# Patient Record
Sex: FEMALE | Race: WHITE | NOT HISPANIC OR LATINO | Employment: OTHER | URBAN - METROPOLITAN AREA
[De-identification: names, ages, dates, MRNs, and addresses within clinical notes are randomized per-mention and may not be internally consistent; named-entity substitution may affect disease eponyms.]

---

## 2017-01-10 ENCOUNTER — RADIANT APPOINTMENT (OUTPATIENT)
Dept: GENERAL RADIOLOGY | Facility: CLINIC | Age: 72
End: 2017-01-10
Attending: PHYSICIAN ASSISTANT
Payer: COMMERCIAL

## 2017-01-10 ENCOUNTER — OFFICE VISIT (OUTPATIENT)
Dept: FAMILY MEDICINE | Facility: CLINIC | Age: 72
End: 2017-01-10
Payer: COMMERCIAL

## 2017-01-10 ENCOUNTER — TELEPHONE (OUTPATIENT)
Dept: FAMILY MEDICINE | Facility: CLINIC | Age: 72
End: 2017-01-10

## 2017-01-10 VITALS
SYSTOLIC BLOOD PRESSURE: 138 MMHG | DIASTOLIC BLOOD PRESSURE: 72 MMHG | TEMPERATURE: 97.1 F | HEART RATE: 79 BPM | WEIGHT: 209 LBS | HEIGHT: 67 IN | BODY MASS INDEX: 32.8 KG/M2 | OXYGEN SATURATION: 98 %

## 2017-01-10 DIAGNOSIS — M79.672 LEFT FOOT PAIN: ICD-10-CM

## 2017-01-10 DIAGNOSIS — S40.021A HEMATOMA OF ARM, RIGHT, INITIAL ENCOUNTER: ICD-10-CM

## 2017-01-10 DIAGNOSIS — E11.9 TYPE 2 DIABETES MELLITUS WITHOUT COMPLICATION, WITHOUT LONG-TERM CURRENT USE OF INSULIN (H): ICD-10-CM

## 2017-01-10 DIAGNOSIS — M79.672 LEFT FOOT PAIN: Primary | ICD-10-CM

## 2017-01-10 LAB — HBA1C MFR BLD: 8.3 % (ref 4.3–6)

## 2017-01-10 PROCEDURE — 83036 HEMOGLOBIN GLYCOSYLATED A1C: CPT | Performed by: PHYSICIAN ASSISTANT

## 2017-01-10 PROCEDURE — 99214 OFFICE O/P EST MOD 30 MIN: CPT | Performed by: PHYSICIAN ASSISTANT

## 2017-01-10 PROCEDURE — 36416 COLLJ CAPILLARY BLOOD SPEC: CPT | Performed by: PHYSICIAN ASSISTANT

## 2017-01-10 PROCEDURE — 73630 X-RAY EXAM OF FOOT: CPT | Mod: LT

## 2017-01-10 NOTE — MR AVS SNAPSHOT
"              After Visit Summary   1/10/2017    Haritha Cat    MRN: [id number]           Patient Information     Date Of Birth          1945        Visit Information        Provider Department      1/10/2017 11:00 AM Amisha Plaza PA-C [facility]        Today's Diagnoses     Hematoma of arm, right, initial encounter    -  1     Left foot pain         Type 2 diabetes mellitus without complication, without long-term current use of insulin (H)            Follow-ups after your visit        Who to contact     If you have questions or need follow up information about today's clinic visit or your schedule please contact [facility] directly at [phone].  Normal or non-critical lab and imaging results will be communicated to you by VideoMining[employer], letter or phone within 4 business days after the clinic has received the results. If you do not hear from us within 7 days, please contact the clinic through VideoMininghart or phone. If you have a critical or abnormal lab result, we will notify you by phone as soon as possible.  Submit refill requests through [employer] or call your pharmacy and they will forward the refill request to us. Please allow 3 business days for your refill to be completed.          Additional Information About Your Visit        MyChart Information     [employer] gives you secure access to your electronic health record. If you see a primary care provider, you can also send messages to your care team and make appointments. If you have questions, please call your primary care clinic.  If you do not have a primary care provider, please call [phone] and they will assist you.        Care EveryWhere ID     This is your Care EveryWhere ID. This could be used by other organizations to access your [place] medical records  [id number]        Your Vitals Were     Pulse Temperature Height BMI (Body Mass Index) Pulse Oximetry Breastfeeding?    79 97.1  F (36.2  C) (Oral) 5' 6.5\" (1.689 m) " 33.23 kg/m2 98% No       Blood Pressure from Last 3 Encounters:   01/10/17 138/72   12/09/16 140/76   10/18/16 140/72    Weight from Last 3 Encounters:   01/10/17 209 lb (94.802 kg)   12/09/16 209 lb 12.8 oz (95.165 kg)   08/25/16 203 lb (92.08 kg)              We Performed the Following     Hemoglobin A1c          Today's Medication Changes          These changes are accurate as of: 1/10/17 12:00 PM.  If you have any questions, ask your nurse or doctor.               These medicines have changed or have updated prescriptions.        Dose/Directions    * metFORMIN 500 MG 24 hr tablet   Commonly known as:  GLUCOPHAGE-XR   This may have changed:  Another medication with the same name was added. Make sure you understand how and when to take each.   Used for:  Type 2 diabetes mellitus without complication, unspecified long term insulin use status (H)   Changed by:  Amisha Plaza PA-C        TAKE ONE TABLET BY MOUTH TWICE DAILY WITH A MEAL   Quantity:  60 tablet   Refills:  3       * metFORMIN 500 MG tablet   Commonly known as:  GLUCOPHAGE   This may have changed:  You were already taking a medication with the same name, and this prescription was added. Make sure you understand how and when to take each.   Used for:  Type 2 diabetes mellitus without complication, without long-term current use of insulin (H)   Changed by:  Amisha Plaza PA-C        Dose:  1000 mg   Take 2 tablets (1,000 mg) by mouth 2 times daily (with meals)   Quantity:  360 tablet   Refills:  1       * Notice:  This list has 2 medication(s) that are the same as other medications prescribed for you. Read the directions carefully, and ask your doctor or other care provider to review them with you.         Where to get your medicines      These medications were sent to [facility] [address] [facility] - [place] - [address]  [address] [address]     Phone:  [phone]    - metFORMIN 500 MG tablet             Primary Care Provider Office  Phone # Fax #    Amisha Plaza PA-C [phone]       [facility] [address] [facility] [address]  [facility] [address]        Thank you!     Thank you for choosing [facility]  for your care. Our goal is always to provide you with excellent care. Hearing back from our patients is one way we can continue to improve our services. Please take a few minutes to complete the written survey that you may receive in the mail after your visit with us. Thank you!             Your Updated Medication List - Protect others around you: Learn how to safely use, store and throw away your medicines at www.disposemymeds.org.          This list is accurate as of: 1/10/17 12:00 PM.  Always use your most recent med list.                   Brand Name Dispense Instructions for use    aspirin 81 MG tablet      Take  by mouth daily.       losartan-hydrochlorothiazide 100-25 MG per tablet    HYZAAR    90 tablet    Take 1 tablet by mouth daily       METAMUCIL SMOOTH TEXTURE 63 % Powd   Generic drug:  psyllium      Take 1 teaspoonful by mouth daily. Mix in 8 ounces of water       * metFORMIN 500 MG 24 hr tablet    GLUCOPHAGE-XR    60 tablet    TAKE ONE TABLET BY MOUTH TWICE DAILY WITH A MEAL       * metFORMIN 500 MG tablet    GLUCOPHAGE    360 tablet    Take 2 tablets (1,000 mg) by mouth 2 times daily (with meals)       metoprolol 100 MG 24 hr tablet    TOPROL-XL    135 tablet    TAKE 1.5 TABLETS BY MOUTH DAILY       naproxen sodium 220 MG tablet    ANAPROX    180 tablet    Take 1 tablet by mouth daily.       omega-3 fatty acids 1200 MG capsule     90 capsule    Take 2 capsules by mouth daily.       STATIN NOT PRESCRIBED (INTENTIONAL)      Statin not prescribed intentionally due to Other LDL at goal; pt doesn't want to add another medication (This option does not exclude patient from measure)       VIACTIV MULTI-VITAMIN Chew      Take 1 chew tab by mouth daily.       * Notice:  This list has 2 medication(s) that are  the same as other medications prescribed for you. Read the directions carefully, and ask your doctor or other care provider to review them with you.

## 2017-01-10 NOTE — PROGRESS NOTES
HPI: 70 yo female here with L foot pain following a fall she took on 1/8/17  She was carrying a big load down her stairs in her home in slippers and slipped on the last step   Pain is across the L forefoot particularly in the L great toe and she has bleeding noted at the base of her great toenail  She also hit the R forearm which is bruised and swollen  She hit the R knee and shin as well and has bruising but normal function and this not affecting her ability to walk like her toe pain is  She had to get a cane and walks with only pressure on the L heel since so much pain in the L forefoot  She is diabetic and due to A1c today.   She has a trip in a few months to hoping to work on diet to lose weight    Past Medical History   Diagnosis Date     DM type 2 (diabetes mellitus, type 2) (H)      Osteopenia      DXA 2008     Abscess of abdominal cavity (H) 11/02     cecal perf, prob from diverticulitis     GERD (gastroesophageal reflux disease)      Hepatitis A      Diverticulosis      Herpes zoster 11/28/11     Elevated LFTs      fatty liver dz     Allergic rhinitis      HTN (hypertension), benign      Osteopenia 2014     Colon polyp 2015     colonoscopy due 5 years.     Past Surgical History   Procedure Laterality Date     Bauxite teeth  age 27     Varicose vein surgery       Dr. Bowen     Social History   Substance Use Topics     Smoking status: Never Smoker      Smokeless tobacco: Never Used     Alcohol Use: 0.0 oz/week     0 Standard drinks or equivalent per week      Comment: 0-3 drinks per week     Current Outpatient Prescriptions   Medication Sig Dispense Refill     metFORMIN (GLUCOPHAGE) 500 MG tablet Take 2 tablets (1,000 mg) by mouth 2 times daily (with meals) 360 tablet 1     order for DME Equipment being ordered: Post op shoe Large 1 each 0     metFORMIN (GLUCOPHAGE-XR) 500 MG 24 hr tablet TAKE ONE TABLET BY MOUTH TWICE DAILY WITH A MEAL 60 tablet 3     losartan-hydrochlorothiazide (HYZAAR) 100-25 MG per  "tablet Take 1 tablet by mouth daily 90 tablet 1     metoprolol (TOPROL-XL) 100 MG 24 hr tablet TAKE 1.5 TABLETS BY MOUTH DAILY 135 tablet 1     STATIN NOT PRESCRIBED, INTENTIONAL, Statin not prescribed intentionally due to Other LDL at goal; pt doesn't want to add another medication (This option does not exclude patient from measure)  0     omega-3 fatty acids 1200 MG capsule Take 2 capsules by mouth daily. 90 capsule      aspirin 81 MG tablet Take  by mouth daily.       naproxen sodium (ANAPROX) 220 MG tablet Take 1 tablet by mouth daily. 180 tablet 3     psyllium (METAMUCIL SMOOTH TEXTURE) 63 % POWD Take 1 teaspoonful by mouth daily. Mix in 8 ounces of water       Multiple Vitamins-Calcium (VIACTIV MULTI-VITAMIN) CHEW Take 1 chew tab by mouth daily.       Allergies   Allergen Reactions     Amoxicillin Hives     Lisinopril      cough     Lotrel Hives       PHYSICAL EXAM:    /72 mmHg  Pulse 79  Temp(Src) 97.1  F (36.2  C) (Oral)  Ht 5' 6.5\" (1.689 m)  Wt 209 lb (94.802 kg)  BMI 33.23 kg/m2  SpO2 98%  Breastfeeding? No    Patient appears non toxic  R forearm with palpable hematoma and large eccymosis.  Small eccymosis R knee and shin  L foot: there is edema and erythema of the forefoot with sig tenderness across the forefoot and in her great toe  Toenail sensitive and partially broken off.  She will likely lose this toenail  No sign of infection or purulent drainage.    Foot xray read by radiology as not fracture, but I question if there is a fx of the great toe   Pt had L foot placed in hard shoe which did give her more comfort as she is not able to wear a normal shoe with the pain and swelling she has    Results for orders placed or performed in visit on 01/10/17   Hemoglobin A1c   Result Value Ref Range    Hemoglobin A1C 8.3 (H) 4.3 - 6.0 %         Assessment and Plan:     (M79.841) Left foot pain  (primary encounter diagnosis)  Comment: ? fx of the great toe? Pt place in hard shoe. She has a cane she " can use as well. She can take otc medications for pain, ice, rest, elevation.  She is at risk of infection given the injury to the great toenail lindsey as a diabetic so asked to f/u right away if develops any purulent drainage, increased redness or swelling or pain  Plan: XR Foot Left G/E 3 Views, order for DME            (S40.021A) Hematoma of arm, right, initial encounter  Comment:   Plan: reassured this likely to resolve.    (E11.9) Type 2 diabetes mellitus without complication, without long-term current use of insulin (H)  Comment: A1c not at goal so doubled her metformin from 500mg bid to 1000mg bid. F/u A1c in 3 months.  Plan: Hemoglobin A1c, metFORMIN (GLUCOPHAGE) 500 MG         tablet              Amisha Plaza PA-C

## 2017-01-10 NOTE — NURSING NOTE
"Chief Complaint   Patient presents with     Fall     Sunday 1-8-17 slipped on inside steps while wearing socks Left  Foot bruised and partial toe nail torn off , Right arm bruised and lump, right knee bruised and painful.       Initial /72 mmHg  Pulse 79  Temp(Src) 97.1  F (36.2  C) (Oral)  Ht 5' 6.5\" (1.689 m)  Wt 209 lb (94.802 kg)  BMI 33.23 kg/m2  SpO2 98%  Breastfeeding? No Estimated body mass index is 33.23 kg/(m^2) as calculated from the following:    Height as of this encounter: 5' 6.5\" (1.689 m).    Weight as of this encounter: 209 lb (94.802 kg).  BP completed using cuff size: large MANUAL     "

## 2017-01-10 NOTE — TELEPHONE ENCOUNTER
Reason for Call:  Form, our goal is to have forms completed with 72 hours, however, some forms may require a visit or additional information.    Type of letter, form or note:  handicap    Who is the form from?: Patient    Where did the form come from: n/a    What clinic location was the form placed at?: [facility]    Where the form was placed: will     What number is listed as a contact on the form?: [phone]        Additional comments: pt came into to clinic and states needs temp handi cap sticker.. If poss. Can it be mailed to here?     Call taken on 1/10/2017 at 12:22 PM by Tarsha Allen

## 2017-01-11 NOTE — TELEPHONE ENCOUNTER
I don't know that she needs this for broken toe  I suspect she will start to feel better pretty quickly so probably not worth going through the paperwork

## 2017-01-11 NOTE — TELEPHONE ENCOUNTER
Per provider, LVM for patient that handicap parking would not be needed for a broken toe that should heal quickly. BROOKLYN Hernandez CMA January 11, 2017 1:44 PM

## 2017-01-11 NOTE — PROGRESS NOTES
Quick Note:    Pat,     The radiologist read your xrays as not fracture, but I'm still suspicious of a possible fracture in the big toe.  Wearing the hard shoe will help either way.  It is also important to continue to ice your foot for at least 20minutes 4 times per day.  Keep the leg elevated and rest the foot as much as possible to speed the healing process along.  Soak the foot in Epson salts daily to prevent infection.    Let me know if you are not improving over the next week or so.     Amisha Plaza PA-C    ______

## 2017-01-18 ENCOUNTER — TELEPHONE (OUTPATIENT)
Dept: FAMILY MEDICINE | Facility: CLINIC | Age: 72
End: 2017-01-18

## 2017-01-18 NOTE — TELEPHONE ENCOUNTER
Reason for call:  Same day appointment Yellow Sheet  Symptom or request: saw Amisha Plaza on 1/10 Hematoma on right arm is rock hard now, red around the area and warm to touch and very painful, foot is doing good, patient was told to call if any change patient  Wonders if Amisha would want to see her today lives [place]minutes from clinic    Duration (how long have symptoms been present): 2weeks    Have you been treated for this before? Yes    Additional comments:     Phone Number patient can be reached at:  Home number on file [phone] (home)    Best Time:  today    Can we leave a detailed message on this number:  YES    Call taken on 1/18/2017 at 8:12 AM by Tessie Cox

## 2017-03-15 DIAGNOSIS — I10 HTN (HYPERTENSION), BENIGN: ICD-10-CM

## 2017-03-15 RX ORDER — METOPROLOL SUCCINATE 100 MG/1
TABLET, EXTENDED RELEASE ORAL
Qty: 135 TABLET | Refills: 2 | Status: SHIPPED | OUTPATIENT
Start: 2017-03-15 | End: 2017-11-15

## 2017-03-15 NOTE — TELEPHONE ENCOUNTER
Metoprolol        Last Written Prescription Date: 09/13/16  Last Fill Quantity: 135, # refills: 1    Last Office Visit with G, P or [facility] prescribing provider:  01/10/17   Future Office Visit:        BP Readings from Last 3 Encounters:   01/10/17 138/72   12/09/16 140/76   10/18/16 140/72     Paulette Armenta MA

## 2017-05-07 DIAGNOSIS — I10 HYPERTENSION: ICD-10-CM

## 2017-05-08 RX ORDER — LOSARTAN POTASSIUM AND HYDROCHLOROTHIAZIDE 25; 100 MG/1; MG/1
TABLET ORAL
Qty: 90 TABLET | Refills: 1 | Status: SHIPPED | OUTPATIENT
Start: 2017-05-08 | End: 2017-07-13

## 2017-05-08 NOTE — TELEPHONE ENCOUNTER
losartan-hydrochlorothiazide (HYZAAR) 100-25 MG per tablet        Last Written Prescription Date: 11/7/2016  Last Fill Quantity: 90, # refills: 1  Last Office Visit with [facility], [facility] or [facility] prescribing provider: 1/10/2017       Potassium   Date Value Ref Range Status   08/19/2016 3.9 3.4 - 5.3 mmol/L Final     Creatinine   Date Value Ref Range Status   08/19/2016 0.83 0.52 - 1.04 mg/dL Final     BP Readings from Last 3 Encounters:   01/10/17 138/72   12/09/16 140/76   10/18/16 140/72

## 2017-07-11 ENCOUNTER — OFFICE VISIT (OUTPATIENT)
Dept: FAMILY MEDICINE | Facility: CLINIC | Age: 72
End: 2017-07-11
Payer: COMMERCIAL

## 2017-07-11 VITALS
HEART RATE: 79 BPM | OXYGEN SATURATION: 97 % | TEMPERATURE: 98 F | WEIGHT: 198.8 LBS | HEIGHT: 67 IN | BODY MASS INDEX: 31.2 KG/M2 | SYSTOLIC BLOOD PRESSURE: 120 MMHG | DIASTOLIC BLOOD PRESSURE: 72 MMHG

## 2017-07-11 DIAGNOSIS — F40.298 NEEDLE PHOBIA: ICD-10-CM

## 2017-07-11 DIAGNOSIS — R10.31 BILATERAL LOWER ABDOMINAL CRAMPING: ICD-10-CM

## 2017-07-11 DIAGNOSIS — R50.9 FEVER, UNSPECIFIED: ICD-10-CM

## 2017-07-11 DIAGNOSIS — R55 VASOVAGAL SYNCOPE: ICD-10-CM

## 2017-07-11 DIAGNOSIS — F43.9 SITUATIONAL STRESS: ICD-10-CM

## 2017-07-11 DIAGNOSIS — I10 HTN (HYPERTENSION), BENIGN: ICD-10-CM

## 2017-07-11 DIAGNOSIS — R53.83 FATIGUE, UNSPECIFIED TYPE: ICD-10-CM

## 2017-07-11 DIAGNOSIS — E11.9 TYPE 2 DIABETES MELLITUS WITHOUT COMPLICATION, WITHOUT LONG-TERM CURRENT USE OF INSULIN (H): Primary | ICD-10-CM

## 2017-07-11 DIAGNOSIS — R10.32 BILATERAL LOWER ABDOMINAL CRAMPING: ICD-10-CM

## 2017-07-11 LAB
ANION GAP SERPL CALCULATED.3IONS-SCNC: 12 MMOL/L (ref 3–14)
BUN SERPL-MCNC: 13 MG/DL (ref 7–30)
CALCIUM SERPL-MCNC: 9.4 MG/DL (ref 8.5–10.1)
CHLORIDE SERPL-SCNC: 90 MMOL/L (ref 94–109)
CO2 SERPL-SCNC: 23 MMOL/L (ref 20–32)
CREAT SERPL-MCNC: 0.86 MG/DL (ref 0.52–1.04)
ERYTHROCYTE [DISTWIDTH] IN BLOOD BY AUTOMATED COUNT: 11.9 % (ref 10–15)
GFR SERPL CREATININE-BSD FRML MDRD: 65 ML/MIN/1.7M2
GLUCOSE SERPL-MCNC: 163 MG/DL (ref 70–99)
HBA1C MFR BLD: 7.2 % (ref 4.3–6)
HCT VFR BLD AUTO: 43.6 % (ref 35–47)
HGB BLD-MCNC: 15.2 G/DL (ref 11.7–15.7)
MCH RBC QN AUTO: 31 PG (ref 26.5–33)
MCHC RBC AUTO-ENTMCNC: 34.9 G/DL (ref 31.5–36.5)
MCV RBC AUTO: 89 FL (ref 78–100)
PLATELET # BLD AUTO: 151 10E9/L (ref 150–450)
POTASSIUM SERPL-SCNC: 4 MMOL/L (ref 3.4–5.3)
RBC # BLD AUTO: 4.91 10E12/L (ref 3.8–5.2)
SODIUM SERPL-SCNC: 125 MMOL/L (ref 133–144)
TSH SERPL DL<=0.005 MIU/L-ACNC: 3.15 MU/L (ref 0.4–4)
WBC # BLD AUTO: 5.1 10E9/L (ref 4–11)

## 2017-07-11 PROCEDURE — 86618 LYME DISEASE ANTIBODY: CPT | Performed by: PHYSICIAN ASSISTANT

## 2017-07-11 PROCEDURE — 85027 COMPLETE CBC AUTOMATED: CPT | Performed by: PHYSICIAN ASSISTANT

## 2017-07-11 PROCEDURE — 80048 BASIC METABOLIC PNL TOTAL CA: CPT | Performed by: PHYSICIAN ASSISTANT

## 2017-07-11 PROCEDURE — 99214 OFFICE O/P EST MOD 30 MIN: CPT | Performed by: PHYSICIAN ASSISTANT

## 2017-07-11 PROCEDURE — 36415 COLL VENOUS BLD VENIPUNCTURE: CPT | Performed by: PHYSICIAN ASSISTANT

## 2017-07-11 PROCEDURE — 83036 HEMOGLOBIN GLYCOSYLATED A1C: CPT | Performed by: PHYSICIAN ASSISTANT

## 2017-07-11 PROCEDURE — 84443 ASSAY THYROID STIM HORMONE: CPT | Performed by: PHYSICIAN ASSISTANT

## 2017-07-11 NOTE — PROGRESS NOTES
Pat,      Your sodium is very low.  Stop the hyzaar. Avoid drinking plain water, start drinking Gatorade or Pedialyte to correct this.  Return on Thursday to have this rechecked. I will see you at 2pm for a follow up appointment that day.  Your thyroid test was normal.    Amisha Plaza PA-C

## 2017-07-11 NOTE — PROGRESS NOTES
HPI: 73 yo female here with loss of appetite, feels tired, HA behind her eyes  She is needing to take naps in the mornings.  She is diabetic and has not been checking her blood sugars.  Her 43 yo son had a seizure in June and brain bleed and now she is helping to take care of him  His kids come every other weekend  He is able to walk and talk but can't work or drive  She has post nasal drip and runny nose. The mucus is clear.  This started in the past few weeks.  She has cough as well x few weeks prod of thick sputum  Denies sick contacts  Not sneezing, no sore throat  She had a temp yesterday 101.  Denies  sxs  Has some lower abd pain; crampy, BMs normal, not loose and no blood      Past Medical History:   Diagnosis Date     Abscess of abdominal cavity (H) 11/02    cecal perf, prob from diverticulitis     Allergic rhinitis      Colon polyp 2015    colonoscopy due 5 years.     Diverticulosis      DM type 2 (diabetes mellitus, type 2) (H)      Elevated LFTs     fatty liver dz     GERD (gastroesophageal reflux disease)      Hepatitis A      Herpes zoster 11/28/11     HTN (hypertension), benign      Osteopenia     DXA 2008     Osteopenia 2014     Past Surgical History:   Procedure Laterality Date     varicose vein surgery      Dr. Bowen     wisdom teeth  age 27     Social History   Substance Use Topics     Smoking status: Never Smoker     Smokeless tobacco: Never Used     Alcohol use 0.0 oz/week     0 Standard drinks or equivalent per week      Comment: 0-3 drinks per week     Current Outpatient Prescriptions   Medication Sig Dispense Refill     losartan-hydrochlorothiazide (HYZAAR) 100-25 MG per tablet TAKE ONE TABLET BY MOUTH ONE TIME DAILY 90 tablet 1     metoprolol (TOPROL-XL) 100 MG 24 hr tablet TAKE 1.5 TABLETS BY MOUTH DAILY 135 tablet 2     metFORMIN (GLUCOPHAGE) 500 MG tablet Take 2 tablets (1,000 mg) by mouth 2 times daily (with meals) 360 tablet 1     order for DME Equipment being ordered: Post op shoe  "Large 1 each 0     STATIN NOT PRESCRIBED, INTENTIONAL, Statin not prescribed intentionally due to Other LDL at goal; pt doesn't want to add another medication (This option does not exclude patient from measure)  0     omega-3 fatty acids 1200 MG capsule Take 2 capsules by mouth daily. 90 capsule      aspirin 81 MG tablet Take  by mouth daily.       naproxen sodium (ANAPROX) 220 MG tablet Take 1 tablet by mouth daily. 180 tablet 3     psyllium (METAMUCIL SMOOTH TEXTURE) 63 % POWD Take 1 teaspoonful by mouth daily. Mix in 8 ounces of water       Multiple Vitamins-Calcium (VIACTIV MULTI-VITAMIN) CHEW Take 1 chew tab by mouth daily.       [DISCONTINUED] metFORMIN (GLUCOPHAGE-XR) 500 MG 24 hr tablet TAKE ONE TABLET BY MOUTH TWICE DAILY WITH A MEAL 60 tablet 3     [DISCONTINUED] losartan-hydrochlorothiazide (HYZAAR) 100-25 MG per tablet Take 1 tablet by mouth daily 90 tablet 1     Allergies   Allergen Reactions     Amoxicillin Hives     Lisinopril      cough     Lotrel Hives     FAMILY HISTORY NOTED AND REVIEWED  PHYSICAL EXAM:    /72 (BP Location: Right arm, Patient Position: Chair, Cuff Size: Adult Large)  Pulse 79  Temp 98  F (36.7  C) (Oral)  Ht 5' 6.5\" (1.689 m)  Wt 198 lb 12.8 oz (90.2 kg)  SpO2 97%  BMI 31.61 kg/m2    EXAM:  GENERAL APPEARANCE: tired  EYES: Eyes grossly normal to inspection, conjunctivae and sclerae normal  HENT: ear canals and TM's normal and nose and mouth without ulcers or lesions  NECK: no adenopathy, no asymmetry, masses, or scars and thyroid normal to palpation  RESP: lungs clear to auscultation - no rales, rhonchi or wheezes  CV: regular rates and rhythm, normal S1 S2, no S3 or S4 and no murmur, click or rub  SKIN: no suspicious lesions or rashes    Results for orders placed or performed in visit on 07/11/17   HEMOGLOBIN A1C   Result Value Ref Range    Hemoglobin A1C 7.2 (H) 4.3 - 6.0 %   CBC with platelets   Result Value Ref Range    WBC 5.1 4.0 - 11.0 10e9/L    RBC Count 4.91 " 3.8 - 5.2 10e12/L    Hemoglobin 15.2 11.7 - 15.7 g/dL    Hematocrit 43.6 35.0 - 47.0 %    MCV 89 78 - 100 fl    MCH 31.0 26.5 - 33.0 pg    MCHC 34.9 31.5 - 36.5 g/dL    RDW 11.9 10.0 - 15.0 %    Platelet Count 151 150 - 450 10e9/L         Assessment and Plan:     (E11.9) Type 2 diabetes mellitus without complication, without long-term current use of insulin (H)  (primary encounter diagnosis)  Comment:   Plan: HEMOGLOBIN A1C        Improved.    (R53.83) Fatigue, unspecified type  Comment: ? With fever and sinus sxs may have sinus infection. Will await labs, but if normal, consider course of antibiotics.  For now start Allegra 180mg qd.  Plan: TSH with free T4 reflex            (F43.9) Situational stress  Comment:   Plan: son with TBI following head injury after seizure.    (I10) HTN (hypertension), benign  Comment: well controlled  Plan: Basic metabolic panel            (R10.31,  R10.32) Bilateral lower abdominal cramping  Comment:   Plan: *UA reflex to Microscopic, CANCELED: *UA reflex        to Microscopic            (R50.9) Fever, unspecified  Comment:   Plan: CBC with platelets, Lyme Disease Maye with         reflex to WB Serum            (R55) Vasovagal syncope  Comment: following her blood draw today  Plan: Pt allowed to rest in pt room and feels well enough to go home    (F40.298) Needle phobia  Comment:   Plan: next time should stay in pt room and be supine for blood draw.      Amisha Plaza PA-C

## 2017-07-11 NOTE — MR AVS SNAPSHOT
After Visit Summary   7/11/2017    Haritha Cat    MRN: [id number]           Patient Information     Date Of Birth          1945        Visit Information        Provider Department      7/11/2017 12:00 PM Amisha Plaza PA-C [facility]        Today's Diagnoses     Type 2 diabetes mellitus without complication, without long-term current use of insulin (H)    -  1    Fatigue, unspecified type        Situational stress        HTN (hypertension), benign        Bilateral lower abdominal cramping        Fever, unspecified          Care Instructions      Preventive Health Recommendations    Female Ages 65 +    Yearly exam:     See your health care provider every year in order to  o Review health changes.   o Discuss preventive care.    o Review your medicines if your doctor has prescribed any.      You no longer need a yearly Pap test unless you've had an abnormal Pap test in the past 10 years. If you have vaginal symptoms, such as bleeding or discharge, be sure to talk with your provider about a Pap test.      Every 1 to 2 years, have a mammogram.  If you are over 69, talk with your health care provider about whether or not you want to continue having screening mammograms.      Every 10 years, have a colonoscopy. Or, have a yearly FIT test (stool test). These exams will check for colon cancer.       Have a cholesterol test every 5 years, or more often if your doctor advises it.       Have a diabetes test (fasting glucose) every three years. If you are at risk for diabetes, you should have this test more often.       At age 65, have a bone density scan (DEXA) to check for osteoporosis (brittle bone disease).    Shots:    Get a flu shot each year.    Get a tetanus shot every 10 years.    Talk to your doctor about your pneumonia vaccines. There are now two you should receive - Pneumovax (PPSV 23) and Prevnar (PCV 13).    Talk to your doctor about the shingles vaccine.    Talk to your  doctor about the hepatitis B vaccine.    Nutrition:     Eat at least 5 servings of fruits and vegetables each day.      Eat whole-grain bread, whole-wheat pasta and brown rice instead of white grains and rice.      Talk to your provider about Calcium and Vitamin D.     Lifestyle    Exercise at least 150 minutes a week (30 minutes a day, 5 days a week). This will help you control your weight and prevent disease.      Limit alcohol to one drink per day.      No smoking.       Wear sunscreen to prevent skin cancer.       See your dentist twice a year for an exam and cleaning.      See your eye doctor every 1 to 2 years to screen for conditions such as glaucoma, macular degeneration and cataracts.    Start Allegra 180mg daily          Follow-ups after your visit        Your next 10 appointments already scheduled     Sep 07, 2017 11:00 AM CDT   LAB with CS LAB   [facility]    [address] [id number] [phone]           Patient must bring picture ID.  Patient should be prepared to give a urine specimen  Please do not eat 10-12 hours before your appointment if you are coming in fasting for labs on lipids, cholesterol, or glucose (sugar).  Pregnant women should follow their Care Team instructions. Water with medications is okay. Do not drink coffee or other fluids.   If you have concerns about taking  your medications, please ask at office or if scheduling via [facility], send a message by clicking on Secure Messaging, Message Your Care Team.            Sep 07, 2017 12:00 PM CDT   [facility]-Op physical with Amisha Plaza [facility]-C   [facility]    [address] [id number] [phone]            Sep 14, 2017 12:00 PM CDT   PHYSICAL with Amisha Plaza PA-C   [facility]    [address] [id number] [phone]              Who to contact     If you have  "questions or need follow up information about today's clinic visit or your schedule please contact [facility] directly at [phone].  Normal or non-critical lab and imaging results will be communicated to you by MyChart, letter or phone within 4 business days after the clinic has received the results. If you do not hear from us within 7 days, please contact the clinic through [facility] or phone. If you have a critical or abnormal lab result, we will notify you by phone as soon as possible.  Submit refill requests through [email or web address] or call your pharmacy and they will forward the refill request to us. Please allow 3 business days for your refill to be completed.          Additional Information About Your Visit        [facility] Information     [email or web address] gives you secure access to your electronic health record. If you see a primary care provider, you can also send messages to your care team and make appointments. If you have questions, please call your primary care clinic.  If you do not have a primary care provider, please call [phone] and they will assist you.        Care EveryWhere ID     This is your Care EveryWhere ID. This could be used by other organizations to access your [place] medical records  [id number]        Your Vitals Were     Pulse Temperature Height Pulse Oximetry BMI (Body Mass Index)       79 98  F (36.7  C) (Oral) 5' 6.5\" (1.689 m) 97% 31.61 kg/m2        Blood Pressure from Last 3 Encounters:   07/11/17 120/72   01/10/17 138/72   12/09/16 140/76    Weight from Last 3 Encounters:   07/11/17 198 lb 12.8 oz (90.2 kg)   01/10/17 209 lb (94.8 kg)   12/09/16 209 lb 12.8 oz (95.2 kg)              We Performed the Following     Basic metabolic panel     CBC with platelets     HEMOGLOBIN A1C     Lyme Disease Maye with reflex to WB Serum     TSH with free T4 reflex          Today's Medication Changes          These changes are accurate as of: 7/11/17 12:24 PM.  If you have any questions, ask " your nurse or doctor.               These medicines have changed or have updated prescriptions.        Dose/Directions    losartan-hydrochlorothiazide 100-25 MG per tablet   Commonly known as:  HYZAAR   This may have changed:  Another medication with the same name was removed. Continue taking this medication, and follow the directions you see here.   Used for:  Hypertension   Changed by:  Amisha Plaza PA-C        TAKE ONE TABLET BY MOUTH ONE TIME DAILY   Quantity:  90 tablet   Refills:  1       metFORMIN 500 MG tablet   Commonly known as:  GLUCOPHAGE   This may have changed:  Another medication with the same name was removed. Continue taking this medication, and follow the directions you see here.   Used for:  Type 2 diabetes mellitus without complication, without long-term current use of insulin (H)   Changed by:  Amisha Plaza PA-C        Dose:  1000 mg   Take 2 tablets (1,000 mg) by mouth 2 times daily (with meals)   Quantity:  360 tablet   Refills:  1                Primary Care Provider Office Phone # Fax #    Amisha Plaza PA-C [phone]       [facility] [address]  [facility] [place] [address]        Equal Access to Services     SAGAR LUCIO : Hadii cheri alegre Somitzi, waaxda luyanni, qaybta kaalmartha lozada, evin miranda . So [facility] [phone].    ATENCIÓN: Si habla español, tiene a law disposición servicios gratuitos de asistencia lingüística. [facility] [phone].    We comply with applicable federal civil rights laws and [place] laws. We do not discriminate on the basis of race, color, national origin, age, disability sex, sexual orientation or gender identity.            Thank you!     Thank you for choosing [facility]  for your care. Our goal is always to provide you with excellent care. Hearing back from our patients is one way we can continue to improve our services. Please take a few minutes to complete the  written survey that you may receive in the mail after your visit with us. Thank you!             Your Updated Medication List - Protect others around you: Learn how to safely use, store and throw away your medicines at www.disposemymeds.org.          This list is accurate as of: 7/11/17 12:24 PM.  Always use your most recent med list.                   Brand Name Dispense Instructions for use Diagnosis    aspirin 81 MG tablet      Take  by mouth daily.        losartan-hydrochlorothiazide 100-25 MG per tablet    HYZAAR    90 tablet    TAKE ONE TABLET BY MOUTH ONE TIME DAILY    Hypertension       METAMUCIL SMOOTH TEXTURE 63 % Powd   Generic drug:  psyllium      Take 1 teaspoonful by mouth daily. Mix in 8 ounces of water        metFORMIN 500 MG tablet    GLUCOPHAGE    360 tablet    Take 2 tablets (1,000 mg) by mouth 2 times daily (with meals)    Type 2 diabetes mellitus without complication, without long-term current use of insulin (H)       metoprolol 100 MG 24 hr tablet    TOPROL-XL    135 tablet    TAKE 1.5 TABLETS BY MOUTH DAILY    HTN (hypertension), benign       naproxen sodium 220 MG tablet    ANAPROX    180 tablet    Take 1 tablet by mouth daily.        omega-3 fatty acids 1200 MG capsule     90 capsule    Take 2 capsules by mouth daily.        order for DME     1 each    Equipment being ordered: Post op shoe Large    Left foot pain       STATIN NOT PRESCRIBED (INTENTIONAL)      Statin not prescribed intentionally due to Other LDL at goal; pt doesn't want to add another medication (This option does not exclude patient from measure)    Hyperlipidemia LDL goal <100       VIACTIV MULTI-VITAMIN Chew      Take 1 chew tab by mouth daily.

## 2017-07-11 NOTE — PATIENT INSTRUCTIONS

## 2017-07-11 NOTE — NURSING NOTE
"Chief Complaint   Patient presents with     Headache       Initial /72 (BP Location: Right arm, Patient Position: Chair, Cuff Size: Adult Large)  Pulse 79  Temp 98  F (36.7  C) (Oral)  Ht 5' 6.5\" (1.689 m)  Wt 198 lb 12.8 oz (90.2 kg)  SpO2 97%  BMI 31.61 kg/m2 Estimated body mass index is 31.61 kg/(m^2) as calculated from the following:    Height as of this encounter: 5' 6.5\" (1.689 m).    Weight as of this encounter: 198 lb 12.8 oz (90.2 kg).  Medication Reconciliation: complete   Sherrell Fung MA  "

## 2017-07-12 LAB — B BURGDOR IGG+IGM SER QL: 0.02 (ref 0–0.89)

## 2017-07-12 ASSESSMENT — PATIENT HEALTH QUESTIONNAIRE - PHQ9: SUM OF ALL RESPONSES TO PHQ QUESTIONS 1-9: 12

## 2017-07-13 ENCOUNTER — OFFICE VISIT (OUTPATIENT)
Dept: FAMILY MEDICINE | Facility: CLINIC | Age: 72
End: 2017-07-13
Payer: COMMERCIAL

## 2017-07-13 VITALS
DIASTOLIC BLOOD PRESSURE: 69 MMHG | HEART RATE: 69 BPM | WEIGHT: 199.4 LBS | BODY MASS INDEX: 31.3 KG/M2 | SYSTOLIC BLOOD PRESSURE: 127 MMHG | HEIGHT: 67 IN | TEMPERATURE: 97 F | OXYGEN SATURATION: 98 %

## 2017-07-13 DIAGNOSIS — E87.1 HYPONATREMIA: Primary | ICD-10-CM

## 2017-07-13 DIAGNOSIS — E11.9 TYPE 2 DIABETES MELLITUS WITHOUT COMPLICATION, WITHOUT LONG-TERM CURRENT USE OF INSULIN (H): ICD-10-CM

## 2017-07-13 DIAGNOSIS — R10.32 BILATERAL LOWER ABDOMINAL CRAMPING: ICD-10-CM

## 2017-07-13 DIAGNOSIS — I10 BENIGN ESSENTIAL HYPERTENSION: ICD-10-CM

## 2017-07-13 DIAGNOSIS — R10.31 BILATERAL LOWER ABDOMINAL CRAMPING: ICD-10-CM

## 2017-07-13 LAB
ALBUMIN UR-MCNC: NEGATIVE MG/DL
APPEARANCE UR: CLEAR
BILIRUB UR QL STRIP: NEGATIVE
COLOR UR AUTO: YELLOW
GLUCOSE UR STRIP-MCNC: NEGATIVE MG/DL
HGB UR QL STRIP: NEGATIVE
KETONES UR STRIP-MCNC: NEGATIVE MG/DL
LEUKOCYTE ESTERASE UR QL STRIP: NEGATIVE
NITRATE UR QL: NEGATIVE
PH UR STRIP: 5.5 PH (ref 5–7)
SP GR UR STRIP: <=1.005 (ref 1–1.03)
URN SPEC COLLECT METH UR: NORMAL
UROBILINOGEN UR STRIP-ACNC: 0.2 EU/DL (ref 0.2–1)

## 2017-07-13 PROCEDURE — 99213 OFFICE O/P EST LOW 20 MIN: CPT | Performed by: PHYSICIAN ASSISTANT

## 2017-07-13 PROCEDURE — 84295 ASSAY OF SERUM SODIUM: CPT | Performed by: PHYSICIAN ASSISTANT

## 2017-07-13 PROCEDURE — 81003 URINALYSIS AUTO W/O SCOPE: CPT | Performed by: PHYSICIAN ASSISTANT

## 2017-07-13 PROCEDURE — 36415 COLL VENOUS BLD VENIPUNCTURE: CPT | Performed by: PHYSICIAN ASSISTANT

## 2017-07-13 RX ORDER — LOSARTAN POTASSIUM 100 MG/1
100 TABLET ORAL DAILY
Qty: 90 TABLET | Refills: 1 | Status: SHIPPED | OUTPATIENT
Start: 2017-07-13 | End: 2017-11-15

## 2017-07-13 NOTE — NURSING NOTE
"Chief Complaint   Patient presents with     Follow Up For     possible dehydration        Initial /69 (BP Location: Left arm, Patient Position: Chair, Cuff Size: Adult Large)  Pulse 69  Temp 97  F (36.1  C) (Tympanic)  Ht 5' 6.5\" (1.689 m)  Wt 199 lb 6.4 oz (90.4 kg)  SpO2 98%  BMI 31.7 kg/m2 Estimated body mass index is 31.7 kg/(m^2) as calculated from the following:    Height as of this encounter: 5' 6.5\" (1.689 m).    Weight as of this encounter: 199 lb 6.4 oz (90.4 kg)..    BP completed using cuff size: large  MEDICATIONS REVIEWED  SOCIAL AND FAMILY HX REVIEWED  Yomaira Rajan CMA  "

## 2017-07-13 NOTE — TELEPHONE ENCOUNTER
metFORMIN (GLUCOPHAGE) 500 MG tablet 360 tablet 1 1/10/2017  --   Sig: Take 2 tablets (1,000 mg) by mouth 2 times daily (with meals)              Last Written Prescription Date: 01/10/2017  Last Fill Quantity: 360, # refills: 1  Last Office Visit with [facility], [facility] or [facility] prescribing provider:  07/11/2017   Next 5 appointments (look out 90 days)     Jul 13, 2017  2:00 PM CDT   Office Visit with Amisha Plaza PA-C   [facility]    [address] [address]   [phone]            Sep 07, 2017 12:00 PM CDT   Pre-Op physical with Amisha Plaza PA-C   [facility]    [address] [address]   [phone]            Sep 14, 2017 12:00 PM CDT   PHYSICAL with Amsiha Plaza PA-C   [facility]    [address] [address]   [phone]                   BP Readings from Last 3 Encounters:   07/11/17 120/72   01/10/17 138/72   12/09/16 140/76     Lab Results   Component Value Date    MICROL 6 08/19/2016     Lab Results   Component Value Date    UMALCR 6.46 08/19/2016     Creatinine   Date Value Ref Range Status   07/11/2017 0.86 0.52 - 1.04 mg/dL Final   ]  GFR Estimate   Date Value Ref Range Status   07/11/2017 65 >60 mL/min/1.7m2 Final     Comment:     Non  GFR Calc   08/19/2016 68 >60 mL/min/1.7m2 Final     Comment:     Non  GFR Calc   08/10/2015 61 >60 mL/min/1.7m2 Final     Comment:     Non  GFR Calc     GFR Estimate If Black   Date Value Ref Range Status   07/11/2017 79 >60 mL/min/1.7m2 Final     Comment:      GFR Calc   08/19/2016 82 >60 mL/min/1.7m2 Final     Comment:      GFR Calc   08/10/2015 74 >60 mL/min/1.7m2 Final     Comment:      GFR Calc     Lab Results   Component Value Date    CHOL 167 08/19/2016     Lab Results   Component Value Date    HDL  47 08/19/2016     Lab Results   Component Value Date    LDL 91 08/19/2016     Lab Results   Component Value Date    TRIG 143 08/19/2016     Lab Results   Component Value Date    CHOLHDLRATIO 3.0 08/10/2015     Lab Results   Component Value Date    AST 26 08/19/2016     Lab Results   Component Value Date    ALT 41 08/19/2016     Lab Results   Component Value Date    A1C 7.2 07/11/2017    A1C 8.3 01/10/2017    A1C 7.4 08/19/2016    A1C 7.1 08/10/2015    A1C 6.6 07/28/2014     Potassium   Date Value Ref Range Status   07/11/2017 4.0 3.4 - 5.3 mmol/L Final

## 2017-07-13 NOTE — MR AVS SNAPSHOT
After Visit Summary   7/13/2017    Haritha Cat    MRN: [id number]           Patient Information     Date Of Birth          1945        Visit Information        Provider Department      7/13/2017 2:00 PM Amisha Plaza PA-C [facility]        Today's Diagnoses     Hyponatremia    -  1    Benign essential hypertension           Follow-ups after your visit        Your next 10 appointments already scheduled     Sep 07, 2017 12:00 PM CDT   Pre-Op physical with Amisha Plaza PA-C   [facility]    [address] [id number] [phone]            Oct 12, 2017  8:00 AM CDT   LAB with CS LAB   [facility]    [address] [address] [phone]           Patient must bring picture ID.  Patient should be prepared to give a urine specimen  Please do not eat 10-12 hours before your appointment if you are coming in fasting for labs on lipids, cholesterol, or glucose (sugar).  Pregnant women should follow their Care Team instructions. Water with medications is okay. Do not drink coffee or other fluids.   If you have concerns about taking  your medications, please ask at office or if scheduling via [email or web address], send a message by clicking on Secure Messaging, Message Your Care Team.            Oct 19, 2017 12:00 PM CDT   PHYSICAL with Amisha Plaza PA-C   [facility]    [address] [address] [phone]              Who to contact     If you have questions or need follow up information about today's clinic visit or your schedule please contact [facility] directly at [phone].  Normal or non-critical lab and imaging results will be communicated to you by MyChart, letter or phone within 4 business days after the clinic has received the results. If you do not hear from us within 7 days, please contact  "the clinic through [employer] or phone. If you have a critical or abnormal lab result, we will notify you by phone as soon as possible.  Submit refill requests through [employer] or call your pharmacy and they will forward the refill request to us. Please allow 3 business days for your refill to be completed.          Additional Information About Your Visit        [employer] Information     [employer] gives you secure access to your electronic health record. If you see a primary care provider, you can also send messages to your care team and make appointments. If you have questions, please call your primary care clinic.  If you do not have a primary care provider, please call [phone] and they will assist you.        Care EveryWhere ID     This is your Care EveryWhere ID. This could be used by other organizations to access your [place] medical records  [id number]        Your Vitals Were     Pulse Temperature Height Pulse Oximetry BMI (Body Mass Index)       69 97  F (36.1  C) (Tympanic) 5' 6.5\" (1.689 m) 98% 31.7 kg/m2        Blood Pressure from Last 3 Encounters:   07/13/17 127/69   07/11/17 120/72   01/10/17 138/72    Weight from Last 3 Encounters:   07/13/17 199 lb 6.4 oz (90.4 kg)   07/11/17 198 lb 12.8 oz (90.2 kg)   01/10/17 209 lb (94.8 kg)              We Performed the Following     Sodium          Today's Medication Changes          These changes are accurate as of: 7/13/17  2:18 PM.  If you have any questions, ask your nurse or doctor.               Start taking these medicines.        Dose/Directions    losartan 100 MG tablet   Commonly known as:  COZAAR   Used for:  Benign essential hypertension   Started by:  Amisha Plaza PA-C        Dose:  100 mg   Take 1 tablet (100 mg) by mouth daily   Quantity:  90 tablet   Refills:  1         These medicines have changed or have updated prescriptions.        Dose/Directions    metFORMIN 500 MG tablet   Commonly known as:  GLUCOPHAGE   This may have changed:  See " the new instructions.   Used for:  Type 2 diabetes mellitus without complication, without long-term current use of insulin (H)   Changed by:  Amisha Plaza PA-C        TAKE 2 TABLETS (1,000 MG) BY MOUTH 2 TIMES DAILY (WITH MEALS)   Quantity:  360 tablet   Refills:  1         Stop taking these medicines if you haven't already. Please contact your care team if you have questions.     losartan-hydrochlorothiazide 100-25 MG per tablet   Commonly known as:  HYZAAR   Stopped by:  Amisha Plaza PA-C                Where to get your medicines      These medications were sent to [facility] [address] [facility] - [place], [place] - [address]  [address], [place] [address]     Phone:  [phone]     losartan 100 MG tablet    metFORMIN 500 MG tablet                Primary Care Provider Office Phone # Fax #    Amisha Plaza PA-C [phone]       [facility] [address]  [facility] [address]        Equal Access to Services     SAGAR LUCIO : Hadii aad ku hadasho Soomaali, waaxda luqadaha, qaybta kaalmada adeegyada, waxay idiin haymartn jude miranda . So [facility] [phone].    ATENCIÓN: Si habla español, tiene a law disposición servicios gratuitos de asistencia lingüística. Llame al [phone].    We comply with applicable federal civil rights laws and [place] laws. We do not discriminate on the basis of race, color, national origin, age, disability sex, sexual orientation or gender identity.            Thank you!     Thank you for choosing [facility]  for your care. Our goal is always to provide you with excellent care. Hearing back from our patients is one way we can continue to improve our services. Please take a few minutes to complete the written survey that you may receive in the mail after your visit with us. Thank you!             Your Updated Medication List - Protect others around you: Learn how to safely use, store and throw away your medicines at  www.disposemymeds.org.          This list is accurate as of: 7/13/17  2:18 PM.  Always use your most recent med list.                   Brand Name Dispense Instructions for use Diagnosis    aspirin 81 MG tablet      Take  by mouth daily.        losartan 100 MG tablet    COZAAR    90 tablet    Take 1 tablet (100 mg) by mouth daily    Benign essential hypertension       METAMUCIL SMOOTH TEXTURE 63 % Powd   Generic drug:  psyllium      Take 1 teaspoonful by mouth daily. Mix in 8 ounces of water        metFORMIN 500 MG tablet    GLUCOPHAGE    360 tablet    TAKE 2 TABLETS (1,000 MG) BY MOUTH 2 TIMES DAILY (WITH MEALS)    Type 2 diabetes mellitus without complication, without long-term current use of insulin (H)       metoprolol 100 MG 24 hr tablet    TOPROL-XL    135 tablet    TAKE 1.5 TABLETS BY MOUTH DAILY    HTN (hypertension), benign       naproxen sodium 220 MG tablet    ANAPROX    180 tablet    Take 1 tablet by mouth daily.        omega-3 fatty acids 1200 MG capsule     90 capsule    Take 2 capsules by mouth daily.        order for DME     1 each    Equipment being ordered: Post op shoe Large    Left foot pain       STATIN NOT PRESCRIBED (INTENTIONAL)      Statin not prescribed intentionally due to Other LDL at goal; pt doesn't want to add another medication (This option does not exclude patient from measure)    Hyperlipidemia LDL goal <100       VIACTIV MULTI-VITAMIN Chew      Take 1 chew tab by mouth daily.

## 2017-07-13 NOTE — PROGRESS NOTES
"HPI: Pt here for f/u HA and extreme fatigue  She was found to have low sodium 2 days ago so we stopped her hyzaar and pushed Gatorade and now feeling \"back to normal\" without any sxs  Her appetite has returned and energy improved.    Past Medical History:   Diagnosis Date     Abscess of abdominal cavity (H) 11/02    cecal perf, prob from diverticulitis     Allergic rhinitis      Colon polyp 2015    colonoscopy due 5 years.     Diverticulosis      DM type 2 (diabetes mellitus, type 2) (H)      Elevated LFTs     fatty liver dz     GERD (gastroesophageal reflux disease)      Hepatitis A      Herpes zoster 11/28/11     HTN (hypertension), benign      Osteopenia     DXA 2008     Osteopenia 2014     Past Surgical History:   Procedure Laterality Date     varicose vein surgery      Dr. Bowen     wisdom teeth  age 27     Social History   Substance Use Topics     Smoking status: Never Smoker     Smokeless tobacco: Never Used     Alcohol use 0.0 oz/week     0 Standard drinks or equivalent per week      Comment: 0-3 drinks per week     Current Outpatient Prescriptions   Medication Sig Dispense Refill     metFORMIN (GLUCOPHAGE) 500 MG tablet TAKE 2 TABLETS (1,000 MG) BY MOUTH 2 TIMES DAILY (WITH MEALS) 360 tablet 1     losartan (COZAAR) 100 MG tablet Take 1 tablet (100 mg) by mouth daily 90 tablet 1     metoprolol (TOPROL-XL) 100 MG 24 hr tablet TAKE 1.5 TABLETS BY MOUTH DAILY 135 tablet 2     order for DME Equipment being ordered: Post op shoe Large 1 each 0     [DISCONTINUED] metFORMIN (GLUCOPHAGE) 500 MG tablet Take 2 tablets (1,000 mg) by mouth 2 times daily (with meals) 360 tablet 1     STATIN NOT PRESCRIBED, INTENTIONAL, Statin not prescribed intentionally due to Other LDL at goal; pt doesn't want to add another medication (This option does not exclude patient from measure)  0     omega-3 fatty acids 1200 MG capsule Take 2 capsules by mouth daily. 90 capsule      aspirin 81 MG tablet Take  by mouth daily.       naproxen " "sodium (ANAPROX) 220 MG tablet Take 1 tablet by mouth daily. 180 tablet 3     psyllium (METAMUCIL SMOOTH TEXTURE) 63 % POWD Take 1 teaspoonful by mouth daily. Mix in 8 ounces of water       Multiple Vitamins-Calcium (VIACTIV MULTI-VITAMIN) CHEW Take 1 chew tab by mouth daily.       Allergies   Allergen Reactions     Amoxicillin Hives     Hydrochlorothiazide      hyponatremia     Lisinopril      cough     Lotrel Hives       PHYSICAL EXAM:    /69 (BP Location: Left arm, Patient Position: Chair, Cuff Size: Adult Large)  Pulse 69  Temp 97  F (36.1  C) (Tympanic)  Ht 5' 6.5\" (1.689 m)  Wt 199 lb 6.4 oz (90.4 kg)  SpO2 98%  BMI 31.7 kg/m2    Patient appears non toxic    Assessment and Plan:     (E87.1) Hyponatremia  (primary encounter diagnosis)  Comment: DC hctz  Plan: switched from hyzaar to losartan 100mg qd.  Recheck Na today.    (I10) Benign essential hypertension  Comment:   Plan: losartan (COZAAR) 100 MG tablet              Amisha Plaza PA-C        "

## 2017-07-14 ENCOUNTER — MYC MEDICAL ADVICE (OUTPATIENT)
Dept: FAMILY MEDICINE | Facility: CLINIC | Age: 72
End: 2017-07-14

## 2017-07-14 LAB — SODIUM SERPL-SCNC: 127 MMOL/L (ref 133–144)

## 2017-07-14 NOTE — PROGRESS NOTES
"Pat,    Your sodium is improved to 127, but still low so same plan.  Let's recheck this level again in a week.  You can come in for a \"lab only\".    Amisha Plaza PA-C  "

## 2017-07-18 DIAGNOSIS — E87.1 HYPONATREMIA: ICD-10-CM

## 2017-07-18 PROCEDURE — 84295 ASSAY OF SERUM SODIUM: CPT | Performed by: PHYSICIAN ASSISTANT

## 2017-07-18 PROCEDURE — 36415 COLL VENOUS BLD VENIPUNCTURE: CPT | Performed by: PHYSICIAN ASSISTANT

## 2017-07-19 LAB — SODIUM SERPL-SCNC: 137 MMOL/L (ref 133–144)

## 2017-09-07 ENCOUNTER — MYC MEDICAL ADVICE (OUTPATIENT)
Dept: FAMILY MEDICINE | Facility: CLINIC | Age: 72
End: 2017-09-07

## 2017-09-07 ENCOUNTER — OFFICE VISIT (OUTPATIENT)
Dept: FAMILY MEDICINE | Facility: CLINIC | Age: 72
End: 2017-09-07
Payer: COMMERCIAL

## 2017-09-07 VITALS
TEMPERATURE: 97.5 F | HEIGHT: 67 IN | HEART RATE: 59 BPM | WEIGHT: 200.1 LBS | OXYGEN SATURATION: 98 % | BODY MASS INDEX: 31.4 KG/M2 | SYSTOLIC BLOOD PRESSURE: 152 MMHG | DIASTOLIC BLOOD PRESSURE: 94 MMHG

## 2017-09-07 DIAGNOSIS — R60.0 BILATERAL LOWER EXTREMITY EDEMA: ICD-10-CM

## 2017-09-07 DIAGNOSIS — I10 HTN (HYPERTENSION), BENIGN: ICD-10-CM

## 2017-09-07 DIAGNOSIS — Z01.818 PREOP GENERAL PHYSICAL EXAM: Primary | ICD-10-CM

## 2017-09-07 PROCEDURE — 99214 OFFICE O/P EST MOD 30 MIN: CPT | Performed by: PHYSICIAN ASSISTANT

## 2017-09-07 RX ORDER — HYDROCHLOROTHIAZIDE 12.5 MG/1
12.5 TABLET ORAL DAILY
Qty: 30 TABLET | Refills: 3 | Status: SHIPPED | OUTPATIENT
Start: 2017-09-07 | End: 2017-10-04

## 2017-09-07 NOTE — MR AVS SNAPSHOT
After Visit Summary   9/7/2017    Haritha Cat    MRN: [id number]           Patient Information     Date Of Birth          1945        Visit Information        Provider Department      9/7/2017 12:00 PM Amisha Plaza PA-C [facility]        Today's Diagnoses     Preop general physical exam    -  1    Bilateral lower extremity edema          Care Instructions      Before Your Surgery      Call your surgeon if there is any change in your health. This includes signs of a cold or flu (such as a sore throat, runny nose, cough, rash or fever).    Do not smoke, drink alcohol or take over the counter medicine (unless your surgeon or primary care doctor tells you to) for the 24 hours before and after surgery.    If you take prescribed drugs: Follow your doctor s orders about which medicines to take and which to stop until after surgery.    Eating and drinking prior to surgery: follow the instructions from your surgeon    Take a shower or bath the night before surgery. Use the soap your surgeon gave you to gently clean your skin. If you do not have soap from your surgeon, use your regular soap. Do not shave or scrub the surgery site.  Wear clean pajamas and have clean sheets on your bed.           Follow-ups after your visit        Your next 10 appointments already scheduled     Nov 15, 2017  9:00 AM CST   PHYSICAL with Amisha Plaza PA-C   [facility] ([facility]    [address] [id number] [phone]              Who to contact     If you have questions or need follow up information about today's clinic visit or your schedule please contact [facility] directly at [phone].  Normal or non-critical lab and imaging results will be communicated to you by MyChart, letter or phone within 4 business days after the clinic has received the results. If you do not hear from us within 7 days, please contact the clinic through Rescalet  "or phone. If you have a critical or abnormal lab result, we will notify you by phone as soon as possible.  Submit refill requests through Missingames or call your pharmacy and they will forward the refill request to us. Please allow 3 business days for your refill to be completed.          Additional Information About Your Visit        [facility] Information     Missingames gives you secure access to your electronic health record. If you see a primary care provider, you can also send messages to your care team and make appointments. If you have questions, please call your primary care clinic.  If you do not have a primary care provider, please call [phone] and they will assist you.        Care EveryWhere ID     This is your Care EveryWhere ID. This could be used by other organizations to access your [facility] medical records  [id number]        Your Vitals Were     Pulse Temperature Height Pulse Oximetry BMI (Body Mass Index)       59 97.5  F (36.4  C) (Oral) 5' 6.5\" (1.689 m) 98% 31.81 kg/m2        Blood Pressure from Last 3 Encounters:   09/07/17 (!) 152/94   07/13/17 127/69   07/11/17 120/72    Weight from Last 3 Encounters:   09/07/17 200 lb 1.6 oz (90.8 kg)   07/13/17 199 lb 6.4 oz (90.4 kg)   07/11/17 198 lb 12.8 oz (90.2 kg)              Today, you had the following     No orders found for display         Today's Medication Changes          These changes are accurate as of: 9/7/17 12:32 PM.  If you have any questions, ask your nurse or doctor.               Start taking these medicines.        Dose/Directions    hydrochlorothiazide 12.5 MG Tabs tablet   Used for:  Bilateral lower extremity edema   Started by:  Amisha Plaza PA-C        Dose:  12.5 mg   Take 1 tablet (12.5 mg) by mouth daily   Quantity:  30 tablet   Refills:  3            Where to get your medicines      These medications were sent to [facility] [address] [place] [facility] - [place] - [address]  [address] [address]     Phone:  [phone]     " hydrochlorothiazide 12.5 MG Tabs tablet                Primary Care Provider Office Phone # Fax #    Amisha Plaza PA-C [phone] [address] [facility] [address]  [facility] [address]        Equal Access to Services     SARAH LUCIO : Hadii aad ku hadasho Soomaali, waaxda luqadaha, qaybta kaalmada adeegyada, waxay idiin hayaan adeeg khashley lamikepaul ah. So [facility] [phone].    ATENCIÓN: Si habla español, tiene a law disposición servicios gratuitos de asistencia lingüística. Llame al [phone].    We comply with applicable federal civil rights laws and [place] laws. We do not discriminate on the basis of race, color, national origin, age, disability sex, sexual orientation or gender identity.            Thank you!     Thank you for choosing [facility]  for your care. Our goal is always to provide you with excellent care. Hearing back from our patients is one way we can continue to improve our services. Please take a few minutes to complete the written survey that you may receive in the mail after your visit with us. Thank you!             Your Updated Medication List - Protect others around you: Learn how to safely use, store and throw away your medicines at www.disposemymeds.org.          This list is accurate as of: 9/7/17 12:32 PM.  Always use your most recent med list.                   Brand Name Dispense Instructions for use Diagnosis    aspirin 81 MG tablet      Take  by mouth daily.        hydrochlorothiazide 12.5 MG Tabs tablet     30 tablet    Take 1 tablet (12.5 mg) by mouth daily    Bilateral lower extremity edema       losartan 100 MG tablet    COZAAR    90 tablet    Take 1 tablet (100 mg) by mouth daily    Benign essential hypertension       METAMUCIL SMOOTH TEXTURE 63 % Powd   Generic drug:  psyllium      Take 1 teaspoonful by mouth daily. Mix in 8 ounces of water        metFORMIN 500 MG tablet    GLUCOPHAGE    360 tablet    TAKE 2 TABLETS (1,000 MG) BY MOUTH 2 TIMES DAILY  (WITH MEALS)    Type 2 diabetes mellitus without complication, without long-term current use of insulin (H)       metoprolol 100 MG 24 hr tablet    TOPROL-XL    135 tablet    TAKE 1.5 TABLETS BY MOUTH DAILY    HTN (hypertension), benign       naproxen sodium 220 MG tablet    ANAPROX    180 tablet    Take 1 tablet by mouth daily.        omega-3 fatty acids 1200 MG capsule     90 capsule    Take 2 capsules by mouth daily.        STATIN NOT PRESCRIBED (INTENTIONAL)      Statin not prescribed intentionally due to Other LDL at goal; pt doesn't want to add another medication (This option does not exclude patient from measure)    Hyperlipidemia LDL goal <100       VIACTIV MULTI-VITAMIN Chew      Take 1 chew tab by mouth daily.

## 2017-09-07 NOTE — PROGRESS NOTES
[address] [address]  [phone]  Dept: [phone]    PRE-OP EVALUATION:  Today's date: 2017    Haritha Cat (: 1945) presents for pre-operative evaluation assessment as requested by Dr. Escalona.  She requires evaluation and anesthesia risk assessment prior to undergoing surgery/procedure for treatment of Cataract .  Proposed procedure: left eye cataract removal  Date of Surgery/ Procedure: 17  Time of Surgery/ Procedure: 5:30 am  Hospital/Surgical Facility: [facility]  Fax number for surgical facility: [phone]  Primary Physician: Amisha Plaza PA-C  Type of Anesthesia Anticipated: to be determined    Patient has a Health Care Directive or Living Will:  NO    1. NO - Do you have a history of heart attack, stroke, stent, bypass or surgery on an artery in the head, neck, heart or legs?  2. NO - Do you ever have any pain or discomfort in your chest?  3. NO - Do you have a history of  Heart Failure?  4. NO - Are you troubled by shortness of breath when: walking on the level, up a slight hill or at night?  5. NO - Do you currently have a cold, bronchitis or other respiratory infection?  6. NO - Do you have a cough, shortness of breath or wheezing?  7. NO - Do you sometimes get pains in the calves of your legs when you walk?  8. NO - Do you or anyone in your family have previous history of blood clots?  9. NO - Do you or does anyone in your family have a serious bleeding problem such as prolonged bleeding following surgeries or cuts?  10. NO - Have you ever had problems with anemia or been told to take iron pills?  11. NO - Have you had any abnormal blood loss such as black, tarry or bloody stools, or abnormal vaginal bleeding?  12. NO - Have you ever had a blood transfusion?  13. NO - Have you or any of your relatives ever had problems with anesthesia?  14. NO - Do you have sleep apnea, excessive snoring or daytime  drowsiness?  15. NO - Do you have any prosthetic heart valves?  16. NO - Do you have prosthetic joints?  17. NO - Is there any chance that you may be pregnant?        HPI:                                                      Brief HPI related to upcoming procedure: decreased vision    DIABETES - Patient has a longstanding history of DiabetesType Type II . Patient is being treated with oral agents and denies significant side effects. Control has been good.         She has some pain L groin x years. Shooting, intermittent                                                                                                        .    MEDICAL HISTORY:                                                    Patient Active Problem List    Diagnosis Date Noted     Needle phobia 07/11/2017     Priority: Medium     Vasovagal syncope 07/11/2017     Priority: Medium     Type 2 diabetes mellitus without complications (H) 10/23/2015     Priority: Medium     Colon polyp      Priority: Medium     colonoscopy due 5 years.       Chronic headaches 04/23/2014     Priority: Medium     HTN (hypertension), benign      Priority: Medium     GERD (gastroesophageal reflux disease)      Priority: Medium     Hyperlipidemia LDL goal <100 05/09/2012     Priority: Medium     Osteopenia      Priority: Medium      Past Medical History:   Diagnosis Date     Abscess of abdominal cavity (H) 11/02    cecal perf, prob from diverticulitis     Allergic rhinitis      Colon polyp 2015    colonoscopy due 5 years.     Diverticulosis      DM type 2 (diabetes mellitus, type 2) (H)      Elevated LFTs     fatty liver dz     GERD (gastroesophageal reflux disease)      Hepatitis A      Herpes zoster 11/28/11     HTN (hypertension), benign      Osteopenia     DXA 2008     Osteopenia 2014     Past Surgical History:   Procedure Laterality Date     varicose vein surgery      Dr. Jessi nunez teeth  age 27     Current Outpatient Prescriptions   Medication Sig Dispense Refill  "    metFORMIN (GLUCOPHAGE) 500 MG tablet TAKE 2 TABLETS (1,000 MG) BY MOUTH 2 TIMES DAILY (WITH MEALS) 360 tablet 1     losartan (COZAAR) 100 MG tablet Take 1 tablet (100 mg) by mouth daily 90 tablet 1     metoprolol (TOPROL-XL) 100 MG 24 hr tablet TAKE 1.5 TABLETS BY MOUTH DAILY 135 tablet 2     STATIN NOT PRESCRIBED, INTENTIONAL, Statin not prescribed intentionally due to Other LDL at goal; pt doesn't want to add another medication (This option does not exclude patient from measure)  0     omega-3 fatty acids 1200 MG capsule Take 2 capsules by mouth daily. 90 capsule      aspirin 81 MG tablet Take  by mouth daily.       naproxen sodium (ANAPROX) 220 MG tablet Take 1 tablet by mouth daily. 180 tablet 3     psyllium (METAMUCIL SMOOTH TEXTURE) 63 % POWD Take 1 teaspoonful by mouth daily. Mix in 8 ounces of water       Multiple Vitamins-Calcium (VIACTIV MULTI-VITAMIN) CHEW Take 1 chew tab by mouth daily.       OTC products: None, except as noted above    Allergies   Allergen Reactions     Amoxicillin Hives     Hydrochlorothiazide      hyponatremia     Lisinopril      cough     Lotrel Hives      Latex Allergy: NO    Social History   Substance Use Topics     Smoking status: Never Smoker     Smokeless tobacco: Never Used     Alcohol use 0.0 oz/week     0 Standard drinks or equivalent per week      Comment: 0-3 drinks per week     History   Drug Use No       REVIEW OF SYSTEMS:                                                    C: NEGATIVE for fever, chills, change in weight  E/M: NEGATIVE for ear, mouth and throat problems  R: NEGATIVE for significant cough or SOB  CV: NEGATIVE for chest pain, palpitations or peripheral edema    EXAM:                                                    BP (!) 152/94 (BP Location: Left arm, Patient Position: Chair, Cuff Size: Adult Large)  Pulse 59  Temp 97.5  F (36.4  C) (Oral)  Ht 5' 6.5\" (1.689 m)  Wt 200 lb 1.6 oz (90.8 kg)  SpO2 98%  BMI 31.81 kg/m2  GENERAL APPEARANCE: healthy, " alert and no distress  HENT: ear canals and TM's normal and nose and mouth without ulcers or lesions  RESP: lungs clear to auscultation - no rales, rhonchi or wheezes  CV: regular rate and rhythm, normal S1 S2, no S3 or S4 and no murmur, click or rub   ABDOMEN: soft, nontender, no HSM or masses and bowel sounds normal  NEURO: Normal strength and tone, sensory exam grossly normal, mentation intact and speech normal    DIAGNOSTICS:                                                    No labs or EKG required for low risk surgery (cataract, skin procedure, breast biopsy, etc)        IMPRESSION:                                                    Reason for surgery/procedure: cataract  Diagnosis/reason for consult: cataract surgery    The proposed surgical procedure is considered LOW risk.    Assessment and Plan:     (Z01.818) Preop general physical exam  (primary encounter diagnosis)  Comment: cleared for this elective surgery  Plan: proceed with surgery    (R60.0) Bilateral lower extremity edema  Comment: We stopped hctz due to hyponatremia but now she complains of intermittent pedal edema and would like to restart  Plan: Okay to take lower dose of hydrochlorothiazide 12.5 MG TABS tablet        Qd prn edema    (I10) HTN (hypertension), benign  Comment: high today but lower at home and she upset about family issues today  Plan: cont to monitor          RECOMMENDATIONS:                                                      APPROVAL GIVEN to proceed with proposed procedure, without further diagnostic evaluation       Signed Electronically by: Amisha Plaza PA-C    Copy of this evaluation report is provided to requesting physician.    [place] Preop Guidelines

## 2017-09-07 NOTE — NURSING NOTE
"Chief Complaint   Patient presents with     Pre-Op Exam        Initial BP (!) 152/94 (BP Location: Left arm, Patient Position: Chair, Cuff Size: Adult Large)  Pulse 59  Temp 97.5  F (36.4  C) (Oral)  Ht 5' 6.5\" (1.689 m)  Wt 200 lb 1.6 oz (90.8 kg)  SpO2 98%  BMI 31.81 kg/m2 Estimated body mass index is 31.81 kg/(m^2) as calculated from the following:    Height as of this encounter: 5' 6.5\" (1.689 m).    Weight as of this encounter: 200 lb 1.6 oz (90.8 kg)..    BP completed using cuff size: large  MEDICATIONS REVIEWED  SOCIAL AND FAMILY HX REVIEWED  Yomaira Rajan CMA  "

## 2017-09-08 ENCOUNTER — TELEPHONE (OUTPATIENT)
Dept: FAMILY MEDICINE | Facility: CLINIC | Age: 72
End: 2017-09-08

## 2017-09-08 NOTE — TELEPHONE ENCOUNTER
Reason for Call:  Form, our goal is to have forms completed with 72 hours, however, some forms may require a visit or additional information.    Type of letter, form or note:  medical-PRE OP form for Eye surgery.    Who is the form from?: Patient    Where did the form come from: Patient or family brought in       What clinic location was the form placed at?: [facility]    Where the form was placed: 's Box    What number is listed as a contact on the form?: [phone]       Additional comments: patient had PRE OP yesterday, but forgot the form from [facility].  Form in Jimena Plaza's in box.    Call taken on 9/8/2017 at 9:33 AM by Cassie Terrazas      .

## 2017-09-08 NOTE — TELEPHONE ENCOUNTER
FYI    Surgery on 9/21. Form placed in your box. Updated pre-op with fax number and Surgery Center

## 2017-10-04 DIAGNOSIS — R60.0 BILATERAL LOWER EXTREMITY EDEMA: ICD-10-CM

## 2017-10-05 RX ORDER — HYDROCHLOROTHIAZIDE 12.5 MG/1
12.5 TABLET ORAL DAILY
Qty: 90 TABLET | Refills: 0 | Status: SHIPPED | OUTPATIENT
Start: 2017-10-05 | End: 2017-11-15

## 2017-10-05 NOTE — TELEPHONE ENCOUNTER
Requesting 90 day supply    Pending Prescriptions:                       Disp   Refills    hydrochlorothiazide 12.5 MG TABS tablet   90 tab*0            Sig: Take 1 tablet (12.5 mg) by mouth daily          Last Written Prescription Date: 9/7/17  Last Fill Quantity: 30, # refills: 3  Last Office Visit with [facility], [facility] or [facility] prescribing provider: 9/7/17 Mela  Next 5 appointments (look out 90 days)     Nov 15, 2017  9:00 AM CST   PHYSICAL with Amisha Plaza PA-C   [facility]    [address] [address]   [phone]                   Potassium   Date Value Ref Range Status   07/11/2017 4.0 3.4 - 5.3 mmol/L Final     Creatinine   Date Value Ref Range Status   07/11/2017 0.86 0.52 - 1.04 mg/dL Final     BP Readings from Last 3 Encounters:   09/07/17 (!) 152/94   07/13/17 127/69   07/11/17 120/72     Adrianne lAva RT(R)

## 2017-11-15 ENCOUNTER — HOSPITAL ENCOUNTER (OUTPATIENT)
Dept: MAMMOGRAPHY | Facility: CLINIC | Age: 72
Discharge: HOME | End: 2017-11-15
Attending: PHYSICIAN ASSISTANT | Admitting: PHYSICIAN ASSISTANT
Payer: MEDICARE

## 2017-11-15 ENCOUNTER — OFFICE VISIT (OUTPATIENT)
Dept: FAMILY MEDICINE | Facility: CLINIC | Age: 72
End: 2017-11-15
Payer: COMMERCIAL

## 2017-11-15 VITALS
SYSTOLIC BLOOD PRESSURE: 150 MMHG | HEIGHT: 67 IN | OXYGEN SATURATION: 96 % | WEIGHT: 200.5 LBS | DIASTOLIC BLOOD PRESSURE: 70 MMHG | HEART RATE: 76 BPM | TEMPERATURE: 97.6 F | BODY MASS INDEX: 31.47 KG/M2

## 2017-11-15 DIAGNOSIS — Z12.31 VISIT FOR SCREENING MAMMOGRAM: ICD-10-CM

## 2017-11-15 DIAGNOSIS — E11.9 TYPE 2 DIABETES MELLITUS WITHOUT COMPLICATION, WITHOUT LONG-TERM CURRENT USE OF INSULIN (H): ICD-10-CM

## 2017-11-15 DIAGNOSIS — Z23 IMMUNIZATION DUE: ICD-10-CM

## 2017-11-15 DIAGNOSIS — Z23 NEED FOR PROPHYLACTIC VACCINATION AND INOCULATION AGAINST INFLUENZA: ICD-10-CM

## 2017-11-15 DIAGNOSIS — E78.5 HYPERLIPIDEMIA LDL GOAL <100: ICD-10-CM

## 2017-11-15 DIAGNOSIS — Z00.00 ENCOUNTER FOR ROUTINE ADULT HEALTH EXAMINATION WITHOUT ABNORMAL FINDINGS: Primary | ICD-10-CM

## 2017-11-15 DIAGNOSIS — I10 HTN (HYPERTENSION), BENIGN: ICD-10-CM

## 2017-11-15 DIAGNOSIS — R60.0 BILATERAL LOWER EXTREMITY EDEMA: ICD-10-CM

## 2017-11-15 LAB
ALBUMIN SERPL-MCNC: 4.2 G/DL (ref 3.4–5)
ALP SERPL-CCNC: 104 U/L (ref 40–150)
ALT SERPL W P-5'-P-CCNC: 42 U/L (ref 0–50)
ANION GAP SERPL CALCULATED.3IONS-SCNC: 5 MMOL/L (ref 3–14)
AST SERPL W P-5'-P-CCNC: 27 U/L (ref 0–45)
BILIRUB SERPL-MCNC: 1.3 MG/DL (ref 0.2–1.3)
BUN SERPL-MCNC: 20 MG/DL (ref 7–30)
CALCIUM SERPL-MCNC: 9.7 MG/DL (ref 8.5–10.1)
CHLORIDE SERPL-SCNC: 96 MMOL/L (ref 94–109)
CHOLEST SERPL-MCNC: 176 MG/DL
CO2 SERPL-SCNC: 29 MMOL/L (ref 20–32)
CREAT SERPL-MCNC: 0.87 MG/DL (ref 0.52–1.04)
CREAT UR-MCNC: 47 MG/DL
ERYTHROCYTE [DISTWIDTH] IN BLOOD BY AUTOMATED COUNT: 12.2 % (ref 10–15)
GFR SERPL CREATININE-BSD FRML MDRD: 64 ML/MIN/1.7M2
GLUCOSE SERPL-MCNC: 158 MG/DL (ref 70–99)
HBA1C MFR BLD: 7.3 % (ref 4.3–6)
HCT VFR BLD AUTO: 40.4 % (ref 35–47)
HDLC SERPL-MCNC: 80 MG/DL
HGB BLD-MCNC: 13.8 G/DL (ref 11.7–15.7)
LDLC SERPL CALC-MCNC: 77 MG/DL
MCH RBC QN AUTO: 31.2 PG (ref 26.5–33)
MCHC RBC AUTO-ENTMCNC: 34.2 G/DL (ref 31.5–36.5)
MCV RBC AUTO: 91 FL (ref 78–100)
MICROALBUMIN UR-MCNC: <5 MG/L
MICROALBUMIN/CREAT UR: NORMAL MG/G CR (ref 0–25)
NONHDLC SERPL-MCNC: 96 MG/DL
PLATELET # BLD AUTO: 234 10E9/L (ref 150–450)
POTASSIUM SERPL-SCNC: 4.4 MMOL/L (ref 3.4–5.3)
PROT SERPL-MCNC: 7.5 G/DL (ref 6.8–8.8)
RBC # BLD AUTO: 4.42 10E12/L (ref 3.8–5.2)
SODIUM SERPL-SCNC: 130 MMOL/L (ref 133–144)
TRIGL SERPL-MCNC: 97 MG/DL
WBC # BLD AUTO: 6.5 10E9/L (ref 4–11)

## 2017-11-15 PROCEDURE — 36415 COLL VENOUS BLD VENIPUNCTURE: CPT | Performed by: PHYSICIAN ASSISTANT

## 2017-11-15 PROCEDURE — 83036 HEMOGLOBIN GLYCOSYLATED A1C: CPT | Performed by: PHYSICIAN ASSISTANT

## 2017-11-15 PROCEDURE — 85027 COMPLETE CBC AUTOMATED: CPT | Performed by: PHYSICIAN ASSISTANT

## 2017-11-15 PROCEDURE — 80061 LIPID PANEL: CPT | Performed by: PHYSICIAN ASSISTANT

## 2017-11-15 PROCEDURE — G0008 ADMIN INFLUENZA VIRUS VAC: HCPCS | Performed by: PHYSICIAN ASSISTANT

## 2017-11-15 PROCEDURE — 99207 C FOOT EXAM  NO CHARGE: CPT | Mod: 25 | Performed by: PHYSICIAN ASSISTANT

## 2017-11-15 PROCEDURE — G0202 SCR MAMMO BI INCL CAD: HCPCS

## 2017-11-15 PROCEDURE — 82043 UR ALBUMIN QUANTITATIVE: CPT | Performed by: PHYSICIAN ASSISTANT

## 2017-11-15 PROCEDURE — 80053 COMPREHEN METABOLIC PANEL: CPT | Performed by: PHYSICIAN ASSISTANT

## 2017-11-15 PROCEDURE — 90662 IIV NO PRSV INCREASED AG IM: CPT | Performed by: PHYSICIAN ASSISTANT

## 2017-11-15 PROCEDURE — 99397 PER PM REEVAL EST PAT 65+ YR: CPT | Mod: 25 | Performed by: PHYSICIAN ASSISTANT

## 2017-11-15 RX ORDER — METOPROLOL SUCCINATE 100 MG/1
TABLET, EXTENDED RELEASE ORAL
Qty: 135 TABLET | Refills: 3 | Status: SHIPPED | OUTPATIENT
Start: 2017-11-15 | End: 2018-09-22

## 2017-11-15 RX ORDER — LOSARTAN POTASSIUM 100 MG/1
100 TABLET ORAL DAILY
Qty: 90 TABLET | Refills: 3 | Status: ON HOLD | OUTPATIENT
Start: 2017-11-15 | End: 2018-05-15

## 2017-11-15 RX ORDER — HYDROCHLOROTHIAZIDE 12.5 MG/1
12.5 TABLET ORAL DAILY
Qty: 90 TABLET | Refills: 3 | Status: SHIPPED | OUTPATIENT
Start: 2017-11-15 | End: 2018-11-20

## 2017-11-15 NOTE — PROGRESS NOTES
"   SUBJECTIVE:   CC: Haritha Cat is an 72 year old woman who presents for preventive health visit.     Healthy Habits:    Do you get at least three servings of calcium containing foods daily (dairy, green leafy vegetables, etc.)? {YES/NO, DAIRY INTAKE:976392::\"yes\"}    Amount of exercise or daily activities, outside of work: {AMOUNT EXERCISE:913186}    Problems taking medications regularly {Yes /No default:333165::\"No\"}    Medication side effects: {Yes /No default.:590301::\"No\"}    Have you had an eye exam in the past two years? {YESNOBLANK:271016}    Do you see a dentist twice per year? {YESNOBLANK:451864}    Do you have sleep apnea, excessive snoring or daytime drowsiness?{YESNOBLANK:444797}    {Outside tests to abstract? :453319}    {additional problems to add (Optional):947411}    Today's PHQ-2 Score:   PHQ-2 ( 1999 [employer]) 7/11/2017 1/10/2017   Q1: Little interest or pleasure in doing things 0 0   Q2: Feeling down, depressed or hopeless 1 0   PHQ-2 Score 1 0     {PHQ-2 LOOK IN ASSESSMENTS (Optional) :244542}  Abuse: Current or Past(Physical, Sexual or Emotional)- {YES/NO/NA:411229}  Do you feel safe in your environment - {YES/NO/NA:345030}    Social History   Substance Use Topics     Smoking status: Never Smoker     Smokeless tobacco: Never Used     Alcohol use 0.0 oz/week     0 Standard drinks or equivalent per week      Comment: 0-3 drinks per week     {ETOH AUDIT:853878}    Reviewed orders with patient.  Reviewed health maintenance and updated orders accordingly - {Yes/No:741205::\"Yes\"}  {Chronicprobdata (Optional):281020}    {Mammo Decision Support (Optional):741848}    Pertinent mammograms are reviewed under the imaging tab.  History of abnormal Pap smear: {PAP HX:184084}    Reviewed and updated as needed this visit by clinical staff         Reviewed and updated as needed this visit by Provider        {HISTORY OPTIONS (Optional):265871}    ROS:  {FEMALE PREVENTATIVE ROS:348828}    OBJECTIVE:   There " "were no vitals taken for this visit.  EXAM:  {Exam Choices:386263}    ASSESSMENT/PLAN:   {Diag Picklist:359575}    COUNSELING:   {FEMALE COUNSELING MESSAGES:853015::\"Reviewed preventive health counseling, as reflected in patient instructions\"}    {BP Counseling- Complete if BP >= 120/80  (Optional):970937}     reports that she has never smoked. She has never used smokeless tobacco.  {Tobacco Cessation -- Complete if patient is a smoker (Optional):738158}  Estimated body mass index is 31.81 kg/(m^2) as calculated from the following:    Height as of 9/7/17: 5' 6.5\" (1.689 m).    Weight as of 9/7/17: 200 lb 1.6 oz (90.8 kg).   {Weight Management Plan (ACO) Complete if BMI is abnormal-  Ages 18-64  BMI >24.9.  Age 65+ with BMI <23 or >30 (Optional):701528}    Counseling Resources:  ATP IV Guidelines  Pooled Cohorts Equation Calculator  Breast Cancer Risk Calculator  FRAX Risk Assessment  ICSI Preventive Guidelines  Dietary Guidelines for Americans, 2010  USDA's MyPlate  ASA Prophylaxis  Lung CA Screening    Amisha Plaza PA-C  [facility]  "

## 2017-11-15 NOTE — MR AVS SNAPSHOT
After Visit Summary   11/15/2017    Haritha Cat    MRN: [id number]           Patient Information     Date Of Birth          1945        Visit Information        Provider Department      11/15/2017 9:00 AM Amisha Plaza PA-C [facility]        Today's Diagnoses     Encounter for routine adult health examination without abnormal findings    -  1    Type 2 diabetes mellitus without complication, without long-term current use of insulin (H)        HTN (hypertension), benign        Benign essential hypertension        Bilateral lower extremity edema        Hyperlipidemia LDL goal <100        Immunization due          Care Instructions      Preventive Health Recommendations  Female Ages 65 +    Yearly exam:     See your health care provider every year in order to  o Review health changes.   o Discuss preventive care.    o Review your medicines if your doctor has prescribed any.      You no longer need a yearly Pap test unless you've had an abnormal Pap test in the past 10 years. If you have vaginal symptoms, such as bleeding or discharge, be sure to talk with your provider about a Pap test.      Every 1 to 2 years, have a mammogram.  If you are over 69, talk with your health care provider about whether or not you want to continue having screening mammograms.      Every 10 years, have a colonoscopy. Or, have a yearly FIT test (stool test). These exams will check for colon cancer.       Have a cholesterol test every 5 years, or more often if your doctor advises it.       Have a diabetes test (fasting glucose) every three years. If you are at risk for diabetes, you should have this test more often.       At age 65, have a bone density scan (DEXA) to check for osteoporosis (brittle bone disease).    Shots:    Get a flu shot each year.    Get a tetanus shot every 10 years.    Talk to your doctor about your pneumonia vaccines. There are now two you should receive - Pneumovax (PPSV 23) and  Prevnar (PCV 13).    Talk to your doctor about the shingles vaccine.    Talk to your doctor about the hepatitis B vaccine.    Nutrition:     Eat at least 5 servings of fruits and vegetables each day.      Eat whole-grain bread, whole-wheat pasta and brown rice instead of white grains and rice.      Talk to your provider about Calcium and Vitamin D.     Lifestyle    Exercise at least 150 minutes a week (30 minutes a day, 5 days a week). This will help you control your weight and prevent disease.      Limit alcohol to one drink per day.      No smoking.       Wear sunscreen to prevent skin cancer.       See your dentist twice a year for an exam and cleaning.      See your eye doctor every 1 to 2 years to screen for conditions such as glaucoma, macular degeneration, cataracts, etc   Preventive Health Recommendations    Female Ages 65 +    Yearly exam:   See your health care provider every year in order to  Review health changes.   Discuss preventive care.    Review your medicines if your doctor has prescribed any.    You no longer need a yearly Pap test unless you've had an abnormal Pap test in the past 10 years. If you have vaginal symptoms, such as bleeding or discharge, be sure to talk with your provider about a Pap test.    Every 1 to 2 years, have a mammogram.  If you are over 69, talk with your health care provider about whether or not you want to continue having screening mammograms.    Every 10 years, have a colonoscopy. Or, have a yearly FIT test (stool test). These exams will check for colon cancer.     Have a cholesterol test every 5 years, or more often if your doctor advises it.     Have a diabetes test (fasting glucose) every three years. If you are at risk for diabetes, you should have this test more often.     At age 65, have a bone density scan (DEXA) to check for osteoporosis (brittle bone disease).    Shots:  Get a flu shot each year.  Get a tetanus shot every 10 years.  Talk to your doctor about  your pneumonia vaccines. There are now two you should receive - Pneumovax (PPSV 23) and Prevnar (PCV 13).  Talk to your doctor about the shingles vaccine.  Talk to your doctor about the hepatitis B vaccine.    Nutrition:   Eat at least 5 servings of fruits and vegetables each day.    Eat whole-grain bread, whole-wheat pasta and brown rice instead of white grains and rice.    Talk to your provider about Calcium and Vitamin D.     Lifestyle  Exercise at least 150 minutes a week (30 minutes a day, 5 days a week). This will help you control your weight and prevent disease.    Limit alcohol to one drink per day.    No smoking.     Wear sunscreen to prevent skin cancer.     See your dentist twice a year for an exam and cleaning.    See your eye doctor every 1 to 2 years to screen for conditions such as glaucoma, macular degeneration and cataracts.    Get your mammogram [address]  Have your blood pressure checked in [facility] pharmacy; bring your home cuff for comparison          Follow-ups after your visit        Who to contact     If you have questions or need follow up information about today's clinic visit or your schedule please contact [facility] directly at [phone].  Normal or non-critical lab and imaging results will be communicated to you by [employer], letter or phone within 4 business days after the clinic has received the results. If you do not hear from us within 7 days, please contact the clinic through [employer] or phone. If you have a critical or abnormal lab result, we will notify you by phone as soon as possible.  Submit refill requests through [employer] or call your pharmacy and they will forward the refill request to us. Please allow 3 business days for your refill to be completed.          Additional Information About Your Visit        [employer] Information     [employer] gives you secure access to your electronic health record. If you see a primary care provider, you can also send messages to your care  "team and make appointments. If you have questions, please call your primary care clinic.  If you do not have a primary care provider, please call [phone] and they will assist you.        Care EveryWhere ID     This is your Care EveryWhere ID. This could be used by other organizations to access your [facility] medical records  [id number]        Your Vitals Were     Pulse Temperature Height Pulse Oximetry Breastfeeding? BMI (Body Mass Index)    76 97.6  F (36.4  C) (Oral) 5' 6.5\" (1.689 m) 96% No 31.88 kg/m2       Blood Pressure from Last 3 Encounters:   11/15/17 150/70   09/07/17 (!) 152/94   07/13/17 127/69    Weight from Last 3 Encounters:   11/15/17 200 lb 8 oz (90.9 kg)   09/07/17 200 lb 1.6 oz (90.8 kg)   07/13/17 199 lb 6.4 oz (90.4 kg)              We Performed the Following     Albumin Random Urine Quantitative with Creat Ratio     CBC with platelets     Comprehensive metabolic panel     FLU VACCINE, INCREASED ANTIGEN, PRESV FREE     FOOT EXAM     Hemoglobin A1c     Lipid Profile          Today's Medication Changes          These changes are accurate as of: 11/15/17  9:32 AM.  If you have any questions, ask your nurse or doctor.               These medicines have changed or have updated prescriptions.        Dose/Directions    metFORMIN 500 MG tablet   Commonly known as:  GLUCOPHAGE   This may have changed:  See the new instructions.   Used for:  Type 2 diabetes mellitus without complication, without long-term current use of insulin (H)   Changed by:  Amisha Plaza PA-C        TAKE 2 TABLETS (1,000 MG) BY MOUTH 2 TIMES DAILY (WITH MEALS)   Quantity:  360 tablet   Refills:  1       metoprolol 100 MG 24 hr tablet   Commonly known as:  TOPROL-XL   This may have changed:  See the new instructions.   Used for:  HTN (hypertension), benign   Changed by:  Amisha Plaza PA-C        TAKE 1.5 TABLETS BY MOUTH DAILY   Quantity:  135 tablet   Refills:  3            Where to get your medicines      These " medications were sent to [facility] [id number] [facility] - [place], [place] - [address]  [address], [place] [address]     Phone:  [phone]     hydrochlorothiazide 12.5 MG Tabs tablet    losartan 100 MG tablet    metFORMIN 500 MG tablet    metoprolol 100 MG 24 hr tablet                Primary Care Provider Office Phone # Fax #    Amisha Chantel Plaza PA-C [phone] [address]  [facility] [address]        Equal Access to Services     [facility] : Hadii aad ku hadasho Soomaali, waaxda luqadaha, qaybta kaalmada adeegyada, waxay idiin hayaan adeeg jhonny miranda . So [facility] [phone].    ATENCIÓN: Si habla español, tiene a law disposición servicios gratuitos de asistencia lingüística. Ll[facility] [phone].    We comply with applicable federal civil rights laws and [place] laws. We do not discriminate on the basis of race, color, national origin, age, disability, sex, sexual orientation, or gender identity.            Thank you!     Thank you for choosing [facility]  for your care. Our goal is always to provide you with excellent care. Hearing back from our patients is one way we can continue to improve our services. Please take a few minutes to complete the written survey that you may receive in the mail after your visit with us. Thank you!             Your Updated Medication List - Protect others around you: Learn how to safely use, store and throw away your medicines at www.disposemymeds.org.          This list is accurate as of: 11/15/17  9:32 AM.  Always use your most recent med list.                   Brand Name Dispense Instructions for use Diagnosis    aspirin 81 MG tablet      Take  by mouth daily.        hydrochlorothiazide 12.5 MG Tabs tablet     90 tablet    Take 1 tablet (12.5 mg) by mouth daily    Bilateral lower extremity edema       losartan 100 MG tablet    COZAAR    90 tablet    Take 1 tablet (100 mg) by mouth daily    Benign essential hypertension       METAMUCIL  SMOOTH TEXTURE 63 % Powd   Generic drug:  psyllium      Take 1 teaspoonful by mouth daily. Mix in 8 ounces of water        metFORMIN 500 MG tablet    GLUCOPHAGE    360 tablet    TAKE 2 TABLETS (1,000 MG) BY MOUTH 2 TIMES DAILY (WITH MEALS)    Type 2 diabetes mellitus without complication, without long-term current use of insulin (H)       metoprolol 100 MG 24 hr tablet    TOPROL-XL    135 tablet    TAKE 1.5 TABLETS BY MOUTH DAILY    HTN (hypertension), benign       naproxen sodium 220 MG tablet    ANAPROX    180 tablet    Take 1 tablet by mouth daily.        omega-3 fatty acids 1200 MG capsule     90 capsule    Take 2 capsules by mouth daily.        STATIN NOT PRESCRIBED (INTENTIONAL)      Statin not prescribed intentionally due to Other LDL at goal; pt doesn't want to add another medication (This option does not exclude patient from measure)    Hyperlipidemia LDL goal <100       VIACTIV MULTI-VITAMIN Chew      Take 1 chew tab by mouth daily.

## 2017-11-15 NOTE — NURSING NOTE
"Chief Complaint   Patient presents with     RECHECK     sodium level       Initial /76 (BP Location: Right arm, Patient Position: Chair, Cuff Size: Adult Large)  Pulse 76  Temp 97.6  F (36.4  C) (Oral)  Ht 5' 6.5\" (1.689 m)  Wt 200 lb 8 oz (90.9 kg)  SpO2 96%  Breastfeeding? No  BMI 31.88 kg/m2 Estimated body mass index is 31.88 kg/(m^2) as calculated from the following:    Height as of this encounter: 5' 6.5\" (1.689 m).    Weight as of this encounter: 200 lb 8 oz (90.9 kg).  Medication Reconciliation: complete.  Darcie Saleh CMA    "

## 2017-11-15 NOTE — PROGRESS NOTES
SUBJECTIVE:   Haritha Cat is a 72 year old female who presents for Preventive Visit.    DM: pt does not check her blood sugars. Wt is stable  HTN: she does have a home digital monitor and BPs normal at home; tends to run higher here.    Feels well without any concerns.  Are you in the first 12 months of your Medicare Part B coverage?  No    Healthy Habits:    Do you get at least three servings of calcium containing foods daily (dairy, green leafy vegetables, etc.)? yes    Amount of exercise or daily activities, outside of work: 4 day(s) per week    Problems taking medications regularly No    Medication side effects: No    Have you had an eye exam in the past two years? yes    Do you see a dentist twice per year? yes    Do you have sleep apnea, excessive snoring or daytime drowsiness?no          Reviewed and updated as needed this visit by clinical staff  Tobacco  Allergies  Meds  Surg Hx  Soc Hx        Reviewed and updated as needed this visit by Provider  Allergies  Surg Hx        Social History   Substance Use Topics     Smoking status: Never Smoker     Smokeless tobacco: Never Used     Alcohol use 0.0 oz/week     0 Standard drinks or equivalent per week      Comment: 0-3 drinks per week       The patient does not drink >3 drinks per day nor >7 drinks per week.     Today's PHQ-2 Score:   PHQ-2 ( 1999 Pfizer) 11/15/2017 7/11/2017   Q1: Little interest or pleasure in doing things 0 0   Q2: Feeling down, depressed or hopeless 0 1   PHQ-2 Score 0 1         Do you feel safe in your environment - Yes    Do you have a Health Care Directive?: Yes: Patient states has Advance Directive and will bring in a copy to clinic.    Current providers sharing in care for this patient include: Patient Care Team:  Amisha Plaza PA-C as PCP - General (Internal Medicine)      Hearing impairment: No    Ability to successfully perform activities of daily living: Yes, no assistance needed     Fall risk:  Fallen 2 or more  times in the past year?: No  Any fall with injury in the past year?: Yes      Home safety:  none identified      The following health maintenance items are reviewed in Epic and correct as of today:  Health Maintenance   Topic Date Due     EYE EXAM Q1 YEAR  02/23/1946     ADVANCE DIRECTIVE PLANNING Q5 YRS  07/12/2017     LIPID MONITORING Q1 YEAR  08/19/2017     MICROALBUMIN Q1 YEAR  08/19/2017     FOOT EXAM Q1 YEAR  08/25/2017     INFLUENZA VACCINE (SYSTEM ASSIGNED)  09/01/2017     FALL RISK ASSESSMENT  01/10/2018     A1C Q6 MO  01/11/2018     CREATININE Q1 YEAR  07/11/2018     MAMMO SCREEN Q2 YR (SYSTEM ASSIGNED)  08/25/2018     TSH W/ FREE T4 REFLEX Q2 YEAR  07/11/2019     COLONOSCOPY Q5 YR  05/06/2020     TETANUS IMMUNIZATION (SYSTEM ASSIGNED)  08/20/2025     DEXA SCAN SCREENING (SYSTEM ASSIGNED)  Completed     PNEUMOCOCCAL  Completed     HEPATITIS C SCREENING  Completed     Past Medical History:   Diagnosis Date     Abscess of abdominal cavity (H) 11/02    cecal perf, prob from diverticulitis     Allergic rhinitis      Colon polyp 2015    colonoscopy due 5 years.     Diverticulosis      DM type 2 (diabetes mellitus, type 2) (H)      Elevated LFTs     fatty liver dz     GERD (gastroesophageal reflux disease)      Hepatitis A      Herpes zoster 11/28/11     HTN (hypertension), benign      Osteopenia     DXA 2008     Osteopenia 2014     Past Surgical History:   Procedure Laterality Date     varicose vein surgery      Dr. Bowen     wisdom teeth  age 27     Current Outpatient Prescriptions   Medication Sig Dispense Refill     metoprolol (TOPROL-XL) 100 MG 24 hr tablet TAKE 1.5 TABLETS BY MOUTH DAILY 135 tablet 3     losartan (COZAAR) 100 MG tablet Take 1 tablet (100 mg) by mouth daily 90 tablet 3     metFORMIN (GLUCOPHAGE) 500 MG tablet TAKE 2 TABLETS (1,000 MG) BY MOUTH 2 TIMES DAILY (WITH MEALS) 360 tablet 1     hydrochlorothiazide 12.5 MG TABS tablet Take 1 tablet (12.5 mg) by mouth daily 90 tablet 3     STATIN  "NOT PRESCRIBED, INTENTIONAL, Statin not prescribed intentionally due to Other LDL at goal; pt doesn't want to add another medication (This option does not exclude patient from measure)  0     omega-3 fatty acids 1200 MG capsule Take 2 capsules by mouth daily. 90 capsule      aspirin 81 MG tablet Take  by mouth daily.       naproxen sodium (ANAPROX) 220 MG tablet Take 1 tablet by mouth daily. 180 tablet 3     psyllium (METAMUCIL SMOOTH TEXTURE) 63 % POWD Take 1 teaspoonful by mouth daily. Mix in 8 ounces of water       Multiple Vitamins-Calcium (VIACTIV MULTI-VITAMIN) CHEW Take 1 chew tab by mouth daily.       [DISCONTINUED] hydrochlorothiazide 12.5 MG TABS tablet Take 1 tablet (12.5 mg) by mouth daily 90 tablet 0     [DISCONTINUED] metFORMIN (GLUCOPHAGE) 500 MG tablet TAKE 2 TABLETS (1,000 MG) BY MOUTH 2 TIMES DAILY (WITH MEALS) 360 tablet 1     [DISCONTINUED] losartan (COZAAR) 100 MG tablet Take 1 tablet (100 mg) by mouth daily 90 tablet 1     [DISCONTINUED] metoprolol (TOPROL-XL) 100 MG 24 hr tablet TAKE 1.5 TABLETS BY MOUTH DAILY 135 tablet 2         ROS:  Constitutional, HEENT, cardiovascular, pulmonary, GI, , musculoskeletal, neuro, skin, endocrine and psych systems are negative, except as otherwise noted.      OBJECTIVE:   /70  Pulse 76  Temp 97.6  F (36.4  C) (Oral)  Ht 5' 6.5\" (1.689 m)  Wt 200 lb 8 oz (90.9 kg)  SpO2 96%  Breastfeeding? No  BMI 31.88 kg/m2 Estimated body mass index is 31.88 kg/(m^2) as calculated from the following:    Height as of this encounter: 5' 6.5\" (1.689 m).    Weight as of this encounter: 200 lb 8 oz (90.9 kg).  EXAM:   GENERAL APPEARANCE: healthy, alert and no distress  EYES: Eyes grossly normal to inspection, PERRL and conjunctivae and sclerae normal  HENT: ear canals and TM's normal, nose and mouth without ulcers or lesions, oropharynx clear and oral mucous membranes moist  NECK: no adenopathy, no asymmetry, masses, or scars and thyroid normal to palpation  RESP: " lungs clear to auscultation - no rales, rhonchi or wheezes  BREAST: normal without masses, tenderness or nipple discharge and no palpable axillary masses or adenopathy  CV: regular rate and rhythm, normal S1 S2, no S3 or S4, no murmur, click or rub, no peripheral edema and peripheral pulses strong  ABDOMEN: soft, nontender, no hepatosplenomegaly, no masses and bowel sounds normal  MS: no musculoskeletal defects are noted and gait is age appropriate without ataxia  SKIN: no suspicious lesions or rashes  NEURO: Normal strength and tone, sensory exam grossly normal, mentation intact and speech normal  PSYCH: mentation appears normal and affect normal/bright    ASSESSMENT / PLAN:   Assessment and Plan:     (Z00.00) Encounter for routine adult health examination without abnormal findings  (primary encounter diagnosis)  Comment:   Plan: Recd she go to [address] today for mammogram.  Colonoscopy is up to date.    (E11.9) Type 2 diabetes mellitus without complication, without long-term current use of insulin (H)  Comment:   Plan: metFORMIN (GLUCOPHAGE) 500 MG tablet,         Comprehensive metabolic panel, CBC with         platelets, Hemoglobin A1c, Albumin Random Urine        Quantitative with Creat Ratio, FOOT EXAM            (I10) HTN (hypertension), benign  Comment: high today, bring home monitor in and have BP rechecked in pharm to make sure home monitor accurate.  Plan: metoprolol (TOPROL-XL) 100 MG 24 hr tablet,         Comprehensive metabolic panel            (R60.0) Bilateral lower extremity edema  Comment: hx of hyponatremia so will check sodium today. She had increased symptomatic edema when she was taken off of this.  Plan: hydrochlorothiazide 12.5 MG TABS tablet        refilled    (E78.5) Hyperlipidemia LDL goal <100  Comment:   Plan: Lipid Profile            (Z23) Immunization due  Comment:   Plan: FLU VACCINE, INCREASED ANTIGEN, PRESV FREE            (Z23) Need for prophylactic vaccination and inoculation  "against influenza  Comment:   Plan: ADMIN INFLUENZA (For MEDICARE Patients ONLY)         []              End of Life Planning:  Patient currently has an advanced directive: No.  I have verified the patient's ablity to prepare an advanced directive/make health care decisions.  Literature was provided to assist patient in preparing an advanced directive.    COUNSELING:  Reviewed preventive health counseling, as reflected in patient instructions        Estimated body mass index is 31.88 kg/(m^2) as calculated from the following:    Height as of this encounter: 5' 6.5\" (1.689 m).    Weight as of this encounter: 200 lb 8 oz (90.9 kg).     reports that she has never smoked. She has never used smokeless tobacco.        Appropriate preventive services were discussed with this patient, including applicable screening as appropriate for cardiovascular disease, diabetes, osteopenia/osteoporosis, and glaucoma.  As appropriate for age/gender, discussed screening for colorectal cancer, prostate cancer, breast cancer, and cervical cancer. Checklist reviewing preventive services available has been given to the patient.    Reviewed patients plan of care and provided an AVS. The Basic Care Plan (routine screening as documented in Health Maintenance) for Haritha meets the Care Plan requirement. This Care Plan has been established and reviewed with the Patient.    Counseling Resources:  ATP IV Guidelines  Pooled Cohorts Equation Calculator  Breast Cancer Risk Calculator  FRAX Risk Assessment  ICSI Preventive Guidelines  Dietary Guidelines for Americans, 2010  USDA's MyPlate  ASA Prophylaxis  Lung CA Screening    Amisha Plaza PA-C  [facility] EDINAInjectable Influenza Immunization Documentation    1.  Is the person to be vaccinated sick today?   No    2. Does the person to be vaccinated have an allergy to a component   of the vaccine?   No  Egg Allergy Algorithm Link    3. Has the person to be vaccinated ever had a " serious reaction   to influenza vaccine in the past?   No    4. Has the person to be vaccinated ever had Guillain-Barré syndrome?   No    Form completed by Darcie Saleh CMA

## 2017-11-15 NOTE — PATIENT INSTRUCTIONS

## 2017-11-17 NOTE — PROGRESS NOTES
It was a pleasure seeing you for your physical examination.  I wanted to get back to you with your test results.  I have enclosed a copy for your review.      I am happy to report that your CBC or complete blood count is normal with no signs of anemia, leukemia or platelet abnormalities.     Your chemistry panel shows a blood sugar of 158.  Your hemoglobin A1c is not at goal of <7% so work on losing 10 lbs as we discussed.  I will see you back for a recheck in 3 month.      Your urine microalbumin was normal.    Your blood salts, kidney tests, liver tests, and proteins are all fine with the exception of the low sodium. Stop the hydrochlorathiazide as we discussed and closely monitor your blood pressure.    Your total cholesterol is 176 with the normal range being below 200.  Your HDL or good cholesterol is 80 with the normal range being above 50.  Your LDL or bad cholesterol is 77 with the normal range being below 100.  This looks good.    Amisha Plaza PA-C

## 2018-05-10 ENCOUNTER — TELEPHONE (OUTPATIENT)
Dept: FAMILY MEDICINE | Facility: CLINIC | Age: 73
End: 2018-05-10

## 2018-05-10 NOTE — TELEPHONE ENCOUNTER
Reason for Call:  Other     Detailed comments: colonoscopy next week - she is diabetic and wants to check on her prep    Please call to advise    Phone Number Patient can be reached at: Home number on file [phone] (home)    Best Time: anytime    Can we leave a detailed message on this number? YES    Call taken on 5/10/2018 at 1:24 PM by Shakeel Mcneal

## 2018-05-10 NOTE — TELEPHONE ENCOUNTER
PCP see below  Noted pt is scheduled for 5/15/18 pre-admit for colonoscopy   Please advise - noted pt has Metformin on current list    Annabella WILSON RN

## 2018-05-10 NOTE — TELEPHONE ENCOUNTER
Spoke with pt and relayed information below. She also asks: Can she take her Advil PM. Advised to take Tylenol PM instead    Gladys BARRON RN

## 2018-05-15 ENCOUNTER — SURGERY (OUTPATIENT)
Age: 73
End: 2018-05-15

## 2018-05-15 ENCOUNTER — HOSPITAL ENCOUNTER (OUTPATIENT)
Facility: CLINIC | Age: 73
Discharge: HOME | End: 2018-05-15
Attending: COLON & RECTAL SURGERY | Admitting: COLON & RECTAL SURGERY
Payer: MEDICARE

## 2018-05-15 VITALS
RESPIRATION RATE: 12 BRPM | BODY MASS INDEX: 30.92 KG/M2 | DIASTOLIC BLOOD PRESSURE: 73 MMHG | WEIGHT: 197 LBS | SYSTOLIC BLOOD PRESSURE: 133 MMHG | HEIGHT: 67 IN | OXYGEN SATURATION: 95 %

## 2018-05-15 LAB
COLONOSCOPY: NORMAL
GLUCOSE BLDC GLUCOMTR-MCNC: 187 MG/DL (ref 70–99)

## 2018-05-15 PROCEDURE — 45378 DIAGNOSTIC COLONOSCOPY: CPT | Performed by: COLON & RECTAL SURGERY

## 2018-05-15 PROCEDURE — G0500 MOD SEDAT ENDO SERVICE >5YRS: HCPCS | Performed by: COLON & RECTAL SURGERY

## 2018-05-15 PROCEDURE — 25000128 H RX IP 250 OP 636: Performed by: COLON & RECTAL SURGERY

## 2018-05-15 PROCEDURE — G0105 COLORECTAL SCRN; HI RISK IND: HCPCS | Performed by: COLON & RECTAL SURGERY

## 2018-05-15 PROCEDURE — 82962 GLUCOSE BLOOD TEST: CPT

## 2018-05-15 RX ORDER — ONDANSETRON 2 MG/ML
4 INJECTION INTRAMUSCULAR; INTRAVENOUS
Status: COMPLETED | OUTPATIENT
Start: 2018-05-15 | End: 2018-05-15

## 2018-05-15 RX ORDER — ONDANSETRON 4 MG/1
4 TABLET, ORALLY DISINTEGRATING ORAL EVERY 6 HOURS PRN
Status: DISCONTINUED | OUTPATIENT
Start: 2018-05-15 | End: 2018-05-15 | Stop reason: HOSPADM

## 2018-05-15 RX ORDER — ONDANSETRON 2 MG/ML
4 INJECTION INTRAMUSCULAR; INTRAVENOUS EVERY 6 HOURS PRN
Status: DISCONTINUED | OUTPATIENT
Start: 2018-05-15 | End: 2018-05-15 | Stop reason: HOSPADM

## 2018-05-15 RX ORDER — FLUMAZENIL 0.1 MG/ML
0.2 INJECTION, SOLUTION INTRAVENOUS
Status: DISCONTINUED | OUTPATIENT
Start: 2018-05-15 | End: 2018-05-15 | Stop reason: HOSPADM

## 2018-05-15 RX ORDER — FENTANYL CITRATE 50 UG/ML
INJECTION, SOLUTION INTRAMUSCULAR; INTRAVENOUS PRN
Status: DISCONTINUED | OUTPATIENT
Start: 2018-05-15 | End: 2018-05-15 | Stop reason: HOSPADM

## 2018-05-15 RX ORDER — NALOXONE HYDROCHLORIDE 0.4 MG/ML
.1-.4 INJECTION, SOLUTION INTRAMUSCULAR; INTRAVENOUS; SUBCUTANEOUS
Status: DISCONTINUED | OUTPATIENT
Start: 2018-05-15 | End: 2018-05-15 | Stop reason: HOSPADM

## 2018-05-15 RX ORDER — LIDOCAINE 40 MG/G
CREAM TOPICAL
Status: DISCONTINUED | OUTPATIENT
Start: 2018-05-15 | End: 2018-05-15 | Stop reason: HOSPADM

## 2018-05-15 RX ADMIN — MIDAZOLAM 2 MG: 1 INJECTION INTRAMUSCULAR; INTRAVENOUS at 08:30

## 2018-05-15 RX ADMIN — ONDANSETRON 4 MG: 2 INJECTION INTRAMUSCULAR; INTRAVENOUS at 08:30

## 2018-05-15 RX ADMIN — FENTANYL CITRATE 100 MCG: 50 INJECTION, SOLUTION INTRAMUSCULAR; INTRAVENOUS at 08:25

## 2018-05-15 NOTE — H&P
Pre-Endoscopy History and Physical     Haritha Cat MRN# [id number]   YOB: 1945 Age: 73 year old     Date of Procedure: 5/15/2018  Primary care provider: Amisha Plaza  Type of Endoscopy: colonoscopy  Reason for Procedure: screening  Type of Anesthesia Anticipated: moderate sedation    HPI:    Haritha is a 73 year old female who will be undergoing the above procedure.  Patient denies a change in her bowel habits or bleeding.  Patient had a sessile serrated adenoma on last colonoscopy.     A history and physical has been performed. The patient's medications and allergies have been reviewed. The risks and benefits of the procedure and the sedation options and risks were discussed with the patient.  All questions were answered and informed consent was obtained.      She denies a personal or family history of anesthesia complications or bleeding disorders.   Prior to Admission medications    Medication Sig Start Date End Date Taking? Authorizing Provider   aspirin 81 MG tablet Take  by mouth daily. 7/12/12  Yes Amisha Plaza PA-C   hydrochlorothiazide 12.5 MG TABS tablet Take 1 tablet (12.5 mg) by mouth daily 11/15/17  Yes Amisha Plaza PA-C   metFORMIN (GLUCOPHAGE) 500 MG tablet TAKE 2 TABLETS (1,000 MG) BY MOUTH 2 TIMES DAILY (WITH MEALS) 11/15/17  Yes Amisha Plaza PA-C   metoprolol (TOPROL-XL) 100 MG 24 hr tablet TAKE 1.5 TABLETS BY MOUTH DAILY 11/15/17  Yes Amisha Plaza PA-C   naproxen sodium (ANAPROX) 220 MG tablet Take 1 tablet by mouth daily. 5/9/12  Yes Amisha Plaza PA-C   psyllium (METAMUCIL SMOOTH TEXTURE) 63 % POWD Take 1 teaspoonful by mouth daily. Mix in 8 ounces of water 5/9/12  Yes Amisha Plaza PA-C   omega-3 fatty acids 1200 MG capsule Take 2 capsules by mouth daily. 6/13/13   Amisha Plaza PA-C   STATIN NOT PRESCRIBED, INTENTIONAL, Statin not prescribed intentionally due to Other LDL at goal; pt doesn't want to add another medication (This option  "does not exclude patient from measure) 8/25/16   Amisha Plaza PA-C       Allergies   Allergen Reactions     Amoxicillin Hives     Hydrochlorothiazide      hyponatremia     Lisinopril      cough     Lotrel Hives        No current facility-administered medications for this encounter.        Patient Active Problem List   Diagnosis     Osteopenia     Hyperlipidemia LDL goal <100     HTN (hypertension), benign     GERD (gastroesophageal reflux disease)     Chronic headaches     Colon polyp     Type 2 diabetes mellitus without complications (H)     Needle phobia     Vasovagal syncope        Past Medical History:   Diagnosis Date     Abscess of abdominal cavity (H) 11/02    cecal perf, prob from diverticulitis     Allergic rhinitis      Colon polyp 2015    colonoscopy due 5 years.     Diverticulosis      DM type 2 (diabetes mellitus, type 2) (H)      Elevated LFTs     fatty liver dz     GERD (gastroesophageal reflux disease)      Hepatitis A      Herpes zoster 11/28/11     HTN (hypertension), benign      Osteopenia     DXA 2008     Osteopenia 2014        Past Surgical History:   Procedure Laterality Date     varicose vein surgery      Dr. Bowen     wisdom teeth  age 27       Social History   Substance Use Topics     Smoking status: Never Smoker     Smokeless tobacco: Never Used     Alcohol use 0.0 oz/week     0 Standard drinks or equivalent per week      Comment: 0-3 drinks per week       Family History   Problem Relation Age of Onset     Hypertension Mother      Thyroid Disease Mother      Neurologic Disorder Mother      Parkinsons and RA     Hypertension Father      DIABETES Father      DIABETES Sister      Hypertension Sister      DIABETES Maternal Grandmother      also cad, OA, parkinsons     DIABETES Maternal Grandfather        REVIEW OF SYSTEMS:     5 point ROS negative except as noted above in HPI, including Gen., Resp., CV, GI &  system review.      PHYSICAL EXAM:   /79  Resp 18  Ht 1.702 m (5' 7\") " " Wt 89.4 kg (197 lb)  SpO2 98%  BMI 30.85 kg/m2 Estimated body mass index is 30.85 kg/(m^2) as calculated from the following:    Height as of this encounter: 1.702 m (5' 7\").    Weight as of this encounter: 89.4 kg (197 lb).   GENERAL APPEARANCE: healthy  MENTAL STATUS: alert  AIRWAY EXAM: Mallampatti Class II (visualization of the soft palate, fauces, and uvula)  RESP: lungs clear to auscultation - no rales, rhonchi or wheezes  CV: regular rates and rhythm      DIAGNOSTICS:    Not indicated      IMPRESSION   ASA Class 2 - Mild systemic disease        PLAN:       Colonoscopy with possible polypectomy, possible biopsy. The indications, procedure and risks were explained to the patient who agrees to proceed.       The above has been forwarded to the consulting provider.      Signed Electronically by: Lucero Acharya  May 15, 2018          "

## 2018-05-15 NOTE — BRIEF OP NOTE
[facility] Brief Operative Note    Pre-operative diagnosis: hx polyps   Post-operative diagnosis diverticulosis     Procedure: Procedure(s):  colonoscopy - Wound Class: II-Clean Contaminated   Surgeon(s): Surgeon(s) and Role:     * Lucero Acharya MD - Primary   Estimated blood loss: * No values recorded between 5/15/2018 12:00 AM and 5/15/2018  8:58 AM *    Specimens: * No specimens in log *   Findings: See Provation procedure note in Epic

## 2018-06-11 ENCOUNTER — MYC MEDICAL ADVICE (OUTPATIENT)
Dept: FAMILY MEDICINE | Facility: CLINIC | Age: 73
End: 2018-06-11

## 2018-06-12 ENCOUNTER — RADIANT APPOINTMENT (OUTPATIENT)
Dept: GENERAL RADIOLOGY | Facility: CLINIC | Age: 73
End: 2018-06-12
Attending: INTERNAL MEDICINE
Payer: COMMERCIAL

## 2018-06-12 ENCOUNTER — OFFICE VISIT (OUTPATIENT)
Dept: FAMILY MEDICINE | Facility: CLINIC | Age: 73
End: 2018-06-12
Payer: COMMERCIAL

## 2018-06-12 VITALS
DIASTOLIC BLOOD PRESSURE: 67 MMHG | HEIGHT: 67 IN | TEMPERATURE: 97 F | BODY MASS INDEX: 30.76 KG/M2 | WEIGHT: 196 LBS | HEART RATE: 62 BPM | OXYGEN SATURATION: 97 % | SYSTOLIC BLOOD PRESSURE: 136 MMHG

## 2018-06-12 DIAGNOSIS — M65.30 TRIGGER FINGER, ACQUIRED: ICD-10-CM

## 2018-06-12 DIAGNOSIS — M65.30 TRIGGER FINGER, ACQUIRED: Primary | ICD-10-CM

## 2018-06-12 PROCEDURE — 73140 X-RAY EXAM OF FINGER(S): CPT | Mod: LT

## 2018-06-12 PROCEDURE — 99213 OFFICE O/P EST LOW 20 MIN: CPT | Performed by: INTERNAL MEDICINE

## 2018-06-12 NOTE — PROGRESS NOTES
"  SUBJECTIVE:   Haritha Cat is a 73 year old female who presents to clinic today for the following health issues:    Musculoskeletal problem/pain    Duration: x 4 days ago    Description  Location: left thumb    Intensity:  moderate    Accompanying signs and symptoms: non-movement, not able to grib    History  Previous similar problem: no   Previous evaluation:  none    Precipitating or alleviating factors:  Trauma or overuse: YES- finger exercises   Aggravating factors include: anything movement trying to manipulate with left hand utilizing thumb     Therapies tried and outcome: rest/inactivity, ice and immobilization, nothing.     The patient was doing finer exercises four days ago when she began experiencing left thumb pain. She notes now when moving her finger she has a \"popping\" sensation. When she moves her finger a lot she has increased, throbbing pain.       Problem list and histories reviewed & adjusted, as indicated.  Additional history: as documented    Current Outpatient Prescriptions   Medication Sig Dispense Refill     aspirin 81 MG tablet Take  by mouth daily.       hydrochlorothiazide 12.5 MG TABS tablet Take 1 tablet (12.5 mg) by mouth daily 90 tablet 3     metFORMIN (GLUCOPHAGE) 500 MG tablet TAKE 2 TABLETS (1,000 MG) BY MOUTH 2 TIMES DAILY (WITH MEALS) 360 tablet 1     metoprolol (TOPROL-XL) 100 MG 24 hr tablet TAKE 1.5 TABLETS BY MOUTH DAILY 135 tablet 3     naproxen sodium (ANAPROX) 220 MG tablet Take 1 tablet by mouth daily. 180 tablet 3     omega-3 fatty acids 1200 MG capsule Take 2 capsules by mouth daily. 90 capsule      psyllium (METAMUCIL SMOOTH TEXTURE) 63 % POWD Take 1 teaspoonful by mouth daily. Mix in 8 ounces of water       STATIN NOT PRESCRIBED, INTENTIONAL, Statin not prescribed intentionally due to Other LDL at goal; pt doesn't want to add another medication (This option does not exclude patient from measure)  0     Recent Labs   Lab Test  11/15/17   0939  07/11/17   1229  " "01/10/17   1137  08/19/16   0733  08/10/15   0840   A1C  7.3*  7.2*  8.3*  7.4*  7.1*   LDL  77   --    --   91  79   HDL  80   --    --   47*  55   TRIG  97   --    --   143  161*   ALT  42   --    --   41  39   CR  0.87  0.86   --   0.83  0.91   GFRESTIMATED  64  65   --   68  61   GFRESTBLACK  78  79   --   82  74   POTASSIUM  4.4  4.0   --   3.9  5.4*   TSH   --   3.15   --   4.78*   --       BP Readings from Last 3 Encounters:   06/12/18 136/67   05/15/18 133/73   11/15/17 150/70    Wt Readings from Last 3 Encounters:   06/12/18 88.9 kg (196 lb)   05/15/18 89.4 kg (197 lb)   11/15/17 90.9 kg (200 lb 8 oz)              Reviewed and updated as needed this visit by clinical staff  Tobacco  Allergies  Soc Hx      Reviewed and updated as needed this visit by Provider         ROS:  Constitutional, HEENT, cardiovascular, pulmonary, GI, , musculoskeletal, neuro, skin, endocrine and psych systems are negative, except as otherwise noted.    This document serves as a record of the services and decisions personally performed and made by Lior Mcghee MD. It was created on his behalf by Cecille Carmona, a trained medical scribe. The creation of this document is based the provider's statements to the medical scribe. 6/12/2018  OBJECTIVE:   /67 (BP Location: Left arm, Patient Position: Sitting, Cuff Size: Adult Large)  Pulse 62  Temp 97  F (36.1  C) (Oral)  Ht 1.702 m (5' 7\")  Wt 88.9 kg (196 lb)  SpO2 97%  Breastfeeding? No  BMI 30.7 kg/m2  Body mass index is 30.7 kg/(m^2).  Neck was supple without adenopathy or thyromegaly her carotids were normal without bruits  Chest clear to auscultation and percussion  Cardiovascular S1 and S2 are physiologic without murmurs or gallops  Abdomen bowel sounds were normal.  There is no palpable mass or organomegaly  Extremities nontender without any edema  Left first finger: some hypertrophic changes associated with osteoarthritis, popping of the DIP joint " extension.  Pulses pedal pulses are as described otherwise his pulses are bilaterally symmetrical throughout without bruits  Skin without significant abnormality    Diagnostic Test Results:  Left Thumb X-Ray: no signs of fracture, degenerative changes noted, waiting for radiology review.   ASSESSMENT/PLAN:   1. Trigger finger, acquired  Triggering at the medial aspect of the DIP of the left first finger. Start pat tapping the first and second finger and rest for two weeks. Followup in two weeks for recheck.   - XR Finger Left G/E 2 Views; Future    Lior Mcghee MD  [facility]    The information in this document, created by the medical scribe for me, accurately reflects the services I personally performed and the decisions made by me. I have reviewed and approved this document for accuracy prior to leaving the patient care area.  Lior Mcghee MD  3:06 PM, 06/12/18

## 2018-06-12 NOTE — MR AVS SNAPSHOT
After Visit Summary   6/12/2018    Haritha Cat    MRN: [id number]           Patient Information     Date Of Birth          1945        Visit Information        Provider Department      6/12/2018 2:30 PM Lior Mcghee MD [facility]        Today's Diagnoses     Trigger finger, acquired    -  1       Follow-ups after your visit        Your next 10 appointments already scheduled     Jun 27, 2018  8:00 AM CDT   Office Visit with Lior Mcghee MD   [facility]    [address] [facility] [place] [address] [phone]           Bring a current list of meds and any records pertaining to this visit. For Physicals, please bring immunization records and any forms needing to be filled out. Please arrive 10 minutes early to complete paperwork.              Who to contact     If you have questions or need follow up information about today's clinic visit or your schedule please contact [facility] directly at [phone].  Normal or non-critical lab and imaging results will be communicated to you by schooxhart, letter or phone within 4 business days after the clinic has received the results. If you do not hear from us within 7 days, please contact the clinic through [employer] or phone. If you have a critical or abnormal lab result, we will notify you by phone as soon as possible.  Submit refill requests through [employer] or call your pharmacy and they will forward the refill request to us. Please allow 3 business days for your refill to be completed.          Additional Information About Your Visit        schooxhart Information     [employer] gives you secure access to your electronic health record. If you see a primary care provider, you can also send messages to your care team and make appointments. If you have questions, please call your primary care clinic.  If you do not have a primary care provider, please call [phone] and they will assist  "you.        Care EveryWhere ID     This is your Care EveryWhere ID. This could be used by other organizations to access your [facility] medical records  [id number]        Your Vitals Were     Pulse Temperature Height Pulse Oximetry Breastfeeding? BMI (Body Mass Index)    62 97  F (36.1  C) (Oral) 5' 7\" (1.702 m) 97% No 30.7 kg/m2       Blood Pressure from Last 3 Encounters:   06/12/18 136/67   05/15/18 133/73   11/15/17 150/70    Weight from Last 3 Encounters:   06/12/18 196 lb (88.9 kg)   05/15/18 197 lb (89.4 kg)   11/15/17 200 lb 8 oz (90.9 kg)               Primary Care Provider Office Phone # Fax #    Amisha Plaza PA-C [phone] [address] [facility] [address]  [place] [address]        Equal Access to Services     SARAH LUCIO AH: Hadii aad ku hadasho Soomaali, waaxda luqadaha, qaybta kaalmada adeegyada, waxay shelleyin wild miranda . So [facility] [phone].    ATENCIÓN: Si rukhsana mejia, tiene a law disposición servicios gratuitos de asistencia lingüística. Llame al [phone].    We comply with applicable federal civil rights laws and [place] laws. We do not discriminate on the basis of race, color, national origin, age, disability, sex, sexual orientation, or gender identity.            Thank you!     Thank you for choosing [facility]  for your care. Our goal is always to provide you with excellent care. Hearing back from our patients is one way we can continue to improve our services. Please take a few minutes to complete the written survey that you may receive in the mail after your visit with us. Thank you!             Your Updated Medication List - Protect others around you: Learn how to safely use, store and throw away your medicines at www.disposemymeds.org.          This list is accurate as of 6/12/18 11:59 PM.  Always use your most recent med list.                   Brand Name Dispense Instructions for use Diagnosis    aspirin 81 MG tablet      Take  by mouth " daily.        hydrochlorothiazide 12.5 MG Tabs tablet     90 tablet    Take 1 tablet (12.5 mg) by mouth daily    Bilateral lower extremity edema       METAMUCIL SMOOTH TEXTURE 63 % Powd   Generic drug:  psyllium      Take 1 teaspoonful by mouth daily. Mix in 8 ounces of water        metFORMIN 500 MG tablet    GLUCOPHAGE    360 tablet    TAKE 2 TABLETS (1,000 MG) BY MOUTH 2 TIMES DAILY (WITH MEALS)    Type 2 diabetes mellitus without complication, without long-term current use of insulin (H)       metoprolol succinate 100 MG 24 hr tablet    TOPROL-XL    135 tablet    TAKE 1.5 TABLETS BY MOUTH DAILY    HTN (hypertension), benign       naproxen sodium 220 MG tablet    ANAPROX    180 tablet    Take 1 tablet by mouth daily.        omega-3 fatty acids 1200 MG capsule     90 capsule    Take 2 capsules by mouth daily.        STATIN NOT PRESCRIBED (INTENTIONAL)      Statin not prescribed intentionally due to Other LDL at goal; pt doesn't want to add another medication (This option does not exclude patient from measure)    Hyperlipidemia LDL goal <100

## 2018-06-27 ENCOUNTER — OFFICE VISIT (OUTPATIENT)
Dept: FAMILY MEDICINE | Facility: CLINIC | Age: 73
End: 2018-06-27
Payer: COMMERCIAL

## 2018-06-27 ENCOUNTER — TELEPHONE (OUTPATIENT)
Dept: FAMILY MEDICINE | Facility: CLINIC | Age: 73
End: 2018-06-27

## 2018-06-27 VITALS
HEART RATE: 65 BPM | TEMPERATURE: 97.7 F | BODY MASS INDEX: 32.8 KG/M2 | HEIGHT: 67 IN | DIASTOLIC BLOOD PRESSURE: 81 MMHG | OXYGEN SATURATION: 96 % | SYSTOLIC BLOOD PRESSURE: 144 MMHG | WEIGHT: 209 LBS

## 2018-06-27 DIAGNOSIS — M65.30 TRIGGER FINGER, ACQUIRED: ICD-10-CM

## 2018-06-27 DIAGNOSIS — E11.9 TYPE 2 DIABETES MELLITUS WITHOUT COMPLICATION, WITHOUT LONG-TERM CURRENT USE OF INSULIN (H): ICD-10-CM

## 2018-06-27 DIAGNOSIS — K21.9 GASTROESOPHAGEAL REFLUX DISEASE WITHOUT ESOPHAGITIS: ICD-10-CM

## 2018-06-27 DIAGNOSIS — I10 HTN (HYPERTENSION), BENIGN: ICD-10-CM

## 2018-06-27 DIAGNOSIS — E11.9 TYPE 2 DIABETES MELLITUS WITHOUT COMPLICATION, WITHOUT LONG-TERM CURRENT USE OF INSULIN (H): Primary | ICD-10-CM

## 2018-06-27 DIAGNOSIS — M19.049 PRIMARY OSTEOARTHRITIS OF HAND, UNSPECIFIED LATERALITY: Primary | ICD-10-CM

## 2018-06-27 PROCEDURE — 20600 DRAIN/INJ JOINT/BURSA W/O US: CPT | Mod: LT | Performed by: INTERNAL MEDICINE

## 2018-06-27 NOTE — PROGRESS NOTES
SUBJECTIVE:   Haritha Cat is a 73 year old female who presents to clinic today for the following health issues:    The patient presents to the clinic for revaluation of trigger finger of the left thumb with LOV 6/12/2018. She was recommended the pat tape the finger for two weeks. She notes not having symptoms improvement with pat taping. She notes with movement of the thumb she has pain and clicking.      Problem list and histories reviewed & adjusted, as indicated.  Additional history: as documented    Current Outpatient Prescriptions   Medication Sig Dispense Refill     aspirin 81 MG tablet Take  by mouth daily.       hydrochlorothiazide 12.5 MG TABS tablet Take 1 tablet (12.5 mg) by mouth daily 90 tablet 3     metFORMIN (GLUCOPHAGE) 500 MG tablet TAKE 2 TABLETS (1,000 MG) BY MOUTH 2 TIMES DAILY (WITH MEALS) 360 tablet 1     metoprolol (TOPROL-XL) 100 MG 24 hr tablet TAKE 1.5 TABLETS BY MOUTH DAILY 135 tablet 3     naproxen sodium (ANAPROX) 220 MG tablet Take 1 tablet by mouth daily. 180 tablet 3     omega-3 fatty acids 1200 MG capsule Take 2 capsules by mouth daily. 90 capsule      psyllium (METAMUCIL SMOOTH TEXTURE) 63 % POWD Take 1 teaspoonful by mouth daily. Mix in 8 ounces of water       STATIN NOT PRESCRIBED, INTENTIONAL, Statin not prescribed intentionally due to Other LDL at goal; pt doesn't want to add another medication (This option does not exclude patient from measure)  0     Recent Labs   Lab Test  11/15/17   0939  07/11/17   1229  01/10/17   1137  08/19/16   0733  08/10/15   0840   A1C  7.3*  7.2*  8.3*  7.4*  7.1*   LDL  77   --    --   91  79   HDL  80   --    --   47*  55   TRIG  97   --    --   143  161*   ALT  42   --    --   41  39   CR  0.87  0.86   --   0.83  0.91   GFRESTIMATED  64  65   --   68  61   GFRESTBLACK  78  79   --   82  74   POTASSIUM  4.4  4.0   --   3.9  5.4*   TSH   --   3.15   --   4.78*   --       BP Readings from Last 3 Encounters:   06/27/18 144/81   06/12/18  "136/67   05/15/18 133/73    Wt Readings from Last 3 Encounters:   06/27/18 94.8 kg (209 lb)   06/12/18 88.9 kg (196 lb)   05/15/18 89.4 kg (197 lb)               Reviewed and updated as needed this visit by clinical staff       Reviewed and updated as needed this visit by Provider         ROS:  Constitutional, HEENT, cardiovascular, pulmonary, GI, , musculoskeletal, neuro, skin, endocrine and psych systems are negative, except as otherwise noted.    This document serves as a record of the services and decisions personally performed and made by Lior Mcghee MD. It was created on his behalf by Cecille Carmona, a trained medical scribe. The creation of this document is based the provider's statements to the medical scribe. 6/27/2018  OBJECTIVE:   /81 (BP Location: Right arm, Cuff Size: Adult Large)  Pulse 65  Temp 97.7  F (36.5  C) (Oral)  Ht 1.702 m (5' 7\")  Wt 94.8 kg (209 lb)  SpO2 96%  BMI 32.73 kg/m2  Body mass index is 32.73 kg/(m^2).    Left Hand: heberden's nodes, no synovitis, first MCP joint was prominent and non tender, with flexion the thumb is clicking and she is still having problems with sticking that was not resolved with taping     Procedure:  The left thumb was cleaned and prepped. At the middle of the volar crease of the thumb, using sterile technique and a 25 gauge 5/8ths-in needle, 20 mg of medrol with lidocaine and marcaine were injected without difficulty.     Diagnostic Test Results:  none   ASSESSMENT/PLAN:   1. Primary osteoarthritis of hand, unspecified laterality  2. Trigger finger, acquired  Steroid injection given in the clinic. Continue buddy taping for one week. Under heat, begin ROM and strengthening exercises.    3. HTN (hypertension), benign  Controlled with hydrochlorothiazide and metoprolol.     4. Gastroesophageal reflux disease without esophagitis    5. Type 2 diabetes mellitus without complication, without long-term current use of insulin (H)  Controlled with " metformin.       Lior Mcghee MD  [facility]    The information in this document, created by the medical scribe for me, accurately reflects the services I personally performed and the decisions made by me. I have reviewed and approved this document for accuracy prior to leaving the patient care area.  Lior Mcghee MD  8:20 AM, 06/27/18

## 2018-06-27 NOTE — TELEPHONE ENCOUNTER
Reason for Call: Request for an order or referral:    Order or referral being requested: a1C    Date needed: at your convenience    Has the patient been seen by the PCP for this problem? YES    Additional comments: pt says she needs an a1c follow up lab.  Please contact her to schedule when order is in.    Phone number Patient can be reached at:  Cell number on file:    Telephone Information:   Mobile [phone]       Best Time:  any    Can we leave a detailed message on this number?  YES    Call taken on 6/27/2018 at 8:56 AM by Mariia De La Torre

## 2018-06-27 NOTE — TELEPHONE ENCOUNTER
Please sign A1C lab order.   Any other labs needed at this time?    Left message for pt that order would be available today. Advised she call to schedule lab appt at her convenience.     Thank you,  Brandi DAUGHERTY RN,BSN

## 2018-06-27 NOTE — MR AVS SNAPSHOT
After Visit Summary   6/27/2018    Haritha Cat    MRN: [id number]           Patient Information     Date Of Birth          1945        Visit Information        Provider Department      6/27/2018 8:00 AM Lior Mcghee MD [facility]        Today's Diagnoses     Primary osteoarthritis of hand, unspecified laterality    -  1    Trigger finger, acquired        HTN (hypertension), benign        Gastroesophageal reflux disease without esophagitis        Type 2 diabetes mellitus without complication, without long-term current use of insulin (H)           Follow-ups after your visit        Who to contact     If you have questions or need follow up information about today's clinic visit or your schedule please contact [facility] directly at [phone].  Normal or non-critical lab and imaging results will be communicated to you by [employer] Jieyinhart, letter or phone within 4 business days after the clinic has received the results. If you do not hear from us within 7 days, please contact the clinic through Smart[employer] Jieyinhart or phone. If you have a critical or abnormal lab result, we will notify you by phone as soon as possible.  Submit refill requests through [employer] or call your pharmacy and they will forward the refill request to us. Please allow 3 business days for your refill to be completed.          Additional Information About Your Visit        MyChart Information     [employer] gives you secure access to your electronic health record. If you see a primary care provider, you can also send messages to your care team and make appointments. If you have questions, please call your primary care clinic.  If you do not have a primary care provider, please call [phone] and they will assist you.        Care EveryWhere ID     This is your Care EveryWhere ID. This could be used by other organizations to access your [facility] medical records  [id number]        Your Vitals Were     Pulse  "Temperature Height Pulse Oximetry BMI (Body Mass Index)       65 97.7  F (36.5  C) (Oral) 5' 7\" (1.702 m) 96% 32.73 kg/m2        Blood Pressure from Last 3 Encounters:   06/27/18 144/81   06/12/18 136/67   05/15/18 133/73    Weight from Last 3 Encounters:   06/27/18 209 lb (94.8 kg)   06/12/18 196 lb (88.9 kg)   05/15/18 197 lb (89.4 kg)              We Performed the Following     INJECTION SINGLE TENDON SHEATH/LIGAMENT     METHYLPREDNISOLONE 40 MG INJ        Primary Care Provider Office Phone # Fax #    Amisha Plaza PA-C [phone] [address] [address]        Equal Access to Services     [facility]: Hadii aad ku hadasho Somitzi, waaxda luqadaha, qaybta kaalmada adefrediyalonnie, evin miradna . So [facility] [phone].    ATENCIÓN: Si habla español, tiene a law disposición servicios gratuitos de asistencia lingüística. Wilmer al [phone].    We comply with applicable federal civil rights laws and [place] laws. We do not discriminate on the basis of race, color, national origin, age, disability, sex, sexual orientation, or gender identity.            Thank you!     Thank you for choosing [facility]  for your care. Our goal is always to provide you with excellent care. Hearing back from our patients is one way we can continue to improve our services. Please take a few minutes to complete the written survey that you may receive in the mail after your visit with us. Thank you!             Your Updated Medication List - Protect others around you: Learn how to safely use, store and throw away your medicines at www.disposemymeds.org.          This list is accurate as of 6/27/18 11:59 PM.  Always use your most recent med list.                   Brand Name Dispense Instructions for use Diagnosis    aspirin 81 MG tablet      Take  by mouth daily.        hydrochlorothiazide 12.5 MG Tabs tablet     90 tablet    Take 1 tablet (12.5 mg) by mouth " daily    Bilateral lower extremity edema       METAMUCIL SMOOTH TEXTURE 63 % Powd   Generic drug:  psyllium      Take 1 teaspoonful by mouth daily. Mix in 8 ounces of water        metoprolol succinate 100 MG 24 hr tablet    TOPROL-XL    135 tablet    TAKE 1.5 TABLETS BY MOUTH DAILY    HTN (hypertension), benign       naproxen sodium 220 MG tablet    ANAPROX    180 tablet    Take 1 tablet by mouth daily.        omega-3 fatty acids 1200 MG capsule     90 capsule    Take 2 capsules by mouth daily.        STATIN NOT PRESCRIBED (INTENTIONAL)      Statin not prescribed intentionally due to Other LDL at goal; pt doesn't want to add another medication (This option does not exclude patient from measure)    Hyperlipidemia LDL goal <100

## 2018-06-29 DIAGNOSIS — E11.9 TYPE 2 DIABETES MELLITUS WITHOUT COMPLICATION, WITHOUT LONG-TERM CURRENT USE OF INSULIN (H): ICD-10-CM

## 2018-06-29 NOTE — TELEPHONE ENCOUNTER
"Last Written Prescription Date:  11/15/17  Last Fill Quantity: 360 tablet,  # refills: 1   Last office visit: 11/15/2017 with prescribing provider:  Mela   Future Office Visit:      Requested Prescriptions   Pending Prescriptions Disp Refills     metFORMIN (GLUCOPHAGE) 500 MG tablet [Pharmacy Med Name: METFORMIN  MG TABLET] 360 tablet 1     Sig: TAKE 2 TABLETS BY MOUTH 2 TIMES DAILY WITH MEALS    Biguanide Agents Failed    6/29/2018  1:16 AM       Failed - Blood pressure less than 140/90 in past 6 months    BP Readings from Last 3 Encounters:   06/27/18 144/81   06/12/18 136/67   05/15/18 133/73                Failed - Patient has documented A1c within the specified period of time.    If HgbA1C is 8 or greater, it needs to be on file within the past 3 months.  If less than 8, must be on file within the past 6 months.     Recent Labs   Lab Test  11/15/17   0939   A1C  7.3*            Passed - Patient has documented LDL within the past 12 mos.    Recent Labs   Lab Test  11/15/17   0939   LDL  77            Passed - Patient has had a Microalbumin in the past 12 mos.    Recent Labs   Lab Test  11/15/17   0939   MICROL  <5   UMALCR  Unable to calculate due to low value            Passed - Patient is age 10 or older       Passed - Patient's CR is NOT>1.4 OR Patient's EGFR is NOT<45 within past 12 mos.    Recent Labs   Lab Test  11/15/17   0939   GFRESTIMATED  64   GFRESTBLACK  78       Recent Labs   Lab Test  11/15/17   0939   CR  0.87            Passed - Patient does NOT have a diagnosis of CHF.       Passed - Patient is not pregnant       Passed - Patient has not had a positive pregnancy test within the past 12 mos.        Passed - Recent (6 mo) or future (30 days) visit within the authorizing provider's specialty    Patient had office visit in the last 6 months or has a visit in the next 30 days with authorizing provider or within the authorizing provider's specialty.  See \"Patient Info\" tab in inbasket, or " "\"Choose Columns\" in Meds & Orders section of the refill encounter.              "

## 2018-06-29 NOTE — TELEPHONE ENCOUNTER
Prescription approved per [facility] Refill Protocol.    Real ALCALA RN

## 2018-09-09 ENCOUNTER — HOSPITAL ENCOUNTER (EMERGENCY)
Facility: CLINIC | Age: 73
Discharge: HOME | End: 2018-09-09
Attending: PHYSICIAN ASSISTANT | Admitting: PHYSICIAN ASSISTANT
Payer: MEDICARE

## 2018-09-09 ENCOUNTER — APPOINTMENT (OUTPATIENT)
Dept: CT IMAGING | Facility: CLINIC | Age: 73
End: 2018-09-09
Attending: PHYSICIAN ASSISTANT
Payer: MEDICARE

## 2018-09-09 VITALS
SYSTOLIC BLOOD PRESSURE: 179 MMHG | BODY MASS INDEX: 29.82 KG/M2 | DIASTOLIC BLOOD PRESSURE: 81 MMHG | TEMPERATURE: 98.7 F | HEART RATE: 65 BPM | HEIGHT: 67 IN | RESPIRATION RATE: 16 BRPM | WEIGHT: 190 LBS | OXYGEN SATURATION: 99 %

## 2018-09-09 DIAGNOSIS — S09.90XA CLOSED HEAD INJURY, INITIAL ENCOUNTER: ICD-10-CM

## 2018-09-09 PROCEDURE — 99284 EMERGENCY DEPT VISIT MOD MDM: CPT | Mod: 25

## 2018-09-09 PROCEDURE — 25000132 ZZH RX MED GY IP 250 OP 250 PS 637: Mod: GY | Performed by: PHYSICIAN ASSISTANT

## 2018-09-09 PROCEDURE — 70450 CT HEAD/BRAIN W/O DYE: CPT

## 2018-09-09 PROCEDURE — A9270 NON-COVERED ITEM OR SERVICE: HCPCS | Mod: GY | Performed by: PHYSICIAN ASSISTANT

## 2018-09-09 RX ORDER — ACETAMINOPHEN 500 MG
1000 TABLET ORAL ONCE
Status: COMPLETED | OUTPATIENT
Start: 2018-09-09 | End: 2018-09-09

## 2018-09-09 RX ADMIN — ACETAMINOPHEN 1000 MG: 500 TABLET, FILM COATED ORAL at 13:30

## 2018-09-09 ASSESSMENT — ENCOUNTER SYMPTOMS
HEADACHES: 1
VOMITING: 0
NUMBNESS: 0
WOUND: 0
NAUSEA: 0
ARTHRALGIAS: 0

## 2018-09-09 NOTE — ED AVS SNAPSHOT
Emergency Department    [address] [place] [id number]    Phone:  [phone]    Fax:  [phone]                                       Haritha Cat   MRN: [id number]    Department:   Emergency Department   Date of Visit:  9/9/2018           After Visit Summary Signature Page     I have received my discharge instructions, and my questions have been answered. I have discussed any challenges I see with this plan with the nurse or doctor.    ..........................................................................................................................................  Patient/Patient Representative Signature      ..........................................................................................................................................  Patient Representative Print Name and Relationship to Patient    ..................................................               ................................................  Date                                            Time    ..........................................................................................................................................  Reviewed by Signature/Title    ...................................................              ..............................................  Date                                                            Time          22EPIC Rev 08/18

## 2018-09-09 NOTE — DISCHARGE INSTRUCTIONS
Discharge Instructions  Head Injury    You have been seen today for a head injury. Your evaluation included a history and physical examination. You may have had a CT (CAT) scan performed, though most head injuries do not require a scan. Based on this evaluation, your provider today does not feel that your head injury is serious.    Generally, every Emergency Department visit should have a follow-up clinic visit with either a primary or a specialty clinic/provider. Please follow-up as instructed by your emergency provider today.  Return to the Emergency Department if:    You are confused or you are not acting right.    Your headache gets worse or you start to have a really bad headache even with your recommended treatment plan.    You vomit (throw up) more than once.    You have a seizure.    You have trouble walking.    You have weakness or paralysis (cannot move) in an arm or a leg.    You have blood or fluid coming from your ears or nose.    You have new symptoms or anything that worries you.    Sleeping:  It is okay for you to sleep, but someone should wake you up if instructed by your provider, and someone should check on you at your usual time to wake up.     Activity:    Do not drive for at least 24 hours.    Do not drive if you have dizzy spells or trouble concentrating, or remembering things.    Do not return to any contact sports until cleared by your regular provider.     MORE INFORMATION:    Concussion:  A concussion is a minor head injury that may cause temporary problems with the way the brain works. Although concussions are important, they are generally not an emergency or a reason that a person needs to be hospitalized. Some concussion symptoms include confusion, amnesia (forgetful), nausea (sick to your stomach) and vomiting (throwing up), dizziness, fatigue, memory or concentration problems, irritability and sleep problems. For most people, concussions are mild and temporary but some will have more  severe and persistent symptoms that require on-going care and treatment.  CT Scans: Your evaluation today may have included a CT scan (CAT scan) to look for things like bleeding or a skull fracture (broken bone).  CT scans involve radiation and too many CT scans can cause serious health problems like cancer, especially in children.  Because of this, your provider may not have ordered a CT scan today if they think you are at low risk for a serious or life threatening problem.    If you were given a prescription for medicine here today, be sure to read all of the information (including the package insert) that comes with your prescription.  This will include important information about the medicine, its side effects, and any warnings that you need to know about.  The pharmacist who fills the prescription can provide more information and answer questions you may have about the medicine.  If you have questions or concerns that the pharmacist cannot address, please call or return to the Emergency Department.     Remember that you can always come back to the Emergency Department if you are not able to see your regular provider in the amount of time listed above, if you get any new symptoms, or if there is anything that worries you.

## 2018-09-09 NOTE — ED PROVIDER NOTES
"  History     Chief Complaint:  Head injury     HPI   Haritha Cat is a 73 year old female who presents today for evaluation of head injury after the patient was hit on the top of her head by a baseball about 45 minutes prior to arrival here in the ED. The states that she was walking away from her grandson's baseball game when a foul baseball from another field hit her unexpectedly on the top right side of her head. She localizes her pain to the right parietal region and also has some right facial pain. The patient denies LOC, bleeding, or any other injury. She denies vision changes, nausea/vomiting, numbness or tingling. The patient is not on any blood thinners, though she does take a daily aspirin 81 mg. She did take an aleve this morning as well.     Allergies:  Amoxicillin  Hydrochlorothiazide  Lisinopril  Lotrel      Medications:    Aspirin 81 mg   HCTZ  Metformin   Metoprolol  Statin (not prescribed)    Past Medical History:    Colon polyp  Diverticulosis   Type II diabetes mellitus   GERD  Hepatitis A  Hypertension   Osteopenia     Past Surgical History:    Sullivan teeth extraction   Varicose vein surgery     Family History:    Hypertension   Thyroid disease   Diabetes   Parkinson's     Social History:  The patient was accompanied to the ED by .  Smoking Status: never  Smokeless Tobacco: never  Alcohol Use: occasional    Marital Status:   [2]     Review of Systems   Eyes: Negative for visual disturbance.   Gastrointestinal: Negative for nausea and vomiting.   Musculoskeletal: Negative for arthralgias.   Skin: Negative for wound.   Neurological: Positive for headaches. Negative for syncope and numbness.   All other systems reviewed and are negative.    Physical Exam     Patient Vitals for the past 24 hrs:   BP Temp Temp src Pulse Resp SpO2 Height Weight   09/09/18 1314 179/81 98.7  F (37.1  C) Oral 65 16 99 % 1.702 m (5' 7\") 86.2 kg (190 lb)      Physical Exam  General: Alert, interactive. " GCS 15, no distress.   Head:  Large area ecchymosis and swelling to the right parietal region with associated tenderness to palpation. Remainder of cranial and facial bones non-tender.   Eyes:  EOM intact without pain. The pupils are equal, round, and reactive to light. No scleral icterus.  ENT:                                      Ears:  The external ears are normal.  Nose:  The external nose is normal.  Throat:  The oropharynx is normal. Mucus membranes are moist.                 Neck:  Normal range of motion. There is no rigidity. No midline cervical tenderness.   CV:  Regular rate and rhythm. No murmur. 2+ radial pulses  Resp:  Breath sounds are clear bilaterally. Non-labored, no retractions or accessory muscle use.  MS:  Normal range of motion.   Skin:  Warm and dry.   Neuro:  Strength and sensation grossly intact. 5/5  strength. Gait normal.    Psych:  Awake. Alert.  Appropriate interactions.       Emergency Department Course     Imaging:  Radiology findings were communicated with the patient and  who voiced understanding of the findings.    Head CT w/o contrast  1. Right lateral scalp soft tissue injury and hematoma without  underlying skull fracture.  2. No evidence of acute intracranial hemorrhage, mass, or herniation.  YAHIR CASTRO MD    Interventions:  1330 Tylenol 1,000 mg PO      Emergency Department Course:  Nursing notes and vitals reviewed.  I entered the room.  I performed an exam of the patient as documented above.     The patient was sent for a head CT scan while in the emergency department, results above.     The patient received the above intervention(s).     1401 the patient was rechecked and they were updated on the results of the imaging studies.     I discussed the treatment plan with the patient and . They expressed understanding of this plan and consented to discharge. They will be discharged home with instructions for care and follow up. In addition, the patient will  return to the emergency department if their symptoms persist, worsen, if new symptoms arise or if there is any concern.  All questions were answered.    Impression & Plan      Medical Decision Making:  Haritha Cat is a 73 year old female who presents for evaluation following a head injury when a foul baseball hit her head. The patient is not on any blood thinners. The patient has no neurologic deficit. The patient had no episodes of vomiting. CT imaging was discussed and in light of shared decision making was obtained. CT negative for intracranial bleed and skull fracture. Given the mechanism of the injury, the lack of focal neurologic deficit, no LOC, no seizure activity, and negative imaging, I believe serious cranial pathology is unlikely. Doubt concussion given no LOC. The patient is comfortable with discharge home and encouraged to read discharge instructions in detail. Recommended Tylenol for pain, ice to the area of swelling, and follow up with primary care provider in 2 days for recheck. All questions/concerns addressed prior to discharge home.     Diagnosis:    ICD-10-CM    1. Closed head injury, initial encounter S09.90XA      Disposition:   The patient was discharged to home.    Scribe Disclosure:  I, Marvin Segovia, am serving as a scribe at 1:20 PM on 9/9/2018 to document services personally performed by Kia Roca PA-C, based on my observations and the provider's statements to me.     EMERGENCY DEPARTMENT       Kia Roca PA-C  09/09/18 [id number]

## 2018-09-09 NOTE — ED AVS SNAPSHOT
Emergency Department    [address] [facility] [address]    Phone:  [phone]    Fax:  [phone]                                       Haritha Cat   MRN: [id number]    Department:   Emergency Department   Date of Visit:  9/9/2018           Patient Information     Date Of Birth          1945        Your diagnoses for this visit were:     Closed head injury, initial encounter        You were seen by Kia Roca PA-C.      Follow-up Information     Follow up with Amisha Plaza PA-C In 2 days.    Specialty:  Internal Medicine    Contact information:    [address] [facility]  [facility] [id number] [phone]          Follow up with  Emergency Department.    Specialty:  EMERGENCY MEDICINE    Why:  As needed, If symptoms worsen    Contact information:    [address] [facility] [id number] [phone]        Discharge Instructions       Discharge Instructions  Head Injury    You have been seen today for a head injury. Your evaluation included a history and physical examination. You may have had a CT (CAT) scan performed, though most head injuries do not require a scan. Based on this evaluation, your provider today does not feel that your head injury is serious.    Generally, every Emergency Department visit should have a follow-up clinic visit with either a primary or a specialty clinic/provider. Please follow-up as instructed by your emergency provider today.  Return to the Emergency Department if:    You are confused or you are not acting right.    Your headache gets worse or you start to have a really bad headache even with your recommended treatment plan.    You vomit (throw up) more than once.    You have a seizure.    You have trouble walking.    You have weakness or paralysis (cannot move) in an arm or a leg.    You have blood or fluid coming from your ears or nose.    You have new symptoms or anything that worries you.    Sleeping:  It is okay  for you to sleep, but someone should wake you up if instructed by your provider, and someone should check on you at your usual time to wake up.     Activity:    Do not drive for at least 24 hours.    Do not drive if you have dizzy spells or trouble concentrating, or remembering things.    Do not return to any contact sports until cleared by your regular provider.     MORE INFORMATION:    Concussion:  A concussion is a minor head injury that may cause temporary problems with the way the brain works. Although concussions are important, they are generally not an emergency or a reason that a person needs to be hospitalized. Some concussion symptoms include confusion, amnesia (forgetful), nausea (sick to your stomach) and vomiting (throwing up), dizziness, fatigue, memory or concentration problems, irritability and sleep problems. For most people, concussions are mild and temporary but some will have more severe and persistent symptoms that require on-going care and treatment.  CT Scans: Your evaluation today may have included a CT scan (CAT scan) to look for things like bleeding or a skull fracture (broken bone).  CT scans involve radiation and too many CT scans can cause serious health problems like cancer, especially in children.  Because of this, your provider may not have ordered a CT scan today if they think you are at low risk for a serious or life threatening problem.    If you were given a prescription for medicine here today, be sure to read all of the information (including the package insert) that comes with your prescription.  This will include important information about the medicine, its side effects, and any warnings that you need to know about.  The pharmacist who fills the prescription can provide more information and answer questions you may have about the medicine.  If you have questions or concerns that the pharmacist cannot address, please call or return to the Emergency Department.     Remember  that you can always come back to the Emergency Department if you are not able to see your regular provider in the amount of time listed above, if you get any new symptoms, or if there is anything that worries you.     24 Hour Appointment Hotline       To make an appointment at any [facility], call [phone] ([phone]). If you don't have a family doctor or clinic, we will help you find one. [facility] are conveniently located to serve the needs of you and your family.             Review of your medicines      Our records show that you are taking the medicines listed below. If these are incorrect, please call your family doctor or clinic.        Dose / Directions Last dose taken    aspirin 81 MG tablet        Take  by mouth daily.   Refills:  0        hydrochlorothiazide 12.5 MG Tabs tablet   Dose:  12.5 mg   Quantity:  90 tablet        Take 1 tablet (12.5 mg) by mouth daily   Refills:  3        METAMUCIL SMOOTH TEXTURE 63 % Powd   Dose:  1 teaspoonful   Generic drug:  psyllium        Take 1 teaspoonful by mouth daily. Mix in 8 ounces of water   Refills:  0        metFORMIN 500 MG tablet   Commonly known as:  GLUCOPHAGE   Quantity:  360 tablet        TAKE 2 TABLETS BY MOUTH 2 TIMES DAILY WITH MEALS   Refills:  0        metoprolol succinate 100 MG 24 hr tablet   Commonly known as:  TOPROL-XL   Quantity:  135 tablet        TAKE 1.5 TABLETS BY MOUTH DAILY   Refills:  3        naproxen sodium 220 MG tablet   Commonly known as:  ANAPROX   Dose:  220 mg   Quantity:  180 tablet        Take 1 tablet by mouth daily.   Refills:  3        omega-3 fatty acids 1200 MG capsule   Dose:  2 capsule   Quantity:  90 capsule        Take 2 capsules by mouth daily.   Refills:  0        STATIN NOT PRESCRIBED (INTENTIONAL)        Statin not prescribed intentionally due to Other LDL at goal; pt doesn't want to add another medication (This option does not exclude patient from measure)   Refills:  0                 Procedures and tests performed during your visit     Head CT w/o contrast      Orders Needing Specimen Collection     None      Pending Results     Date and Time Order Name Status Description    9/9/2018 1327 Head CT w/o contrast Preliminary             Pending Culture Results     No orders found from 9/7/2018 to 9/10/2018.            Pending Results Instructions     If you had any lab results that were not finalized at the time of your Discharge, you can call the ED Lab Result RN at [phone]. You will be contacted by this team for any positive Lab results or changes in treatment. The nurses are available 7 days a week from 10A to 6:30P.  You can leave a message 24 hours per day and they will return your call.        Test Results From Your Hospital Stay        9/9/2018  2:00 PM      Narrative     CT SCAN OF THE HEAD WITHOUT CONTRAST   9/9/2018 1:41 PM     HISTORY: Baseball struck head.     TECHNIQUE:  Axial images of the head and coronal reformations without  IV contrast material. Radiation dose for this scan was reduced using  automated exposure control, adjustment of the mA and/or kV according  to patient size, or iterative reconstruction technique.    COMPARISON: None.    FINDINGS: There is no evidence of intracranial hemorrhage, mass, acute  infarct or anomaly. The ventricles are normal in size, shape and  configuration. The brain parenchyma and subarachnoid spaces are  normal.     The visualized portions of the sinuses and mastoids appear normal.    Right lateral scalp soft tissue injury and hematoma. No underlying  skull fracture.        Impression     IMPRESSION:    1. Right lateral scalp soft tissue injury and hematoma without  underlying skull fracture.  2. No evidence of acute intracranial hemorrhage, mass, or herniation.                Clinical Quality Measure: Blood Pressure Screening     Your blood pressure was checked while you were in the emergency department today. The last reading we obtained  was  BP: 179/81 . Please read the guidelines below about what these numbers mean and what you should do about them.  If your systolic blood pressure (the top number) is less than 120 and your diastolic blood pressure (the bottom number) is less than 80, then your blood pressure is normal. There is nothing more that you need to do about it.  If your systolic blood pressure (the top number) is 120-139 or your diastolic blood pressure (the bottom number) is 80-89, your blood pressure may be higher than it should be. You should have your blood pressure rechecked within a year by a primary care provider.  If your systolic blood pressure (the top number) is 140 or greater or your diastolic blood pressure (the bottom number) is 90 or greater, you may have high blood pressure. High blood pressure is treatable, but if left untreated over time it can put you at risk for heart attack, stroke, or kidney failure. You should have your blood pressure rechecked by a primary care provider within the next 4 weeks.  If your provider in the emergency department today gave you specific instructions to follow-up with your doctor or provider even sooner than that, you should follow that instruction and not wait for up to 4 weeks for your follow-up visit.        Thank you for choosing [facility]       Thank you for choosing [facility] for your care. Our goal is always to provide you with excellent care. Hearing back from our patients is one way we can continue to improve our services. Please take a few minutes to complete the written survey that you may receive in the mail after you visit with us. Thank you!        [facility] Information     [facility] gives you secure access to your electronic health record. If you see a primary care provider, you can also send messages to your care team and make appointments. If you have questions, please call your primary care clinic.  If you do not have a primary care provider, please call [phone] and they  will assist you.        Care EveryWhere ID     This is your Care EveryWhere ID. This could be used by other organizations to access your [facility] medical records  [id number]        Equal Access to Services     SARAH LUCIO : Mele Pham, herson moura, evin crowe. So [facility] [phone].    ATENCIÓN: Si habla español, tiene a law disposición servicios gratuitos de asistencia lingüística. Llame al [phone].    We comply with applicable federal civil rights laws and [place] laws. We do not discriminate on the basis of race, color, national origin, age, disability, sex, sexual orientation, or gender identity.            After Visit Summary       This is your record. Keep this with you and show to your community pharmacist(s) and doctor(s) at your next visit.

## 2018-09-12 ENCOUNTER — HOSPITAL ENCOUNTER (EMERGENCY)
Facility: CLINIC | Age: 73
Discharge: HOME | End: 2018-09-12
Attending: EMERGENCY MEDICINE | Admitting: EMERGENCY MEDICINE
Payer: MEDICARE

## 2018-09-12 ENCOUNTER — TELEPHONE (OUTPATIENT)
Dept: FAMILY MEDICINE | Facility: CLINIC | Age: 73
End: 2018-09-12

## 2018-09-12 VITALS
RESPIRATION RATE: 18 BRPM | SYSTOLIC BLOOD PRESSURE: 162 MMHG | DIASTOLIC BLOOD PRESSURE: 74 MMHG | OXYGEN SATURATION: 96 % | HEART RATE: 62 BPM | WEIGHT: 208 LBS | TEMPERATURE: 97.8 F | BODY MASS INDEX: 31.52 KG/M2 | HEIGHT: 68 IN

## 2018-09-12 DIAGNOSIS — S00.93XS: ICD-10-CM

## 2018-09-12 PROCEDURE — 99282 EMERGENCY DEPT VISIT SF MDM: CPT

## 2018-09-12 ASSESSMENT — ENCOUNTER SYMPTOMS
CONFUSION: 0
VOMITING: 0

## 2018-09-12 NOTE — TELEPHONE ENCOUNTER
Spoke with Pt:     She is home from the ED with instructions.     Will follow-up with Amisha tomorrow.       Pt has headache, but otherwise feels fine.     Gladys BARRON RN

## 2018-09-12 NOTE — DISCHARGE INSTRUCTIONS
You can still use ice, Tylenol as needed.  Activity as tolerated.  If you develop a severe headache with confusion and vomiting, you should be rechecked in the ER.        Soft Tissue Contusion  You have a contusion. This is also called a bruise. There is swelling and some bleeding under the skin. This injury generally takes a few days to a few weeks to heal.  During that time, the bruise will typically change in color from reddish, to purple-blue, to greenish-yellow, then to yellow-brown.  Home care    Elevate the injured area to reduce pain and swelling. As much as possible, sit or lie down with the injured area raised about the level of your heart. This is especially important during the first 48 hours.    Ice the injured area to help reduce pain and swelling. Wrap a cold source (ice pack or ice cubes in a plastic bag) in a thin towel. Apply to the bruised area for 20 minutes every 1 to 2 hours the first day. Continue this 3 to 4 times a day until the pain and swelling goes away.    Unless another medicine was prescribed, you can take acetaminophen, ibuprofen, or naproxen to control pain. (If you have chronic liver or kidney disease or ever had a stomach ulcer or gastrointestinal bleeding, talk with your doctor before using these medicines.)  Follow-up care  Follow up with your healthcare provider or our staff as advised. Call if you are not better in 1 to 2 weeks.  When to seek medical advice   Call your healthcare provider right away if you have any of the following:    Increased pain or swelling    Bruise is on an arm or leg and arm or leg becomes cold, blue, numb or tingly    Signs of infection: Warmth, drainage, or increased redness or pain around the contusion    Inability to move the injured area or body part     Bruise is near your eye and you have problems with your eyesight or eye     Frequent bruising for unknown reasons  Date Last Reviewed: 5/1/2017 2000-2017 [employer]. [address] [place] [address]  [place] [address]. All rights reserved. This information is not intended as a substitute for professional medical care. Always follow your healthcare professional's instructions.         No

## 2018-09-12 NOTE — TELEPHONE ENCOUNTER
Reason for call:  Patient reporting a symptom    Symptom or request: patient was hit in head by a baseball on Sunday.  She still has a headache and now it seems like blood is going down into her right eye.    Duration (how long have symptoms been present): 3 days    Have you been treated for this before? Yes-went to ER Sunday right after it happened.    Additional comments: patient is wondering if she needs to be seen for this?     Phone Number patient can be reached at:  Cell number on file:    Telephone Information:   Mobile [phone]       Best Time:  anytime    Can we leave a detailed message on this number:  YES    Call taken on 9/12/2018 at 8:47 AM by Cassie Terrazas.

## 2018-09-12 NOTE — TELEPHONE ENCOUNTER
Returned patient call, no answer.  LM for patient to go to ED and to call clinic to confirm message was received.    After reviewing the chart it was noted that the patient has checked into the [facility] ED.    Will route to PCP as an FYI.    Marisol Gonzalez RN  [facility]

## 2018-09-12 NOTE — ED PROVIDER NOTES
"  History     Chief Complaint:  Bleeding/Bruising      HPI   Haritha Cat is a 73 year old female who presents with bleeding/bruising around the right eye. The patient was at her grandson's baseball game on 9/9 and was hit on the right side of her head with a foul ball.  She was subsequently seen in the ED and had a negative head CT at that time. Today, the patient presents as she noticed swelling and bruising to her right eye. She reports that she does still have a headache but it is unchanged. The patient denies any confusion, vomiting, or neck pain.        Head CT w/o contrast Results from 9/9/18  1. Right lateral scalp soft tissue injury and hematoma without  underlying skull fracture.  2. No evidence of acute intracranial hemorrhage, mass, or herniation.      Allergies:  Amoxicillin   Hydrochlorothiazide  Lisinopril  Lotrel     Medications:    Aspirin   Hydrochlorothiazide  Metformin  Metoprolol  Anaprox  Omega-3 fatty acids  Psyllium     Past Medical History:    Abscess of abdominal cavity   Diverticulosis   DM  Elevated LFTs  GERD  Hepatitis A  Herpes zoster  HTN  Osteopenia    Past Surgical History:    Varicose vein surgery   Belt teeth     Family History:    Hypertension-Mother, Father, Sister  Thyroid Disease-Mother  Neurologic Disorder-Mother  Diabetes-Father, Sister    Social History:  Marital Status:   Presents to the ED with   Tobacco Use: Never Used  Alcohol Use: Yes  PCP: Amisha Plaza      Review of Systems   HENT:        Positive for bruising and swelling to the right eye   Eyes: Negative for visual disturbance.   Gastrointestinal: Negative for vomiting.   Psychiatric/Behavioral: Negative for confusion.   All other systems reviewed and are negative.    Physical Exam   First Vitals:  BP: 181/76  Pulse: 63  Temp: 97.8  F (36.6  C)  Resp: 16  Height: 173.7 cm (5' 8.4\")  Weight: 94.3 kg (208 lb)  SpO2: 97 %      Physical Exam  Nursing note and vitals reviewed.  Constitutional: "  Alert.  Appears well-developed and well-nourished, comfortable.   HENT:   Head:   Tenderness over the right anterior parietal scalp.  There is some yellowish bruising on the scalp.  Nose:    Nose no blood, no tenderness.  Ears:   No hemotympanum.  Eyes:    Conjunctivae normal are normal.      Pupils are equal, round, and reactive to light.  The globes are normal.  Extraocular movements are intact.     There is dark ecchymoses and some swelling of the upper eyelid and medial aspect of the eye.  Musculoskeletal:  No neck tenderness  Neurological:   Alert and appropriate. No focal weakness.  GCS 15.     No cranial nerve deficit.     Skin:    Skin is warm and dry. No rash noted. No diaphoresis.      Ecchymosis to the upper eyelid, yellowish discoloration to the upper forehead and anterior parietal scalp as noted above.  Psychiatric:   Behavior is normal. Judgment and thought content normal.      Emergency Department Course   Emergency Department Course:  Nursing notes and vitals reviewed.  (0216) I performed an exam of the patient as documented above.    Findings and plan explained to the patient. Patient discharged home with instructions regarding supportive care, medications, and reasons to return. The importance of close follow-up was reviewed.    Impression & Plan      Medical Decision Making:  Patient comes in with bruising around the right eye.  She had been struck with a baseball on Sunday.  No increase in headache, no confusion, no nausea or vomiting.  No vision change.  On exam she had no hemotympanum.  The blood is mostly in the upper eyelid and medial aspect of the eye.  This is all blood from her contusion on Sunday that has tracked down due to gravity around the eye.  She does not need further imaging.  Reassurance was given.        Diagnosis:    ICD-10-CM    1. Contusion of head, sequela S00.93XS        Disposition and Plan:  discharged to home. You can still use ice, Tylenol as needed.  Activity as  tolerated.  If you develop a severe headache with confusion and vomiting, you should be rechecked in the ER.      I, Danelle Ta, am serving as a scribe on 9/12/2018 at 9:28 AM to personally document services performed by Dr. Claros based on my observations and the provider's statements to me.    9/12/2018    EMERGENCY DEPARTMENT       Kendra Claros MD  09/12/18 1007

## 2018-09-12 NOTE — ED AVS SNAPSHOT
Emergency Department    [address] [place] [address]    Phone:  [phone]    Fax:  [phone]                                       Haritha Cat   MRN: [id number]    Department:   Emergency Department   Date of Visit:  9/12/2018           After Visit Summary Signature Page     I have received my discharge instructions, and my questions have been answered. I have discussed any challenges I see with this plan with the nurse or doctor.    ..........................................................................................................................................  Patient/Patient Representative Signature      ..........................................................................................................................................  Patient Representative Print Name and Relationship to Patient    ..................................................               ................................................  Date                                   Time    ..........................................................................................................................................  Reviewed by Signature/Title    ...................................................              ..............................................  Date                                               Time          22EPIC Rev 08/18

## 2018-09-12 NOTE — ED AVS SNAPSHOT
Emergency Department    [address] [facility] [address]    Phone:  [phone]    Fax:  [phone]                                       Haritha Cat   MRN: [id number]    Department:   Emergency Department   Date of Visit:  9/12/2018           Patient Information     Date Of Birth          1945        Your diagnoses for this visit were:     Contusion of head, sequela        You were seen by Kendra Claros MD.      Follow-up Information     Schedule an appointment as soon as possible for a visit with Amisha Plaza PA-C.    Specialty:  Internal Medicine    Why:  As needed    Contact information:    [address] [address] [phone]          Discharge Instructions       You can still use ice, Tylenol as needed.  Activity as tolerated.  If you develop a severe headache with confusion and vomiting, you should be rechecked in the ER.        Soft Tissue Contusion  You have a contusion. This is also called a bruise. There is swelling and some bleeding under the skin. This injury generally takes a few days to a few weeks to heal.  During that time, the bruise will typically change in color from reddish, to purple-blue, to greenish-yellow, then to yellow-brown.  Home care    Elevate the injured area to reduce pain and swelling. As much as possible, sit or lie down with the injured area raised about the level of your heart. This is especially important during the first 48 hours.    Ice the injured area to help reduce pain and swelling. Wrap a cold source (ice pack or ice cubes in a plastic bag) in a thin towel. Apply to the bruised area for 20 minutes every 1 to 2 hours the first day. Continue this 3 to 4 times a day until the pain and swelling goes away.    Unless another medicine was prescribed, you can take acetaminophen, ibuprofen, or naproxen to control pain. (If you have chronic liver or kidney disease or ever had a stomach ulcer or gastrointestinal  bleeding, talk with your doctor before using these medicines.)  Follow-up care  Follow up with your healthcare provider or our staff as advised. Call if you are not better in 1 to 2 weeks.  When to seek medical advice   Call your healthcare provider right away if you have any of the following:    Increased pain or swelling    Bruise is on an arm or leg and arm or leg becomes cold, blue, numb or tingly    Signs of infection: Warmth, drainage, or increased redness or pain around the contusion    Inability to move the injured area or body part     Bruise is near your eye and you have problems with your eyesight or eye     Frequent bruising for unknown reasons  Date Last Reviewed: 5/1/2017 2000-2017 [employer]. [address] [place] [address], [place] [address]. All rights reserved. This information is not intended as a substitute for professional medical care. Always follow your healthcare professional's instructions.          24 Hour Appointment Hotline       To make an appointment at any [facility] call [phone] ([phone]). If you don't have a family doctor or clinic, we will help you find one. [facility] clinics are conveniently located to serve the needs of you and your family.             Review of your medicines      Our records show that you are taking the medicines listed below. If these are incorrect, please call your family doctor or clinic.        Dose / Directions Last dose taken    aspirin 81 MG tablet        Take  by mouth daily.   Refills:  0        hydrochlorothiazide 12.5 MG Tabs tablet   Dose:  12.5 mg   Quantity:  90 tablet        Take 1 tablet (12.5 mg) by mouth daily   Refills:  3        METAMUCIL SMOOTH TEXTURE 63 % Powd   Dose:  1 teaspoonful   Generic drug:  psyllium        Take 1 teaspoonful by mouth daily. Mix in 8 ounces of water   Refills:  0        metFORMIN 500 MG tablet   Commonly known as:  GLUCOPHAGE   Quantity:  360 tablet        TAKE 2 TABLETS BY MOUTH 2 TIMES  DAILY WITH MEALS   Refills:  0        metoprolol succinate 100 MG 24 hr tablet   Commonly known as:  TOPROL-XL   Quantity:  135 tablet        TAKE 1.5 TABLETS BY MOUTH DAILY   Refills:  3        naproxen sodium 220 MG tablet   Commonly known as:  ANAPROX   Dose:  220 mg   Quantity:  180 tablet        Take 1 tablet by mouth daily.   Refills:  3        omega-3 fatty acids 1200 MG capsule   Dose:  2 capsule   Quantity:  90 capsule        Take 2 capsules by mouth daily.   Refills:  0        STATIN NOT PRESCRIBED (INTENTIONAL)        Statin not prescribed intentionally due to Other LDL at goal; pt doesn't want to add another medication (This option does not exclude patient from measure)   Refills:  0                Orders Needing Specimen Collection     None      Pending Results     No orders found from 9/10/2018 to 9/13/2018.            Pending Culture Results     No orders found from 9/10/2018 to 9/13/2018.            Pending Results Instructions     If you had any lab results that were not finalized at the time of your Discharge, you can call the ED Lab Result RN at [phone]. You will be contacted by this team for any positive Lab results or changes in treatment. The nurses are available 7 days a week from 10A to 6:30P.  You can leave a message 24 hours per day and they will return your call.        Test Results From Your Hospital Stay               Clinical Quality Measure: Blood Pressure Screening     Your blood pressure was checked while you were in the emergency department today. The last reading we obtained was  BP: 181/76 . Please read the guidelines below about what these numbers mean and what you should do about them.  If your systolic blood pressure (the top number) is less than 120 and your diastolic blood pressure (the bottom number) is less than 80, then your blood pressure is normal. There is nothing more that you need to do about it.  If your systolic blood pressure (the top number) is 120-139 or  your diastolic blood pressure (the bottom number) is 80-89, your blood pressure may be higher than it should be. You should have your blood pressure rechecked within a year by a primary care provider.  If your systolic blood pressure (the top number) is 140 or greater or your diastolic blood pressure (the bottom number) is 90 or greater, you may have high blood pressure. High blood pressure is treatable, but if left untreated over time it can put you at risk for heart attack, stroke, or kidney failure. You should have your blood pressure rechecked by a primary care provider within the next 4 weeks.  If your provider in the emergency department today gave you specific instructions to follow-up with your doctor or provider even sooner than that, you should follow that instruction and not wait for up to 4 weeks for your follow-up visit.        Thank you for choosing [facility]       Thank you for choosing [facility] for your care. Our goal is always to provide you with excellent care. Hearing back from our patients is one way we can continue to improve our services. Please take a few minutes to complete the written survey that you may receive in the mail after you visit with us. Thank you!        S[employer] Information     [employer]en gives you secure access to your electronic health record. If you see a primary care provider, you can also send messages to your care team and make appointments. If you have questions, please call your primary care clinic.  If you do not have a primary care provider, please call [phone] and they will assist you.        Care EveryWhere ID     This is your Care EveryWhere ID. This could be used by other organizations to access your [facility] medical records  [id number]        Equal Access to Services     SARAH LUCIO : Hadii cheri brumfieldo Somitzi, waaxda luqadaha, qaybta kaalmaevin bui. So [facility] [phone].    ATENCIÓN: shabbir Gallardo  law disposición servicios gratuitos de asistencia lingüística. Llsuzette al [phone].    We comply with applicable federal civil rights laws and [place] laws. We do not discriminate on the basis of race, color, national origin, age, disability, sex, sexual orientation, or gender identity.            After Visit Summary       This is your record. Keep this with you and show to your community pharmacist(s) and doctor(s) at your next visit.

## 2018-09-13 ENCOUNTER — OFFICE VISIT (OUTPATIENT)
Dept: FAMILY MEDICINE | Facility: CLINIC | Age: 73
End: 2018-09-13
Payer: COMMERCIAL

## 2018-09-13 VITALS
SYSTOLIC BLOOD PRESSURE: 138 MMHG | OXYGEN SATURATION: 98 % | TEMPERATURE: 98 F | HEART RATE: 69 BPM | HEIGHT: 69 IN | DIASTOLIC BLOOD PRESSURE: 70 MMHG | WEIGHT: 207 LBS | BODY MASS INDEX: 30.66 KG/M2

## 2018-09-13 DIAGNOSIS — I10 HTN (HYPERTENSION), BENIGN: ICD-10-CM

## 2018-09-13 DIAGNOSIS — E11.9 TYPE 2 DIABETES MELLITUS WITHOUT COMPLICATION, WITHOUT LONG-TERM CURRENT USE OF INSULIN (H): ICD-10-CM

## 2018-09-13 DIAGNOSIS — Z23 NEED FOR PROPHYLACTIC VACCINATION AND INOCULATION AGAINST INFLUENZA: ICD-10-CM

## 2018-09-13 DIAGNOSIS — S00.03XD CONTUSION OF SCALP, SUBSEQUENT ENCOUNTER: Primary | ICD-10-CM

## 2018-09-13 LAB
ANION GAP SERPL CALCULATED.3IONS-SCNC: 9 MMOL/L (ref 3–14)
BUN SERPL-MCNC: 16 MG/DL (ref 7–30)
CALCIUM SERPL-MCNC: 9.7 MG/DL (ref 8.5–10.1)
CHLORIDE SERPL-SCNC: 97 MMOL/L (ref 94–109)
CO2 SERPL-SCNC: 26 MMOL/L (ref 20–32)
CREAT SERPL-MCNC: 0.76 MG/DL (ref 0.52–1.04)
GFR SERPL CREATININE-BSD FRML MDRD: 75 ML/MIN/1.7M2
GLUCOSE SERPL-MCNC: 209 MG/DL (ref 70–99)
HBA1C MFR BLD: 7.1 % (ref 0–5.6)
POTASSIUM SERPL-SCNC: 4.8 MMOL/L (ref 3.4–5.3)
SODIUM SERPL-SCNC: 132 MMOL/L (ref 133–144)

## 2018-09-13 PROCEDURE — 80048 BASIC METABOLIC PNL TOTAL CA: CPT | Performed by: PHYSICIAN ASSISTANT

## 2018-09-13 PROCEDURE — G0008 ADMIN INFLUENZA VIRUS VAC: HCPCS | Performed by: PHYSICIAN ASSISTANT

## 2018-09-13 PROCEDURE — 90662 IIV NO PRSV INCREASED AG IM: CPT | Performed by: PHYSICIAN ASSISTANT

## 2018-09-13 PROCEDURE — 99214 OFFICE O/P EST MOD 30 MIN: CPT | Mod: 25 | Performed by: PHYSICIAN ASSISTANT

## 2018-09-13 PROCEDURE — 83036 HEMOGLOBIN GLYCOSYLATED A1C: CPT | Performed by: PHYSICIAN ASSISTANT

## 2018-09-13 PROCEDURE — 36415 COLL VENOUS BLD VENIPUNCTURE: CPT | Performed by: PHYSICIAN ASSISTANT

## 2018-09-13 RX ORDER — IRBESARTAN 150 MG/1
150 TABLET ORAL DAILY
Qty: 30 TABLET | Refills: 1 | Status: SHIPPED | OUTPATIENT
Start: 2018-09-13 | End: 2018-11-13

## 2018-09-13 NOTE — PROGRESS NOTES
Pat,    Your sodium is slightly low at 132 but stable.  Your blood sugar was 209 but you were not fasting.  Your hemoglobin A1c was fortunately a little better at 7.1% so I think if you focus on dietary changes you could get to goal of <7% without adding more medication.    Let me know if you have any questions.    Amisha Plaza PA-C

## 2018-09-13 NOTE — MR AVS SNAPSHOT
"              After Visit Summary   9/13/2018    Haritha Cat    MRN: [id number]           Patient Information     Date Of Birth          1945        Visit Information        Provider Department      9/13/2018 10:00 AM Amisha Plaza PA-C [facility]        Today's Diagnoses     HTN (hypertension), benign    -  1    Type 2 diabetes mellitus without complication, without long-term current use of insulin (H)        Contusion of scalp, subsequent encounter           Follow-ups after your visit        Who to contact     If you have questions or need follow up information about today's clinic visit or your schedule please contact [facility] directly at [phone].  Normal or non-critical lab and imaging results will be communicated to you by GetLikemindshart, letter or phone within 4 business days after the clinic has received the results. If you do not hear from us within 7 days, please contact the clinic through GetLikemindshart or phone. If you have a critical or abnormal lab result, we will notify you by phone as soon as possible.  Submit refill requests through [employer] or call your pharmacy and they will forward the refill request to us. Please allow 3 business days for your refill to be completed.          Additional Information About Your Visit        MyChart Information     [employer] gives you secure access to your electronic health record. If you see a primary care provider, you can also send messages to your care team and make appointments. If you have questions, please call your primary care clinic.  If you do not have a primary care provider, please call [phone] and they will assist you.        Care EveryWhere ID     This is your Care EveryWhere ID. This could be used by other organizations to access your [facility] medical records  [id number]        Your Vitals Were     Pulse Temperature Height Pulse Oximetry Breastfeeding? BMI (Body Mass Index)    69 98  F (36.7  C) (Oral) 5' 8.5\" " (1.74 m) 98% No 31.02 kg/m2       Blood Pressure from Last 3 Encounters:   09/13/18 138/70   09/12/18 162/74   09/09/18 179/81    Weight from Last 3 Encounters:   09/13/18 207 lb (93.9 kg)   09/12/18 208 lb (94.3 kg)   09/09/18 190 lb (86.2 kg)              We Performed the Following     Basic metabolic panel     Hemoglobin A1c          Today's Medication Changes          These changes are accurate as of 9/13/18 10:29 AM.  If you have any questions, ask your nurse or doctor.               Start taking these medicines.        Dose/Directions    irbesartan 150 MG tablet   Commonly known as:  AVAPRO   Used for:  HTN (hypertension), benign   Started by:  Amisha Plaza PA-C        Dose:  150 mg   Take 1 tablet (150 mg) by mouth daily   Quantity:  30 tablet   Refills:  1            Where to get your medicines      These medications were sent to [place] [address] [facility] [place] - [address]  [address] [place] [address]     Phone:  [phone]     irbesartan 150 MG tablet                Primary Care Provider Office Phone # Fax #    Amisha Plaza PA-C [phone] [address]  [place] [address]        Equal Access to Services     [facility] Hospital[facility] AH: Hadii cheri ku hadasho Sojaretali, waaxda luqadaha, qaybta kaalmada adeegyada, evin edwards haylouis miranda . So [facility] [phone].    ATENCIÓN: Si habla español, tiene a law disposición servicios gratuitos de asistencia lingüística. Llame al [phone].    We comply with applicable federal civil rights laws and [place] laws. We do not discriminate on the basis of race, color, national origin, age, disability, sex, sexual orientation, or gender identity.            Thank you!     Thank you for choosing [facility]  for your care. Our goal is always to provide you with excellent care. Hearing back from our patients is one way we can continue to improve our services. Please take a few minutes to complete the written  survey that you may receive in the mail after your visit with us. Thank you!             Your Updated Medication List - Protect others around you: Learn how to safely use, store and throw away your medicines at www.disposemymeds.org.          This list is accurate as of 9/13/18 10:29 AM.  Always use your most recent med list.                   Brand Name Dispense Instructions for use Diagnosis    aspirin 81 MG tablet      Take  by mouth daily.        hydrochlorothiazide 12.5 MG Tabs tablet     90 tablet    Take 1 tablet (12.5 mg) by mouth daily    Bilateral lower extremity edema       irbesartan 150 MG tablet    AVAPRO    30 tablet    Take 1 tablet (150 mg) by mouth daily    HTN (hypertension), benign       METAMUCIL SMOOTH TEXTURE 63 % Powd   Generic drug:  psyllium      Take 1 teaspoonful by mouth daily. Mix in 8 ounces of water        metFORMIN 500 MG tablet    GLUCOPHAGE    360 tablet    TAKE 2 TABLETS BY MOUTH 2 TIMES DAILY WITH MEALS    Type 2 diabetes mellitus without complication, without long-term current use of insulin (H)       metoprolol succinate 100 MG 24 hr tablet    TOPROL-XL    135 tablet    TAKE 1.5 TABLETS BY MOUTH DAILY    HTN (hypertension), benign       naproxen sodium 220 MG tablet    ANAPROX    180 tablet    Take 1 tablet by mouth daily.        omega-3 fatty acids 1200 MG capsule     90 capsule    Take 2 capsules by mouth daily.        STATIN NOT PRESCRIBED (INTENTIONAL)      Statin not prescribed intentionally due to Other LDL at goal; pt doesn't want to add another medication (This option does not exclude patient from measure)    Hyperlipidemia LDL goal <100

## 2018-09-13 NOTE — PROGRESS NOTES
SUBJECTIVE:   Haritha Cat is a 73 year old female who presents to clinic today for the following health issues:      ED/UC Followup:    Facility:  [facility]   Date of visit: 09/09/2018  &  09/12/2018  Reason for visit:    Diagnosis:     Closed head injury, initial encounter   Contusion of head, sequela    Current Status: continues to have pain R side of the head where she was hit with a baseball. CT was neg on 9/9/18     DM:  She is taking her metformin 1000mg bid  She is not checking her blood sugars.  She doesn't have a glucometer  She did see the diabetic educator and knows what she should do    Lab Results   Component Value Date    A1C 7.3 11/15/2017    A1C 7.2 07/11/2017    A1C 8.3 01/10/2017    A1C 7.4 08/19/2016    A1C 7.1 08/10/2015     Past Medical History:   Diagnosis Date     Abscess of abdominal cavity (H) 11/02    cecal perf, prob from diverticulitis     Allergic rhinitis      Colon polyp 2015    colonoscopy due 5 years.     Diverticulosis      DM type 2 (diabetes mellitus, type 2) (H)      Elevated LFTs     fatty liver dz     GERD (gastroesophageal reflux disease)      Hepatitis A      Herpes zoster 11/28/11     HTN (hypertension), benign      Osteopenia     DXA 2008     Osteopenia 2014     Past Surgical History:   Procedure Laterality Date     CATARACT IOL, RT/LT       COLONOSCOPY N/A 5/15/2018    Procedure: COLONOSCOPY;  colonoscopy;  Surgeon: Lucero Acharya MD;  Location:  GI     [facility] 27     Social History   Substance Use Topics     Smoking status: Never Smoker     Smokeless tobacco: Never Used     Alcohol use 0.0 oz/week     0 Standard drinks or equivalent per week      Comment: 0-3 drinks per week     Current Outpatient Prescriptions   Medication Sig Dispense Refill     hydrochlorothiazide 12.5 MG TABS tablet Take 1 tablet (12.5 mg) by mouth daily 90 tablet 3     irbesartan (AVAPRO) 150 MG tablet Take 1 tablet (150 mg) by  "mouth daily 30 tablet 1     metFORMIN (GLUCOPHAGE) 500 MG tablet TAKE 2 TABLETS BY MOUTH 2 TIMES DAILY WITH MEALS 360 tablet 0     metoprolol (TOPROL-XL) 100 MG 24 hr tablet TAKE 1.5 TABLETS BY MOUTH DAILY 135 tablet 3     omega-3 fatty acids 1200 MG capsule Take 2 capsules by mouth daily. 90 capsule      psyllium (METAMUCIL SMOOTH TEXTURE) 63 % POWD Take 1 teaspoonful by mouth daily. Mix in 8 ounces of water       STATIN NOT PRESCRIBED, INTENTIONAL, Statin not prescribed intentionally due to Other LDL at goal; pt doesn't want to add another medication (This option does not exclude patient from measure)  0     aspirin 81 MG tablet Take  by mouth daily.       naproxen sodium (ANAPROX) 220 MG tablet Take 1 tablet by mouth daily. 180 tablet 3     Allergies   Allergen Reactions     Amoxicillin Hives     Hydrochlorothiazide      hyponatremia     Lisinopril      cough     Lotrel Hives     FAMILY HISTORY NOTED AND REVIEWED    PHYSICAL EXAM:    /70  Pulse 69  Temp 98  F (36.7  C) (Oral)  Ht 5' 8.5\" (1.74 m)  Wt 207 lb (93.9 kg)  SpO2 98%  Breastfeeding? No  BMI 31.02 kg/m2    Patient appears non toxic  Eccymosis which is tender R side of head  No bleeding or laceration  There is eccymoses R upper eyelid which is from the head injury  No eye pain, swelling or vision changes.    Assessment and Plan:     (S00.03XD) Contusion of scalp, subsequent encounter  (primary encounter diagnosis)  Comment:   Plan: pt reassured. Blood in upper eyelid due to gravity from head contusion.    (I10) HTN (hypertension), benign  Comment: high today and sometimes high at home. Added Avapro 150mg every day. Check BPs and f/u with readings in one month  Plan: Basic metabolic panel, irbesartan (AVAPRO) 150         MG tablet            (E11.9) Type 2 diabetes mellitus without complication, without long-term current use of insulin (H)  Comment:   Plan: Hemoglobin A1c            (Z23) Need for prophylactic vaccination and inoculation " against influenza  Comment:   Plan: FLU VACCINE, INCREASED ANTIGEN, PRESV FREE, AGE        65+ [39558], Vaccine Administration, Initial         [30326]              Amisha Plaza PA-C

## 2018-09-13 NOTE — NURSING NOTE
Injectable Influenza Immunization Documentation    1.  Is the person to be vaccinated sick today?  No    2. Does the person to be vaccinated have an allergy to eggs or to a component of the vaccine?  No    3. Has the person to be vaccinated today ever had a serious reaction to influenza vaccine in the past?  No    4. Has the person to be vaccinated ever had Guillain-Aurora syndrome?  No     Form completed verbally with patient. Marietta POON MA     Prior to injection verified patient identity using patient's name and date of birth.  Due to injection administration, patient instructed to remain in clinic for 15 minutes  afterwards, and to report any adverse reaction to me immediately.

## 2018-09-22 DIAGNOSIS — I10 HTN (HYPERTENSION), BENIGN: ICD-10-CM

## 2018-09-24 RX ORDER — METOPROLOL SUCCINATE 100 MG/1
TABLET, EXTENDED RELEASE ORAL
Qty: 135 TABLET | Refills: 1 | Status: SHIPPED | OUTPATIENT
Start: 2018-09-24 | End: 2019-03-11

## 2018-09-24 NOTE — TELEPHONE ENCOUNTER
" metoprolol (TOPROL-XL) 100 MG 24 hr tablet 135 tablet 3 11/15/2017     Last Written Prescription Date:  11/15/2017  Last Fill Quantity: 135,  # refills: 3   Last office visit: 9/13/2018 with prescribing provider:  Amisha Plaza   Future Office Visit:      Requested Prescriptions   Pending Prescriptions Disp Refills     metoprolol succinate (TOPROL-XL) 100 MG 24 hr tablet [Pharmacy Med Name: METOPROLOL SUCC  MG TAB] 135 tablet 3     Sig: TAKE 1.5 TABLETS BY MOUTH DAILY    Beta-Blockers Protocol Passed    9/22/2018  7:50 AM       Passed - Blood pressure under 140/90 in past 12 months    BP Readings from Last 3 Encounters:   09/13/18 138/70   09/12/18 162/74   09/09/18 179/81                Passed - Patient is age 6 or older       Passed - Recent (12 mo) or future (30 days) visit within the authorizing provider's specialty    Patient had office visit in the last 12 months or has a visit in the next 30 days with authorizing provider or within the authorizing provider's specialty.  See \"Patient Info\" tab in inbasket, or \"Choose Columns\" in Meds & Orders section of the refill encounter.              "

## 2018-09-24 NOTE — TELEPHONE ENCOUNTER
Prescription approved per [facility] Refill Protocol.    Gladys BARRON RN

## 2018-09-26 DIAGNOSIS — E11.9 TYPE 2 DIABETES MELLITUS WITHOUT COMPLICATION, WITHOUT LONG-TERM CURRENT USE OF INSULIN (H): ICD-10-CM

## 2018-09-27 NOTE — TELEPHONE ENCOUNTER
metFORMIN (GLUCOPHAGE) 500 MG tablet 360 tablet 0 6/29/2018       Last Written Prescription Date:  06/29/2018  Last Fill Quantity: 360,  # refills: 0   Last office visit: 9/13/2018 with prescribing provider:  Amisha Plaza   Future Office Visit:  Unknown      Requested Prescriptions   Pending Prescriptions Disp Refills     metFORMIN (GLUCOPHAGE) 500 MG tablet [Pharmacy Med Name: METFORMIN  MG TABLET] 360 tablet 0     Sig: TAKE 2 TABLETS BY MOUTH 2 TIMES DAILY WITH MEALS    Biguanide Agents Passed    9/26/2018  3:54 AM       Passed - Blood pressure less than 140/90 in past 6 months    BP Readings from Last 3 Encounters:   09/13/18 138/70   09/12/18 162/74   09/09/18 179/81                Passed - Patient has documented LDL within the past 12 mos.    Recent Labs   Lab Test  11/15/17   0939   LDL  77            Passed - Patient has had a Microalbumin in the past 15 mos.    Recent Labs   Lab Test  11/15/17   0939   MICROL  <5   UMALCR  Unable to calculate due to low value            Passed - Patient is age 10 or older       Passed - Patient has documented A1c within the specified period of time.    If HgbA1C is 8 or greater, it needs to be on file within the past 3 months.  If less than 8, must be on file within the past 6 months.     Recent Labs   Lab Test  09/13/18   1043   A1C  7.1*            Passed - Patient's CR is NOT>1.4 OR Patient's EGFR is NOT<45 within past 12 mos.    Recent Labs   Lab Test  09/13/18   1043   GFRESTIMATED  75   GFRESTBLACK  >90       Recent Labs   Lab Test  09/13/18   1043   CR  0.76            Passed - Patient does NOT have a diagnosis of CHF.       Passed - Patient is not pregnant       Passed - Patient has not had a positive pregnancy test within the past 12 mos.        Passed - Recent (6 mo) or future (30 days) visit within the authorizing provider's specialty    Patient had office visit in the last 6 months or has a visit in the next 30 days with authorizing provider or within the  "authorizing provider's specialty.  See \"Patient Info\" tab in inbasket, or \"Choose Columns\" in Meds & Orders section of the refill encounter.              "

## 2018-11-13 DIAGNOSIS — I10 HTN (HYPERTENSION), BENIGN: ICD-10-CM

## 2018-11-13 NOTE — TELEPHONE ENCOUNTER
"irbesartan (AVAPRO) 150 MG tablet 30 tablet 1 9/13/2018     Last Written Prescription Date:  9/13/18  Last Fill Quantity: 30,  # refills: 1   Last office visit: 9/13/2018 with prescribing provider:  Mela   Future Office Visit:   Next 5 appointments (look out 90 days)     Jan 24, 2019  9:30 AM CST   [facility]YSICAL with Amisha Plaza PA-C   [facility]    [address] [address] [phone]                 Requested Prescriptions   Pending Prescriptions Disp Refills     irbesartan (AVAPRO) 150 MG tablet [Pharmacy Med Name: IRBESARTAN 150 MG TABLET] 30 tablet 1     Sig: TAKE 1 TABLET BY MOUTH EVERY DAY    Angiotensin-II Receptors Passed    11/13/2018  1:47 AM       Passed - Blood pressure under 140/90 in past 12 months    BP Readings from Last 3 Encounters:   09/13/18 138/70   09/12/18 162/74   09/09/18 179/81                Passed - Recent (12 mo) or future (30 days) visit within the authorizing provider's specialty    Patient had office visit in the last 12 months or has a visit in the next 30 days with authorizing provider or within the authorizing provider's specialty.  See \"Patient Info\" tab in inbasket, or \"Choose Columns\" in Meds & Orders section of the refill encounter.             Passed - Patient is age 18 or older       Passed - No active pregnancy on record       Passed - Normal serum creatinine on file in past 12 months    Recent Labs   Lab Test  09/13/18   1043   CR  0.76            Passed - Normal serum potassium on file in past 12 months    Recent Labs   Lab Test  09/13/18   1043   POTASSIUM  4.8                   Passed - No positive pregnancy test in past 12 months        PHQ-9 SCORE 7/11/2017   Total Score 12     "

## 2018-11-15 RX ORDER — IRBESARTAN 150 MG/1
TABLET ORAL
Qty: 30 TABLET | Refills: 2 | Status: SHIPPED | OUTPATIENT
Start: 2018-11-15 | End: 2019-02-11

## 2019-01-02 DIAGNOSIS — E11.9 TYPE 2 DIABETES MELLITUS WITHOUT COMPLICATION, WITHOUT LONG-TERM CURRENT USE OF INSULIN (H): ICD-10-CM

## 2019-01-03 NOTE — TELEPHONE ENCOUNTER
Medication is being filled for 1 time refill only due to:  Patient needs to be seen because due for fasting labs.  Annabella WILSON RN

## 2019-01-03 NOTE — TELEPHONE ENCOUNTER
metFORMIN (GLUCOPHAGE) 500 MG tablet 360 tablet 0 9/27/2018       Last Written Prescription Date:  09/27/2018  Last Fill Quantity: 360,  # refills: 0   Last office visit: 9/13/2018 with prescribing provider:  Amisha Plaza   Future Office Visit: 01/24/2019  Next 5 appointments (look out 90 days)    Jan 24, 2019  9:30 AM CST  PHYSICAL with Amisha Plaza PA-C  [facility] [address] [address] [phone]         Requested Prescriptions   Pending Prescriptions Disp Refills     metFORMIN (GLUCOPHAGE) 500 MG tablet [Pharmacy Med Name: METFORMIN  MG TABLET] 360 tablet 0     Sig: TAKE 2 TABLETS BY MOUTH 2 TIMES DAILY WITH MEALS    Biguanide Agents Failed - 1/2/2019  7:19 PM       Failed - Patient has documented LDL within the past 12 mos.    Recent Labs   Lab Test 11/15/17  0939   LDL 77            Passed - Blood pressure less than 140/90 in past 6 months    BP Readings from Last 3 Encounters:   09/13/18 138/70   09/12/18 162/74   09/09/18 179/81                Passed - Patient has had a Microalbumin in the past 15 mos.    Recent Labs   Lab Test 11/15/17  0939   MICROL <5   UMALCR Unable to calculate due to low value            Passed - Patient is age 10 or older       Passed - Patient has documented A1c within the specified period of time.    If HgbA1C is 8 or greater, it needs to be on file within the past 3 months.  If less than 8, must be on file within the past 6 months.     Recent Labs   Lab Test 09/13/18  1043   A1C 7.1*            Passed - Patient's CR is NOT>1.4 OR Patient's EGFR is NOT<45 within past 12 mos.    Recent Labs   Lab Test 09/13/18  1043   GFRESTIMATED 75   GFRESTBLACK >90       Recent Labs   Lab Test 09/13/18  1043   CR 0.76            Passed - Patient does NOT have a diagnosis of CHF.       Passed - Patient is not pregnant       Passed - Patient has not had a positive pregnancy test within the past 12 mos.        Passed - Recent (6  "mo) or future (30 days) visit within the authorizing provider's specialty    Patient had office visit in the last 6 months or has a visit in the next 30 days with authorizing provider or within the authorizing provider's specialty.  See \"Patient Info\" tab in inbasket, or \"Choose Columns\" in Meds & Orders section of the refill encounter.              "

## 2019-01-24 ENCOUNTER — OFFICE VISIT (OUTPATIENT)
Dept: FAMILY MEDICINE | Facility: CLINIC | Age: 74
End: 2019-01-24
Payer: MEDICARE

## 2019-01-24 VITALS
WEIGHT: 206 LBS | HEART RATE: 62 BPM | SYSTOLIC BLOOD PRESSURE: 147 MMHG | TEMPERATURE: 97.6 F | DIASTOLIC BLOOD PRESSURE: 76 MMHG | BODY MASS INDEX: 33.11 KG/M2 | OXYGEN SATURATION: 97 % | HEIGHT: 66 IN

## 2019-01-24 DIAGNOSIS — M77.8 RIGHT SHOULDER TENDINITIS: Primary | ICD-10-CM

## 2019-01-24 PROCEDURE — 99214 OFFICE O/P EST MOD 30 MIN: CPT | Performed by: PHYSICIAN ASSISTANT

## 2019-01-24 ASSESSMENT — MIFFLIN-ST. JEOR: SCORE: 1466.16

## 2019-01-24 NOTE — PROGRESS NOTES
"  SUBJECTIVE:   Haritha Cat is a 73 year old female who presents for Preventive Visit.  {PVP to remind patient that this is not necessarily a physical exam; physical exam may or may not be done:681371::\"click delete button to remove this line now\"}  {PVP to inform patient that additional E&M charge may apply, if additional problems addressed:010489::\"click delete button to remove this line now\"}  Are you in the first 12 months of your Medicare Part B coverage?  { :440480::\"No\"}    Physical Health:    In general, how would you rate your overall physical health? { :285314}    Outside of work, how many days during the week do you exercise? { :449790}    Outside of work, approximately how many minutes a day do you exercise?{ :917148}    If you drink alcohol do you typically have >3 drinks per day or >7 drinks per week? { :670459}    Do you usually eat at least 4 servings of fruit and vegetables a day, include whole grains & fiber and avoid regularly eating high fat or \"junk\" foods? { :841159::\"Yes\"}    Do you have any problems taking medications regularly?  { :307978::\"No\"}    Do you have any side effects from medications? { :730061}    Needs assistance for the following daily activities: { :834111}    Which of the following safety concerns are present in your home?  { :954025::\"none identified\"}     Hearing impairment: { :983109}    In the past 6 months, have you been bothered by leaking of urine? { :897004}    Mental Health:    In general, how would you rate your overall mental or emotional health? { :626104}  PHQ-2 Score:      Do you feel safe in your environment? { :974537}    Do you have a Health Care Directive? { :953466}    Additional concerns to address?  {If YES, notify patient they may be responsible for a copay, coinsurance or deductible if the visit today includes services such as checking on a sore throat, having an x-ray or lab test, or treating and evaluating a new or existing condition " ":469473::\"No\"}    Fall risk:  { :077314}  {If any of the above assessments are answered yes, consider ordering appropriate referrals (Optional):404338::\"click delete button to remove this line now\"}  Cognitive Screening: { :454539}    {Do you have sleep apnea, excessive snoring or daytime drowsiness? (Optional):310701}    {Outside tests to abstract? :585940}    {additional problems to add (Optional):170480}    Reviewed and updated as needed this visit by clinical staff         Reviewed and updated as needed this visit by Provider        Social History     Tobacco Use     Smoking status: Never Smoker     Smokeless tobacco: Never Used   Substance Use Topics     Alcohol use: Yes     Alcohol/week: 0.0 oz     Comment: 0-3 drinks per week                           Current providers sharing in care for this patient include:   Patient Care Team:  Amisha Plaza PA-C as PCP - General (Internal Medicine)  Amisha Plaza PA-C as PCP - Assigned PCP    The following health maintenance items are reviewed in Epic and correct as of today:  Health Maintenance   Topic Date Due     EYE EXAM Q1 YEAR  02/23/1946     ZOSTER IMMUNIZATION (2 of 3) 10/02/2014     ADVANCE DIRECTIVE PLANNING Q5 YRS  07/12/2017     FOOT EXAM Q1 YEAR  11/15/2018     FALL RISK ASSESSMENT  11/15/2018     LIPID MONITORING Q1 YEAR  11/15/2018     MICROALBUMIN Q1 YEAR  11/15/2018     A1C Q6 MO  03/13/2019     TSH W/ FREE T4 REFLEX Q2 YEAR  07/11/2019     CREATININE Q1 YEAR  09/13/2019     PHQ-2 Q1 YR  09/13/2019     MAMMO SCREEN Q2 YR (SYSTEM ASSIGNED)  11/15/2019     COLONOSCOPY Q5 YR  05/15/2023     DTAP/TDAP/TD IMMUNIZATION (3 - Td) 08/20/2025     DEXA SCAN SCREENING (SYSTEM ASSIGNED)  Completed     INFLUENZA VACCINE  Completed     HEPATITIS C SCREENING  Completed     IPV IMMUNIZATION  Aged Out     MENINGITIS IMMUNIZATION  Aged Out     {Chronicprobdata (Optional):503339}  {Decision Support (Optional):815732}    ROS:  {ROS COMP:958926}    OBJECTIVE:   There " "were no vitals taken for this visit. Estimated body mass index is 31.02 kg/m  as calculated from the following:    Height as of 18: 1.74 m (5' 8.5\").    Weight as of 18: 93.9 kg (207 lb).  EXAM:   {Exam :962053}    {Diagnostic Test Results (Optional):679970::\"Diagnostic Test Results:\",\"none \"}    ASSESSMENT / PLAN:   {Diag Picklist:035810}    End of Life Planning:  Patient currently has an advanced directive: { :155034}    COUNSELING:  {Medicare Counselin}    BP Readings from Last 1 Encounters:   18 138/70     Estimated body mass index is 31.02 kg/m  as calculated from the following:    Height as of 18: 1.74 m (5' 8.5\").    Weight as of 18: 93.9 kg (207 lb).    {BP Counseling- Complete if BP >= 120/80  (Optional):589964}  {Weight Management Plan (ACO) Complete if BMI is abnormal-  Ages 18-64  BMI >24.9.  Age 65+ with BMI <23 or >30 (Optional):495562}     reports that  has never smoked. she has never used smokeless tobacco.  {Tobacco Cessation -- Complete if patient is a smoker (Optional):664111}    Appropriate preventive services were discussed with this patient, including applicable screening as appropriate for cardiovascular disease, diabetes, osteopenia/osteoporosis, and glaucoma.  As appropriate for age/gender, discussed screening for colorectal cancer, prostate cancer, breast cancer, and cervical cancer. Checklist reviewing preventive services available has been given to the patient.    Reviewed patients plan of care and provided an AVS. The {CarePlan:871272} for Haritha meets the Care Plan requirement. This Care Plan has been established and reviewed with the {PATIENT, FAMILY MEMBER, CAREGIVER:331177}.    Counseling Resources:  ATP IV Guidelines  Pooled Cohorts Equation Calculator  Breast Cancer Risk Calculator  FRAX Risk Assessment  ICSI Preventive Guidelines  Dietary Guidelines for Americans,   USDA's MyPlate  ASA Prophylaxis  Lung CA Screening    Amisha Plaza, " HERNAN  [facility]

## 2019-01-24 NOTE — PATIENT INSTRUCTIONS
Preventive Health Recommendations    See your health care provider every year to    Review health changes.     Discuss preventive care.      Review your medicines if your doctor has prescribed any.      You no longer need a yearly Pap test unless you've had an abnormal Pap test in the past 10 years. If you have vaginal symptoms, such as bleeding or discharge, be sure to talk with your provider about a Pap test.      Every 1 to 2 years, have a mammogram.  If you are over 69, talk with your health care provider about whether or not you want to continue having screening mammograms.      Every 10 years, have a colonoscopy. Or, have a yearly FIT test (stool test). These exams will check for colon cancer.       Have a cholesterol test every 5 years, or more often if your doctor advises it.       Have a diabetes test (fasting glucose) every three years. If you are at risk for diabetes, you should have this test more often.       At age 65, have a bone density scan (DEXA) to check for osteoporosis (brittle bone disease).    Shots:    Get a flu shot each year.    Get a tetanus shot every 10 years.    Talk to your doctor about your pneumonia vaccines. There are now two you should receive - Pneumovax (PPSV 23) and Prevnar (PCV 13).    Talk to your pharmacist about the shingles vaccine.    Talk to your doctor about the hepatitis B vaccine.    Nutrition:     Eat at least 5 servings of fruits and vegetables each day.      Eat whole-grain bread, whole-wheat pasta and brown rice instead of white grains and rice.      Get adequate Calcium and Vitamin D.     Lifestyle    Exercise at least 150 minutes a week (30 minutes a day, 5 days a week). This will help you control your weight and prevent disease.      Limit alcohol to one drink per day.      No smoking.       Wear sunscreen to prevent skin cancer.       See your dentist twice a year for an exam and cleaning.      See your eye doctor every 1 to 2 years to screen for conditions  such as glaucoma, macular degeneration and cataracts.    Personalized Prevention Plan  You are due for the preventive services outlined below.  Your care team is available to assist you in scheduling these services.  If you have already completed any of these items, please share that information with your care team to update in your medical record.  Health Maintenance Due   Topic Date Due     Eye Exam - yearly  02/23/1946     Zoster (Chicken Pox) Vaccine (2 of 3) 10/02/2014     Discuss Advance Directive Planning  07/12/2017     Diabetic Foot Exam - yearly  11/15/2018     FALL RISK ASSESSMENT  11/15/2018     Cholesterol Lab - yearly  11/15/2018     Microalbumin Lab - yearly  11/15/2018

## 2019-01-24 NOTE — PROGRESS NOTES
SUBJECTIVE:   Haritha Cat is a 73 year old female who presents to clinic today for the following health issues:    Medication Followup of Irbesartan    Taking Medication as prescribed: yes    Side Effects:  Possible muscle aches    Medication Helping Symptoms:  Slightly improved BP     Ct is a 74 yo female with DM here with R shoulder pain x 6 weeks.   She wonders if this could be related to irbesartan as her  had shoulder pain from one of his BP medications.  Pain goes down to the forearm at times  Denies arm numbness/tingling or weakness.  She has trouble lifting arm without pain in doing activities like curling her hair or getting dressed  Pain the worst at night and interfering with sleep  Rates pain as 8/10 at those times.  Denies neck pain or injury  She does have a new pillow but no other changes.    HTN: she doesn't feel her BP is much different and runs between 125-140/60-80  Tends to run higher here  Plans to f/u for px next month.    Past Medical History:   Diagnosis Date     Abscess of abdominal cavity (H) 11/02    cecal perf, prob from diverticulitis     Allergic rhinitis      Colon polyp 2015    colonoscopy due 5 years.     Diverticulosis      DM type 2 (diabetes mellitus, type 2) (H)      Elevated LFTs     fatty liver dz     GERD (gastroesophageal reflux disease)      Hepatitis A      Herpes zoster 11/28/11     HTN (hypertension), benign      Osteopenia     DXA 2008     Osteopenia 2014     Past Surgical History:   Procedure Laterality Date     CATARACT IOL, RT/LT       COLONOSCOPY N/A 5/15/2018    Procedure: COLONOSCOPY;  colonoscopy;  Surgeon: Lucero Acharya MD;  Location:  GI     varicose vein surgery      Dr. Bowen     wisdom teeth  age 27     Social History     Tobacco Use     Smoking status: Never Smoker     Smokeless tobacco: Never Used   Substance Use Topics     Alcohol use: Yes     Alcohol/week: 0.0 oz     Comment: 0-3 drinks per week     Current Outpatient  "Medications   Medication Sig Dispense Refill     aspirin 81 MG tablet Take  by mouth daily.       hydrochlorothiazide 12.5 MG TABS tablet TAKE 1 TABLET (12.5 MG) BY MOUTH DAILY 90 tablet 2     irbesartan (AVAPRO) 150 MG tablet TAKE 1 TABLET BY MOUTH EVERY DAY 30 tablet 2     losartan (COZAAR) 100 MG tablet TAKE 1 TABLET (100 MG) BY MOUTH DAILY 90 tablet 2     metFORMIN (GLUCOPHAGE) 500 MG tablet TAKE 2 TABLETS BY MOUTH 2 TIMES DAILY WITH MEALS 120 tablet 0     metoprolol succinate (TOPROL-XL) 100 MG 24 hr tablet TAKE 1.5 TABLETS BY MOUTH DAILY 135 tablet 1     naproxen sodium (ANAPROX) 220 MG tablet Take 1 tablet by mouth daily. 180 tablet 3     omega-3 fatty acids 1200 MG capsule Take 2 capsules by mouth daily. 90 capsule      psyllium (METAMUCIL SMOOTH TEXTURE) 63 % POWD Take 1 teaspoonful by mouth daily. Mix in 8 ounces of water       STATIN NOT PRESCRIBED, INTENTIONAL, Statin not prescribed intentionally due to Other LDL at goal; pt doesn't want to add another medication (This option does not exclude patient from measure)  0     Allergies   Allergen Reactions     Amoxicillin Hives     Hydrochlorothiazide      hyponatremia     Lisinopril      cough     Lotrel Hives     FAMILY HISTORY NOTED AND REVIEWED    PHYSICAL EXAM:    /76 (BP Location: Right arm, Cuff Size: Adult Large)   Pulse 62   Temp 97.6  F (36.4  C) (Tympanic)   Ht 1.676 m (5' 6\")   Wt 93.4 kg (206 lb)   SpO2 97%   Breastfeeding? No   BMI 33.25 kg/m      Patient appears non toxic  Rechecked BP manually and this remained the same.  R shoulder: no bony changes, erythema or eccymoses  FROM on abduction with pain on R  No tenderness to palp of the shoulder  +neer impingement sign    Assessment and Plan:     (M75.81) Right shoulder tendinitis  (primary encounter diagnosis)  Comment: pt has FROM but with discomfort.  Suspect rotator cuff tendinitis.   Plan: XR Shoulder Right 2 Views, ORTHO          REFERRAL        Recd naproxen 1-2 tabs " BID with food for 2 weeks, ice 20min tid and keeping shoulder loose. Exercises demonstrated.  Could see sports medicine for possible cortisone shot as this uncomfortable at night.     * pt to bring her home BP monitor to appt for px since higher here than at home. I'd like to do a comparison to make sure her readings reliable.    Amisha Plaza PA-C

## 2019-01-25 ENCOUNTER — TELEPHONE (OUTPATIENT)
Dept: FAMILY MEDICINE | Facility: CLINIC | Age: 74
End: 2019-01-25

## 2019-01-25 ENCOUNTER — ANCILLARY PROCEDURE (OUTPATIENT)
Dept: GENERAL RADIOLOGY | Facility: CLINIC | Age: 74
End: 2019-01-25
Payer: MEDICARE

## 2019-01-25 DIAGNOSIS — M77.8 RIGHT SHOULDER TENDINITIS: ICD-10-CM

## 2019-01-25 PROCEDURE — 73030 X-RAY EXAM OF SHOULDER: CPT | Mod: RT

## 2019-01-25 NOTE — TELEPHONE ENCOUNTER
Called pt and advised she can take all her medications with water the day of fasting labs    Real ALCALA RN

## 2019-01-25 NOTE — RESULT ENCOUNTER NOTE
Pat,     Your shoulder xray does not show arthritis or any tumor.    Rotator cuff tendons would need to be evaluated with a MRI.  See Dr. Esteban as we discussed.    Amisha Plaza PA-C

## 2019-02-02 DIAGNOSIS — E11.9 TYPE 2 DIABETES MELLITUS WITHOUT COMPLICATION, WITHOUT LONG-TERM CURRENT USE OF INSULIN (H): ICD-10-CM

## 2019-02-02 NOTE — TELEPHONE ENCOUNTER
Last Written Prescription Date:  1/03/19  Last Fill Quantity: 120 tablet,  # refills: 0   Last office visit: 1/24/2019 with prescribing provider:  Mela   Future Office Visit:   Next 5 appointments (look out 90 days)    Mar 11, 2019  9:30 AM CDT  PHYSICAL with Amisha Plaza PA-C  [facility] [address] [facility] [address] [phone]         Requested Prescriptions   Pending Prescriptions Disp Refills     metFORMIN (GLUCOPHAGE) 500 MG tablet [Pharmacy Med Name: METFORMIN  MG TABLET] 120 tablet 0     Sig: TAKE 2 TABLETS BY MOUTH TWICE A DAY WITH MEALS    Biguanide Agents Failed - 2/2/2019  8:33 AM       Failed - Blood pressure less than 140/90 in past 6 months    BP Readings from Last 3 Encounters:   01/24/19 147/76   09/13/18 138/70   09/12/18 162/74                Failed - Patient has documented LDL within the past 12 mos.    Recent Labs   Lab Test 11/15/17  0939   LDL 77            Passed - Patient has had a Microalbumin in the past 15 mos.    Recent Labs   Lab Test 11/15/17  0939   MICROL <5   UMALCR Unable to calculate due to low value            Passed - Patient is age 10 or older       Passed - Patient has documented A1c within the specified period of time.    If HgbA1C is 8 or greater, it needs to be on file within the past 3 months.  If less than 8, must be on file within the past 6 months.     Recent Labs   Lab Test 09/13/18  1043   A1C 7.1*            Passed - Patient's CR is NOT>1.4 OR Patient's EGFR is NOT<45 within past 12 mos.    Recent Labs   Lab Test 09/13/18  1043   GFRESTIMATED 75   GFRESTBLACK >90       Recent Labs   Lab Test 09/13/18  1043   CR 0.76            Passed - Patient does NOT have a diagnosis of CHF.       Passed - Medication is active on med list       Passed - Patient is not pregnant       Passed - Patient has not had a positive pregnancy test within the past 12 mos.        Passed - Recent (6 mo) or future (30 days) visit  "within the authorizing provider's specialty    Patient had office visit in the last 6 months or has a visit in the next 30 days with authorizing provider or within the authorizing provider's specialty.  See \"Patient Info\" tab in inbasket, or \"Choose Columns\" in Meds & Orders section of the refill encounter.              "

## 2019-02-04 NOTE — PROGRESS NOTES
[facility]   Clinic Visit s Feb 4, 2019    PCP: Amisha Plaza      Haritha is a 73 year old female who is seen as self referral for   Chief Complaint   Patient presents with     Right Shoulder - Pain       Injury: Reports insidious onset without acute precipitating event.     Right hand dominant    Location of Pain: right shoulder lateral, nonradiating   Duration of Pain: 6 week(s)  Rating of Pain at worst: 9/10  Rating of Pain Currently: 2/10  Pain is better with: activity avoidance   Pain is worse with: carrying (trash, groceries, laundry), cooking and self care, sleeping on side  Treatment so far consists of: Pain medication: naproxen (NAPROSYN/ALEVE)  Associated symptoms: no distal numbness or tingling; denies swelling or warmth  Recent imaging completed: X-rays completed 1/25/19.  Prior History of related problems: none    Social History: retired     Past Medical History:   Diagnosis Date     Abscess of abdominal cavity (H) 11/02    cecal perf, prob from diverticulitis     Allergic rhinitis      Colon polyp 2015    colonoscopy due 5 years.     Diverticulosis      DM type 2 (diabetes mellitus, type 2) (H)      Elevated LFTs     fatty liver dz     GERD (gastroesophageal reflux disease)      Hepatitis A      Herpes zoster 11/28/11     HTN (hypertension), benign      Osteopenia     DXA 2008     Osteopenia 2014       Patient Active Problem List    Diagnosis Date Noted     Needle phobia 07/11/2017     Priority: Medium     Vasovagal syncope 07/11/2017     Priority: Medium     Type 2 diabetes mellitus without complications (H) 10/23/2015     Priority: Medium     Colon polyp      Priority: Medium     colonoscopy due 5 years.       Chronic headaches 04/23/2014     Priority: Medium     HTN (hypertension), benign      Priority: Medium     GERD (gastroesophageal reflux disease)      Priority: Medium     Hyperlipidemia LDL goal <100 05/09/2012     Priority: Medium     Osteopenia       "Priority: Medium       Family History   Problem Relation Age of Onset     Hypertension Mother      Thyroid Disease Mother      Neurologic Disorder Mother         Parkinsons and RA     Hypertension Father      Diabetes Father      Diabetes Sister      Hypertension Sister      Diabetes Maternal Grandmother         also cad, OA, parkinsons     Diabetes Maternal Grandfather        Social History     Socioeconomic History     Marital status:      Spouse name: Not on file     Number of children: Not on file     Years of education: Not on file     Highest education level: Not on file   Social Needs     Financial resource strain: Not on file     Food insecurity - worry: Not on file     Food insecurity - inability: Not on file     Transportation needs - medical: Not on file     Transportation needs - non-medical: Not on file   Occupational History     Not on file   Tobacco Use     Smoking status: Never Smoker     Smokeless tobacco: Never Used   Substance and Sexual Activity     Alcohol use: Yes     Alcohol/week: 0.0 oz     Comment: 0-3 drinks per week     Drug use: No     Past Surgical History:   Procedure Laterality Date     CATARACT IOL, RT/LT       COLONOSCOPY N/A 5/15/2018    Procedure: COLONOSCOPY;  colonoscopy;  Surgeon: Lucero Acharya MD;  Location:  GI     varicose vein surgery      Dr. Bowen     [place] teeth  age 27           Review of Systems   Musculoskeletal: Positive for joint pain.   All other systems reviewed and are negative.        Physical Exam  /83   Ht 1.676 m (5' 6\")   Wt 93.4 kg (206 lb)   BMI 33.25 kg/m    Constitutional:well-developed, well-nourished, and in no distress.   Cardiovascular: Intact distal pulses.    Neurological: alert. Gait Normal:   Gait, station, stance, and balance appear normal for age  Skin: Skin is warm and dry.   Psychiatric: Mood and affect normal.   Respiratory: unlabored, speaks in full sentences  Lymph: no LAD, no lymphangitis            Right " Shoulder Exam     Tenderness   The patient is experiencing tenderness in the acromioclavicular joint.    Range of Motion   Active abduction: normal   Passive abduction: normal   Extension: normal   External rotation: normal   Forward flexion: normal   Internal rotation 0 degrees:  T12 abnormal   Internal rotation 90 degrees: normal     Muscle Strength   Abduction: 5/5   Internal rotation: 5/5   External rotation: 4/5   Supraspinatus: 4/5   Subscapularis: 5/5   Biceps: 5/5     Tests   Apprehension: negative  Mancilla test: positive  Cross arm: positive  Impingement: positive  Drop arm: positive  Sulcus: absent    Other   Erythema: absent  Scars: absent  Sensation: normal  Pulse: present      Left Shoulder Exam     Range of Motion   Internal rotation 0 degrees: T7           X-ray images Previously done and independently reviewed by me in the office today with the patient. X-ray shows:     Recent Results (from the past 744 hour(s))   XR Shoulder Right 2 Views    Narrative    XR SHOULDER 2 VIEW RIGHT 1/25/2019 11:41 AM    HISTORY: Right shoulder pain.    COMPARISON: None.      Impression    IMPRESSION: 2 views of the right shoulder show no fracture or  dislocation. The space between the humeral head and acromion process  is fairly well-maintained.     ANA LITTLE MD     ASSESSMENT/PLAN    ICD-10-CM    1. Rotator cuff syndrome of right shoulder M75.101 [facility] PT, HAND, AND CHIROPRACTIC REFERRAL     Nature of rotator cuff syndrome discussed with the patient.  She recalled recently holding the newspaper up for prolonged periods of time, this would be a reasonable trigger to set off this condition.  Discussed treatment options, primarily that of physical therapy, but occasionally cortisone injections for pain control for if therapy is unsuccessful or plateaus.  She has a needle phobia and declined for now but would consider it in the future if stuck.  Follow-up as needed.

## 2019-02-06 ENCOUNTER — OFFICE VISIT (OUTPATIENT)
Dept: ORTHOPEDICS | Facility: CLINIC | Age: 74
End: 2019-02-06
Payer: MEDICARE

## 2019-02-06 VITALS
WEIGHT: 206 LBS | DIASTOLIC BLOOD PRESSURE: 83 MMHG | HEIGHT: 66 IN | SYSTOLIC BLOOD PRESSURE: 149 MMHG | BODY MASS INDEX: 33.11 KG/M2

## 2019-02-06 DIAGNOSIS — E11.9 TYPE 2 DIABETES MELLITUS WITHOUT COMPLICATION, WITHOUT LONG-TERM CURRENT USE OF INSULIN (H): ICD-10-CM

## 2019-02-06 DIAGNOSIS — M75.101 ROTATOR CUFF SYNDROME OF RIGHT SHOULDER: Primary | ICD-10-CM

## 2019-02-06 PROCEDURE — 99203 OFFICE O/P NEW LOW 30 MIN: CPT | Performed by: FAMILY MEDICINE

## 2019-02-06 ASSESSMENT — MIFFLIN-ST. JEOR: SCORE: 1466.16

## 2019-02-06 NOTE — LETTER
2/6/2019         RE: Haritha Cat  [address] [place] [address]        Dear Colleague,    Thank you for referring your patient, Haritha Cat, to the  SPORTS MEDICINE. Please see a copy of my visit note below.    [facility]   Clinic Visit s Feb 4, 2019    PCP: Amisha Plaza      Haritha is a 73 year old female who is seen as self referral for   Chief Complaint   Patient presents with     Right Shoulder - Pain       Injury: Reports insidious onset without acute precipitating event.     Right hand dominant    Location of Pain: right shoulder lateral, nonradiating   Duration of Pain: 6 week(s)  Rating of Pain at worst: 9/10  Rating of Pain Currently: 2/10  Pain is better with: activity avoidance   Pain is worse with: carrying (trash, groceries, laundry), cooking and self care, sleeping on side  Treatment so far consists of: Pain medication: naproxen (NAPROSYN/ALEVE)  Associated symptoms: no distal numbness or tingling; denies swelling or warmth  Recent imaging completed: X-rays completed 1/25/19.  Prior History of related problems: none    Social History: retired     Past Medical History:   Diagnosis Date     Abscess of abdominal cavity (H) 11/02    cecal perf, prob from diverticulitis     Allergic rhinitis      Colon polyp 2015    colonoscopy due 5 years.     Diverticulosis      DM type 2 (diabetes mellitus, type 2) (H)      Elevated LFTs     fatty liver dz     GERD (gastroesophageal reflux disease)      Hepatitis A      Herpes zoster 11/28/11     HTN (hypertension), benign      Osteopenia     DXA 2008     Osteopenia 2014       Patient Active Problem List    Diagnosis Date Noted     Needle phobia 07/11/2017     Priority: Medium     Vasovagal syncope 07/11/2017     Priority: Medium     Type 2 diabetes mellitus without complications (H) 10/23/2015     Priority: Medium     Colon polyp      Priority: Medium     colonoscopy due 5 years.       Chronic headaches  "04/23/2014     Priority: Medium     HTN (hypertension), benign      Priority: Medium     GERD (gastroesophageal reflux disease)      Priority: Medium     Hyperlipidemia LDL goal <100 05/09/2012     Priority: Medium     Osteopenia      Priority: Medium       Family History   Problem Relation Age of Onset     Hypertension Mother      Thyroid Disease Mother      Neurologic Disorder Mother         Parkinsons and RA     Hypertension Father      Diabetes Father      Diabetes Sister      Hypertension Sister      Diabetes Maternal Grandmother         also cad, OA, parkinsons     Diabetes Maternal Grandfather        Social History     Socioeconomic History     Marital status:      Spouse name: Not on file     Number of children: Not on file     Years of education: Not on file     Highest education level: Not on file   Social Needs     Financial resource strain: Not on file     Food insecurity - worry: Not on file     Food insecurity - inability: Not on file     Transportation needs - medical: Not on file     Transportation needs - non-medical: Not on file   Occupational History     Not on file   Tobacco Use     Smoking status: Never Smoker     Smokeless tobacco: Never Used   Substance and Sexual Activity     Alcohol use: Yes     Alcohol/week: 0.0 oz     Comment: 0-3 drinks per week     Drug use: No     Past Surgical History:   Procedure Laterality Date     CATARACT IOL, RT/LT       COLONOSCOPY N/A 5/15/2018    Procedure: COLONOSCOPY;  colonoscopy;  Surgeon: Lucero Acharya MD;  Location:  GI     varicose vein surgery      Dr. Bowen     [place] teeth  age 27           Review of Systems   Musculoskeletal: Positive for joint pain.   All other systems reviewed and are negative.        Physical Exam  /83   Ht 1.676 m (5' 6\")   Wt 93.4 kg (206 lb)   BMI 33.25 kg/m     Constitutional:well-developed, well-nourished, and in no distress.   Cardiovascular: Intact distal pulses.    Neurological: alert. Gait " Normal:   Gait, station, stance, and balance appear normal for age  Skin: Skin is warm and dry.   Psychiatric: Mood and affect normal.   Respiratory: unlabored, speaks in full sentences  Lymph: no LAD, no lymphangitis            Right Shoulder Exam     Tenderness   The patient is experiencing tenderness in the acromioclavicular joint.    Range of Motion   Active abduction: normal   Passive abduction: normal   Extension: normal   External rotation: normal   Forward flexion: normal   Internal rotation 0 degrees:  T12 abnormal   Internal rotation 90 degrees: normal     Muscle Strength   Abduction: 5/5   Internal rotation: 5/5   External rotation: 4/5   Supraspinatus: 4/5   Subscapularis: 5/5   Biceps: 5/5     Tests   Apprehension: negative  Mancilla test: positive  Cross arm: positive  Impingement: positive  Drop arm: positive  Sulcus: absent    Other   Erythema: absent  Scars: absent  Sensation: normal  Pulse: present      Left Shoulder Exam     Range of Motion   Internal rotation 0 degrees: T7           X-ray images Previously done and independently reviewed by me in the office today with the patient. X-ray shows:     Recent Results (from the past 744 hour(s))   XR Shoulder Right 2 Views    Narrative    XR SHOULDER 2 VIEW RIGHT 1/25/2019 11:41 AM    HISTORY: Right shoulder pain.    COMPARISON: None.      Impression    IMPRESSION: 2 views of the right shoulder show no fracture or  dislocation. The space between the humeral head and acromion process  is fairly well-maintained.     ANA LITTLE MD     ASSESSMENT/PLAN    ICD-10-CM    1. Rotator cuff syndrome of right shoulder M75.101 [facility], HAND, AND CHIROPRACTIC REFERRAL     Nature of rotator cuff syndrome discussed with the patient.  She recalled recently holding the newspaper up for prolonged periods of time, this would be a reasonable trigger to set off this condition.  Discussed treatment options, primarily that of physical therapy, but occasionally cortisone  injections for pain control for if therapy is unsuccessful or plateaus.  She has a needle phobia and declined for now but would consider it in the future if stuck.  Follow-up as needed.    Again, thank you for allowing me to participate in the care of your patient.        Sincerely,        Eliseo Esteban MD

## 2019-02-06 NOTE — TELEPHONE ENCOUNTER
Pending Prescriptions:                       Disp   Refills    metFORMIN (GLUCOPHAGE) 500 MG tablet [Pha*120 ta*0            Sig: TAKE 2 TABLETS BY MOUTH TWICE A DAY WITH MEALS    Last Written Prescription Date:  2/4/19  Last Fill Quantity: 120,  # refills: 0   Last office visit: 1/24/2019 with prescribing provider:     Future Office Visit:   Next 5 appointments (look out 90 days)    Mar 11, 2019  9:30 AM CDT  PHYSICAL with Amisha Plaza PA-C  [facility] [address] [facility] [address]  [phone]         Requested Prescriptions   Pending Prescriptions Disp Refills     metFORMIN (GLUCOPHAGE) 500 MG tablet [Pharmacy Med Name: METFORMIN  MG TABLET] 120 tablet 0     Sig: TAKE 2 TABLETS BY MOUTH TWICE A DAY WITH MEALS    Biguanide Agents Failed - 2/6/2019  3:33 PM       Failed - Blood pressure less than 140/90 in past 6 months    BP Readings from Last 3 Encounters:   02/06/19 149/83   01/24/19 147/76   09/13/18 138/70                Failed - Patient has documented LDL within the past 12 mos.    Recent Labs   Lab Test 11/15/17  0939   LDL 77            Passed - Patient has had a Microalbumin in the past 15 mos.    Recent Labs   Lab Test 11/15/17  0939   MICROL <5   UMALCR Unable to calculate due to low value            Passed - Patient is age 10 or older       Passed - Patient has documented A1c within the specified period of time.    If HgbA1C is 8 or greater, it needs to be on file within the past 3 months.  If less than 8, must be on file within the past 6 months.     Recent Labs   Lab Test 09/13/18  1043   A1C 7.1*            Passed - Patient's CR is NOT>1.4 OR Patient's EGFR is NOT<45 within past 12 mos.    Recent Labs   Lab Test 09/13/18  1043   GFRESTIMATED 75   GFRESTBLACK >90       Recent Labs   Lab Test 09/13/18  1043   CR 0.76            Passed - Patient does NOT have a diagnosis of CHF.       Passed - Medication is active on med list       Passed -  "Patient is not pregnant       Passed - Patient has not had a positive pregnancy test within the past 12 mos.        Passed - Recent (6 mo) or future (30 days) visit within the authorizing provider's specialty    Patient had office visit in the last 6 months or has a visit in the next 30 days with authorizing provider or within the authorizing provider's specialty.  See \"Patient Info\" tab in inbasket, or \"Choose Columns\" in Meds & Orders section of the refill encounter.              "

## 2019-02-07 NOTE — TELEPHONE ENCOUNTER
Duplicate.  Medication was just filled on 2/4/2019.    ANIBAL AlbrechtN, RN  [facility]

## 2019-02-11 DIAGNOSIS — I10 HTN (HYPERTENSION), BENIGN: ICD-10-CM

## 2019-02-12 RX ORDER — IRBESARTAN 150 MG/1
TABLET ORAL
Qty: 90 TABLET | Refills: 0 | Status: SHIPPED | OUTPATIENT
Start: 2019-02-12 | End: 2019-05-08

## 2019-02-12 NOTE — TELEPHONE ENCOUNTER
Medication is being filled for 1 time refill only due to:  Patient needs to be seen because last BP elevated.  Annabella WILSON RN

## 2019-02-12 NOTE — TELEPHONE ENCOUNTER
"irbesartan (AVAPRO) 150 MG tablet 30 tablet 2 11/15/2018  No   Sig: TAKE 1 TABLET BY MOUTH EVERY DAY     Last Written Prescription Date:  11/15/2018  Last Fill Quantity: 30,  # refills: 2   Last office visit: 1/24/2019 with prescribing provider:     Future Office Visit:   Next 5 appointments (look out 90 days)    Mar 11, 2019  9:30 AM CDT  PHYSICAL with Amisha Plaza PA-C  [facility] ([facility] [address] [address] [phone]         Requested Prescriptions   Pending Prescriptions Disp Refills     irbesartan (AVAPRO) 150 MG tablet [Pharmacy Med Name: IRBESARTAN 150 MG TABLET] 30 tablet 2     Sig: TAKE 1 TABLET BY MOUTH EVERY DAY    Angiotensin-II Receptors Failed - 2/11/2019  1:23 AM       Failed - Blood pressure under 140/90 in past 12 months    BP Readings from Last 3 Encounters:   02/06/19 149/83   01/24/19 147/76   09/13/18 138/70                Passed - Recent (12 mo) or future (30 days) visit within the authorizing provider's specialty    Patient had office visit in the last 12 months or has a visit in the next 30 days with authorizing provider or within the authorizing provider's specialty.  See \"Patient Info\" tab in inbasket, or \"Choose Columns\" in Meds & Orders section of the refill encounter.             Passed - Medication is active on med list       Passed - Patient is age 18 or older       Passed - No active pregnancy on record       Passed - Normal serum creatinine on file in past 12 months    Recent Labs   Lab Test 09/13/18  1043   CR 0.76            Passed - Normal serum potassium on file in past 12 months    Recent Labs   Lab Test 09/13/18  1043   POTASSIUM 4.8                   Passed - No positive pregnancy test in past 12 months          "

## 2019-03-03 DIAGNOSIS — E11.9 TYPE 2 DIABETES MELLITUS WITHOUT COMPLICATION, WITHOUT LONG-TERM CURRENT USE OF INSULIN (H): ICD-10-CM

## 2019-03-04 NOTE — TELEPHONE ENCOUNTER
metFORMIN (GLUCOPHAGE) 500 MG tablet 120 tablet 0 2/4/2019         Last Written Prescription Date:  02/04/2019  Last Fill Quantity: 120,  # refills: 0   Last office visit: 1/24/2019 with prescribing provider:  Amisha Plaza   Future Office Visit:   Next 5 appointments (look out 90 days)    Mar 11, 2019  9:30 AM CDT  PHYSICAL with Amisha Plaza PA-C  [facility] [address] [address] [phone]         Requested Prescriptions   Pending Prescriptions Disp Refills     metFORMIN (GLUCOPHAGE) 500 MG tablet [Pharmacy Med Name: METFORMIN  MG TABLET] 120 tablet 0     Sig: TAKE 2 TABLETS BY MOUTH TWICE A DAY WITH MEALS    Biguanide Agents Failed - 3/3/2019  4:11 PM       Failed - Blood pressure less than 140/90 in past 6 months    BP Readings from Last 3 Encounters:   02/06/19 149/83   01/24/19 147/76   09/13/18 138/70                Failed - Patient has documented LDL within the past 12 mos.    Recent Labs   Lab Test 11/15/17  0939   LDL 77            Failed - Patient has had a Microalbumin in the past 15 mos.    Recent Labs   Lab Test 11/15/17  0939   MICROL <5   UMALCR Unable to calculate due to low value            Passed - Patient is age 10 or older       Passed - Patient has documented A1c within the specified period of time.    If HgbA1C is 8 or greater, it needs to be on file within the past 3 months.  If less than 8, must be on file within the past 6 months.     Recent Labs   Lab Test 09/13/18  1043   A1C 7.1*            Passed - Patient's CR is NOT>1.4 OR Patient's EGFR is NOT<45 within past 12 mos.    Recent Labs   Lab Test 09/13/18  1043   GFRESTIMATED 75   GFRESTBLACK >90       Recent Labs   Lab Test 09/13/18  1043   CR 0.76            Passed - Patient does NOT have a diagnosis of CHF.       Passed - Medication is active on med list       Passed - Patient is not pregnant       Passed - Patient has not had a positive pregnancy test within the past  "12 mos.        Passed - Recent (6 mo) or future (30 days) visit within the authorizing provider's specialty    Patient had office visit in the last 6 months or has a visit in the next 30 days with authorizing provider or within the authorizing provider's specialty.  See \"Patient Info\" tab in inbasket, or \"Choose Columns\" in Meds & Orders section of the refill encounter.              "

## 2019-03-05 NOTE — TELEPHONE ENCOUNTER
To PCP:     Routing refill request to provider for review/approval because:  Labs not current:  LDL, Microalbumin   BP's out of range per RN Refill protocol     Pt has OV 3/11. OK to fill prior to appt?     Please review and authorize if appropriate,     Thank you,   Gladys LEAL RN

## 2019-03-08 ASSESSMENT — ACTIVITIES OF DAILY LIVING (ADL): CURRENT_FUNCTION: NO ASSISTANCE NEEDED

## 2019-03-11 ENCOUNTER — OFFICE VISIT (OUTPATIENT)
Dept: ORTHOPEDICS | Facility: CLINIC | Age: 74
End: 2019-03-11
Payer: MEDICARE

## 2019-03-11 ENCOUNTER — OFFICE VISIT (OUTPATIENT)
Dept: FAMILY MEDICINE | Facility: CLINIC | Age: 74
End: 2019-03-11
Payer: MEDICARE

## 2019-03-11 ENCOUNTER — HOSPITAL ENCOUNTER (OUTPATIENT)
Dept: MAMMOGRAPHY | Facility: CLINIC | Age: 74
Discharge: HOME | End: 2019-03-11
Attending: PHYSICIAN ASSISTANT | Admitting: PHYSICIAN ASSISTANT
Payer: MEDICARE

## 2019-03-11 ENCOUNTER — TELEPHONE (OUTPATIENT)
Dept: FAMILY MEDICINE | Facility: CLINIC | Age: 74
End: 2019-03-11

## 2019-03-11 ENCOUNTER — HOSPITAL ENCOUNTER (OUTPATIENT)
Dept: BONE DENSITY | Facility: CLINIC | Age: 74
End: 2019-03-11
Attending: PHYSICIAN ASSISTANT
Payer: MEDICARE

## 2019-03-11 VITALS
SYSTOLIC BLOOD PRESSURE: 149 MMHG | HEART RATE: 68 BPM | BODY MASS INDEX: 33.43 KG/M2 | DIASTOLIC BLOOD PRESSURE: 71 MMHG | WEIGHT: 208 LBS | TEMPERATURE: 97.9 F | OXYGEN SATURATION: 97 % | HEIGHT: 66 IN

## 2019-03-11 VITALS
WEIGHT: 208 LBS | DIASTOLIC BLOOD PRESSURE: 71 MMHG | BODY MASS INDEX: 33.43 KG/M2 | HEIGHT: 66 IN | SYSTOLIC BLOOD PRESSURE: 149 MMHG

## 2019-03-11 DIAGNOSIS — Z00.00 ENCOUNTER FOR MEDICARE ANNUAL WELLNESS EXAM: Primary | ICD-10-CM

## 2019-03-11 DIAGNOSIS — Z12.31 VISIT FOR SCREENING MAMMOGRAM: ICD-10-CM

## 2019-03-11 DIAGNOSIS — Z78.0 POSTMENOPAUSAL STATUS: ICD-10-CM

## 2019-03-11 DIAGNOSIS — Z13.89 SCREENING FOR DIABETIC PERIPHERAL NEUROPATHY: ICD-10-CM

## 2019-03-11 DIAGNOSIS — I10 HTN (HYPERTENSION), BENIGN: ICD-10-CM

## 2019-03-11 DIAGNOSIS — R60.0 BILATERAL LOWER EXTREMITY EDEMA: ICD-10-CM

## 2019-03-11 DIAGNOSIS — E11.9 TYPE 2 DIABETES MELLITUS WITHOUT COMPLICATION, WITHOUT LONG-TERM CURRENT USE OF INSULIN (H): ICD-10-CM

## 2019-03-11 DIAGNOSIS — M75.101 ROTATOR CUFF SYNDROME OF RIGHT SHOULDER: Primary | ICD-10-CM

## 2019-03-11 DIAGNOSIS — E87.5 HYPERKALEMIA: ICD-10-CM

## 2019-03-11 DIAGNOSIS — E78.5 HYPERLIPIDEMIA LDL GOAL <100: ICD-10-CM

## 2019-03-11 LAB
ALBUMIN SERPL-MCNC: 4.3 G/DL (ref 3.4–5)
ALP SERPL-CCNC: 104 U/L (ref 40–150)
ALT SERPL W P-5'-P-CCNC: 39 U/L (ref 0–50)
ANION GAP SERPL CALCULATED.3IONS-SCNC: 7 MMOL/L (ref 3–14)
AST SERPL W P-5'-P-CCNC: 28 U/L (ref 0–45)
BILIRUB SERPL-MCNC: 1.2 MG/DL (ref 0.2–1.3)
BUN SERPL-MCNC: 19 MG/DL (ref 7–30)
CALCIUM SERPL-MCNC: 9.9 MG/DL (ref 8.5–10.1)
CHLORIDE SERPL-SCNC: 101 MMOL/L (ref 94–109)
CHOLEST SERPL-MCNC: 179 MG/DL
CO2 SERPL-SCNC: 27 MMOL/L (ref 20–32)
CREAT SERPL-MCNC: 1.05 MG/DL (ref 0.52–1.04)
ERYTHROCYTE [DISTWIDTH] IN BLOOD BY AUTOMATED COUNT: 12.3 % (ref 10–15)
GFR SERPL CREATININE-BSD FRML MDRD: 53 ML/MIN/{1.73_M2}
GLUCOSE SERPL-MCNC: 181 MG/DL (ref 70–99)
HBA1C MFR BLD: 7 % (ref 0–5.6)
HCT VFR BLD AUTO: 40.4 % (ref 35–47)
HDLC SERPL-MCNC: 65 MG/DL
HGB BLD-MCNC: 13.2 G/DL (ref 11.7–15.7)
LDLC SERPL CALC-MCNC: 84 MG/DL
MCH RBC QN AUTO: 30.7 PG (ref 26.5–33)
MCHC RBC AUTO-ENTMCNC: 32.7 G/DL (ref 31.5–36.5)
MCV RBC AUTO: 94 FL (ref 78–100)
NONHDLC SERPL-MCNC: 114 MG/DL
PLATELET # BLD AUTO: 243 10E9/L (ref 150–450)
POTASSIUM SERPL-SCNC: 5.7 MMOL/L (ref 3.4–5.3)
PROT SERPL-MCNC: 7.3 G/DL (ref 6.8–8.8)
RBC # BLD AUTO: 4.3 10E12/L (ref 3.8–5.2)
SODIUM SERPL-SCNC: 135 MMOL/L (ref 133–144)
TRIGL SERPL-MCNC: 151 MG/DL
WBC # BLD AUTO: 6 10E9/L (ref 4–11)

## 2019-03-11 PROCEDURE — 99213 OFFICE O/P EST LOW 20 MIN: CPT | Mod: 25 | Performed by: FAMILY MEDICINE

## 2019-03-11 PROCEDURE — 77080 DXA BONE DENSITY AXIAL: CPT

## 2019-03-11 PROCEDURE — 36415 COLL VENOUS BLD VENIPUNCTURE: CPT | Performed by: PHYSICIAN ASSISTANT

## 2019-03-11 PROCEDURE — 83036 HEMOGLOBIN GLYCOSYLATED A1C: CPT | Performed by: PHYSICIAN ASSISTANT

## 2019-03-11 PROCEDURE — G0439 PPPS, SUBSEQ VISIT: HCPCS | Performed by: PHYSICIAN ASSISTANT

## 2019-03-11 PROCEDURE — 20610 DRAIN/INJ JOINT/BURSA W/O US: CPT | Mod: RT | Performed by: FAMILY MEDICINE

## 2019-03-11 PROCEDURE — 82043 UR ALBUMIN QUANTITATIVE: CPT | Performed by: PHYSICIAN ASSISTANT

## 2019-03-11 PROCEDURE — 85027 COMPLETE CBC AUTOMATED: CPT | Performed by: PHYSICIAN ASSISTANT

## 2019-03-11 PROCEDURE — 77067 SCR MAMMO BI INCL CAD: CPT

## 2019-03-11 PROCEDURE — 80053 COMPREHEN METABOLIC PANEL: CPT | Performed by: PHYSICIAN ASSISTANT

## 2019-03-11 PROCEDURE — 99207 C FOOT EXAM  NO CHARGE: CPT | Performed by: PHYSICIAN ASSISTANT

## 2019-03-11 PROCEDURE — 80061 LIPID PANEL: CPT | Performed by: PHYSICIAN ASSISTANT

## 2019-03-11 RX ORDER — METOPROLOL SUCCINATE 100 MG/1
TABLET, EXTENDED RELEASE ORAL
Qty: 135 TABLET | Refills: 1 | Status: SHIPPED | OUTPATIENT
Start: 2019-03-11 | End: 2019-10-02

## 2019-03-11 RX ORDER — HYDROCHLOROTHIAZIDE 12.5 MG/1
TABLET ORAL
Qty: 90 TABLET | Refills: 3 | Status: SHIPPED | OUTPATIENT
Start: 2019-03-11 | End: 2019-09-18

## 2019-03-11 RX ORDER — LIDOCAINE HYDROCHLORIDE 10 MG/ML
4 INJECTION, SOLUTION INFILTRATION; PERINEURAL
Status: DISCONTINUED | OUTPATIENT
Start: 2019-03-11 | End: 2020-03-24

## 2019-03-11 RX ORDER — TRIAMCINOLONE ACETONIDE 40 MG/ML
40 INJECTION, SUSPENSION INTRA-ARTICULAR; INTRAMUSCULAR
Status: DISCONTINUED | OUTPATIENT
Start: 2019-03-11 | End: 2019-04-25

## 2019-03-11 RX ORDER — LIDOCAINE HYDROCHLORIDE 10 MG/ML
5 INJECTION, SOLUTION INFILTRATION; PERINEURAL
Status: DISCONTINUED | OUTPATIENT
Start: 2019-03-11 | End: 2020-03-24

## 2019-03-11 RX ADMIN — TRIAMCINOLONE ACETONIDE 40 MG: 40 INJECTION, SUSPENSION INTRA-ARTICULAR; INTRAMUSCULAR at 16:11

## 2019-03-11 RX ADMIN — LIDOCAINE HYDROCHLORIDE 5 ML: 10 INJECTION, SOLUTION INFILTRATION; PERINEURAL at 16:11

## 2019-03-11 RX ADMIN — LIDOCAINE HYDROCHLORIDE 4 ML: 10 INJECTION, SOLUTION INFILTRATION; PERINEURAL at 16:11

## 2019-03-11 ASSESSMENT — MIFFLIN-ST. JEOR
SCORE: 1470.23
SCORE: 1470.23

## 2019-03-11 NOTE — PATIENT INSTRUCTIONS
Preventive Health Recommendations    See your health care provider every year to    Review health changes.     Discuss preventive care.      Review your medicines if your doctor has prescribed any.      You no longer need a yearly Pap test unless you've had an abnormal Pap test in the past 10 years. If you have vaginal symptoms, such as bleeding or discharge, be sure to talk with your provider about a Pap test.      Every 1 to 2 years, have a mammogram.  If you are over 69, talk with your health care provider about whether or not you want to continue having screening mammograms.      Every 10 years, have a colonoscopy. Or, have a yearly FIT test (stool test). These exams will check for colon cancer.       Have a cholesterol test every 5 years, or more often if your doctor advises it.       Have a diabetes test (fasting glucose) every three years. If you are at risk for diabetes, you should have this test more often.       At age 65, have a bone density scan (DEXA) to check for osteoporosis (brittle bone disease).    Shots:    Get a flu shot each year.    Get a tetanus shot every 10 years.    Talk to your doctor about your pneumonia vaccines. There are now two you should receive - Pneumovax (PPSV 23) and Prevnar (PCV 13).    Talk to your pharmacist about the shingles vaccine.    Talk to your doctor about the hepatitis B vaccine.    Nutrition:     Eat at least 5 servings of fruits and vegetables each day.      Eat whole-grain bread, whole-wheat pasta and brown rice instead of white grains and rice.      Get adequate Calcium and Vitamin D.     Lifestyle    Exercise at least 150 minutes a week (30 minutes a day, 5 days a week). This will help you control your weight and prevent disease.      Limit alcohol to one drink per day.      No smoking.       Wear sunscreen to prevent skin cancer.       See your dentist twice a year for an exam and cleaning.      See your eye doctor every 1 to 2 years to screen for conditions  such as glaucoma, macular degeneration and cataracts.    Personalized Prevention Plan  You are due for the preventive services outlined below.  Your care team is available to assist you in scheduling these services.  If you have already completed any of these items, please share that information with your care team to update in your medical record.  Health Maintenance Due   Topic Date Due     Eye Exam - yearly  02/23/1946     Zoster (Shingles) Vaccine (2 of 3) 10/02/2014     Discuss Advance Directive Planning  07/12/2017     Diabetic Foot Exam - yearly  11/15/2018     Annual Wellness Visit  11/15/2018     Cholesterol Lab - yearly  11/15/2018     Microalbumin Lab - yearly  11/15/2018     A1C (Diabetes) Lab - every 6 months  03/13/2019       Shingrex is the new shingles vaccine; call and check insurance.  Mammogram and bone density [address]

## 2019-03-11 NOTE — TELEPHONE ENCOUNTER
Reason for Call:  Other Medication Update    Detailed comments: Pt is taking  Irbesartan 150 Mg Daily  Losartan 100 Mg daily  Motropolol 150 Mg daily 50 MGTablets  hydrochlorothiazide 12.5 Mg daily      Phone Number Patient can be reached at: Home number on file [phone] (home)    Best Time: anytime    Can we leave a detailed message on this number? YES    Call taken on 3/11/2019 at 12:49 PM by Marie Napoles

## 2019-03-11 NOTE — PROGRESS NOTES
Musculoskeletal Problem          [facility]   Follow-up Visit s Mar 11, 2019    PCP: Amisha Plaza      Subjective:  Haritha is a 74 year old female who is seen in follow up for evaluation of   Chief Complaint   Patient presents with     Right Shoulder - Pain     Her last visit was on 2/4/2019.  Since that time, symptoms have been worse than before. Haritha Cat is accompanied today by self.     Patient has noticed worsened symptoms with home exercise program  treatment.    Pain is located right mid upper arm, persistent.  Patient is using no aids.    Patient denies any new injuries.    Patient's past medical, surgical, social and family histories are reviewed today.    History from previous visit on 2/4/2019  Injury: Reports insidious onset without acute precipitating event.     Right hand dominant    Location of Pain: right shoulder lateral, nonradiating   Duration of Pain: 6 week(s)  Rating of Pain at worst: 9/10  Rating of Pain Currently: 2/10  Pain is better with: activity avoidance   Pain is worse with: carrying (trash, groceries, laundry), cooking and self care, sleeping on side  Treatment so far consists of: Pain medication: naproxen (NAPROSYN/ALEVE)  Associated symptoms: no distal numbness or tingling; denies swelling or warmth  Recent imaging completed: X-rays completed 1/25/19.  Prior History of related problems: none    Social History: retired     Past Medical History:   Diagnosis Date     Abscess of abdominal cavity (H) 11/02    cecal perf, prob from diverticulitis     Allergic rhinitis      Colon polyp 2015    colonoscopy due 5 years.     Diverticulosis      DM type 2 (diabetes mellitus, type 2) (H)      Elevated LFTs     fatty liver dz     GERD (gastroesophageal reflux disease)      Hepatitis A      Herpes zoster 11/28/11     HTN (hypertension), benign      Osteopenia     DXA 2008     Osteopenia 2014       Patient Active Problem List    Diagnosis Date Noted     Needle  phobia 07/11/2017     Priority: Medium     Vasovagal syncope 07/11/2017     Priority: Medium     Type 2 diabetes mellitus without complications (H) 10/23/2015     Priority: Medium     Colon polyp      Priority: Medium     colonoscopy due 5 years.       Chronic headaches 04/23/2014     Priority: Medium     HTN (hypertension), benign      Priority: Medium     GERD (gastroesophageal reflux disease)      Priority: Medium     Hyperlipidemia LDL goal <100 05/09/2012     Priority: Medium     Osteopenia      Priority: Medium       Family History   Problem Relation Age of Onset     Hypertension Mother      Thyroid Disease Mother      Neurologic Disorder Mother         Parkinsons and RA     Hypertension Father      Diabetes Father      Diabetes Sister      Hypertension Sister      Diabetes Maternal Grandmother         also cad, OA, parkinsons     Diabetes Maternal Grandfather      Alcoholism Sister 59       Social History     Socioeconomic History     Marital status:      Spouse name: Not on file     Number of children: Not on file     Years of education: Not on file     Highest education level: Not on file   Social Needs     Financial resource strain: Not on file     Food insecurity - worry: Not on file     Food insecurity - inability: Not on file     Transportation needs - medical: Not on file     Transportation needs - non-medical: Not on file   Occupational History     Not on file   Tobacco Use     Smoking status: Never Smoker     Smokeless tobacco: Never Used   Substance and Sexual Activity     Alcohol use: Yes     Alcohol/week: 0.0 oz     Comment: 0-3 drinks per week     Drug use: No     Past Surgical History:   Procedure Laterality Date     CATARACT IOL, RT/LT       COLONOSCOPY N/A 5/15/2018    Procedure: COLONOSCOPY;  colonoscopy;  Surgeon: Lucero Acharya MD;  Location:  GI     varicose vein surgery      Dr. Jessi nunez teeth  age 27           Review of Systems   Musculoskeletal: Positive for  "joint pain.   All other systems reviewed and are negative.        Physical Exam  /71   Ht 1.676 m (5' 6\")   Wt 94.3 kg (208 lb)   BMI 33.57 kg/m    Constitutional:well-developed, well-nourished, and in no distress.   Cardiovascular: Intact distal pulses.    Neurological: alert. Gait Normal:   Gait, station, stance, and balance appear normal for age  Skin: Skin is warm and dry.   Psychiatric: Mood and affect normal.   Respiratory: unlabored, speaks in full sentences  Lymph: no LAD, no lymphangitis            Right Shoulder Exam     Tenderness   The patient is experiencing tenderness in the acromioclavicular joint.    Range of Motion   Active abduction: normal   Passive abduction: normal   Extension: normal   External rotation: normal   Forward flexion: normal   Internal rotation 0 degrees:  T12 abnormal   Internal rotation 90 degrees: normal     Muscle Strength   Abduction: 5/5   Internal rotation: 5/5   External rotation: 4/5   Supraspinatus: 4/5   Subscapularis: 5/5   Biceps: 5/5     Tests   Apprehension: negative  Mancilla test: positive  Cross arm: positive  Impingement: positive  Drop arm: positive  Sulcus: absent    Other   Erythema: absent  Scars: absent  Sensation: normal  Pulse: present                    ASSESSMENT/PLAN    ICD-10-CM    1. Rotator cuff syndrome of right shoulder M75.101      Reviewed course with patient.  She has been doing home exercises but has not gone forward with formal physical therapy.  Her home rehab is primarily pendulum exercises and some stretching.  I recommended formal therapy for strengthening stabilization but she opted to hold off for now.  At this point she is seeking some benefit from pain control as waking up the morning after sleeping on her right shoulder is very painful.  Options discussed and she is willing to proceed with a subacromial cortisone injection today.  May call for PT referral if desired.    Large Joint Injection/Arthocentesis: R subacromial " bursa  Date/Time: 3/11/2019 4:11 PM  Performed by: Eliseo Esteban MD  Authorized by: Eliseo Esteban MD     Indications:  Pain  Needle Size:  25 G  Guidance: landmark guided    Approach:  Posterolateral  Location:  Shoulder  Site:  R subacromial bursa  Medications:  4 mL lidocaine 1 %; 5 mL lidocaine 1 %; 40 mg triamcinolone 40 MG/ML  Outcome:  Tolerated well, no immediate complications  Procedure discussed: discussed risks, benefits, and alternatives    Timeout: timeout called immediately prior to procedure    Prep: patient was prepped and draped in usual sterile fashion     Lot: [id number] EXP: 09/2020        Time spent in one-on-one evalution and discussion with patient regarding nature of problem, course, prior treatments, and therapeutic options, at least 50% of which was spent in counseling and coordination of care: 15 minutes, over and above time spent on injection.

## 2019-03-11 NOTE — LETTER
3/11/2019         RE: Haritha Cat  [address] [place] [address]        Dear Colleague,    Thank you for referring your patient, Haritha Cat, to the  [facility]TS [facility]. Please see a copy of my visit note below.    Musculoskeletal Problem          [facility]   Follow-up Visit s Mar 11, 2019    PCP: Amisha Plaza      Subjective:  Haritha is a 74 year old female who is seen in follow up for evaluation of   Chief Complaint   Patient presents with     Right Shoulder - Pain     Her last visit was on 2/4/2019.  Since that time, symptoms have been worse than before. Haritha Cat is accompanied today by self.     Patient has noticed worsened symptoms with home exercise program  treatment.    Pain is located right mid upper arm, persistent.  Patient is using no aids.    Patient denies any new injuries.    Patient's past medical, surgical, social and family histories are reviewed today.    History from previous visit on 2/4/2019  Injury: Reports insidious onset without acute precipitating event.     Right hand dominant    Location of Pain: right shoulder lateral, nonradiating   Duration of Pain: 6 week(s)  Rating of Pain at worst: 9/10  Rating of Pain Currently: 2/10  Pain is better with: activity avoidance   Pain is worse with: carrying (trash, groceries, laundry), cooking and self care, sleeping on side  Treatment so far consists of: Pain medication: naproxen (NAPROSYN/ALEVE)  Associated symptoms: no distal numbness or tingling; denies swelling or warmth  Recent imaging completed: X-rays completed 1/25/19.  Prior History of related problems: none    Social History: retired     Past Medical History:   Diagnosis Date     Abscess of abdominal cavity (H) 11/02    cecal perf, prob from diverticulitis     Allergic rhinitis      Colon polyp 2015    colonoscopy due 5 years.     Diverticulosis      DM type 2 (diabetes mellitus, type 2) (H)      Elevated LFTs     fatty liver  dz     GERD (gastroesophageal reflux disease)      Hepatitis A      Herpes zoster 11/28/11     HTN (hypertension), benign      Osteopenia     DXA 2008     Osteopenia 2014       Patient Active Problem List    Diagnosis Date Noted     Needle phobia 07/11/2017     Priority: Medium     Vasovagal syncope 07/11/2017     Priority: Medium     Type 2 diabetes mellitus without complications (H) 10/23/2015     Priority: Medium     Colon polyp      Priority: Medium     colonoscopy due 5 years.       Chronic headaches 04/23/2014     Priority: Medium     HTN (hypertension), benign      Priority: Medium     GERD (gastroesophageal reflux disease)      Priority: Medium     Hyperlipidemia LDL goal <100 05/09/2012     Priority: Medium     Osteopenia      Priority: Medium       Family History   Problem Relation Age of Onset     Hypertension Mother      Thyroid Disease Mother      Neurologic Disorder Mother         Parkinsons and RA     Hypertension Father      Diabetes Father      Diabetes Sister      Hypertension Sister      Diabetes Maternal Grandmother         also cad, OA, parkinsons     Diabetes Maternal Grandfather      Alcoholism Sister 59       Social History     Socioeconomic History     Marital status:      Spouse name: Not on file     Number of children: Not on file     Years of education: Not on file     Highest education level: Not on file   Social Needs     Financial resource strain: Not on file     Food insecurity - worry: Not on file     Food insecurity - inability: Not on file     Transportation needs - medical: Not on file     Transportation needs - non-medical: Not on file   Occupational History     Not on file   Tobacco Use     Smoking status: Never Smoker     Smokeless tobacco: Never Used   Substance and Sexual Activity     Alcohol use: Yes     Alcohol/week: 0.0 oz     Comment: 0-3 drinks per week     Drug use: No     Past Surgical History:   Procedure Laterality Date     CATARACT IOL, RT/LT        "COLONOSCOPY N/A 5/15/2018    Procedure: COLONOSCOPY;  colonoscopy;  Surgeon: Lucero Acharya MD;  Location:  GI     varicose vein surgery      Dr. Jessi monkdom teeth  age 27           Review of Systems   Musculoskeletal: Positive for joint pain.   All other systems reviewed and are negative.        Physical Exam  /71   Ht 1.676 m (5' 6\")   Wt 94.3 kg (208 lb)   BMI 33.57 kg/m     Constitutional:well-developed, well-nourished, and in no distress.   Cardiovascular: Intact distal pulses.    Neurological: alert. Gait Normal:   Gait, station, stance, and balance appear normal for age  Skin: Skin is warm and dry.   Psychiatric: Mood and affect normal.   Respiratory: unlabored, speaks in full sentences  Lymph: no LAD, no lymphangitis            Right Shoulder Exam     Tenderness   The patient is experiencing tenderness in the acromioclavicular joint.    Range of Motion   Active abduction: normal   Passive abduction: normal   Extension: normal   External rotation: normal   Forward flexion: normal   Internal rotation 0 degrees:  T12 abnormal   Internal rotation 90 degrees: normal     Muscle Strength   Abduction: 5/5   Internal rotation: 5/5   External rotation: 4/5   Supraspinatus: 4/5   Subscapularis: 5/5   Biceps: 5/5     Tests   Apprehension: negative  Mancilla test: positive  Cross arm: positive  Impingement: positive  Drop arm: positive  Sulcus: absent    Other   Erythema: absent  Scars: absent  Sensation: normal  Pulse: present                    ASSESSMENT/PLAN    ICD-10-CM    1. Rotator cuff syndrome of right shoulder M75.101      Reviewed course with patient.  She has been doing home exercises but has not gone forward with formal physical therapy.  Her home rehab is primarily pendulum exercises and some stretching.  I recommended formal therapy for strengthening stabilization but she opted to hold off for now.  At this point she is seeking some benefit from pain control as waking up the morning " after sleeping on her right shoulder is very painful.  Options discussed and she is willing to proceed with a subacromial cortisone injection today.  May call for PT referral if desired.    Large Joint Injection/Arthocentesis: R subacromial bursa  Date/Time: 3/11/2019 4:11 PM  Performed by: Eliseo Esteban MD  Authorized by: Eliseo Esteban MD     Indications:  Pain  Needle Size:  25 G  Guidance: landmark guided    Approach:  Posterolateral  Location:  Shoulder  Site:  R subacromial bursa  Medications:  4 mL lidocaine 1 %; 5 mL lidocaine 1 %; 40 mg triamcinolone 40 MG/ML  Outcome:  Tolerated well, no immediate complications  Procedure discussed: discussed risks, benefits, and alternatives    Timeout: timeout called immediately prior to procedure    Prep: patient was prepped and draped in usual sterile fashion     Lot: [id number] EXP: 09/2020        Time spent in one-on-one evalution and discussion with patient regarding nature of problem, course, prior treatments, and therapeutic options, at least 50% of which was spent in counseling and coordination of care: 15 minutes, over and above time spent on injection.        Again, thank you for allowing me to participate in the care of your patient.        Sincerely,        Eliseo Esteban MD

## 2019-03-11 NOTE — PROGRESS NOTES
"  SUBJECTIVE:   Haritha Cat is a 74 year old female who presents for Preventive Visit.    Pt has been less active this winter due to poor weather.  Checks BP at grocery store and readings in normal range but on review of her med list both cozaar and avapro listed and she believes she is taking both.  We discussed she should stop losartan.  Due for eye exam.    Are you in the first 12 months of your Medicare Part B coverage?  No    Physical Health:Answers for HPI/ROS submitted by the patient on 3/8/2019   Annual Exam:  In general, how would you rate your overall physical health?: excellent  Frequency of exercise:: 2-3 days/week  Do you usually eat at least 4 servings of fruit and vegetables a day, include whole grains & fiber, and avoid regularly eating high fat or \"junk\" foods? : Yes  Taking medications regularly:: Yes  Medication side effects:: Not applicable  Activities of Daily Living: no assistance needed  Home safety: no safety concerns identified  Hearing Impairment:: no hearing concerns  In the past 6 months, have you been bothered by leaking of urine?: No  In general, how would you rate your overall mental or emotional health?: excellent  Additional concerns today:: No  Duration of exercise:: 15-30 minutes      PHQ-2 Score: (P) 0    Do you feel safe in your environment? Yes    Do you have a Health Care Directive? Yes: Patient states has Advance Directive and will bring in a copy to clinic.    Additional concerns to address?  No    Fall risk:  Fallen 2 or more times in the past year?: No  Any fall with injury in the past year?: No    Cognitive Screenin) Repeat 3 items (Leader, Season, Table)    2) Clock draw: NORMAL  3) 3 item recall: Recalls 3 objects  Results: 3 items recalled: COGNITIVE IMPAIRMENT LESS LIKELY    Mini-CogTM Copyright JOSÉ MIGUEL Jones. Licensed by the author for use in [facility]; reprinted with permission (soarlyn@.edu). All rights reserved.      Do you have sleep apnea, " excessive snoring or daytime drowsiness?: no            Reviewed and updated as needed this visit by clinical staff  Tobacco  Allergies  Meds  Soc Hx        Reviewed and updated as needed this visit by Provider        Social History     Tobacco Use     Smoking status: Never Smoker     Smokeless tobacco: Never Used   Substance Use Topics     Alcohol use: Yes     Alcohol/week: 0.0 oz     Comment: 0-3 drinks per week                           Current providers sharing in care for this patient include:   Patient Care Team:  Amisha Plaza PA-C as PCP - General (Internal Medicine)  Amisha Plaza PA-C as Assigned PCP    The following health maintenance items are reviewed in Epic and correct as of today:  Health Maintenance   Topic Date Due     EYE EXAM Q1 YEAR  02/23/1946     ZOSTER IMMUNIZATION (2 of 3) 10/02/2014     ADVANCE DIRECTIVE PLANNING Q5 YRS  07/12/2017     FOOT EXAM Q1 YEAR  11/15/2018     MEDICARE ANNUAL WELLNESS VISIT  11/15/2018     LIPID MONITORING Q1 YEAR  11/15/2018     MICROALBUMIN Q1 YEAR  11/15/2018     A1C Q6 MO  03/13/2019     TSH W/ FREE T4 REFLEX Q2 YEAR  07/11/2019     CREATININE Q1 YEAR  09/13/2019     MAMMO SCREEN Q2 YR (SYSTEM ASSIGNED)  11/15/2019     FALL RISK ASSESSMENT  01/24/2020     PHQ-2 Q1 YR  03/11/2020     COLONOSCOPY Q5 YR  05/15/2023     DTAP/TDAP/TD IMMUNIZATION (3 - Td) 08/20/2025     DEXA SCAN SCREENING (SYSTEM ASSIGNED)  Completed     INFLUENZA VACCINE  Completed     PNEUMOCOCCAL IMMUNIZATION 65+ LOW/MEDIUM RISK  Completed     HEPATITIS C SCREENING  Completed     IPV IMMUNIZATION  Aged Out     MENINGITIS IMMUNIZATION  Aged Out     Past Medical History:   Diagnosis Date     Abscess of abdominal cavity (H) 11/02    cecal perf, prob from diverticulitis     Allergic rhinitis      Colon polyp 2015    colonoscopy due 5 years.     Diverticulosis      DM type 2 (diabetes mellitus, type 2) (H)      Elevated LFTs     fatty liver dz     GERD (gastroesophageal reflux disease)   "    Hepatitis A      Herpes zoster 11/28/11     HTN (hypertension), benign      Osteopenia     DXA 2008     Osteopenia 2014     Past Surgical History:   Procedure Laterality Date     CATARACT IOL, RT/LT       COLONOSCOPY N/A 5/15/2018    Procedure: COLONOSCOPY;  colonoscopy;  Surgeon: Lucero Acharya MD;  Location:  GI     varicose vein surgery      Dr. Bowen     wisdom teeth  age 27     Current Outpatient Medications   Medication Sig Dispense Refill     aspirin 81 MG tablet Take  by mouth daily.       hydrochlorothiazide 12.5 MG TABS tablet TAKE 1 TABLET (12.5 MG) BY MOUTH DAILY 90 tablet 2     irbesartan (AVAPRO) 150 MG tablet TAKE 1 TABLET BY MOUTH EVERY DAY 90 tablet 0     losartan (COZAAR) 100 MG tablet TAKE 1 TABLET (100 MG) BY MOUTH DAILY 90 tablet 2     metFORMIN (GLUCOPHAGE) 500 MG tablet TAKE 2 TABLETS BY MOUTH TWICE A DAY WITH MEALS 360 tablet 1     metoprolol succinate (TOPROL-XL) 100 MG 24 hr tablet TAKE 1.5 TABLETS BY MOUTH DAILY 135 tablet 1     naproxen sodium (ANAPROX) 220 MG tablet Take 1 tablet by mouth daily. 180 tablet 3     psyllium (METAMUCIL SMOOTH TEXTURE) 63 % POWD Take 1 teaspoonful by mouth daily. Mix in 8 ounces of water       STATIN NOT PRESCRIBED, INTENTIONAL, Statin not prescribed intentionally due to Other LDL at goal; pt doesn't want to add another medication (This option does not exclude patient from measure)  0         ROS:  Constitutional, HEENT, cardiovascular, pulmonary, GI, , musculoskeletal, neuro, skin, endocrine and psych systems are negative, except as otherwise noted.    OBJECTIVE:   /71 (BP Location: Right arm, Patient Position: Chair, Cuff Size: Adult Large)   Pulse 68   Temp 97.9  F (36.6  C) (Tympanic)   Ht 1.676 m (5' 6\")   Wt 94.3 kg (208 lb)   SpO2 97%   BMI 33.57 kg/m   Estimated body mass index is 33.57 kg/m  as calculated from the following:    Height as of this encounter: 1.676 m (5' 6\").    Weight as of this encounter: 94.3 kg (208 " lb).  EXAM:   GENERAL APPEARANCE: healthy, alert and no distress  EYES: Eyes grossly normal to inspection, PERRL and conjunctivae and sclerae normal  HENT: ear canals and TM's normal, nose and mouth without ulcers or lesions, oropharynx clear and oral mucous membranes moist  NECK: no adenopathy, no asymmetry, masses, or scars and thyroid normal to palpation  RESP: lungs clear to auscultation - no rales, rhonchi or wheezes  BREAST: normal without masses, tenderness or nipple discharge and no palpable axillary masses or adenopathy  CV: regular rate and rhythm, normal S1 S2, no S3 or S4, no murmur, click or rub, no peripheral edema and peripheral pulses strong  ABDOMEN: soft, nontender, no hepatosplenomegaly, no masses and bowel sounds normal  MS: no musculoskeletal defects are noted and gait is age appropriate without ataxia  SKIN: no suspicious lesions or rashes  NEURO: Normal strength and tone, sensory exam grossly normal, mentation intact and speech normal  PSYCH: mentation appears normal and affect normal/bright        ASSESSMENT / PLAN:   Assessment and Plan:     (Z00.00) Encounter for Medicare annual wellness exam  (primary encounter diagnosis)  Comment: recd she go for mammogram and bone density today. Colonoscopy is up to date.  Shingrex discussed.  Plan: CBC with platelets            (E11.9) Type 2 diabetes mellitus without complication, without long-term current use of insulin (H)  Comment:   Plan: HEMOGLOBIN A1C, Albumin Random Urine         Quantitative with Creat Ratio, Comprehensive         metabolic panel            (I10) HTN (hypertension), benign  Comment: higher here which is typical. Runs normal outside of office. discontinue losartan as shouldn't be on 2 ARBs and call in some BP readings from home next week.  Plan: cont to monitor    (E78.5) Hyperlipidemia LDL goal <100  Comment:   Plan: Lipid Profile            (Z13.89) Screening for diabetic peripheral neuropathy  Comment:   Plan: FOOT EXAM  NO  "CHARGE [[id number]]            (Z78.0) Postmenopausal status  Comment:   Plan: DX Hip/Pelvis/Spine                  End of Life Planning:  Patient currently has an advanced directive: No.  I have verified the patient's ablity to prepare an advanced directive/make health care decisions.  Literature was provided to assist patient in preparing an advanced directive.    COUNSELING:  Reviewed preventive health counseling, as reflected in patient instructions    BP Readings from Last 1 Encounters:   03/11/19 149/71     Estimated body mass index is 33.57 kg/m  as calculated from the following:    Height as of this encounter: 1.676 m (5' 6\").    Weight as of this encounter: 94.3 kg (208 lb).           reports that  has never smoked. she has never used smokeless tobacco.      Appropriate preventive services were discussed with this patient, including applicable screening as appropriate for cardiovascular disease, diabetes, osteopenia/osteoporosis, and glaucoma.  As appropriate for age/gender, discussed screening for colorectal cancer, prostate cancer, breast cancer, and cervical cancer. Checklist reviewing preventive services available has been given to the patient.    Reviewed patients plan of care and provided an AVS. The Basic Care Plan (routine screening as documented in Health Maintenance) for Haritha meets the Care Plan requirement. This Care Plan has been established and reviewed with the Patient.    Counseling Resources:  ATP IV Guidelines  Pooled Cohorts Equation Calculator  Breast Cancer Risk Calculator  FRAX Risk Assessment  ICSI Preventive Guidelines  Dietary Guidelines for Americans, 2010  USDA's MyPlate  ASA Prophylaxis  Lung CA Screening    Amisha Plaza PA-C  [facility]  "

## 2019-03-11 NOTE — TELEPHONE ENCOUNTER
Told pt she should not be on 2 ARBs. Stopped losartan. Pt to monitor bP and call in readings next week.

## 2019-03-12 LAB
CREAT UR-MCNC: 104 MG/DL
MICROALBUMIN UR-MCNC: 16 MG/L
MICROALBUMIN/CREAT UR: 15.19 MG/G CR (ref 0–25)

## 2019-03-12 NOTE — RESULT ENCOUNTER NOTE
Pat,    As we discussed, your potassium is high which may be due to you being on both losartan and irbesartan.   You should be off of losartan now.  Repeat the blood work next week.   Otherwise your lab work looks fine.    Amisha Plaza PA-C

## 2019-03-13 NOTE — RESULT ENCOUNTER NOTE
Pat,    Your urine microalbumin was normal.  Let's see what happens with your potassium level before making any changes to the metoprolol.    Amisha Plaza PA-C

## 2019-03-26 DIAGNOSIS — E11.9 TYPE 2 DIABETES MELLITUS WITHOUT COMPLICATION, WITHOUT LONG-TERM CURRENT USE OF INSULIN (H): ICD-10-CM

## 2019-03-26 DIAGNOSIS — E87.5 HYPERKALEMIA: ICD-10-CM

## 2019-03-26 LAB
ANION GAP SERPL CALCULATED.3IONS-SCNC: 10 MMOL/L (ref 3–14)
BUN SERPL-MCNC: 21 MG/DL (ref 7–30)
CALCIUM SERPL-MCNC: 9.7 MG/DL (ref 8.5–10.1)
CHLORIDE SERPL-SCNC: 97 MMOL/L (ref 94–109)
CO2 SERPL-SCNC: 27 MMOL/L (ref 20–32)
CREAT SERPL-MCNC: 1.01 MG/DL (ref 0.52–1.04)
GFR SERPL CREATININE-BSD FRML MDRD: 56 ML/MIN/{1.73_M2}
GLUCOSE SERPL-MCNC: 169 MG/DL (ref 70–99)
HBA1C MFR BLD: 7.3 % (ref 0–5.6)
POTASSIUM SERPL-SCNC: 4.9 MMOL/L (ref 3.4–5.3)
SODIUM SERPL-SCNC: 134 MMOL/L (ref 133–144)

## 2019-03-26 PROCEDURE — 80048 BASIC METABOLIC PNL TOTAL CA: CPT | Performed by: PHYSICIAN ASSISTANT

## 2019-03-26 PROCEDURE — 83036 HEMOGLOBIN GLYCOSYLATED A1C: CPT | Performed by: PHYSICIAN ASSISTANT

## 2019-03-26 PROCEDURE — 36415 COLL VENOUS BLD VENIPUNCTURE: CPT | Performed by: PHYSICIAN ASSISTANT

## 2019-03-26 NOTE — RESULT ENCOUNTER NOTE
Pat,    Your potassium level is in normal range now.  Your kidney function is stable.   Your blood sugar was 169 with a hemoglobin A1c of 7.3%.  Work hard on your diet.   Follow up in 6months.    Amisha Plaza PA-C

## 2019-04-21 ENCOUNTER — HOSPITAL ENCOUNTER (EMERGENCY)
Facility: CLINIC | Age: 74
Discharge: HOME | End: 2019-04-21
Attending: EMERGENCY MEDICINE | Admitting: EMERGENCY MEDICINE
Payer: MEDICARE

## 2019-04-21 ENCOUNTER — APPOINTMENT (OUTPATIENT)
Dept: CT IMAGING | Facility: CLINIC | Age: 74
End: 2019-04-21
Attending: EMERGENCY MEDICINE
Payer: MEDICARE

## 2019-04-21 VITALS
SYSTOLIC BLOOD PRESSURE: 155 MMHG | HEIGHT: 66 IN | BODY MASS INDEX: 32.14 KG/M2 | RESPIRATION RATE: 18 BRPM | WEIGHT: 200 LBS | TEMPERATURE: 98.1 F | DIASTOLIC BLOOD PRESSURE: 83 MMHG | OXYGEN SATURATION: 97 % | HEART RATE: 67 BPM

## 2019-04-21 DIAGNOSIS — M54.41 ACUTE BILATERAL LOW BACK PAIN WITH RIGHT-SIDED SCIATICA: ICD-10-CM

## 2019-04-21 LAB
ALBUMIN UR-MCNC: 10 MG/DL
ANION GAP SERPL CALCULATED.3IONS-SCNC: 7 MMOL/L (ref 3–14)
APPEARANCE UR: CLEAR
BASOPHILS # BLD AUTO: 0 10E9/L (ref 0–0.2)
BASOPHILS NFR BLD AUTO: 0.6 %
BILIRUB UR QL STRIP: NEGATIVE
BUN SERPL-MCNC: 17 MG/DL (ref 7–30)
CALCIUM SERPL-MCNC: 9.2 MG/DL (ref 8.5–10.1)
CHLORIDE SERPL-SCNC: 100 MMOL/L (ref 94–109)
CO2 SERPL-SCNC: 26 MMOL/L (ref 20–32)
COLOR UR AUTO: YELLOW
CREAT SERPL-MCNC: 0.87 MG/DL (ref 0.52–1.04)
DIFFERENTIAL METHOD BLD: NORMAL
EOSINOPHIL # BLD AUTO: 0.1 10E9/L (ref 0–0.7)
EOSINOPHIL NFR BLD AUTO: 2.6 %
ERYTHROCYTE [DISTWIDTH] IN BLOOD BY AUTOMATED COUNT: 12.3 % (ref 10–15)
GFR SERPL CREATININE-BSD FRML MDRD: 66 ML/MIN/{1.73_M2}
GLUCOSE SERPL-MCNC: 214 MG/DL (ref 70–99)
GLUCOSE UR STRIP-MCNC: NEGATIVE MG/DL
HCT VFR BLD AUTO: 38 % (ref 35–47)
HGB BLD-MCNC: 13.4 G/DL (ref 11.7–15.7)
HGB UR QL STRIP: NEGATIVE
HYALINE CASTS #/AREA URNS LPF: 5 /LPF (ref 0–2)
IMM GRANULOCYTES # BLD: 0 10E9/L (ref 0–0.4)
IMM GRANULOCYTES NFR BLD: 0.6 %
KETONES UR STRIP-MCNC: NEGATIVE MG/DL
LEUKOCYTE ESTERASE UR QL STRIP: NEGATIVE
LYMPHOCYTES # BLD AUTO: 1.1 10E9/L (ref 0.8–5.3)
LYMPHOCYTES NFR BLD AUTO: 20.8 %
MCH RBC QN AUTO: 32 PG (ref 26.5–33)
MCHC RBC AUTO-ENTMCNC: 35.3 G/DL (ref 31.5–36.5)
MCV RBC AUTO: 91 FL (ref 78–100)
MONOCYTES # BLD AUTO: 0.5 10E9/L (ref 0–1.3)
MONOCYTES NFR BLD AUTO: 8.9 %
MUCOUS THREADS #/AREA URNS LPF: PRESENT /LPF
NEUTROPHILS # BLD AUTO: 3.6 10E9/L (ref 1.6–8.3)
NEUTROPHILS NFR BLD AUTO: 66.5 %
NITRATE UR QL: NEGATIVE
NRBC # BLD AUTO: 0 10*3/UL
NRBC BLD AUTO-RTO: 0 /100
PH UR STRIP: 7 PH (ref 5–7)
PLATELET # BLD AUTO: 209 10E9/L (ref 150–450)
POTASSIUM SERPL-SCNC: 4.4 MMOL/L (ref 3.4–5.3)
RBC # BLD AUTO: 4.19 10E12/L (ref 3.8–5.2)
RBC #/AREA URNS AUTO: 1 /HPF (ref 0–2)
SODIUM SERPL-SCNC: 133 MMOL/L (ref 133–144)
SOURCE: ABNORMAL
SP GR UR STRIP: 1.02 (ref 1–1.03)
SQUAMOUS #/AREA URNS AUTO: <1 /HPF (ref 0–1)
UROBILINOGEN UR STRIP-MCNC: 2 MG/DL (ref 0–2)
WBC # BLD AUTO: 5.4 10E9/L (ref 4–11)
WBC #/AREA URNS AUTO: 1 /HPF (ref 0–5)

## 2019-04-21 PROCEDURE — 74177 CT ABD & PELVIS W/CONTRAST: CPT

## 2019-04-21 PROCEDURE — 99285 EMERGENCY DEPT VISIT HI MDM: CPT | Mod: 25

## 2019-04-21 PROCEDURE — 96374 THER/PROPH/DIAG INJ IV PUSH: CPT | Mod: 59

## 2019-04-21 PROCEDURE — 80048 BASIC METABOLIC PNL TOTAL CA: CPT | Performed by: EMERGENCY MEDICINE

## 2019-04-21 PROCEDURE — 25000125 ZZHC RX 250: Performed by: EMERGENCY MEDICINE

## 2019-04-21 PROCEDURE — 81001 URINALYSIS AUTO W/SCOPE: CPT | Performed by: EMERGENCY MEDICINE

## 2019-04-21 PROCEDURE — 25000128 H RX IP 250 OP 636: Performed by: EMERGENCY MEDICINE

## 2019-04-21 PROCEDURE — 85025 COMPLETE CBC W/AUTO DIFF WBC: CPT | Performed by: EMERGENCY MEDICINE

## 2019-04-21 PROCEDURE — A9270 NON-COVERED ITEM OR SERVICE: HCPCS | Mod: GY | Performed by: EMERGENCY MEDICINE

## 2019-04-21 PROCEDURE — 72131 CT LUMBAR SPINE W/O DYE: CPT

## 2019-04-21 PROCEDURE — 25000132 ZZH RX MED GY IP 250 OP 250 PS 637: Mod: GY | Performed by: EMERGENCY MEDICINE

## 2019-04-21 RX ORDER — OXYCODONE HYDROCHLORIDE 5 MG/1
5 TABLET ORAL EVERY 6 HOURS PRN
Qty: 12 TABLET | Refills: 0 | Status: SHIPPED | OUTPATIENT
Start: 2019-04-21 | End: 2019-04-24

## 2019-04-21 RX ORDER — METHYLPREDNISOLONE 4 MG
TABLET, DOSE PACK ORAL
Qty: 21 TABLET | Refills: 0 | Status: SHIPPED | OUTPATIENT
Start: 2019-04-21 | End: 2019-05-08

## 2019-04-21 RX ORDER — LIDOCAINE 4 G/G
1 PATCH TOPICAL ONCE
Status: DISCONTINUED | OUTPATIENT
Start: 2019-04-21 | End: 2019-04-21 | Stop reason: HOSPADM

## 2019-04-21 RX ORDER — IOPAMIDOL 755 MG/ML
101 INJECTION, SOLUTION INTRAVASCULAR ONCE
Status: COMPLETED | OUTPATIENT
Start: 2019-04-21 | End: 2019-04-21

## 2019-04-21 RX ORDER — HYDROMORPHONE HYDROCHLORIDE 1 MG/ML
0.2 INJECTION, SOLUTION INTRAMUSCULAR; INTRAVENOUS; SUBCUTANEOUS ONCE
Status: COMPLETED | OUTPATIENT
Start: 2019-04-21 | End: 2019-04-21

## 2019-04-21 RX ORDER — ACETAMINOPHEN 325 MG/1
975 TABLET ORAL ONCE
Status: COMPLETED | OUTPATIENT
Start: 2019-04-21 | End: 2019-04-21

## 2019-04-21 RX ORDER — TRAMADOL HYDROCHLORIDE 50 MG/1
50 TABLET ORAL ONCE
Status: COMPLETED | OUTPATIENT
Start: 2019-04-21 | End: 2019-04-21

## 2019-04-21 RX ORDER — OXYCODONE HYDROCHLORIDE 5 MG/1
5 TABLET ORAL ONCE
Status: COMPLETED | OUTPATIENT
Start: 2019-04-21 | End: 2019-04-21

## 2019-04-21 RX ADMIN — Medication 0.2 MG: at 10:41

## 2019-04-21 RX ADMIN — SODIUM CHLORIDE 71 ML: 9 INJECTION, SOLUTION INTRAVENOUS at 11:16

## 2019-04-21 RX ADMIN — LIDOCAINE 1 PATCH: 560 PATCH PERCUTANEOUS; TOPICAL; TRANSDERMAL at 10:16

## 2019-04-21 RX ADMIN — OXYCODONE HYDROCHLORIDE 5 MG: 5 TABLET ORAL at 12:19

## 2019-04-21 RX ADMIN — TRAMADOL HYDROCHLORIDE 50 MG: 50 TABLET, COATED ORAL at 10:03

## 2019-04-21 RX ADMIN — ACETAMINOPHEN 975 MG: 325 TABLET, FILM COATED ORAL at 10:03

## 2019-04-21 RX ADMIN — IOPAMIDOL 101 ML: 755 INJECTION, SOLUTION INTRAVENOUS at 11:15

## 2019-04-21 ASSESSMENT — ENCOUNTER SYMPTOMS
DYSURIA: 0
HEMATURIA: 0
DIFFICULTY URINATING: 0
FREQUENCY: 0
NAUSEA: 1
VOMITING: 0
BACK PAIN: 1
MYALGIAS: 1
FEVER: 0
ABDOMINAL PAIN: 0

## 2019-04-21 ASSESSMENT — MIFFLIN-ST. JEOR: SCORE: 1433.94

## 2019-04-21 NOTE — ED PROVIDER NOTES
History     Chief Complaint:  Back Pain    HPI   Haritha Cat is a 74 year old female with a history of hypertension and type II diabetes mellitus who presents accompanied by her  for evaluation of back pain. The patient reports that she has been abnormally active for the last several days. Yesterday, the patient started to develop some minor lower back pain. This morning, the patient was getting dressed when she experienced the sudden onset of severe right lower back pain with radiation into her groin and down her right leg. Her pain is worse with movement, ambulation, and when sitting, and is only relieved somewhat when laying on her right side. Due to this new back pain, the patient came into the ED for evaluation. Currently in the ED, the patient rates her pain at a severity of 10/10, and she did take two tablets of Aleve this morning. She has had some nausea associated with this pain. Otherwise, she denies any recent fever, abdominal pain, vomiting, dysuria, hematuria, difficulty urinating, or urinary frequency. She has not had any recent falls or trauma to her back. She has never had similar pain.     Allergies:  Amoxicillin  Hydrochlorothiazide  Lisinopril  Lotrel      Medications:    aspirin 81 MG tablet  hydrochlorothiazide (HYDRODIURIL) 12.5 MG tablet  irbesartan (AVAPRO) 150 MG tablet  metFORMIN (GLUCOPHAGE) 500 MG tablet  metoprolol succinate ER (TOPROL-XL) 100 MG 24 hr tablet  naproxen sodium (ANAPROX) 220 MG tablet  psyllium (METAMUCIL SMOOTH TEXTURE) 63 % POWD    Past Medical History:    Abscess of abdominal cavity  Colon polyp  Diverticulosis  Diabetes mellitus, type II   Elevated LFTs   Hepatitis A   Herpes zoster  Hypertension   Osteopenia  GERD     Past Surgical History:    Cataract iol  Colonoscopy  Varicose vein surgery  [place] teeth extraction     Family History:    Hypertension - Mother and father   Diabetes - Father and sister   Thyroid disease - Mother  Parkinson's disease -  "Mother   Alcoholism - Sister     Social History:  Tobacco use:    Never smoker   Alcohol use:    Positive   Marital status:       Accompanied to ED by:        Review of Systems   Constitutional: Negative for fever.   Gastrointestinal: Positive for nausea. Negative for abdominal pain and vomiting.   Genitourinary: Negative for difficulty urinating, dysuria, frequency and hematuria.   Musculoskeletal: Positive for back pain (right lower) and myalgias (right leg and groin).   All other systems reviewed and are negative.      Physical Exam   First Vitals:  BP: 183/80  Pulse: 71  Temp: 98.1  F (36.7  C)  Resp: 14  Height: 167.6 cm (5' 6\")  Weight: 90.7 kg (200 lb)  SpO2: 97 %      Physical Exam  Constitutional: Alert, attentive, GCS 15  HENT:    Nose: Nose normal.    Mouth/Throat: Oropharynx is clear, mucous membranes are moist.  Eyes: EOM are normal, anicteric, conjugate gaze  CV: regular rate and rhythm; no murmurs  Chest: Effort normal and breath sounds clear without wheezing or rales, symmetric bilaterally   GI:  non tender. No distension. No guarding or rebound.    MSK: No LE edema, no tenderness to palpation of BLE.    Back: Right mid paraspinal muscle tenderness.   Neurological: Alert, attentive, moving all extremities equally.   Skin: Skin is warm and dry.    Emergency Department Course     Imaging:  Radiographic findings were communicated with the patient and family who voiced understanding of the findings.    CT Abdomen Pelvis w Contrast:  IMPRESSION:  1. No acute abnormality. No urolithiasis or hydronephrosis. Normal appendix.  2. Distal colonic diverticulosis without diverticulitis.  Preliminary report per radiology.     Lumbar spine CT w/o Contrast:  IMPRESSION:  1.  Moderate right L2-L3 neural foraminal stenosis.  2.  Moderate left L3-L4 neural foraminal stenosis.  3.  5.5 mm L4-L5 degenerative spondylolisthesis with associated moderate right lateral recess stenosis and mild central spinal " canal stenosis.  4.  Mild/moderate right L5-S1 neural foraminal stenosis.  Per radiology.     Laboratory:  CBC: WNL (WBC 5.4, HGB 13.4, )  BMP: Glucose 214 high, o/w WNL (Creatinine 0.87)   UA with Microscopic: Protein albumin 10, Mucous present, Hyaline casts 5 high, o/w Negative      Interventions:  1003 Tylenol 975 mg PO  1003 Tramadol 50 mg PO  1016 Lidocaine 1 patch Transdermal  1041 Dilaudid 0.2 mg IV   1219 Roxicodone 5 mg PO     Emergency Department Course:  Nursing notes and vitals reviewed.  0949: I performed an exam of the patient as documented above.     1200: I updated and reassessed the patient.     Findings and plan explained to the Patient and spouse. Patient discharged home with instructions regarding supportive care, medications, and reasons to return. The importance of close follow-up was reviewed. The patient was prescribed Roxicodone and a Medrol Dosepak.      Impression & Plan      Medical Decision Makin-year-old female past mental history significant for diabetes, GERD, hypertension who presents for evaluation of 5 right lower back pain without report of trauma that radiates into the groin and upper lateral leg.  Vital signs notable for elevated blood pressure on arrival, otherwise within normal limits.  On exam, patient does have some point tenderness in the right lumbar paraspinal muscles causing radiation down into the leg suggestive of possible radicular symptoms.  However given radiation in her groin, concern was also for possible ureteral stone with lower suspicion for UTI.  CT imaging of the abdomen and pelvis shows no evidence of hollow viscus ureteral stone.  She does have diverticulosis without diverticulitis.  CT imaging of her L-spine shows moderate right L2-L3 neuroforaminal stenosis and thesis on the right evidence of fracture.  Given she does not have any red flag symptoms including fevers, weakness or suggestion of cauda equina, no indication for emergent MRI  imaging at this time.  Will recommend a course of pain medication, Medrol Dosepak and close follow-up with PCP for referral to physical therapy for likely sciatic symptoms at this time apparently from lumbar disc disease versus foraminal stenosis.  Patient expressed understanding spine agrees to return should she have weakness, numbness or urinary symptoms.  Patient discharged home.    Diagnosis:    ICD-10-CM   1. Acute bilateral low back pain with right-sided sciatica M54.41       Disposition:  Discharged to home with Roxicodone and a Medrol Dosepak.     Discharge Medications:  methylPREDNISolone 4 MG tablet therapy pack  Commonly known as:  MEDROL DOSEPAK  Follow Package Directions     oxyCODONE 5 MG tablet  Commonly known as:  ROXICODONE  5 mg, Oral, EVERY 6 HOURS PRN          Wolfgang Adler MD   [employer]  5:56 PM 04/21/19     Joe FOOTE, am serving as a scribe at 9:49 AM on 4/21/2019 to document services personally performed by Dr. Adler, based on my observations and the provider's statements to me.     EMERGENCY DEPARTMENT       Wolfgang Adler MD  04/21/19 4057

## 2019-04-21 NOTE — DISCHARGE INSTRUCTIONS
Should return the emergency department if you have worsening pain not relieved with Tylenol, short course of Aleve or ibuprofen and oxycodone or if you have weakness of the leg or develop trouble pooping or peeing.  You should take the steroids as prescribed as this can help with inflammation which can improve the pain.  You should follow-up with your primary care doctor to revisit your pain control and consideration of outpatient MRI and referral to physical therapy.    Discharge Instructions  Back Pain  You were seen today for back pain. Back pain can have many causes, but most will get better without surgery or other specific treatment. Sometimes there is a herniated (?slipped?) disc. We do not usually do MRI scans to look for these right away, since most herniated discs will get better on their own with time.  Today, we did not find any evidence that your back pain was caused by a serious condition. However, sometimes symptoms develop over time and cannot be found during an emergency visit, so it is very important that you follow up with your primary provider.  Generally, every Emergency Department visit should have a follow-up clinic visit with either a primary or a specialty clinic/provider. Please follow-up as instructed by your emergency provider today.    Return to the Emergency Department if:  You develop a fever with your back pain.   You have weakness or change in sensation in one or both legs.  You lose control of your bowels or bladder, or cannot empty your bladder (cannot pee).  Your pain gets much worse.     Follow-up with your provider:  Unless your pain has completely gone away, please make an appointment with your provider within one week. Most of the routine care for back pain is available in a clinic and not the Emergency Department. You may need further management of your back pain, such as more pain medication, imaging such as an X-ray or MRI, or physical therapy.    What can I do to help  myself?  Remain Active -- People are often afraid that they will hurt their back further or delay recovery by remaining active, but this is one of the best things you can do for your back. In fact, staying in bed for a long time to rest is not recommended. Studies have shown that people with low back pain recover faster when they remain active. Movement helps to bring blood flow to the muscles and relieve muscle spasms as well as preventing loss of muscle strength.  Heat -- Using a heating pad can help with low back pain during the first few weeks. Do not sleep with a heating pad, as you can be burned.   Pain medications - You may take a pain medication such as Tylenol  (acetaminophen), Advil , Motrin  (ibuprofen) or Aleve  (naproxen).  If you were given a prescription for medicine here today, be sure to read all of the information (including the package insert) that comes with your prescription.  This will include important information about the medicine, its side effects, and any warnings that you need to know about.  The pharmacist who fills the prescription can provide more information and answer questions you may have about the medicine.  If you have questions or concerns that the pharmacist cannot address, please call or return to the Emergency Department.   Remember that you can always come back to the Emergency Department if you are not able to see your regular provider in the amount of time listed above, if you get any new symptoms, or if there is anything that worries you.

## 2019-04-21 NOTE — ED AVS SNAPSHOT
Emergency Department  [address] [place] [address]  Phone:  [phone]  Fax:  [phone]                                    Haritha Cat   MRN: [id number]    Department:   Emergency Department   Date of Visit:  4/21/2019           After Visit Summary Signature Page    I have received my discharge instructions, and my questions have been answered. I have discussed any challenges I see with this plan with the nurse or doctor.    ..........................................................................................................................................  Patient/Patient Representative Signature      ..........................................................................................................................................  Patient Representative Print Name and Relationship to Patient    ..................................................               ................................................  Date                                   Time    ..........................................................................................................................................  Reviewed by Signature/Title    ...................................................              ..............................................  Date                                               Time          22EPIC Rev 08/18

## 2019-04-22 ENCOUNTER — TELEPHONE (OUTPATIENT)
Dept: FAMILY MEDICINE | Facility: CLINIC | Age: 74
End: 2019-04-22

## 2019-04-22 NOTE — TELEPHONE ENCOUNTER
Since she was just in the ED yesterday and started the oral steroids, we need to give this a little more time.  Schedule her to see me the end of this week so we can see how she is doing.

## 2019-04-22 NOTE — TELEPHONE ENCOUNTER
Reason for Call:  Other     Detailed comments: Pt was Dx with bulging disks on 4.21.19 and was prescribed oxyCODONE (ROXICODONE) & methylPREDNISolone (MEDROL DOSEPAK). Pt is reporting that the Oxy is not cutting her pain but just making her feel tired. She can lay down but is unable to stand without pain/feeling tingly in her thigh    Pt is wondering if there is something else she is able to do/a different Rx to try    Phone Number Patient can be reached at: Cell number on file:    Telephone Information:   Mobile [phone]     Best Time: any    Can we leave a detailed message on this number? YES    Call taken on 4/22/2019 at 1:06 PM by Patricia Walker

## 2019-04-22 NOTE — TELEPHONE ENCOUNTER
Returned Pt's call:     When pt takes oxycodone, she feels she can walk around, but does end up falling asleep.     We discussed message below, which she agrees.     ER follow-up scheduled for Thursday    R: Continue Medrol Dosepack and Oxycodone (as needed)- make sure to drink plenty of fluids and having BM's.     If symptoms worsen, with weakness or urinary symptoms, unable to walk- return to ER.     Pt has a ride to clinic on Thursday, advised against driving while taking Oxycodone.     Thanks

## 2019-04-22 NOTE — TELEPHONE ENCOUNTER
Taken from ER progress note on 4/21/19      Will recommend a course of pain medication, Medrol Dosepak and close follow-up with PCP for referral to physical therapy for likely sciatic symptoms at this time apparently from lumbar disc disease versus foraminal stenosis.  Patient expressed understanding spine agrees to return should she have weakness, numbness or urinary symptoms.  Patient discharged home.    Will route to Amisha Plaza for advisement

## 2019-04-24 ENCOUNTER — TELEPHONE (OUTPATIENT)
Dept: FAMILY MEDICINE | Facility: CLINIC | Age: 74
End: 2019-04-24

## 2019-04-24 DIAGNOSIS — M54.5 ACUTE LOW BACK PAIN, UNSPECIFIED BACK PAIN LATERALITY, WITH SCIATICA PRESENCE UNSPECIFIED: Primary | ICD-10-CM

## 2019-04-24 RX ORDER — OXYCODONE HYDROCHLORIDE 5 MG/1
5 TABLET ORAL EVERY 6 HOURS PRN
Qty: 12 TABLET | Refills: 0 | Status: SHIPPED | OUTPATIENT
Start: 2019-04-24 | End: 2019-04-25

## 2019-04-24 NOTE — TELEPHONE ENCOUNTER
Called patient:     OK when lying down. Walking and sitting cause pain     Pt's spouse will  prescription, will bring photo ID.     Gladys BARRON RN

## 2019-04-24 NOTE — TELEPHONE ENCOUNTER
Amisha,    In pt's message, it states that she is out of the oxycodone now. She is wondering what to do until she sees you tomorrow?    Will you prescribe more or ask her to wait until the appointment?    Please review and advise

## 2019-04-24 NOTE — TELEPHONE ENCOUNTER
Reason for call:  Patient reporting a symptom    Symptom or request: Pain From Cyatic nerve and faulty disc pain  Back and leg pain   Got Pain medication from ED and she is out and has an OV tomorrow but she wants to know what to do between now and then     Duration (how long have symptoms been present): since 4/21    Have you been treated for this before? Yes    Additional comments: Treated in Emergency Department    Phone Number patient can be reached at:  Home number on file [phone] (home)    Best Time:  anytime    Can we leave a detailed message on this number:  YES    Call taken on 4/24/2019 at 8:55 AM by Marie Napoles

## 2019-04-25 ENCOUNTER — MEDICAL CORRESPONDENCE (OUTPATIENT)
Dept: HEALTH INFORMATION MANAGEMENT | Facility: CLINIC | Age: 74
End: 2019-04-25

## 2019-04-25 ENCOUNTER — OFFICE VISIT (OUTPATIENT)
Dept: FAMILY MEDICINE | Facility: CLINIC | Age: 74
End: 2019-04-25
Payer: MEDICARE

## 2019-04-25 VITALS
BODY MASS INDEX: 31.5 KG/M2 | WEIGHT: 196 LBS | SYSTOLIC BLOOD PRESSURE: 179 MMHG | OXYGEN SATURATION: 98 % | TEMPERATURE: 97.9 F | DIASTOLIC BLOOD PRESSURE: 81 MMHG | HEIGHT: 66 IN | HEART RATE: 62 BPM

## 2019-04-25 DIAGNOSIS — M48.062 SPINAL STENOSIS OF LUMBAR REGION WITH NEUROGENIC CLAUDICATION: Primary | ICD-10-CM

## 2019-04-25 PROCEDURE — 99214 OFFICE O/P EST MOD 30 MIN: CPT | Performed by: PHYSICIAN ASSISTANT

## 2019-04-25 RX ORDER — OXYCODONE AND ACETAMINOPHEN 7.5; 325 MG/1; MG/1
1 TABLET ORAL EVERY 6 HOURS PRN
Qty: 30 TABLET | Refills: 0 | Status: SHIPPED | OUTPATIENT
Start: 2019-04-25 | End: 2019-05-03

## 2019-04-25 ASSESSMENT — MIFFLIN-ST. JEOR: SCORE: 1415.8

## 2019-04-25 NOTE — PROGRESS NOTES
SUBJECTIVE:   Haritha Cat is a 74 year old female who presents to clinic today for the following   health issues:    ED/UC Followup:    Facility:   ED  Date of visit: 4-  Reason for visit: Acute bilateral low back pain     Current Status: no significant improvement, she has been in bed since pain started as any walking, sitting makes pain worse.  She was prescribed a medrol dose pack and oxycodone in the ED       She had sudden onset of pain starting 4/21/19   Pain located in R low back with radiation to the R thigh  She has numbness and tingling R lateral thigh to the knee  No foot numbness  No pain to the foot  Rates pain 4-5/10 when supine but worse with sitting and walking or rolling over in bed.  She was more active in the few days before that as she was picking up sticks in the yard, but wasn't having pain at that point.  She did go to ER and CT lumbar spine showing:                                                   IMPRESSION:  1.  Moderate right L2-L3 neural foraminal stenosis.  2.  Moderate left L3-L4 neural foraminal stenosis.  3.  5.5 mm L4-L5 degenerative spondylolisthesis with associated  moderate right lateral recess stenosis and mild central spinal canal  stenosis.  4.  Mild/moderate right L5-S1 neural foraminal stenosis.    Past Medical History:   Diagnosis Date     Abscess of abdominal cavity (H) 11/02    cecal perf, prob from diverticulitis     Allergic rhinitis      Colon polyp 2015    colonoscopy due 5 years.     Diverticulosis      DM type 2 (diabetes mellitus, type 2) (H)      Elevated LFTs     fatty liver dz     GERD (gastroesophageal reflux disease)      Hepatitis A      Herpes zoster 11/28/11     HTN (hypertension), benign      Osteopenia     DXA 2008     Osteopenia 2014     Past Surgical History:   Procedure Laterality Date     CATARACT IOL, RT/LT       COLONOSCOPY N/A 5/15/2018    Procedure: COLONOSCOPY;  colonoscopy;  Surgeon: Lucero Acharya MD;  Location:  GI  "    varicose vein surgery      Dr. Bowen     wisdom teeth  age 27     Social History     Tobacco Use     Smoking status: Never Smoker     Smokeless tobacco: Never Used   Substance Use Topics     Alcohol use: Yes     Alcohol/week: 0.0 oz     Comment: 0-3 drinks per week     Current Outpatient Medications   Medication Sig Dispense Refill     irbesartan (AVAPRO) 150 MG tablet TAKE 1 TABLET BY MOUTH EVERY DAY 90 tablet 0     metFORMIN (GLUCOPHAGE) 500 MG tablet TAKE 2 TABLETS BY MOUTH TWICE A DAY WITH MEALS 360 tablet 1     methylPREDNISolone (MEDROL DOSEPAK) 4 MG tablet therapy pack Follow Package Directions 21 tablet 0     metoprolol succinate ER (TOPROL-XL) 100 MG 24 hr tablet TAKE 1.5 TABLETS BY MOUTH DAILY 135 tablet 1     oxyCODONE (ROXICODONE) 5 MG tablet Take 1 tablet (5 mg) by mouth every 6 hours as needed for pain 12 tablet 0     STATIN NOT PRESCRIBED, INTENTIONAL, Statin not prescribed intentionally due to Other LDL at goal; pt doesn't want to add another medication (This option does not exclude patient from measure)  0     aspirin 81 MG tablet Take  by mouth daily.       hydrochlorothiazide (HYDRODIURIL) 12.5 MG tablet TAKE 1 TABLET (12.5 MG) BY MOUTH DAILY (Patient not taking: Reported on 4/25/2019) 90 tablet 3     naproxen sodium (ANAPROX) 220 MG tablet Take 1 tablet by mouth daily. 180 tablet 3     psyllium (METAMUCIL SMOOTH TEXTURE) 63 % POWD Take 1 teaspoonful by mouth daily. Mix in 8 ounces of water       Allergies   Allergen Reactions     Amoxicillin Hives     Hydrochlorothiazide      hyponatremia     Lisinopril      cough     Lotrel Hives     FAMILY HISTORY NOTED AND REVIEWED    PHYSICAL EXAM:    /81 (BP Location: Right arm, Cuff Size: Adult Large)   Pulse 62   Temp 97.9  F (36.6  C) (Oral)   Ht 1.676 m (5' 6\")   Wt 88.9 kg (196 lb)   SpO2 98%   BMI 31.64 kg/m      Patient appears non toxic  She comes in slumped in wheelchair crying out in pain  Once she supine on the exam table pain " subsides  +SLR on R, neg on L  No foot drop  +numbness R lateral thigh on sensory exam    Assessment and Plan:     (M48.062) Spinal stenosis of lumbar region with neurogenic claudication  (primary encounter diagnosis)  Comment: she has 2 days of her medrol dose pack left and should cont that as prescribed. Switched oxycodone to percocet as below, one Q6hr prn. Senna for constipation. FARZAD scheduled for tomorrow.  Plan: IR Caudal Epidural Injection Single,         oxyCODONE-acetaminophen (PERCOCET) 7.5-325 MG         per tablet              Amisha Plaza PA-C

## 2019-04-26 ENCOUNTER — TRANSFERRED RECORDS (OUTPATIENT)
Dept: HEALTH INFORMATION MANAGEMENT | Facility: CLINIC | Age: 74
End: 2019-04-26

## 2019-04-28 ENCOUNTER — TELEPHONE (OUTPATIENT)
Dept: FAMILY MEDICINE | Facility: CLINIC | Age: 74
End: 2019-04-28

## 2019-04-28 DIAGNOSIS — M54.5 ACUTE LOW BACK PAIN, UNSPECIFIED BACK PAIN LATERALITY, WITH SCIATICA PRESENCE UNSPECIFIED: Primary | ICD-10-CM

## 2019-04-28 NOTE — TELEPHONE ENCOUNTER
Pt calling and says she has had some relief since injection in clinic on 4/25/19. But still very uncomfortable. Is best when laying flat on her back. Wonders if should have a referral to [facility]. Please call patient to let her know your recommendations. Refused RN triage at this time.     Christel Marrufo RN  [facility]

## 2019-04-28 NOTE — LETTER
[address] [address]  Phone: [phone]    May 1, 2019        Haritha Cat  [address] [place] [address]          To whom it may concern:    RE: Haritha Cat    This patient had acute onset of severe low back pain starting 4/20/19. She was evaluated in the emergency room on 4/21/19 and at our office on 4/25/19. She is advised not to travel as cannot sit for more than a few minutes at a time.  Please allow her to change her flight to a later date once her symptoms resolve.    Please contact me for questions or concerns.      Sincerely,        Amisha Plaza PA-C  [facility]

## 2019-04-29 NOTE — TELEPHONE ENCOUNTER
Sure, I can put in the referral. She will likely start with Juan joshua who is a PA in the spine clinic.  Number is [phone]

## 2019-04-30 NOTE — TELEPHONE ENCOUNTER
Pt calling to follow up on this.  She has an appointment with [facility] on 5/1.  She is supposed to fly on Sunday to [place], she reports she can not fly.  The airlines will be calling to confirm that Ct choudhary can not fly on Sunday.  She would like someone to call her when Amisha get this message. [phone]  Ok to leave a message

## 2019-04-30 NOTE — TELEPHONE ENCOUNTER
Amisha Plaza,   Please see below  Pt cannot fly d/t back pain  Wants letter stating that since she had to cancel upcoming flight  Wants ticket extended to later date per below  Are you ok with doing letter?  Please advise  Thanks,  Ibeth SCHWARZ RN

## 2019-04-30 NOTE — TELEPHONE ENCOUNTER
"Pt states that she cannot fly with [employer] and had to cancel her flight for 5.6.19. Airline needs a report from  To state that Pt is medically unable to fly - and they will extend her flight until August. Pt stated \"[employer] will contact the PCP\" and did not have any contact information  "

## 2019-04-30 NOTE — TELEPHONE ENCOUNTER
Reason for Call:  Other      Detailed comments:  Pt called requesting to speak with Nurse regarding back pain. Please call to discuss.     Phone Number Patient can be reached at: Home number on file [phone] (home)    Best Time: Anytime     Can we leave a detailed message on this number? YES    Call taken on 4/30/2019 at 12:38 PM by Leydi Wen

## 2019-05-02 ENCOUNTER — TRANSFERRED RECORDS (OUTPATIENT)
Dept: HEALTH INFORMATION MANAGEMENT | Facility: CLINIC | Age: 74
End: 2019-05-02

## 2019-05-02 ENCOUNTER — TELEPHONE (OUTPATIENT)
Dept: FAMILY MEDICINE | Facility: CLINIC | Age: 74
End: 2019-05-02

## 2019-05-02 DIAGNOSIS — M48.062 SPINAL STENOSIS OF LUMBAR REGION WITH NEUROGENIC CLAUDICATION: ICD-10-CM

## 2019-05-02 NOTE — TELEPHONE ENCOUNTER
Reason for Call:  Medication or medication refill:    Do you use a [facility]?  Name of the pharmacy and phone number for the current request:       [facility], [place] - [address] AT [facility] [address]      Name of the medication requested: oxyCODONE-acetaminophen (PERCOCET) 7.5-325 MG       Other request:     Can we leave a detailed message on this number? YES    Phone number patient can be reached at: Home number on file [phone] (home)    Best Time: any    Call taken on 5/2/2019 at 6:35 PM by Mariia De La Torre

## 2019-05-03 RX ORDER — OXYCODONE AND ACETAMINOPHEN 7.5; 325 MG/1; MG/1
1 TABLET ORAL EVERY 6 HOURS PRN
Qty: 30 TABLET | Refills: 0 | Status: SHIPPED | OUTPATIENT
Start: 2019-05-03 | End: 2019-05-08

## 2019-05-03 NOTE — TELEPHONE ENCOUNTER
Could not locate Rx on fifth floor printer or in outgoing Rx bin - routing to [facility] to assist with locating Rx

## 2019-05-03 NOTE — TELEPHONE ENCOUNTER
Controlled Substance Refill Request for Oxycodone    Problem List Complete:  No     PROVIDER TO CONSIDER COMPLETION OF PROBLEM LIST AND OVERVIEW/CONTROLLED SUBSTANCE AGREEMENT    Last Written Prescription Date:  04/25/19  Last Fill Quantity: 30,   # refills: 0    THE MOST RECENT OFFICE VISIT MUST BE WITHIN THE PAST 3 MONTHS. AT LEAST ONE FACE TO FACE VISIT MUST OCCUR EVERY 6 MONTHS. ADDITIONAL VISITS CAN BE VIRTUAL.  (THIS STATEMENT SHOULD BE DELETED.)    Last Office Visit with [facility] primary care provider: 04/25/19    Future Office visit:     Controlled substance agreement:   Encounter-Level CSA:    There are no encounter-level csa.     Patient-Level CSA:    There are no patient-level csa.         Last Urine Drug Screen: No results found for: CDAUT, No results found for: COMDAT, No results found for: THC13, PCP13, COC13, MAMP13, OPI13, AMP13, BZO13, TCA13, MTD13, BAR13, OXY13, PPX13, BUP13     Processing:  Patient will  in clinic     [email or web address]       checked in past 3 months?  No, route to MEMO Armenta MA

## 2019-05-03 NOTE — TELEPHONE ENCOUNTER
Pt notified Rx ready for  on 1st floor.    Printed on 1st floor in provider office  (scroll to bottom of Rx to see where printed =   Destination Workstation: [facility] BACK NURSE PRINTER     Marietta POON MA

## 2019-05-03 NOTE — TELEPHONE ENCOUNTER
Patient just called said she is in pain and needs to please  the Rx Oxycodone today.    Please call pt when this is ready

## 2019-05-07 DIAGNOSIS — M48.062 SPINAL STENOSIS OF LUMBAR REGION WITH NEUROGENIC CLAUDICATION: ICD-10-CM

## 2019-05-07 NOTE — TELEPHONE ENCOUNTER
Controlled Substance Refill Request for Oxycodone  Problem List Complete:  No     PROVIDER TO CONSIDER COMPLETION OF PROBLEM LIST AND OVERVIEW/CONTROLLED SUBSTANCE AGREEMENT    Last Written Prescription Date:  05/03/19  Last Fill Quantity: 30,   # refills: 0    THE MOST RECENT OFFICE VISIT MUST BE WITHIN THE PAST 3 MONTHS. AT LEAST ONE FACE TO FACE VISIT MUST OCCUR EVERY 6 MONTHS. ADDITIONAL VISITS CAN BE VIRTUAL.  (THIS STATEMENT SHOULD BE DELETED.)    Last Office Visit with [facility] primary care provider: 04/25/19    Future Office visit:     Controlled substance agreement:   Encounter-Level CSA:    There are no encounter-level csa.     Patient-Level CSA:    There are no patient-level csa.         Last Urine Drug Screen: No results found for: CDAUT, No results found for: COMDAT, No results found for: THC13, PCP13, COC13, MAMP13, OPI13, AMP13, BZO13, TCA13, MTD13, BAR13, OXY13, PPX13, BUP13     Processing:  Patient will  in clinic     [email or web address]       checked in past 3 months?  No, route to MEMO Armenta MA

## 2019-05-07 NOTE — TELEPHONE ENCOUNTER
Reason for Call:  Medication or medication refill:    Do you use a [facility]?  Name of the pharmacy and phone number for the current request:         [facility] [address] [facility], [place] - [address] AT [facility] [address]      Name of the medication requested: Oxycodone-Acteam.    Other request: call once Hard Copy is ready for     Can we leave a detailed message on this number? YES    Phone number patient can be reached at: Home number on file [phone] (home)    Best Time: Once RX is ready to      Call taken on 5/7/2019 at 12:45 PM by Chencho Sanches

## 2019-05-08 DIAGNOSIS — I10 HTN (HYPERTENSION), BENIGN: ICD-10-CM

## 2019-05-08 RX ORDER — OXYCODONE AND ACETAMINOPHEN 7.5; 325 MG/1; MG/1
1 TABLET ORAL EVERY 6 HOURS PRN
Qty: 30 TABLET | Refills: 0 | OUTPATIENT
Start: 2019-05-08

## 2019-05-08 NOTE — TELEPHONE ENCOUNTER
Pt calling to follow up on this.  She wonders if Amisha would prescribe a non-opioid medication.  Her pain has gone down, and she would like to go down a notch in medication

## 2019-05-08 NOTE — TELEPHONE ENCOUNTER
"irbesartan (AVAPRO) 150 MG tablet 90 tablet 0 2/12/2019     Last Written Prescription Date:  2/12/19  Last Fill Quantity: 90,  # refills: 0   Last office visit: 4/25/2019 with prescribing provider:  Mela   Future Office Visit:  None    Requested Prescriptions   Pending Prescriptions Disp Refills     irbesartan (AVAPRO) 150 MG tablet [Pharmacy Med Name: IRBESARTAN 150 MG TABLET] 90 tablet 0     Sig: TAKE 1 TABLET BY MOUTH EVERY DAY       Angiotensin-II Receptors Failed - 5/8/2019  1:10 AM        Failed - Blood pressure under 140/90 in past 12 months     BP Readings from Last 3 Encounters:   04/25/19 179/81   04/21/19 155/83   03/11/19 149/71                 Passed - Recent (12 mo) or future (30 days) visit within the authorizing provider's specialty     Patient had office visit in the last 12 months or has a visit in the next 30 days with authorizing provider or within the authorizing provider's specialty.  See \"Patient Info\" tab in inbasket, or \"Choose Columns\" in Meds & Orders section of the refill encounter.              Passed - Medication is active on med list        Passed - Patient is age 18 or older        Passed - No active pregnancy on record        Passed - Normal serum creatinine on file in past 12 months     Recent Labs   Lab Test 04/21/19  0957   CR 0.87             Passed - Normal serum potassium on file in past 12 months     Recent Labs   Lab Test 04/21/19  0957   POTASSIUM 4.4                    Passed - No positive pregnancy test in past 12 months        [facility] Follow-up to PHQ 7/11/2017   PHQ-9 9. Suicide Ideation past 2 weeks Not at all         "

## 2019-05-09 RX ORDER — IRBESARTAN 150 MG/1
TABLET ORAL
Qty: 90 TABLET | Refills: 3 | Status: SHIPPED | OUTPATIENT
Start: 2019-05-09 | End: 2020-03-12

## 2019-05-09 NOTE — TELEPHONE ENCOUNTER
To PCP:    Pt had actually called back yesterday asking for you to NOT RX Percocet. (she wishes to be off of it). She agreed fully with acetaminophen/aleve    We discussed a max of 3000mg/day of acetaminophen and how she can alternate with the 12 hour Aleve tabs (try to limit for stomach risks- take with food)    Discussed DVT and pneumonia prevention as she states she still spends a lot of time in bed. Encouraged ambulation, ankle pumping in bed and deep breathing      Pt plans to make f/u appt when she is able to sit up for a longer period of time     Pt wishes to avoid surgery at all costs.     [facility].         Gladys BARRON RN

## 2019-05-09 NOTE — TELEPHONE ENCOUNTER
Routing refill request to provider for review/approval because:  Last BP high     Annabella WILSON RN

## 2019-05-15 ENCOUNTER — TELEPHONE (OUTPATIENT)
Dept: FAMILY MEDICINE | Facility: CLINIC | Age: 74
End: 2019-05-15

## 2019-05-15 DIAGNOSIS — M54.5 LOW BACK PAIN, UNSPECIFIED BACK PAIN LATERALITY, UNSPECIFIED CHRONICITY, WITH SCIATICA PRESENCE UNSPECIFIED: Primary | ICD-10-CM

## 2019-05-15 NOTE — TELEPHONE ENCOUNTER
Pt still dealing with ongoing back pain, etc asking for a medication that's not as intense as Percocet (previously rxed), but stronger than Tylenol.      Pt states the PT recommended Tramadol, but  but only wanted tp check with Amisha Plaza on this and if safe, ok for her, and ask Amisha for rx.    [facility] [address]     Please advise,  Jennifer Joy RN

## 2019-05-15 NOTE — TELEPHONE ENCOUNTER
Reason for call:  Patient reporting a symptom    Symptom or request: herniated disc, severe pain    Duration (how long have symptoms been present): ongoing    Have you been treated for this before? Yes    Additional comments: Pt requesting call back from RN regarding having so much pain in Rt leg.  Pt unable to sleep at night because of pain.  Tylenol and Aleeve not helping at all.  Pt is requesting if there is something stronger that could be used for sleeping to help with pain    Phone Number patient can be reached at:  Home number on file [phone] (home)    Best Time:  any    Can we leave a detailed message on this number:  YES    Call taken on 5/15/2019 at 2:56 PM by Sandra Braswell

## 2019-05-16 RX ORDER — TRAMADOL HYDROCHLORIDE 50 MG/1
50 TABLET ORAL EVERY 6 HOURS PRN
Qty: 30 TABLET | Refills: 0 | Status: SHIPPED | OUTPATIENT
Start: 2019-05-16 | End: 2019-06-04

## 2019-05-16 NOTE — TELEPHONE ENCOUNTER
I signed for some tramadol and we can fax that. F/u 1-2 weeks if continues to be very uncomfortable.

## 2019-05-16 NOTE — TELEPHONE ENCOUNTER
Patient was notified with information noted by provider and agreed with plan.  [facility] [place]  Patient will schedule appointment with PCP.   JHON Crawford, RN  [employer] [facility]

## 2019-06-04 ENCOUNTER — OFFICE VISIT (OUTPATIENT)
Dept: FAMILY MEDICINE | Facility: CLINIC | Age: 74
End: 2019-06-04
Payer: MEDICARE

## 2019-06-04 VITALS
WEIGHT: 190 LBS | HEART RATE: 62 BPM | SYSTOLIC BLOOD PRESSURE: 138 MMHG | DIASTOLIC BLOOD PRESSURE: 76 MMHG | OXYGEN SATURATION: 100 % | BODY MASS INDEX: 30.67 KG/M2 | TEMPERATURE: 97.9 F

## 2019-06-04 DIAGNOSIS — E11.9 TYPE 2 DIABETES MELLITUS WITHOUT COMPLICATION, WITHOUT LONG-TERM CURRENT USE OF INSULIN (H): ICD-10-CM

## 2019-06-04 DIAGNOSIS — M51.26 LUMBAR DISC HERNIATION: Primary | ICD-10-CM

## 2019-06-04 PROCEDURE — 99213 OFFICE O/P EST LOW 20 MIN: CPT | Performed by: PHYSICIAN ASSISTANT

## 2019-06-04 ASSESSMENT — PATIENT HEALTH QUESTIONNAIRE - PHQ9: SUM OF ALL RESPONSES TO PHQ QUESTIONS 1-9: 3

## 2019-06-04 NOTE — PROGRESS NOTES
HPI: Pat is a 73yo pt here for f/u lumbar disc herniation which started acutely without injury 4/21/19  She continues to have numbness R ant thigh  She is loving PT and doing her core strengthening exercises  She did meet with spine surgeon Dr. Peng who told her she may need surgery if she doesn't improve.  She did have an epidural steroid inj x 2  She was in bed for 2.5 weeks when the pain was worse.  She lost 10lbs since pain started due to pain and no longer drinking wine every night.  She is taking aleve twice per day for pain  She is sleeping fine if lays on L side  She feels better walking than sitting    DM: she doesn't monitor her blood sugars    Lab Results   Component Value Date    A1C 7.3 03/26/2019    A1C 7.0 03/11/2019    A1C 7.1 09/13/2018    A1C 7.3 11/15/2017    A1C 7.2 07/11/2017       HTN: her BP was on the low side at home     Past Medical History:   Diagnosis Date     Abscess of abdominal cavity (H) 11/02    cecal perf, prob from diverticulitis     Allergic rhinitis      Colon polyp 2015    colonoscopy due 5 years.     Diverticulosis      DM type 2 (diabetes mellitus, type 2) (H)      Elevated LFTs     fatty liver dz     GERD (gastroesophageal reflux disease)      Hepatitis A      Herpes zoster 11/28/11     HTN (hypertension), benign      Osteopenia     DXA 2008     Osteopenia 2014     Past Surgical History:   Procedure Laterality Date     CATARACT IOL, RT/LT       COLONOSCOPY N/A 5/15/2018    Procedure: COLONOSCOPY;  colonoscopy;  Surgeon: Lucero Acharya MD;  Location:  GI     varicose vein surgery      Dr. Bowen     wisdom teeth  age 27     Social History     Tobacco Use     Smoking status: Never Smoker     Smokeless tobacco: Never Used   Substance Use Topics     Alcohol use: Yes     Alcohol/week: 0.0 oz     Comment: 0-3 drinks per week     Current Outpatient Medications   Medication Sig Dispense Refill     aspirin 81 MG tablet Take  by mouth daily.       hydrochlorothiazide  (HYDRODIURIL) 12.5 MG tablet TAKE 1 TABLET (12.5 MG) BY MOUTH DAILY 90 tablet 3     irbesartan (AVAPRO) 150 MG tablet TAKE 1 TABLET BY MOUTH EVERY DAY 90 tablet 3     metFORMIN (GLUCOPHAGE) 500 MG tablet TAKE 2 TABLETS BY MOUTH TWICE A DAY WITH MEALS 360 tablet 1     metoprolol succinate ER (TOPROL-XL) 100 MG 24 hr tablet TAKE 1.5 TABLETS BY MOUTH DAILY 135 tablet 1     naproxen sodium (ANAPROX) 220 MG tablet Take 1 tablet by mouth daily. 180 tablet 3     psyllium (METAMUCIL SMOOTH TEXTURE) 63 % POWD Take 1 teaspoonful by mouth daily. Mix in 8 ounces of water       STATIN NOT PRESCRIBED, INTENTIONAL, Statin not prescribed intentionally due to Other LDL at goal; pt doesn't want to add another medication (This option does not exclude patient from measure)  0     Allergies   Allergen Reactions     Amoxicillin Hives     Hydrochlorothiazide      hyponatremia     Lisinopril      cough     Lotrel Hives     FAMILY HISTORY NOTED AND REVIEWED      PHYSICAL EXAM:    /76 (BP Location: Right arm, Patient Position: Chair, Cuff Size: Adult Large)   Pulse 62   Temp 97.9  F (36.6  C) (Tympanic)   Wt 86.2 kg (190 lb)   SpO2 100%   BMI 30.67 kg/m      Patient appears non toxic  Pt walking without assistant or limp    Assessment and Plan:     (M51.26) Lumbar disc herniation  (primary encounter diagnosis)  Comment: pain is controlled with OTC NSAIDs  Plan: cont PT as this is helping.discussed she will need to continue her home exercise program to help prevent recurrent sxs. Had FARZAD x 2 and is clinically improved.    (E11.9) Type 2 diabetes mellitus without complication, without long-term current use of insulin (H)  Comment:   Plan: return in 1 month for A1c. She does not check her blood sugars at home and with the injections and medrol dose pack suspect her blood sugars were high.  She has lost 10lb due to loss of appetite when in more pain and also stopped nightly wine with dinner. Encouraged her to keep that weight off. [facility]  [facility] [employer] as this would be [facility]. She could do her back program there once done with PT.      Amisha Plaza PA-C

## 2019-06-24 DIAGNOSIS — I10 HTN (HYPERTENSION), BENIGN: ICD-10-CM

## 2019-06-24 NOTE — TELEPHONE ENCOUNTER
"Pending Prescriptions:                       Disp   Refills    metoprolol succinate ER (TOPROL-XL) 100 M*135 ta*1            Sig: TAKE 1.5 TABLETS BY MOUTH DAILY    Last Written Prescription Date:  3/11/19  Last Fill Quantity: 135,  # refills: 1   Last office visit: 6/4/2019 with prescribing provider:     Future Office Visit:   Next 5 appointments (look out 90 days)    Jul 17, 2019  9:00 AM CDT  Office Visit with Amisha Plaza PA-C  [facility] ([facility] [address] [address]  [phone]         Requested Prescriptions   Pending Prescriptions Disp Refills     metoprolol succinate ER (TOPROL-XL) 100 MG 24 hr tablet [Pharmacy Med Name: METOPROLOL SUCC  MG TAB] 135 tablet 1     Sig: TAKE 1.5 TABLETS BY MOUTH DAILY       Beta-Blockers Protocol Passed - 6/24/2019  1:12 AM        Passed - Blood pressure under 140/90 in past 12 months     BP Readings from Last 3 Encounters:   06/04/19 138/76   04/25/19 179/81   04/21/19 155/83                 Passed - Patient is age 6 or older        Passed - Recent (12 mo) or future (30 days) visit within the authorizing provider's specialty     Patient had office visit in the last 12 months or has a visit in the next 30 days with authorizing provider or within the authorizing provider's specialty.  See \"Patient Info\" tab in inbasket, or \"Choose Columns\" in Meds & Orders section of the refill encounter.              Passed - Medication is active on med list          "

## 2019-06-25 RX ORDER — METOPROLOL SUCCINATE 100 MG/1
TABLET, EXTENDED RELEASE ORAL
Qty: 135 TABLET | Refills: 1
Start: 2019-06-25

## 2019-06-26 DIAGNOSIS — I10 HTN (HYPERTENSION), BENIGN: ICD-10-CM

## 2019-06-27 NOTE — TELEPHONE ENCOUNTER
"Last Written Prescription Date:  3/11/19  Last Fill Quantity: 135 tablet,  # refills: 1   Last office visit: 6/4/2019 with prescribing provider:  Mela   Future Office Visit:   Next 5 appointments (look out 90 days)    Jul 31, 2019  9:30 AM CDT  Office Visit with Amisha Plaza PA-C  [facility] [address] [address] [phone]         Requested Prescriptions   Pending Prescriptions Disp Refills     metoprolol succinate ER (TOPROL-XL) 100 MG 24 hr tablet [Pharmacy Med Name: METOPROLOL SUCC  MG TAB] 135 tablet 1     Sig: TAKE 1.5 TABLETS BY MOUTH DAILY       Beta-Blockers Protocol Passed - 6/26/2019  2:08 AM        Passed - Blood pressure under 140/90 in past 12 months     BP Readings from Last 3 Encounters:   06/04/19 138/76   04/25/19 179/81   04/21/19 155/83                 Passed - Patient is age 6 or older        Passed - Recent (12 mo) or future (30 days) visit within the authorizing provider's specialty     Patient had office visit in the last 12 months or has a visit in the next 30 days with authorizing provider or within the authorizing provider's specialty.  See \"Patient Info\" tab in inbasket, or \"Choose Columns\" in Meds & Orders section of the refill encounter.              Passed - Medication is active on med list          "

## 2019-06-28 RX ORDER — METOPROLOL SUCCINATE 100 MG/1
TABLET, EXTENDED RELEASE ORAL
Qty: 135 TABLET | Refills: 1 | OUTPATIENT
Start: 2019-06-28

## 2019-06-28 NOTE — TELEPHONE ENCOUNTER
Rx sent 3/11/19, 90 day with 1 refill - Rx refused. Duplicate/early request. Pharmacy notified.  Annabella WILSON RN

## 2019-09-05 ENCOUNTER — OFFICE VISIT (OUTPATIENT)
Dept: FAMILY MEDICINE | Facility: CLINIC | Age: 74
End: 2019-09-05
Payer: MEDICARE

## 2019-09-05 VITALS
SYSTOLIC BLOOD PRESSURE: 153 MMHG | BODY MASS INDEX: 31.66 KG/M2 | HEIGHT: 66 IN | HEART RATE: 61 BPM | DIASTOLIC BLOOD PRESSURE: 78 MMHG | TEMPERATURE: 98.2 F | WEIGHT: 197 LBS | OXYGEN SATURATION: 97 %

## 2019-09-05 DIAGNOSIS — E87.1 HYPONATREMIA: ICD-10-CM

## 2019-09-05 DIAGNOSIS — E11.9 TYPE 2 DIABETES MELLITUS WITHOUT COMPLICATION, WITHOUT LONG-TERM CURRENT USE OF INSULIN (H): ICD-10-CM

## 2019-09-05 DIAGNOSIS — M51.26 LUMBAR DISC HERNIATION: ICD-10-CM

## 2019-09-05 DIAGNOSIS — I10 HTN (HYPERTENSION), BENIGN: Primary | ICD-10-CM

## 2019-09-05 DIAGNOSIS — Z23 NEED FOR PROPHYLACTIC VACCINATION AND INOCULATION AGAINST INFLUENZA: ICD-10-CM

## 2019-09-05 LAB
ANION GAP SERPL CALCULATED.3IONS-SCNC: 10 MMOL/L (ref 3–14)
BUN SERPL-MCNC: 18 MG/DL (ref 7–30)
CALCIUM SERPL-MCNC: 9.6 MG/DL (ref 8.5–10.1)
CHLORIDE SERPL-SCNC: 98 MMOL/L (ref 94–109)
CO2 SERPL-SCNC: 23 MMOL/L (ref 20–32)
CREAT SERPL-MCNC: 0.92 MG/DL (ref 0.52–1.04)
GFR SERPL CREATININE-BSD FRML MDRD: 62 ML/MIN/{1.73_M2}
GLUCOSE SERPL-MCNC: 138 MG/DL (ref 70–99)
HBA1C MFR BLD: 6.4 % (ref 0–5.6)
POTASSIUM SERPL-SCNC: 4.6 MMOL/L (ref 3.4–5.3)
SODIUM SERPL-SCNC: 131 MMOL/L (ref 133–144)
TSH SERPL DL<=0.005 MIU/L-ACNC: 3.7 MU/L (ref 0.4–4)

## 2019-09-05 PROCEDURE — G0008 ADMIN INFLUENZA VIRUS VAC: HCPCS | Performed by: PHYSICIAN ASSISTANT

## 2019-09-05 PROCEDURE — 84443 ASSAY THYROID STIM HORMONE: CPT | Performed by: PHYSICIAN ASSISTANT

## 2019-09-05 PROCEDURE — 36415 COLL VENOUS BLD VENIPUNCTURE: CPT | Performed by: PHYSICIAN ASSISTANT

## 2019-09-05 PROCEDURE — 83036 HEMOGLOBIN GLYCOSYLATED A1C: CPT | Performed by: PHYSICIAN ASSISTANT

## 2019-09-05 PROCEDURE — 80048 BASIC METABOLIC PNL TOTAL CA: CPT | Performed by: PHYSICIAN ASSISTANT

## 2019-09-05 PROCEDURE — 90662 IIV NO PRSV INCREASED AG IM: CPT | Performed by: PHYSICIAN ASSISTANT

## 2019-09-05 PROCEDURE — 99214 OFFICE O/P EST MOD 30 MIN: CPT | Mod: 25 | Performed by: PHYSICIAN ASSISTANT

## 2019-09-05 ASSESSMENT — MIFFLIN-ST. JEOR: SCORE: 1417.17

## 2019-09-05 NOTE — PATIENT INSTRUCTIONS
Schedule your eye exam and have them send me the report    Check insurance regarding Shingrex.    Check with Dr. Peng's office regarding the numbness    See the dermatologist for a skin check    Schedule a nurse only visit for a blood pressure check and bring home monitor and a few readings.

## 2019-09-05 NOTE — PROGRESS NOTES
HPI: Update on lumbar disc herniation L4-L5: pts back pain much improved but still has R ant thigh numbness  She did see Dr. Peng when she was having this trouble and did have FARZAD x 2  Wonders if surgery would be needed due to the numbness.    HTN: She does monitor her BP at home and gets normal readings  Tends to run higher here    DM: She gained some weight back now that not having so much pain  About twice per week she forgets to take her Metformin  Does not monitor blood sugars at home  No hypoglycemia  Due for eye exam    Past Medical History:   Diagnosis Date     Abscess of abdominal cavity (H) 11/02    cecal perf, prob from diverticulitis     Allergic rhinitis      Colon polyp 2015    colonoscopy due 5 years.     Diverticulosis      DM type 2 (diabetes mellitus, type 2) (H)      Elevated LFTs     fatty liver dz     GERD (gastroesophageal reflux disease)      Hepatitis A      Herpes zoster 11/28/11     HTN (hypertension), benign      Osteopenia     DXA 2008     Osteopenia 2014     Past Surgical History:   Procedure Laterality Date     CATARACT IOL, RT/LT       COLONOSCOPY N/A 5/15/2018    Procedure: COLONOSCOPY;  colonoscopy;  Surgeon: Lucero Acharya MD;  Location:  GI     [facility] 27     Social History     Tobacco Use     Smoking status: Never Smoker     Smokeless tobacco: Never Used   Substance Use Topics     Alcohol use: Yes     Alcohol/week: 0.0 oz     Comment: 0-3 drinks per week     Current Outpatient Medications   Medication Sig Dispense Refill     aspirin 81 MG tablet Take  by mouth daily.       hydrochlorothiazide (HYDRODIURIL) 12.5 MG tablet TAKE 1 TABLET (12.5 MG) BY MOUTH DAILY 90 tablet 3     irbesartan (AVAPRO) 150 MG tablet TAKE 1 TABLET BY MOUTH EVERY DAY 90 tablet 3     metFORMIN (GLUCOPHAGE) 500 MG tablet TAKE 2 TABLETS BY MOUTH TWICE A DAY WITH MEALS 360 tablet 0     metoprolol succinate ER (TOPROL-XL) 100 MG 24 hr tablet TAKE  "1.5 TABLETS BY MOUTH DAILY 135 tablet 1     naproxen sodium (ANAPROX) 220 MG tablet Take 1 tablet by mouth daily. 180 tablet 3     psyllium (METAMUCIL SMOOTH TEXTURE) 63 % POWD Take 1 teaspoonful by mouth daily. Mix in 8 ounces of water       STATIN NOT PRESCRIBED, INTENTIONAL, Statin not prescribed intentionally due to Other LDL at goal; pt doesn't want to add another medication (This option does not exclude patient from measure) (Patient not taking: Reported on 9/5/2019)  0     Allergies   Allergen Reactions     Amoxicillin Hives     Hydrochlorothiazide      hyponatremia     Lisinopril      cough     Lotrel Hives     FAMILY HISTORY NOTED AND REVIEWED    PHYSICAL EXAM:    BP (!) 153/78 (BP Location: Right arm, Patient Position: Sitting, Cuff Size: Adult Large)   Pulse 61   Temp 98.2  F (36.8  C) (Oral)   Ht 1.671 m (5' 5.8\")   Wt 89.4 kg (197 lb)   SpO2 97%   BMI 31.99 kg/m      Patient appears non toxic  Rechecked BP and this remained elevated  Heart: RRR without m/r/g  Foot exam; no edema or ulceration    Assessment and Plan:     (I10) HTN (hypertension), benign  (primary encounter diagnosis)  Comment: ? White coat. Recd nurse only BP check. Bring in home cuff for comparison. She states she is compliant with her her 3 BP meds.  Plan: Basic metabolic panel            (E11.9) Type 2 diabetes mellitus without complication, without long-term current use of insulin (H)  Comment: schedule eye exam  Plan: Hemoglobin A1c, TSH, Basic metabolic panel            (M51.26) Lumbar disc herniation  Comment:   Plan: pain mostly resolved, but still some R ant thigh numbness.  Likely needs to give this more time.      (Z23) Need for prophylactic vaccination and inoculation against influenza  Comment:   Plan: FLU VACCINE, INCREASED ANTIGEN, PRESV FREE, AGE        65+ [[address]], ADMIN INFLUENZA (For MEDICARE         Patients ONLY) []        Check insurance regarding shingrex      Amisha Plaza PA-C            "

## 2019-09-06 NOTE — RESULT ENCOUNTER NOTE
Pat,    Your sodium is a little low so limit drinking water as this can further dilute your system.  The hydrochlorothiazide is likely to blame for this.  You do not need to avoid salt.  Let's recheck this level in 7-10 days to make sure it isn't getting worse.    Your diabetes control is great!  Your A1c is down to 6.4%!    Amisha Plaza PA-C

## 2019-09-09 ENCOUNTER — TRANSFERRED RECORDS (OUTPATIENT)
Dept: HEALTH INFORMATION MANAGEMENT | Facility: CLINIC | Age: 74
End: 2019-09-09

## 2019-09-18 ENCOUNTER — ALLIED HEALTH/NURSE VISIT (OUTPATIENT)
Dept: NURSING | Facility: CLINIC | Age: 74
End: 2019-09-18
Payer: MEDICARE

## 2019-09-18 ENCOUNTER — TRANSFERRED RECORDS (OUTPATIENT)
Dept: HEALTH INFORMATION MANAGEMENT | Facility: CLINIC | Age: 74
End: 2019-09-18

## 2019-09-18 VITALS — SYSTOLIC BLOOD PRESSURE: 122 MMHG | DIASTOLIC BLOOD PRESSURE: 68 MMHG

## 2019-09-18 DIAGNOSIS — I10 HTN (HYPERTENSION), BENIGN: Primary | ICD-10-CM

## 2019-09-18 DIAGNOSIS — E87.1 HYPONATREMIA: ICD-10-CM

## 2019-09-18 LAB — SODIUM SERPL-SCNC: 127 MMOL/L (ref 133–144)

## 2019-09-18 PROCEDURE — 84295 ASSAY OF SERUM SODIUM: CPT | Performed by: PHYSICIAN ASSISTANT

## 2019-09-18 PROCEDURE — 36415 COLL VENOUS BLD VENIPUNCTURE: CPT | Performed by: PHYSICIAN ASSISTANT

## 2019-09-18 PROCEDURE — 99207 ZZC NO CHARGE NURSE ONLY: CPT

## 2019-09-18 NOTE — Clinical Note
Haritha Cat is a 74 year old patient who comes in today for a Blood Pressure check.Initial BP:  /68 (BP Location: Right arm, Patient Position: Sitting, Cuff Size: Adult Regular)    Data UnavailableDisposition: results routed to provider

## 2019-09-18 NOTE — PROGRESS NOTES
Haritha Cat is a 74 year old patient who comes in today for a Blood Pressure check.  Initial BP:  /68 (BP Location: Right arm, Patient Position: Sitting, Cuff Size: Adult Regular)      Data Unavailable  Disposition: results routed to provider    Demetrius Franco CMA on 9/18/2019 at 10:10 AM

## 2019-09-18 NOTE — RESULT ENCOUNTER NOTE
Pat,    Your sodium is worse.  I need to have you STOP the hydrochlorothiazide.   Avoid drinking water, start some pedialyte and monitor your blood pressure with this change.  Let's repeat your sodium again in a week.    Amisha Plaza PA-C

## 2019-09-25 ENCOUNTER — OFFICE VISIT (OUTPATIENT)
Dept: FAMILY MEDICINE | Facility: CLINIC | Age: 74
End: 2019-09-25
Payer: MEDICARE

## 2019-09-25 ENCOUNTER — APPOINTMENT (OUTPATIENT)
Dept: LAB | Facility: CLINIC | Age: 74
End: 2019-09-25
Payer: MEDICARE

## 2019-09-25 VITALS
OXYGEN SATURATION: 98 % | DIASTOLIC BLOOD PRESSURE: 70 MMHG | BODY MASS INDEX: 31.02 KG/M2 | TEMPERATURE: 97.3 F | HEIGHT: 66 IN | SYSTOLIC BLOOD PRESSURE: 136 MMHG | HEART RATE: 62 BPM | WEIGHT: 193 LBS

## 2019-09-25 DIAGNOSIS — M51.26 LUMBAR DISC HERNIATION: ICD-10-CM

## 2019-09-25 DIAGNOSIS — L65.9 HAIR LOSS: ICD-10-CM

## 2019-09-25 DIAGNOSIS — E87.1 HYPONATREMIA: ICD-10-CM

## 2019-09-25 DIAGNOSIS — Z01.818 PREOP GENERAL PHYSICAL EXAM: Primary | ICD-10-CM

## 2019-09-25 LAB — HGB BLD-MCNC: 14.3 G/DL (ref 11.7–15.7)

## 2019-09-25 PROCEDURE — 84295 ASSAY OF SERUM SODIUM: CPT | Performed by: PHYSICIAN ASSISTANT

## 2019-09-25 PROCEDURE — 82728 ASSAY OF FERRITIN: CPT | Performed by: PHYSICIAN ASSISTANT

## 2019-09-25 PROCEDURE — 93000 ELECTROCARDIOGRAM COMPLETE: CPT | Performed by: PHYSICIAN ASSISTANT

## 2019-09-25 PROCEDURE — 36415 COLL VENOUS BLD VENIPUNCTURE: CPT | Performed by: PHYSICIAN ASSISTANT

## 2019-09-25 PROCEDURE — 99215 OFFICE O/P EST HI 40 MIN: CPT | Performed by: PHYSICIAN ASSISTANT

## 2019-09-25 PROCEDURE — 85018 HEMOGLOBIN: CPT | Performed by: PHYSICIAN ASSISTANT

## 2019-09-25 PROCEDURE — 84132 ASSAY OF SERUM POTASSIUM: CPT | Performed by: PHYSICIAN ASSISTANT

## 2019-09-25 ASSESSMENT — MIFFLIN-ST. JEOR: SCORE: 1402.19

## 2019-09-25 NOTE — PROGRESS NOTES
[facility]  [address] [facility] [address]  [phone]  Dept: [phone]    PRE-OP EVALUATION:  Today's date: 2019    Haritha Cat (: 1945) presents for pre-operative evaluation assessment as requested by Wolfgang Olsen MD.  She requires evaluation and anesthesia risk assessment prior to undergoing surgery/procedure for treatment of lumbar disc herniation.    Proposed Surgery/ Procedure: RIGHT MICRODISCECTOMY L2-3  Date of Surgery/ Procedure: 10/01/2019  Time of Surgery/ Procedure: 9:40 AM  Hospital/Surgical Facility: [facility]     Primary Physician: Amisha Plaza PA-C  Type of Anesthesia Anticipated: General    Patient has a Health Care Directive or Living Will:  NO    1. NO - Do you have a history of heart attack, stroke, stent, bypass or surgery on an artery in the head, neck, heart or legs?  2. NO - Do you ever have any pain or discomfort in your chest?  3. NO - Do you have a history of  Heart Failure?  4. NO - Are you troubled by shortness of breath when: walking on the level, up a slight hill or at night?  5. NO - Do you currently have a cold, bronchitis or other respiratory infection?  6. NO - Do you have a cough, shortness of breath or wheezing?  7. NO - Do you sometimes get pains in the calves of your legs when you walk?  8. NO - Do you or anyone in your family have previous history of blood clots?  9. NO - Do you or does anyone in your family have a serious bleeding problem such as prolonged bleeding following surgeries or cuts?  10. NO - Have you ever had problems with anemia or been told to take iron pills?  11. NO - Have you had any abnormal blood loss such as black, tarry or bloody stools, or abnormal vaginal bleeding?  12. NO - Have you ever had a blood transfusion?  13. NO - Have you or any of your relatives ever had problems with anesthesia?  14. NO - Do you have sleep apnea, excessive snoring or daytime drowsiness?  15. NO - Do you have any  prosthetic heart valves?  16. NO - Do you have prosthetic joints?  17. NO - Is there any chance that you may be pregnant?      HPI:     HPI related to upcoming procedure: increased size of disc herniation on MRI  Pain located in R low back and down the right ant thigh      DIABETES - Patient has a longstanding history of DiabetesType Type II . Patient is being treated with oral agents and denies significant side effects. Control has been good. Complicating factors include but are not limited to: hypertension.     Lab Results   Component Value Date    A1C 6.4 09/05/2019    A1C 7.3 03/26/2019    A1C 7.0 03/11/2019    A1C 7.1 09/13/2018    A1C 7.3 11/15/2017           MEDICAL HISTORY:     Patient Active Problem List    Diagnosis Date Noted     Lumbar disc herniation 06/04/2019     Priority: Medium     Rotator cuff syndrome of right shoulder 03/11/2019     Priority: Medium     Needle phobia 07/11/2017     Priority: Medium     Vasovagal syncope 07/11/2017     Priority: Medium     Type 2 diabetes mellitus without complications (H) 10/23/2015     Priority: Medium     Colon polyp      Priority: Medium     colonoscopy due 5 years.       Chronic headaches 04/23/2014     Priority: Medium     HTN (hypertension), benign      Priority: Medium     GERD (gastroesophageal reflux disease)      Priority: Medium     Hyperlipidemia LDL goal <100 05/09/2012     Priority: Medium     Osteopenia      Priority: Medium      Past Medical History:   Diagnosis Date     Abscess of abdominal cavity (H) 11/02    cecal perf, prob from diverticulitis     Allergic rhinitis      Colon polyp 2015    colonoscopy due 5 years.     Diverticulosis      DM type 2 (diabetes mellitus, type 2) (H)      Elevated LFTs     fatty liver dz     GERD (gastroesophageal reflux disease)      Hepatitis A      Herpes zoster 11/28/11     HTN (hypertension), benign      Osteopenia     DXA 2008     Osteopenia 2014     Past Surgical History:   Procedure Laterality Date      "CATARACT IOL, RT/LT       COLONOSCOPY N/A 5/15/2018    Procedure: COLONOSCOPY;  colonoscopy;  Surgeon: Lucero Acharya MD;  Location:  GI     varicose vein surgery      Dr. Bowen     wisdom teeth  age 27     Current Outpatient Medications   Medication Sig Dispense Refill     aspirin 81 MG tablet Take  by mouth daily.       irbesartan (AVAPRO) 150 MG tablet TAKE 1 TABLET BY MOUTH EVERY DAY 90 tablet 3     metFORMIN (GLUCOPHAGE) 500 MG tablet TAKE 2 TABLETS BY MOUTH TWICE A DAY WITH MEALS 360 tablet 0     metoprolol succinate ER (TOPROL-XL) 100 MG 24 hr tablet TAKE 1.5 TABLETS BY MOUTH DAILY 135 tablet 1     psyllium (METAMUCIL SMOOTH TEXTURE) 63 % POWD Take 1 teaspoonful by mouth daily. Mix in 8 ounces of water       STATIN NOT PRESCRIBED, INTENTIONAL, Statin not prescribed intentionally due to Other LDL at goal; pt doesn't want to add another medication (This option does not exclude patient from measure)  0     OTC products: None, except as noted above    Allergies   Allergen Reactions     Amoxicillin Hives     Hydrochlorothiazide Other (See Comments)     hyponatremia     Lisinopril Cough     Lotrel Hives      Latex Allergy: NO    Social History     Tobacco Use     Smoking status: Never Smoker     Smokeless tobacco: Never Used   Substance Use Topics     Alcohol use: Yes     Alcohol/week: 0.0 standard drinks     Comment: 0-3 drinks per week     History   Drug Use No       REVIEW OF SYSTEMS:   Constitutional, neuro, ENT, endocrine, pulmonary, cardiac, gastrointestinal, genitourinary, musculoskeletal, integument and psychiatric systems are negative, except as otherwise noted.    EXAM:   /70 (BP Location: Right arm, Patient Position: Chair, Cuff Size: Adult Large)   Pulse 62   Temp 97.3  F (36.3  C) (Tympanic)   Ht 1.676 m (5' 6\")   Wt 87.5 kg (193 lb)   SpO2 98%   BMI 31.15 kg/m      GENERAL APPEARANCE: healthy, alert and no distress     EYES: EOMI, PERRL     HENT: ear canals and TM's normal and " nose and mouth without ulcers or lesions     NECK: no adenopathy, no asymmetry, masses, or scars and thyroid normal to palpation     RESP: lungs clear to auscultation - no rales, rhonchi or wheezes     CV: regular rates and rhythm, normal S1 S2, no S3 or S4 and no murmur, click or rub     ABDOMEN:  soft, nontender, no HSM or masses and bowel sounds normal     MS: extremities normal- no gross deformities noted, no evidence of inflammation in joints, FROM in all extremities.     SKIN: no suspicious lesions or rashes     NEURO: Normal strength and tone, sensory exam grossly normal, mentation intact and speech normal     PSYCH: mentation appears normal. and affect normal/bright     LYMPHATICS: No cervical adenopathy    DIAGNOSTICS:     Results for orders placed or performed in visit on 09/25/19   Hemoglobin   Result Value Ref Range    Hemoglobin 14.3 11.7 - 15.7 g/dL     EKG: Atrial  Bradycardia   P:QRS - 1:1, Abnormal P axis, H Rate 57  BORDERLINE RHYTHM    IMPRESSION:   Reason for surgery/procedure: lumbar disc herniation  Diagnosis/reason for consult: RIGHT MICRODISCECTOMY L2-3      The proposed surgical procedur      ICD-10-CM    1. Preop general physical exam Z01.818 Hemoglobin     Potassium     EKG 12-lead complete w/read - Clinics   2. Lumbar disc herniation M51.26    3. Hyponatremia E87.1 Sodium   4. Hair loss L65.9 Ferritin       RECOMMENDATIONS:   APPROVAL GIVEN to proceed with proposed procedure, without further diagnostic evaluation. Pt to stop asa now.  She will hold metformin the day of surgery.  She can take avapro and metoprolol with a sip of water the morning of surgery.       Signed Electronically by: Amisha Plaza PA-C    Copy of this evaluation report is provided to requesting physician.    [facility] Preop Guidelines    Revised Cardiac Risk Index

## 2019-09-26 LAB
FERRITIN SERPL-MCNC: 112 NG/ML (ref 8–252)
POTASSIUM SERPL-SCNC: 5.2 MMOL/L (ref 3.4–5.3)
SODIUM SERPL-SCNC: 134 MMOL/L (ref 133–144)

## 2019-09-27 ENCOUNTER — TELEPHONE (OUTPATIENT)
Dept: FAMILY MEDICINE | Facility: CLINIC | Age: 74
End: 2019-09-27

## 2019-09-27 NOTE — TELEPHONE ENCOUNTER
Reason for Call:  Request for results:    Name of test or procedure: Sodium lab    Date of test of procedure: 09/25    Location of the test or procedure: [facility]    OK to leave the result message on voice mail or with a family member? YES    Phone number Patient can be reached at:  Home number on file [phone] (home)    Additional comments: she checked mychart but is it not viewable    Call taken on 9/27/2019 at 3:11 PM by Viviana Baeza

## 2019-10-01 ENCOUNTER — APPOINTMENT (OUTPATIENT)
Dept: GENERAL RADIOLOGY | Facility: CLINIC | Age: 74
End: 2019-10-01
Attending: ORTHOPAEDIC SURGERY
Payer: MEDICARE

## 2019-10-01 ENCOUNTER — HOSPITAL ENCOUNTER (OUTPATIENT)
Facility: CLINIC | Age: 74
Discharge: HOME | End: 2019-10-01
Attending: ORTHOPAEDIC SURGERY | Admitting: ORTHOPAEDIC SURGERY
Payer: MEDICARE

## 2019-10-01 ENCOUNTER — ANESTHESIA (OUTPATIENT)
Dept: SURGERY | Facility: CLINIC | Age: 74
End: 2019-10-01
Payer: MEDICARE

## 2019-10-01 ENCOUNTER — ANESTHESIA EVENT (OUTPATIENT)
Dept: SURGERY | Facility: CLINIC | Age: 74
End: 2019-10-01
Payer: MEDICARE

## 2019-10-01 VITALS
DIASTOLIC BLOOD PRESSURE: 99 MMHG | HEIGHT: 66 IN | TEMPERATURE: 97.6 F | HEART RATE: 72 BPM | BODY MASS INDEX: 30.63 KG/M2 | WEIGHT: 190.6 LBS | OXYGEN SATURATION: 94 % | RESPIRATION RATE: 16 BRPM | SYSTOLIC BLOOD PRESSURE: 168 MMHG

## 2019-10-01 DIAGNOSIS — M51.26 LUMBAR DISC HERNIATION: Primary | ICD-10-CM

## 2019-10-01 LAB — GLUCOSE BLDC GLUCOMTR-MCNC: 131 MG/DL (ref 70–99)

## 2019-10-01 PROCEDURE — 25000132 ZZH RX MED GY IP 250 OP 250 PS 637: Mod: GY | Performed by: ORTHOPAEDIC SURGERY

## 2019-10-01 PROCEDURE — 37000009 ZZH ANESTHESIA TECHNICAL FEE, EACH ADDTL 15 MIN: Performed by: ORTHOPAEDIC SURGERY

## 2019-10-01 PROCEDURE — 82962 GLUCOSE BLOOD TEST: CPT

## 2019-10-01 PROCEDURE — 71000013 ZZH RECOVERY PHASE 1 LEVEL 1 EA ADDTL HR: Performed by: ORTHOPAEDIC SURGERY

## 2019-10-01 PROCEDURE — 25000128 H RX IP 250 OP 636: Performed by: ORTHOPAEDIC SURGERY

## 2019-10-01 PROCEDURE — 40000169 ZZH STATISTIC PRE-PROCEDURE ASSESSMENT I: Performed by: ORTHOPAEDIC SURGERY

## 2019-10-01 PROCEDURE — 71000027 ZZH RECOVERY PHASE 2 EACH 15 MINS: Performed by: ORTHOPAEDIC SURGERY

## 2019-10-01 PROCEDURE — 36000093 ZZH SURGERY LEVEL 4 1ST 30 MIN: Performed by: ORTHOPAEDIC SURGERY

## 2019-10-01 PROCEDURE — 25000566 ZZH SEVOFLURANE, EA 15 MIN: Performed by: ORTHOPAEDIC SURGERY

## 2019-10-01 PROCEDURE — 36000063 ZZH SURGERY LEVEL 4 EA 15 ADDTL MIN: Performed by: ORTHOPAEDIC SURGERY

## 2019-10-01 PROCEDURE — 25000125 ZZHC RX 250: Performed by: REGISTERED NURSE

## 2019-10-01 PROCEDURE — 25000132 ZZH RX MED GY IP 250 OP 250 PS 637: Mod: GY | Performed by: ANESTHESIOLOGY

## 2019-10-01 PROCEDURE — 25800030 ZZH RX IP 258 OP 636: Performed by: REGISTERED NURSE

## 2019-10-01 PROCEDURE — 25000128 H RX IP 250 OP 636: Performed by: REGISTERED NURSE

## 2019-10-01 PROCEDURE — 40000985 XR LUMBAR SPINE PORT 1 VW

## 2019-10-01 PROCEDURE — 27210794 ZZH OR GENERAL SUPPLY STERILE: Performed by: ORTHOPAEDIC SURGERY

## 2019-10-01 PROCEDURE — 71000012 ZZH RECOVERY PHASE 1 LEVEL 1 FIRST HR: Performed by: ORTHOPAEDIC SURGERY

## 2019-10-01 PROCEDURE — 37000008 ZZH ANESTHESIA TECHNICAL FEE, 1ST 30 MIN: Performed by: ORTHOPAEDIC SURGERY

## 2019-10-01 PROCEDURE — 25000128 H RX IP 250 OP 636: Performed by: ANESTHESIOLOGY

## 2019-10-01 PROCEDURE — 25000125 ZZHC RX 250: Performed by: ORTHOPAEDIC SURGERY

## 2019-10-01 RX ORDER — SODIUM CHLORIDE, SODIUM LACTATE, POTASSIUM CHLORIDE, CALCIUM CHLORIDE 600; 310; 30; 20 MG/100ML; MG/100ML; MG/100ML; MG/100ML
INJECTION, SOLUTION INTRAVENOUS CONTINUOUS
Status: DISCONTINUED | OUTPATIENT
Start: 2019-10-01 | End: 2019-10-01 | Stop reason: HOSPADM

## 2019-10-01 RX ORDER — FENTANYL CITRATE 50 UG/ML
25-50 INJECTION, SOLUTION INTRAMUSCULAR; INTRAVENOUS
Status: DISCONTINUED | OUTPATIENT
Start: 2019-10-01 | End: 2019-10-01 | Stop reason: HOSPADM

## 2019-10-01 RX ORDER — HYDROMORPHONE HYDROCHLORIDE 1 MG/ML
.3-.5 INJECTION, SOLUTION INTRAMUSCULAR; INTRAVENOUS; SUBCUTANEOUS EVERY 10 MIN PRN
Status: DISCONTINUED | OUTPATIENT
Start: 2019-10-01 | End: 2019-10-01 | Stop reason: HOSPADM

## 2019-10-01 RX ORDER — SODIUM CHLORIDE, SODIUM LACTATE, POTASSIUM CHLORIDE, CALCIUM CHLORIDE 600; 310; 30; 20 MG/100ML; MG/100ML; MG/100ML; MG/100ML
INJECTION, SOLUTION INTRAVENOUS CONTINUOUS PRN
Status: DISCONTINUED | OUTPATIENT
Start: 2019-10-01 | End: 2019-10-01

## 2019-10-01 RX ORDER — ONDANSETRON 2 MG/ML
INJECTION INTRAMUSCULAR; INTRAVENOUS PRN
Status: DISCONTINUED | OUTPATIENT
Start: 2019-10-01 | End: 2019-10-01

## 2019-10-01 RX ORDER — ONDANSETRON 4 MG/1
4 TABLET, ORALLY DISINTEGRATING ORAL EVERY 30 MIN PRN
Status: DISCONTINUED | OUTPATIENT
Start: 2019-10-01 | End: 2019-10-01 | Stop reason: HOSPADM

## 2019-10-01 RX ORDER — HYDROCODONE BITARTRATE AND ACETAMINOPHEN 5; 325 MG/1; MG/1
1 TABLET ORAL ONCE
Status: COMPLETED | OUTPATIENT
Start: 2019-10-01 | End: 2019-10-01

## 2019-10-01 RX ORDER — MAGNESIUM HYDROXIDE 1200 MG/15ML
LIQUID ORAL PRN
Status: DISCONTINUED | OUTPATIENT
Start: 2019-10-01 | End: 2019-10-01 | Stop reason: HOSPADM

## 2019-10-01 RX ORDER — DEXAMETHASONE SODIUM PHOSPHATE 4 MG/ML
INJECTION, SOLUTION INTRA-ARTICULAR; INTRALESIONAL; INTRAMUSCULAR; INTRAVENOUS; SOFT TISSUE PRN
Status: DISCONTINUED | OUTPATIENT
Start: 2019-10-01 | End: 2019-10-01

## 2019-10-01 RX ORDER — ACETAMINOPHEN 500 MG
1000 TABLET ORAL ONCE
Status: COMPLETED | OUTPATIENT
Start: 2019-10-01 | End: 2019-10-01

## 2019-10-01 RX ORDER — BUPIVACAINE HYDROCHLORIDE 5 MG/ML
INJECTION, SOLUTION PERINEURAL PRN
Status: DISCONTINUED | OUTPATIENT
Start: 2019-10-01 | End: 2019-10-01 | Stop reason: HOSPADM

## 2019-10-01 RX ORDER — BUPIVACAINE HYDROCHLORIDE 5 MG/ML
INJECTION, SOLUTION EPIDURAL; INTRACAUDAL
Status: DISCONTINUED
Start: 2019-10-01 | End: 2019-10-01 | Stop reason: HOSPADM

## 2019-10-01 RX ORDER — CEFAZOLIN SODIUM 2 G/100ML
2 INJECTION, SOLUTION INTRAVENOUS
Status: COMPLETED | OUTPATIENT
Start: 2019-10-01 | End: 2019-10-01

## 2019-10-01 RX ORDER — MEPERIDINE HYDROCHLORIDE 25 MG/ML
12.5 INJECTION INTRAMUSCULAR; INTRAVENOUS; SUBCUTANEOUS
Status: DISCONTINUED | OUTPATIENT
Start: 2019-10-01 | End: 2019-10-01 | Stop reason: HOSPADM

## 2019-10-01 RX ORDER — EPHEDRINE SULFATE 50 MG/ML
INJECTION, SOLUTION INTRAMUSCULAR; INTRAVENOUS; SUBCUTANEOUS PRN
Status: DISCONTINUED | OUTPATIENT
Start: 2019-10-01 | End: 2019-10-01

## 2019-10-01 RX ORDER — PROPOFOL 10 MG/ML
INJECTION, EMULSION INTRAVENOUS PRN
Status: DISCONTINUED | OUTPATIENT
Start: 2019-10-01 | End: 2019-10-01

## 2019-10-01 RX ORDER — NALOXONE HYDROCHLORIDE 0.4 MG/ML
.1-.4 INJECTION, SOLUTION INTRAMUSCULAR; INTRAVENOUS; SUBCUTANEOUS
Status: DISCONTINUED | OUTPATIENT
Start: 2019-10-01 | End: 2019-10-01 | Stop reason: HOSPADM

## 2019-10-01 RX ORDER — NEOSTIGMINE METHYLSULFATE 1 MG/ML
VIAL (ML) INJECTION PRN
Status: DISCONTINUED | OUTPATIENT
Start: 2019-10-01 | End: 2019-10-01

## 2019-10-01 RX ORDER — GLYCOPYRROLATE 0.2 MG/ML
INJECTION, SOLUTION INTRAMUSCULAR; INTRAVENOUS PRN
Status: DISCONTINUED | OUTPATIENT
Start: 2019-10-01 | End: 2019-10-01

## 2019-10-01 RX ORDER — CEFAZOLIN SODIUM 1 G/3ML
1 INJECTION, POWDER, FOR SOLUTION INTRAMUSCULAR; INTRAVENOUS SEE ADMIN INSTRUCTIONS
Status: DISCONTINUED | OUTPATIENT
Start: 2019-10-01 | End: 2019-10-01 | Stop reason: HOSPADM

## 2019-10-01 RX ORDER — FENTANYL CITRATE 50 UG/ML
INJECTION, SOLUTION INTRAMUSCULAR; INTRAVENOUS PRN
Status: DISCONTINUED | OUTPATIENT
Start: 2019-10-01 | End: 2019-10-01

## 2019-10-01 RX ORDER — ONDANSETRON 2 MG/ML
4 INJECTION INTRAMUSCULAR; INTRAVENOUS EVERY 30 MIN PRN
Status: DISCONTINUED | OUTPATIENT
Start: 2019-10-01 | End: 2019-10-01 | Stop reason: HOSPADM

## 2019-10-01 RX ORDER — LIDOCAINE HYDROCHLORIDE 20 MG/ML
INJECTION, SOLUTION INFILTRATION; PERINEURAL PRN
Status: DISCONTINUED | OUTPATIENT
Start: 2019-10-01 | End: 2019-10-01

## 2019-10-01 RX ORDER — HYDROCODONE BITARTRATE AND ACETAMINOPHEN 5; 325 MG/1; MG/1
1 TABLET ORAL EVERY 6 HOURS PRN
Qty: 30 TABLET | Refills: 0 | Status: SHIPPED | OUTPATIENT
Start: 2019-10-01 | End: 2019-12-31

## 2019-10-01 RX ADMIN — MIDAZOLAM 1 MG: 1 INJECTION INTRAMUSCULAR; INTRAVENOUS at 10:03

## 2019-10-01 RX ADMIN — FENTANYL CITRATE 50 MCG: 50 INJECTION, SOLUTION INTRAMUSCULAR; INTRAVENOUS at 10:08

## 2019-10-01 RX ADMIN — LIDOCAINE HYDROCHLORIDE 100 MG: 20 INJECTION, SOLUTION INFILTRATION; PERINEURAL at 10:08

## 2019-10-01 RX ADMIN — SODIUM CHLORIDE, POTASSIUM CHLORIDE, SODIUM LACTATE AND CALCIUM CHLORIDE: 600; 310; 30; 20 INJECTION, SOLUTION INTRAVENOUS at 10:03

## 2019-10-01 RX ADMIN — NEOSTIGMINE METHYLSULFATE 4.5 MG: 1 INJECTION, SOLUTION INTRAVENOUS at 11:17

## 2019-10-01 RX ADMIN — SODIUM CHLORIDE, POTASSIUM CHLORIDE, SODIUM LACTATE AND CALCIUM CHLORIDE: 600; 310; 30; 20 INJECTION, SOLUTION INTRAVENOUS at 11:24

## 2019-10-01 RX ADMIN — DEXAMETHASONE SODIUM PHOSPHATE 4 MG: 4 INJECTION, SOLUTION INTRA-ARTICULAR; INTRALESIONAL; INTRAMUSCULAR; INTRAVENOUS; SOFT TISSUE at 10:49

## 2019-10-01 RX ADMIN — Medication 5 MG: at 11:07

## 2019-10-01 RX ADMIN — ROCURONIUM BROMIDE 40 MG: 10 INJECTION INTRAVENOUS at 10:08

## 2019-10-01 RX ADMIN — FENTANYL CITRATE 50 MCG: 0.05 INJECTION, SOLUTION INTRAMUSCULAR; INTRAVENOUS at 12:36

## 2019-10-01 RX ADMIN — ONDANSETRON 4 MG: 2 INJECTION INTRAMUSCULAR; INTRAVENOUS at 11:14

## 2019-10-01 RX ADMIN — CEFAZOLIN SODIUM 2 G: 2 INJECTION, SOLUTION INTRAVENOUS at 10:24

## 2019-10-01 RX ADMIN — PROPOFOL 140 MG: 10 INJECTION, EMULSION INTRAVENOUS at 10:08

## 2019-10-01 RX ADMIN — HYDROCODONE BITARTRATE AND ACETAMINOPHEN 1 TABLET: 5; 325 TABLET ORAL at 13:34

## 2019-10-01 RX ADMIN — GLYCOPYRROLATE 0.8 MG: 0.2 INJECTION, SOLUTION INTRAMUSCULAR; INTRAVENOUS at 11:17

## 2019-10-01 RX ADMIN — FENTANYL CITRATE 50 MCG: 50 INJECTION, SOLUTION INTRAMUSCULAR; INTRAVENOUS at 10:54

## 2019-10-01 RX ADMIN — Medication 5 MG: at 10:46

## 2019-10-01 RX ADMIN — ACETAMINOPHEN 1000 MG: 500 TABLET, FILM COATED ORAL at 08:52

## 2019-10-01 RX ADMIN — ROCURONIUM BROMIDE 10 MG: 10 INJECTION INTRAVENOUS at 10:45

## 2019-10-01 ASSESSMENT — ENCOUNTER SYMPTOMS: SEIZURES: 0

## 2019-10-01 ASSESSMENT — MIFFLIN-ST. JEOR: SCORE: 1391.31

## 2019-10-01 NOTE — BRIEF OP NOTE
[facility]    Brief Operative Note    Pre-operative diagnosis: DISC HERNIATION  Post-operative diagnosis * No post-op diagnosis entered *  Procedure: Procedure(s):  LUMBAR MICRODISCECTOMY RIGHT L2-3  Surgeon: Surgeon(s) and Role:     * Wolfgang Peng MD - Primary     * Aminta Bowden PA-C - Assisting  Anesthesia: General   Estimated blood loss: Less than 50 ml  Drains: None  Specimens: * No specimens in log *  Findings:   None.  Complications: None.  Implants:  * No implants in log *

## 2019-10-01 NOTE — ANESTHESIA POSTPROCEDURE EVALUATION
Patient: Haritha Cat    Procedure(s):  LUMBAR MICRODISCECTOMY RIGHT L2-3    Diagnosis:DISC HERNIATION  Diagnosis Additional Information: No value filed.    Anesthesia Type:  General    Note:  Anesthesia Post Evaluation    Patient location during evaluation: PACU  Patient participation: Able to fully participate in evaluation  Level of consciousness: awake  Pain management: adequate  Airway patency: patent  Cardiovascular status: acceptable  Respiratory status: acceptable  Hydration status: acceptable  PONV: none             Last vitals:  Vitals:    10/01/19 1236 10/01/19 1245 10/01/19 1341   BP:  (!) 150/97 (!) 168/99   Pulse:  72    Resp: 13 13 16   Temp:      SpO2: 98% 91% 94%         Electronically Signed By: Will Corona MD  October 1, 2019  4:32 PM

## 2019-10-01 NOTE — ANESTHESIA PREPROCEDURE EVALUATION
Anesthesia Pre-Procedure Evaluation    Patient: Haritha Cat   MRN: [id number] : 1945          Preoperative Diagnosis: DISC HERNIATION    Procedure(s):  RIGHT MICRODISCECTOMY L2-3 (VALENTIN TABLE, MICROSCOPE)    Past Medical History:   Diagnosis Date     Abscess of abdominal cavity (H)     cecal perf, prob from diverticulitis     Allergic rhinitis      Colon polyp     colonoscopy due 5 years.     Diverticulosis      DM type 2 (diabetes mellitus, type 2) (H)      Elevated LFTs     fatty liver dz     GERD (gastroesophageal reflux disease)      Hepatitis A      Herpes zoster 11     HTN (hypertension), benign      Osteopenia     DXA      Osteopenia      Past Surgical History:   Procedure Laterality Date     CATARACT IOL, RT/LT       COLONOSCOPY N/A 5/15/2018    Procedure: COLONOSCOPY;  colonoscopy;  Surgeon: Lucero Acharya MD;  Location:  GI     varicose vein surgery      Dr. Bowen     Waldo teeth  age 27     K 5.2  HGB 14.3  EKG - SR, LAD    Anesthesia Evaluation     . Pt has had prior anesthetic.     No history of anesthetic complications          ROS/MED HX    ENT/Pulmonary:     (+)allergic rhinitis, , . .   (-) sleep apnea and recent URI   Neurologic: Comment: Chronic headaches     (-) seizures, CVA and migraines   Cardiovascular:     (+) hypertension----. : . . fainting (syncope) (hx of vasovagal syncope). :. .       METS/Exercise Tolerance:     Hematologic:  - neg hematologic  ROS       Musculoskeletal: Comment: Herniated lumbar disc  osteopenia        GI/Hepatic:     (+) GERD hepatitis type A, liver disease (fatty liver disease), Other GI/Hepatic diverticulosis      Renal/Genitourinary:         Endo:     (+) type II DM Obesity, .      Psychiatric:  - neg psychiatric ROS       Infectious Disease:         Malignancy:         Other: Comment: Needle phobia                         Physical Exam  Normal systems: cardiovascular, pulmonary and dental    Airway   Mallampati:  "II  TM distance: >3 FB  Neck ROM: full    Dental     Cardiovascular   Rhythm and rate: regular and normal      Pulmonary    breath sounds clear to auscultation            Lab Results   Component Value Date    WBC 5.4 04/21/2019    HGB 14.3 09/25/2019    HCT 38.0 04/21/2019     04/21/2019     09/25/2019    POTASSIUM 5.2 09/25/2019    CHLORIDE 98 09/05/2019    CO2 23 09/05/2019    BUN 18 09/05/2019    CR 0.92 09/05/2019     (H) 09/05/2019    NATALYA 9.6 09/05/2019    ALBUMIN 4.3 03/11/2019    PROTTOTAL 7.3 03/11/2019    ALT 39 03/11/2019    AST 28 03/11/2019    ALKPHOS 104 03/11/2019    BILITOTAL 1.2 03/11/2019    TSH 3.70 09/05/2019    T4 1.10 08/19/2016       Preop Vitals  BP Readings from Last 3 Encounters:   09/25/19 136/70   09/18/19 122/68   09/05/19 (!) 153/78    Pulse Readings from Last 3 Encounters:   09/25/19 62   09/05/19 61   06/04/19 62      Resp Readings from Last 3 Encounters:   04/21/19 18   09/12/18 18   09/09/18 16    SpO2 Readings from Last 3 Encounters:   09/25/19 98%   09/05/19 97%   06/04/19 100%      Temp Readings from Last 1 Encounters:   09/25/19 36.3  C (97.3  F) (Tympanic)    Ht Readings from Last 1 Encounters:   09/25/19 1.676 m (5' 6\")      Wt Readings from Last 1 Encounters:   09/25/19 87.5 kg (193 lb)    Estimated body mass index is 31.15 kg/m  as calculated from the following:    Height as of 9/25/19: 1.676 m (5' 6\").    Weight as of 9/25/19: 87.5 kg (193 lb).       Anesthesia Plan      History & Physical Review  History and physical reviewed and following examination; no interval change.    ASA Status:  2 .    NPO Status:  > 8 hours    Plan for General with Propofol induction. Maintenance will be Balanced.    PONV prophylaxis:  Ondansetron (or other 5HT-3) and Dexamethasone or Solumedrol       Postoperative Care  Postoperative pain management:  IV analgesics and Oral pain medications.      Consents  Anesthetic plan, risks, benefits and alternatives discussed with:  " Patient..                 Will Corona MD

## 2019-10-01 NOTE — ANESTHESIA CARE TRANSFER NOTE
Patient: Haritha Cat    Procedure(s):  LUMBAR MICRODISCECTOMY RIGHT L2-3    Diagnosis: DISC HERNIATION  Diagnosis Additional Information: No value filed.    Anesthesia Type:   General     Note:  Airway :Face Mask  Patient transferred to:PACU  Comments: Spontaneously breathing with adequate vT, sats 98 - 100%, TOF 4/4 with sustained tetany. Purposeful movement, following commands with 100% O2 at 15 L/min, suctioned x2, Anesthesiologist notified.  Extubated.  To PACU with O2 via SFM at 10 L/minute, VSS. Monitors on, report to RN.Handoff Report: Identifed the Patient, Identified the Reponsible Provider, Reviewed the pertinent medical history, Discussed the surgical course, Reviewed Intra-OP anesthesia mangement and issues during anesthesia, Set expectations for post-procedure period and Allowed opportunity for questions and acknowledgement of understanding      Vitals: (Last set prior to Anesthesia Care Transfer)    CRNA VITALS  10/1/2019 1107 - 10/1/2019 1142      10/1/2019             Resp Rate (observed):  (!) 2    Resp Rate (set):  10                Electronically Signed By: HANY Torres CRNA  October 1, 2019  11:42 AM

## 2019-10-01 NOTE — DISCHARGE INSTRUCTIONS
Today you were given 975 mg of Tylenol at 9:00 am. The recommended daily maximum dose is 4000 mg.       Same Day Surgery Discharge Instructions for  Sedation and General Anesthesia       It's not unusual to feel dizzy, light-headed or faint for up to 24 hours after surgery or while taking pain medication.  If you have these symptoms: sit for a few minutes before standing and have someone assist you when you get up to walk or use the bathroom.      You should rest and relax for the next 24 hours. We recommend you make arrangements to have an adult stay with you for at least 24 hours after your discharge.  Avoid hazardous and strenuous activity.      DO NOT DRIVE any vehicle or operate mechanical equipment for 24 hours following the end of your surgery.  Even though you may feel normal, your reactions may be affected by the medication you have received.      Do not drink alcoholic beverages for 24 hours following surgery.       Slowly progress to your regular diet as you feel able. It's not unusual to feel nauseated and/or vomit after receiving anesthesia.  If you develop these symptoms, drink clear liquids (apple juice, ginger ale, broth, 7-up, etc. ) until you feel better.  If your nausea and vomiting persists for 24 hours, please notify your surgeon.        All narcotic pain medications, along with inactivity and anesthesia, can cause constipation. Drinking plenty of liquids and increasing fiber intake will help.      For any questions of a medical nature, call your surgeon.      Do not make important decisions for 24 hours.      If you had general anesthesia, you may have a sore throat for a couple of days related to the breathing tube used during surgery.  You may use Cepacol lozenges to help with this discomfort.  If it worsens or if you develop a fever, contact your surgeon.       If you feel your pain is not well managed with the pain medications prescribed by your surgeon, please contact your surgeon's office  to let them know so they can address your concerns.         [facility]   Lumbar Microdiscectomy, Laminectomy or Decompression Discharge Instructions   Dr. Wolfgang Peng  Activity:   Walking plays a very important role in your recovery. We encourage you to begin taking short walks as soon as you are discharged from the hospital, increasing your distance and time each day. Two to three weeks after surgery you may want to vary walking with other forms of aerobic activity, such as riding a stationary bike, using a Nordic Track or Stairmaster machine. One of these forms of aerobic activity may be more comfortable than another. You have to experiment and see which one works best for you. What is most important is that you begin some form of aerobic exercise. Your goal for 6 weeks after surgery should be a minimum of 30 minutes, 3 times per week. Two weeks after surgery you may also begin some gentle stretching exercises.  Your discs are the soft, cushioning material between each vertebra (bone) in your back. Aerobic exercise helps with the nutrition of the discs in addition to strengthening the muscles that support your back.  Incision/Dressing:  You have dissolvable sutures beneath the skin, which do not need to be removed. The tape-like strips across your incision are called steri strips. They will eventually fall off on their own and if they haven't done so by the time you come in for your post-operative appointment, we will remove them for you. For your own comfort you may wish to keep a dressing over your incision for a few days. Please inspect your incision once a day and notify our office if there is persistent drainage, swelling and/or redness.  Pain/Pain Medications:   Do not be alarmed if you experience leg pain similar to what you experienced prior to surgery. This is not unusual. During surgery Dr. Peng worked around the nerve that was compressed which can cause increased nerve irritation. In  addition, the disc material that was pressing on the nerve before surgery can be a chemical irritant.  If you experienced numbness or weakness in your leg before surgery, it may take some time for this to resolve, and may never do so completely. Typically pain resolves first, numbness second, and weakness third.  Your narcotic pain medication will be refilled up to 4-6 weeks after surgery. Over the course of that period you should gradually decrease the amount of pain medications you take each day. After the pain medication is discontinued, Tylenol or ibuprofen should be enough to keep you comfortable.    Bowels/Constipation:   Narcotic pain medication can be very constipating. Make sure you drink plenty of fluids and eat plenty of fresh fruits and vegetables to help decrease constipation. Walking will also help promote normal bowel function. If the constipation continues, you may want to purchase Metamucil or an over the counter suppository at your pharmacy.    Work: Please consult with Dr. Peng as to when you may return to work. Generally, if your job is sedentary, as opposed to physically strenuous, you may return as soon as you wish.  General Do's and Don'ts:    You may shower once you are discharged from the hospital. You do not need to cover your incision, but do remove your dressing prior to showering.    You may soak in a tub or Jacuzzi after your first post-operative appointment if your incision is healed and not draining.    Avoid excessive bending, twisting, and lifting more than 10 pounds for 2 weeks.    Avoid sitting for more than 1 hour at a time for 2 weeks. If you need to sit longer than 1 hour at a time, make sure you get up and walk around for a few minutes.    You may drive when you are comfortable to do so and you are no longer taking narcotic pain medication.    You may resume sexual activity as comfort allows.    If you develop a fever greater than 100.6 F which lasts more than 2 days,  please contact our office.    If you develop difficulties controlling your bowel and/or bladder, please contact our office.    Your follow up appointment should be scheduled for 10-14 days after surgery.       If you have further questions after reviewing these instructions, please feel free to contact Sarah Mercedes RN at [phone]. The main number is [phone].

## 2019-10-02 ENCOUNTER — HEALTH MAINTENANCE LETTER (OUTPATIENT)
Age: 74
End: 2019-10-02

## 2019-10-02 DIAGNOSIS — I10 HTN (HYPERTENSION), BENIGN: ICD-10-CM

## 2019-10-02 NOTE — TELEPHONE ENCOUNTER
"Requested Prescriptions   Pending Prescriptions Disp Refills     metoprolol succinate ER (TOPROL-XL) 100 MG 24 hr tablet [Pharmacy Med Name: METOPROLOL SUCC  MG TAB] 135 tablet 1     Sig: TAKE 1.5 TABLETS BY MOUTH DAILY  Last Written Prescription Date:  03/11/2019  Last Fill Quantity: 135 tablet,  # refills: 1   Last Office Visit: 9/25/2019 [facility]  Future Office Visit:            Beta-Blockers Protocol Failed - 10/2/2019  3:10 PM        Failed - Blood pressure under 140/90 in past 12 months     BP Readings from Last 3 Encounters:   10/01/19 (!) 168/99   09/25/19 136/70   09/18/19 122/68                 Passed - Patient is age 6 or older        Passed - Recent (12 mo) or future (30 days) visit within the authorizing provider's specialty     Patient has had an office visit with the authorizing provider or a provider within the authorizing providers department within the previous 12 mos or has a future within next 30 days. See \"Patient Info\" tab in inbasket, or \"Choose Columns\" in Meds & Orders section of the refill encounter.              Passed - Medication is active on med list        "

## 2019-10-02 NOTE — OP NOTE
Procedure Date: 10/01/2019      PREOPERATIVE DIAGNOSIS:  Extraforaminal disc herniation, right L2 and L3.      POSTOPERATIVE DIAGNOSIS:  Extraforaminal disc herniation, right L2 and L3.      PROCEDURE:  Right L2 and L3 extraforaminal diskectomy.      SURGEON:  Roxana Rodríguez MD.       ASSISTANT:  ANDIE Alfaro.      DESCRIPTION OF PROCEDURE:  The patient was placed under general anesthesia.  After assurance of adequate anesthetic levels, turned prone on the Luca table.  Her back was prepped and draped in the usual fashion.  A straight midline incision was made and carried out sharply.  A subperiosteal dissection was carried out over the lamina of L2 and L3 on the right side.  A lateral radiograph was taken with a Penfield 4 dissector directed at the L2 and L3 disc space, confirming anatomic placement.  At this time, the microscope was brought into the field.  The dissection was carried laterally to the L2 and L3 facet.  The intertransverse ligament was identified and taken down.  The superolateral aspect of the supra-articular facet of L3 was thinned with a Midas Anupam.  At this time, the nerve root was gently retracted caudally where multiple large free fragment disc herniation fragments were identified and removed.  The rent within the annulus was further explored with pituitary rongeurs.  The disc space was irrigated.  No other fragments were identified.  The exiting L2 root was found to be quite free at completion.        The fascia was closed with #0 Vicryl, the subcutaneous tissue closed with 2-0 Stratafix, and the skin with a running subcuticular 3-0 undyed Vicryl.  Steri-Strips followed by a sterile compressive dressing was applied.  She was turned back to the supine position, awakened, extubated and taken to the recovery room in stable condition.  The blood loss was less than 25 mL.  The specimen was the L2 and L3 disc.  There were no intraoperative complications.         ROXANA RODRÍGUEZ MD              D: 10/01/2019   T: 10/01/2019   MT:       Name:     BARBARA ZAMORA   MRN:      [id number]        Account:        [id number]   :      1945           Procedure Date: 10/01/2019      Document: [id number]       cc: KARRI IRIZARRY

## 2019-10-03 RX ORDER — METOPROLOL SUCCINATE 100 MG/1
TABLET, EXTENDED RELEASE ORAL
Qty: 135 TABLET | Refills: 1 | Status: SHIPPED | OUTPATIENT
Start: 2019-10-03 | End: 2020-03-12

## 2019-10-03 NOTE — TELEPHONE ENCOUNTER
Routing refill request to provider for review/approval because:  BP out of range.  Please authorize if appropriate.  Thanks,  Kamille Casey RN

## 2019-10-16 ENCOUNTER — TRANSFERRED RECORDS (OUTPATIENT)
Dept: HEALTH INFORMATION MANAGEMENT | Facility: CLINIC | Age: 74
End: 2019-10-16

## 2019-11-01 ENCOUNTER — TRANSFERRED RECORDS (OUTPATIENT)
Dept: HEALTH INFORMATION MANAGEMENT | Facility: CLINIC | Age: 74
End: 2019-11-01

## 2019-12-11 DIAGNOSIS — E11.9 TYPE 2 DIABETES MELLITUS WITHOUT COMPLICATION, WITHOUT LONG-TERM CURRENT USE OF INSULIN (H): ICD-10-CM

## 2019-12-11 NOTE — TELEPHONE ENCOUNTER
Prescription approved per [facility] [place].  Ibeth SCHWARZ RN    Last Written Prescription Date:  8/27/2019  Last Fill Quantity: 360,  # refills: 0   Last office visit: 9/25/2019 with prescribing provider:     Future Office Visit:    Requested Prescriptions   Pending Prescriptions Disp Refills     metFORMIN (GLUCOPHAGE) 500 MG tablet [Pharmacy Med Name: METFORMIN  MG TABLET] 360 tablet 0     Sig: TAKE 2 TABLETS BY MOUTH TWICE A DAY WITH MEALS       Biguanide Agents Failed - 12/11/2019  8:38 AM        Failed - Blood pressure less than 140/90 in past 6 months     BP Readings from Last 3 Encounters:   10/01/19 (!) 168/99   09/25/19 136/70   09/18/19 122/68                 Passed - Patient has documented LDL within the past 12 mos.     Recent Labs   Lab Test 03/11/19  1020   LDL 84             Passed - Patient has had a Microalbumin in the past 15 mos.     Recent Labs   Lab Test 03/11/19  1028   MICROL 16   UMALCR 15.19             Passed - Patient is age 10 or older        Passed - Patient has documented A1c within the specified period of time.     If HgbA1C is 8 or greater, it needs to be on file within the past 3 months.  If less than 8, must be on file within the past 6 months.     Recent Labs   Lab Test 09/05/19  1013   A1C 6.4*             Passed - Patient's CR is NOT>1.4 OR Patient's EGFR is NOT<45 within past 12 mos.     Recent Labs   Lab Test 09/05/19  1013   GFRESTIMATED 62   GFRESTBLACK 72       Recent Labs   Lab Test 09/05/19  1013   CR 0.92             Passed - Patient does NOT have a diagnosis of CHF.        Passed - Medication is active on med list        Passed - Patient is not pregnant        Passed - Patient has not had a positive pregnancy test within the past 12 mos.         Passed - Recent (6 mo) or future (30 days) visit within the authorizing provider's specialty     Patient had office visit in the last 6 months or has a visit in the next 30 days with authorizing provider or within the  "authorizing provider's specialty.  See \"Patient Info\" tab in inbasket, or \"Choose Columns\" in Meds & Orders section of the refill encounter.            "

## 2019-12-31 ENCOUNTER — OFFICE VISIT (OUTPATIENT)
Dept: FAMILY MEDICINE | Facility: CLINIC | Age: 74
End: 2019-12-31
Payer: MEDICARE

## 2019-12-31 VITALS
WEIGHT: 186 LBS | HEART RATE: 104 BPM | BODY MASS INDEX: 30.02 KG/M2 | TEMPERATURE: 99.2 F | OXYGEN SATURATION: 95 % | SYSTOLIC BLOOD PRESSURE: 145 MMHG | DIASTOLIC BLOOD PRESSURE: 85 MMHG

## 2019-12-31 DIAGNOSIS — J20.9 ACUTE BRONCHITIS, UNSPECIFIED ORGANISM: Primary | ICD-10-CM

## 2019-12-31 PROCEDURE — 99213 OFFICE O/P EST LOW 20 MIN: CPT | Performed by: NURSE PRACTITIONER

## 2019-12-31 RX ORDER — AZITHROMYCIN 250 MG/1
TABLET, FILM COATED ORAL
Qty: 6 TABLET | Refills: 0 | Status: SHIPPED | OUTPATIENT
Start: 2019-12-31 | End: 2020-03-24

## 2019-12-31 NOTE — PROGRESS NOTES
Subjective     Haritha Cat is a 74 year old female who presents to clinic today for the following health issues:    HPI   RESPIRATORY SYMPTOMS      Duration: 3 weeks or so and then felt better for about 3 days and then 5 days ago it hit again and is now running fever and chilling    Description  cough, fatigue/malaise and yellow/green mucus; gurgling in ears and nose, chills, no appetite, rib pain from couging, no wheezing or SOB    Severity: severe    Accompanying signs and symptoms: None    History (predisposing factors):  none    Precipitating or alleviating factors: None  Therapies tried and outcome:  Cough syrup and cepachol and tylenol    Patient Active Problem List   Diagnosis     Osteopenia     Hyperlipidemia LDL goal <100     HTN (hypertension), benign     GERD (gastroesophageal reflux disease)     Chronic headaches     Colon polyp     Type 2 diabetes mellitus without complications (H)     Needle phobia     Vasovagal syncope     Rotator cuff syndrome of right shoulder     Lumbar disc herniation     Past Surgical History:   Procedure Laterality Date     CATARACT IOL, RT/LT       COLONOSCOPY N/A 5/15/2018    Procedure: COLONOSCOPY;  colonoscopy;  Surgeon: Lucero Acharya MD;  Location:  GI     DISCECTOMY LUMBAR POSTERIOR MICROSCOPIC ONE LEVEL Right 10/1/2019    Procedure: LUMBAR MICRODISCECTOMY RIGHT L2-3;  Surgeon: Wolfgang Peng MD;  Location:  OR     varicose vein surgery      Dr. Bowen     wisdom teeth  age 27       Social History     Tobacco Use     Smoking status: Never Smoker     Smokeless tobacco: Never Used   Substance Use Topics     Alcohol use: Yes     Alcohol/week: 0.0 standard drinks     Comment: 0-3 drinks per week     Family History   Problem Relation Age of Onset     Hypertension Mother      Thyroid Disease Mother      Neurologic Disorder Mother         Parkinsons and RA     Hypertension Father      Diabetes Father      Diabetes Sister      Hypertension Sister      Diabetes  Maternal Grandmother         also cad, OA, parkinsons     Diabetes Maternal Grandfather      Alcoholism Sister 59         Current Outpatient Medications   Medication Sig Dispense Refill     aspirin 81 MG tablet Take  by mouth daily.       azithromycin (ZITHROMAX) 250 MG tablet Two tablets first day, then one tablet daily for four days. 6 tablet 0     irbesartan (AVAPRO) 150 MG tablet TAKE 1 TABLET BY MOUTH EVERY DAY 90 tablet 3     metFORMIN (GLUCOPHAGE) 500 MG tablet TAKE 2 TABLETS BY MOUTH TWICE A DAY WITH MEALS 360 tablet 0     metoprolol succinate ER (TOPROL-XL) 100 MG 24 hr tablet TAKE 1.5 TABLETS BY MOUTH DAILY 135 tablet 1     psyllium (METAMUCIL SMOOTH TEXTURE) 63 % POWD Take 1 teaspoonful by mouth daily. Mix in 8 ounces of water       STATIN NOT PRESCRIBED, INTENTIONAL, Statin not prescribed intentionally due to Other LDL at goal; pt doesn't want to add another medication (This option does not exclude patient from measure)  0     Allergies   Allergen Reactions     Amoxicillin Hives     Hydrochlorothiazide Other (See Comments)     hyponatremia     Lisinopril Cough     Lotrel Hives       Reviewed and updated as needed this visit by Provider         Review of Systems   ROS COMP: Constitutional, HEENT, cardiovascular, pulmonary, gi and gu systems are negative, except as otherwise noted.      Objective    BP (!) 145/85 (BP Location: Left arm, Cuff Size: Adult Regular)   Pulse 104   Temp 99.2  F (37.3  C) (Tympanic)   Wt 84.4 kg (186 lb)   SpO2 95%   BMI 30.02 kg/m    Body mass index is 30.02 kg/m .  Physical Exam   GENERAL: healthy, alert and no distress  EYES: Eyes grossly normal to inspection, PERRL and conjunctivae and sclerae normal  HENT: ear canals and TM's normal, nose and mouth without ulcers or lesions  NECK: no adenopathy, no asymmetry, masses, or scars and thyroid normal to palpation  RESP: lungs clear to auscultation - no rales, rhonchi or wheezes, frequent cough  CV: regular rate and rhythm,  normal S1 S2, no S3 or S4, no murmur, click or rub, no peripheral edema and peripheral pulses strong  MS: no gross musculoskeletal defects noted, no edema    Diagnostic Test Results:  Labs reviewed in Epic        Assessment & Plan       ICD-10-CM    1. Acute bronchitis, unspecified organism J20.9 azithromycin (ZITHROMAX) 250 MG tablet          Patient Instructions   Please begin taking mucinex 1200mg twice a day  Push fluid intake  Take the antibiotic as prescribed   Use flonase 2 sprays each nostril once a day  And throughout the day you can use saline nasal spray  You might also want to try a neti-pot  steam heat        [facility]

## 2019-12-31 NOTE — PATIENT INSTRUCTIONS
Please begin taking mucinex 1200mg twice a day  Push fluid intake  Take the antibiotic as prescribed   Use flonase 2 sprays each nostril once a day  And throughout the day you can use saline nasal spray  You might also want to try a neti-pot  steam heat

## 2020-03-11 DIAGNOSIS — R60.0 BILATERAL LOWER EXTREMITY EDEMA: ICD-10-CM

## 2020-03-11 DIAGNOSIS — I10 HTN (HYPERTENSION), BENIGN: ICD-10-CM

## 2020-03-11 DIAGNOSIS — E11.9 TYPE 2 DIABETES MELLITUS WITHOUT COMPLICATION, WITHOUT LONG-TERM CURRENT USE OF INSULIN (H): ICD-10-CM

## 2020-03-11 NOTE — TELEPHONE ENCOUNTER
"irbesartan (AVAPRO) 150 MG tablet  90 tablet  3  5/9/2019   No    Sig: TAKE 1 TABLET BY MOUTH EVERY DAY     Last Written Prescription Date:  5-9-2019  Last Fill Quantity: 90,  # refills: 3   Last office visit: 12/31/2019 with prescribing provider:     Future Office Visit:   Next 5 appointments (look out 90 days)    Apr 16, 2020  9:30 AM CDT  PHYSICAL with Amisha Plaza PA-C  [facility] [address] [address] [phone]         Requested Prescriptions   Pending Prescriptions Disp Refills     metoprolol succinate ER (TOPROL-XL) 100 MG 24 hr tablet [Pharmacy Med Name: METOPROLOL SUCC  MG TAB] 135 tablet 1     Sig: TAKE 1.5 TABLETS BY MOUTH DAILY       Beta-Blockers Protocol Failed - 3/11/2020  8:47 AM        Failed - Blood pressure under 140/90 in past 12 months     BP Readings from Last 3 Encounters:   12/31/19 (!) 145/85   10/01/19 (!) 168/99 09/25/19 136/70                 Passed - Patient is age 6 or older        Passed - Recent (12 mo) or future (30 days) visit within the authorizing provider's specialty     Patient has had an office visit with the authorizing provider or a provider within the authorizing providers department within the previous 12 mos or has a future within next 30 days. See \"Patient Info\" tab in inbasket, or \"Choose Columns\" in Meds & Orders section of the refill encounter.              Passed - Medication is active on med list           metFORMIN (GLUCOPHAGE) 500 MG tablet [Pharmacy Med Name: METFORMIN  MG TABLET] 360 tablet 0     Sig: TAKE 2 TABLETS BY MOUTH TWICE A DAY WITH MEALS       Biguanide Agents Failed - 3/11/2020  8:47 AM        Failed - Blood pressure less than 140/90 in past 6 months     BP Readings from Last 3 Encounters:   12/31/19 (!) 145/85   10/01/19 (!) 168/99   09/25/19 136/70                 Failed - Patient has documented LDL within the past 12 mos.     Recent Labs   Lab Test 03/11/19  1020   LDL " "84             Failed - Patient has documented A1c within the specified period of time.     If HgbA1C is 8 or greater, it needs to be on file within the past 3 months.  If less than 8, must be on file within the past 6 months.     Recent Labs   Lab Test 09/05/19  1013   A1C 6.4*             Passed - Patient has had a Microalbumin in the past 15 mos.     Recent Labs   Lab Test 03/11/19  1028   MICROL 16   UMALCR 15.19             Passed - Patient is age 10 or older        Passed - Patient's CR is NOT>1.4 OR Patient's EGFR is NOT<45 within past 12 mos.     Recent Labs   Lab Test 09/05/19  1013   GFRESTIMATED 62   GFRESTBLACK 72       Recent Labs   Lab Test 09/05/19  1013   CR 0.92             Passed - Patient does NOT have a diagnosis of CHF.        Passed - Medication is active on med list        Passed - Patient is not pregnant        Passed - Patient has not had a positive pregnancy test within the past 12 mos.         Passed - Recent (6 mo) or future (30 days) visit within the authorizing provider's specialty     Patient had office visit in the last 6 months or has a visit in the next 30 days with authorizing provider or within the authorizing provider's specialty.  See \"Patient Info\" tab in inbasket, or \"Choose Columns\" in Meds & Orders section of the refill encounter.               hydrochlorothiazide (HYDRODIURIL) 12.5 MG tablet [Pharmacy Med Name: HYDROCHLOROTHIAZIDE 12.5 MG TB] 90 tablet 3     Sig: TAKE 1 TABLET BY MOUTH EVERY DAY       Diuretics (Including Combos) Protocol Failed - 3/11/2020  8:47 AM        Failed - Blood pressure under 140/90 in past 12 months     BP Readings from Last 3 Encounters:   12/31/19 (!) 145/85   10/01/19 (!) 168/99   09/25/19 136/70                 Failed - Medication is active on med list        Passed - Recent (12 mo) or future (30 days) visit within the authorizing provider's specialty     Patient has had an office visit with the authorizing provider or a provider within the " "authorizing providers department within the previous 12 mos or has a future within next 30 days. See \"Patient Info\" tab in inbasket, or \"Choose Columns\" in Meds & Orders section of the refill encounter.              Passed - Patient is age 18 or older        Passed - No active pregancy on record        Passed - Normal serum creatinine on file in past 12 months     Recent Labs   Lab Test 09/05/19  1013   CR 0.92              Passed - Normal serum potassium on file in past 12 months     Recent Labs   Lab Test 09/25/19  1647   POTASSIUM 5.2                    Passed - Normal serum sodium on file in past 12 months     Recent Labs   Lab Test 09/25/19  1559                 Passed - No positive pregnancy test in past 12 months           irbesartan (AVAPRO) 150 MG tablet [Pharmacy Med Name: IRBESARTAN 150 MG TABLET] 90 tablet 3     Sig: TAKE 1 TABLET BY MOUTH EVERY DAY       Angiotensin-II Receptors Failed - 3/11/2020  8:47 AM        Failed - Last blood pressure under 140/90 in past 12 months     BP Readings from Last 3 Encounters:   12/31/19 (!) 145/85   10/01/19 (!) 168/99   09/25/19 136/70                 Passed - Recent (12 mo) or future (30 days) visit within the authorizing provider's specialty     Patient has had an office visit with the authorizing provider or a provider within the authorizing providers department within the previous 12 mos or has a future within next 30 days. See \"Patient Info\" tab in in[facility]amer[facility], or \"Choose Columns\" in Meds & Orders section of the refill encounter.              Passed - Medication is active on med list        Passed - Patient is age 18 or older        Passed - No active pregnancy on record        Passed - Normal serum creatinine on file in past 12 months     Recent Labs   Lab Test 09/05/19  1013   CR 0.92             Passed - Normal serum potassium on file in past 12 months     Recent Labs   Lab Test 09/25/19  1647   POTASSIUM 5.2                    Passed - No positive " "pregnancy test in past 12 months           metFORMIN (GLUCOPHAGE) 500 MG tablet  360 tablet  0  12/11/2019   No    Sig: TAKE 2 TABLETS BY MOUTH TWICE A DAY WITH MEALS     Last Written Prescription Date:  12-  Last Fill Quantity: 360,  # refills: 0   Last office visit: 12/31/2019 with prescribing provider:     Future Office Visit:   Next 5 appointments (look out 90 days)    Apr 16, 2020  9:30 AM CDT  PHYSICAL with Amisha Plaza PA-C  [facility] [address] [address] [phone]         Requested Prescriptions   Pending Prescriptions Disp Refills     metoprolol succinate ER (TOPROL-XL) 100 MG 24 hr tablet [Pharmacy Med Name: METOPROLOL SUCC  MG TAB] 135 tablet 1     Sig: TAKE 1.5 TABLETS BY MOUTH DAILY       Beta-Blockers Protocol Failed - 3/11/2020  8:47 AM        Failed - Blood pressure under 140/90 in past 12 months     BP Readings from Last 3 Encounters:   12/31/19 (!) 145/85   10/01/19 (!) 168/99 09/25/19 136/70                 Passed - Patient is age 6 or older        Passed - Recent (12 mo) or future (30 days) visit within the authorizing provider's specialty     Patient has had an office visit with the authorizing provider or a provider within the authorizing providers department within the previous 12 mos or has a future within next 30 days. See \"Patient Info\" tab in inbasket, or \"Choose Columns\" in Meds & Orders section of the refill encounter.              Passed - Medication is active on med list           metFORMIN (GLUCOPHAGE) 500 MG tablet [Pharmacy Med Name: METFORMIN  MG TABLET] 360 tablet 0     Sig: TAKE 2 TABLETS BY MOUTH TWICE A DAY WITH MEALS       Biguanide Agents Failed - 3/11/2020  8:47 AM        Failed - Blood pressure less than 140/90 in past 6 months     BP Readings from Last 3 Encounters:   12/31/19 (!) 145/85   10/01/19 (!) 168/99 09/25/19 136/70                 Failed - Patient has documented LDL within " "the past 12 mos.     Recent Labs   Lab Test 03/11/19  1020   LDL 84             Failed - Patient has documented A1c within the specified period of time.     If HgbA1C is 8 or greater, it needs to be on file within the past 3 months.  If less than 8, must be on file within the past 6 months.     Recent Labs   Lab Test 09/05/19  1013   A1C 6.4*             Passed - Patient has had a Microalbumin in the past 15 mos.     Recent Labs   Lab Test 03/11/19  1028   MICROL 16   UMALCR 15.19             Passed - Patient is age 10 or older        Passed - Patient's CR is NOT>1.4 OR Patient's EGFR is NOT<45 within past 12 mos.     Recent Labs   Lab Test 09/05/19  1013   GFRESTIMATED 62   GFRESTBLACK 72       Recent Labs   Lab Test 09/05/19  1013   CR 0.92             Passed - Patient does NOT have a diagnosis of CHF.        Passed - Medication is active on med list        Passed - Patient is not pregnant        Passed - Patient has not had a positive pregnancy test within the past 12 mos.         Passed - Recent (6 mo) or future (30 days) visit within the authorizing provider's specialty     Patient had office visit in the last 6 months or has a visit in the next 30 days with authorizing provider or within the authorizing provider's specialty.  See \"Patient Info\" tab in inbasket, or \"Choose Columns\" in Meds & Orders section of the refill encounter.               hydrochlorothiazide (HYDRODIURIL) 12.5 MG tablet [Pharmacy Med Name: HYDROCHLOROTHIAZIDE 12.5 MG TB] 90 tablet 3     Sig: TAKE 1 TABLET BY MOUTH EVERY DAY       Diuretics (Including Combos) Protocol Failed - 3/11/2020  8:47 AM        Failed - Blood pressure under 140/90 in past 12 months     BP Readings from Last 3 Encounters:   12/31/19 (!) 145/85   10/01/19 (!) 168/99   09/25/19 136/70                 Failed - Medication is active on med list        Passed - Recent (12 mo) or future (30 days) visit within the authorizing provider's specialty     Patient has had an " "office visit with the authorizing provider or a provider within the authorizing providers department within the previous 12 mos or has a future within next 30 days. See \"Patient Info\" tab in inbasket, or \"Choose Columns\" in Meds & Orders section of the refill encounter.              Passed - Patient is age 18 or older        Passed - No active pregancy on record        Passed - Normal serum creatinine on file in past 12 months     Recent Labs   Lab Test 09/05/19  1013   CR 0.92              Passed - Normal serum potassium on file in past 12 months     Recent Labs   Lab Test 09/25/19  1647   POTASSIUM 5.2                    Passed - Normal serum sodium on file in past 12 months     Recent Labs   Lab Test 09/25/19  1559                 Passed - No positive pregnancy test in past 12 months           irbesartan (AVAPRO) 150 MG tablet [Pharmacy Med Name: IRBESARTAN 150 MG TABLET] 90 tablet 3     Sig: TAKE 1 TABLET BY MOUTH EVERY DAY       Angiotensin-II Receptors Failed - 3/11/2020  8:47 AM        Failed - Last blood pressure under 140/90 in past 12 months     BP Readings from Last 3 Encounters:   12/31/19 (!) 145/85   10/01/19 (!) 168/99   09/25/19 136/70                 Passed - Recent (12 mo) or future (30 days) visit within the authorizing provider's specialty     Patient has had an office visit with the authorizing provider or a provider within the authorizing providers department within the previous 12 mos or has a future within next 30 days. See \"Patient Info\" tab in [facility], or \"Choose Columns\" in Meds & Orders section of the refill encounter.              Passed - Medication is active on med list        Passed - Patient is age 18 or older        Passed - No active pregnancy on record        Passed - Normal serum creatinine on file in past 12 months     Recent Labs   Lab Test 09/05/19  1013   CR 0.92             Passed - Normal serum potassium on file in past 12 months     Recent Labs   Lab Test " "09/25/19  1647   POTASSIUM 5.2                    Passed - No positive pregnancy test in past 12 months           metoprolol succinate ER (TOPROL-XL) 100 MG 24 hr tablet  135 tablet  1  10/3/2019   No    Sig: TAKE 1.5 TABLETS BY MOUTH DAILY      Last Written Prescription Date:  10-3-2019  Last Fill Quantity: 135,  # refills: 1   Last office visit: 12/31/2019 with prescribing provider:     Future Office Visit:   Next 5 appointments (look out 90 days)    Apr 16, 2020  9:30 AM CDT  PHYSICAL with Amisha Plaza PA-C  [facility] [address] [id number] [phone]         Requested Prescriptions   Pending Prescriptions Disp Refills     metoprolol succinate ER (TOPROL-XL) 100 MG 24 hr tablet [Pharmacy Med Name: METOPROLOL SUCC  MG TAB] 135 tablet 1     Sig: TAKE 1.5 TABLETS BY MOUTH DAILY       Beta-Blockers Protocol Failed - 3/11/2020  8:47 AM        Failed - Blood pressure under 140/90 in past 12 months     BP Readings from Last 3 Encounters:   12/31/19 (!) 145/85   10/01/19 (!) 168/99   09/25/19 136/70                 Passed - Patient is age 6 or older        Passed - Recent (12 mo) or future (30 days) visit within the authorizing provider's specialty     Patient has had an office visit with the authorizing provider or a provider within the authorizing providers department within the previous 12 mos or has a future within next 30 days. See \"Patient Info\" tab in inbasket, or \"Choose Columns\" in Meds & Orders section of the refill encounter.              Passed - Medication is active on med list           metFORMIN (GLUCOPHAGE) 500 MG tablet [Pharmacy Med Name: METFORMIN  MG TABLET] 360 tablet 0     Sig: TAKE 2 TABLETS BY MOUTH TWICE A DAY WITH MEALS       Biguanide Agents Failed - 3/11/2020  8:47 AM        Failed - Blood pressure less than 140/90 in past 6 months     BP Readings from Last 3 Encounters:   12/31/19 (!) 145/85   10/01/19 (!) 168/99 " "  09/25/19 136/70                 Failed - Patient has documented LDL within the past 12 mos.     Recent Labs   Lab Test 03/11/19  1020   LDL 84             Failed - Patient has documented A1c within the specified period of time.     If HgbA1C is 8 or greater, it needs to be on file within the past 3 months.  If less than 8, must be on file within the past 6 months.     Recent Labs   Lab Test 09/05/19  1013   A1C 6.4*             Passed - Patient has had a Microalbumin in the past 15 mos.     Recent Labs   Lab Test 03/11/19  1028   MICROL 16   UMALCR 15.19             Passed - Patient is age 10 or older        Passed - Patient's CR is NOT>1.4 OR Patient's EGFR is NOT<45 within past 12 mos.     Recent Labs   Lab Test 09/05/19  1013   GFRESTIMATED 62   GFRESTBLACK 72       Recent Labs   Lab Test 09/05/19  1013   CR 0.92             Passed - Patient does NOT have a diagnosis of CHF.        Passed - Medication is active on med list        Passed - Patient is not pregnant        Passed - Patient has not had a positive pregnancy test within the past 12 mos.         Passed - Recent (6 mo) or future (30 days) visit within the authorizing provider's specialty     Patient had office visit in the last 6 months or has a visit in the next 30 days with authorizing provider or within the authorizing provider's specialty.  See \"Patient Info\" tab in inbasket, or \"Choose Columns\" in Meds & Orders section of the refill encounter.               hydrochlorothiazide (HYDRODIURIL) 12.5 MG tablet [Pharmacy Med Name: HYDROCHLOROTHIAZIDE 12.5 MG TB] 90 tablet 3     Sig: TAKE 1 TABLET BY MOUTH EVERY DAY       Diuretics (Including Combos) Protocol Failed - 3/11/2020  8:47 AM        Failed - Blood pressure under 140/90 in past 12 months     BP Readings from Last 3 Encounters:   12/31/19 (!) 145/85   10/01/19 (!) 168/99   09/25/19 136/70                 Failed - Medication is active on med list        Passed - Recent (12 mo) or future (30 days) " "visit within the authorizing provider's specialty     Patient has had an office visit with the authorizing provider or a provider within the authorizing providers department within the previous 12 mos or has a future within next 30 days. See \"Patient Info\" tab in inbasket, or \"Choose Columns\" in Meds & Orders section of the refill encounter.              Passed - Patient is age 18 or older        Passed - No active pregancy on record        Passed - Normal serum creatinine on file in past 12 months     Recent Labs   Lab Test 09/05/19  1013   CR 0.92              Passed - Normal serum potassium on file in past 12 months     Recent Labs   Lab Test 09/25/19  1647   POTASSIUM 5.2                    Passed - Normal serum sodium on file in past 12 months     Recent Labs   Lab Test 09/25/19  1559                 Passed - No positive pregnancy test in past 12 months           irbesartan (AVAPRO) 150 MG tablet [Pharmacy Med Name: IRBESARTAN 150 MG TABLET] 90 tablet 3     Sig: TAKE 1 TABLET BY MOUTH EVERY DAY       Angiotensin-II Receptors Failed - 3/11/2020  8:47 AM        Failed - Last blood pressure under 140/90 in past 12 months     BP Readings from Last 3 Encounters:   12/31/19 (!) 145/85   10/01/19 (!) 168/99   09/25/19 136/70                 Passed - Recent (12 mo) or future (30 days) visit within the authorizing provider's specialty     Patient has had an office visit with the authorizing provider or a provider within the authorizing providers department within the previous 12 mos or has a future within next 30 days. See \"Patient Info\" tab in inbasket, or \"Choose Columns\" in Meds & Orders section of the refill encounter.              Passed - Medication is active on med list        Passed - Patient is age 18 or older        Passed - No active pregnancy on record        Passed - Normal serum creatinine on file in past 12 months     Recent Labs   Lab Test 09/05/19  1013   CR 0.92             Passed - Normal serum " "potassium on file in past 12 months     Recent Labs   Lab Test 09/25/19  1647   POTASSIUM 5.2                    Passed - No positive pregnancy test in past 12 months           hydrochlorothiazide (HYDRODIURIL) 12.5 MG tablet (Discontinued)  90 tablet  3  3/11/2019  9/18/2019  No    Sig: TAKE 1 TABLET (12.5 MG) BY MOUTH DAILY      Last Written Prescription Date:  3-  Last Fill Quantity: 90,  # refills: 3   Last office visit: 12/31/2019 with prescribing provider:     Future Office Visit:   Next 5 appointments (look out 90 days)    Apr 16, 2020  9:30 AM CDT  PHYSICAL with Amisha Plaza PA-C  [facility]) [address] [address] [phone]         Requested Prescriptions   Pending Prescriptions Disp Refills     metoprolol succinate ER (TOPROL-XL) 100 MG 24 hr tablet [Pharmacy Med Name: METOPROLOL SUCC  MG TAB] 135 tablet 1     Sig: TAKE 1.5 TABLETS BY MOUTH DAILY       Beta-Blockers Protocol Failed - 3/11/2020  8:47 AM        Failed - Blood pressure under 140/90 in past 12 months     BP Readings from Last 3 Encounters:   12/31/19 (!) 145/85   10/01/19 (!) 168/99   09/25/19 136/70                 Passed - Patient is age 6 or older        Passed - Recent (12 mo) or future (30 days) visit within the authorizing provider's specialty     Patient has had an office visit with the authorizing provider or a provider within the authorizing providers department within the previous 12 mos or has a future within next 30 days. See \"Patient Info\" tab in inbasket, or \"Choose Columns\" in Meds & Orders section of the refill encounter.              Passed - Medication is active on med list           metFORMIN (GLUCOPHAGE) 500 MG tablet [Pharmacy Med Name: METFORMIN  MG TABLET] 360 tablet 0     Sig: TAKE 2 TABLETS BY MOUTH TWICE A DAY WITH MEALS       Biguanide Agents Failed - 3/11/2020  8:47 AM        Failed - Blood pressure less than 140/90 in past 6 months " "    BP Readings from Last 3 Encounters:   12/31/19 (!) 145/85   10/01/19 (!) 168/99   09/25/19 136/70                 Failed - Patient has documented LDL within the past 12 mos.     Recent Labs   Lab Test 03/11/19  1020   LDL 84             Failed - Patient has documented A1c within the specified period of time.     If HgbA1C is 8 or greater, it needs to be on file within the past 3 months.  If less than 8, must be on file within the past 6 months.     Recent Labs   Lab Test 09/05/19  1013   A1C 6.4*             Passed - Patient has had a Microalbumin in the past 15 mos.     Recent Labs   Lab Test 03/11/19  1028   MICROL 16   UMALCR 15.19             Passed - Patient is age 10 or older        Passed - Patient's CR is NOT>1.4 OR Patient's EGFR is NOT<45 within past 12 mos.     Recent Labs   Lab Test 09/05/19  1013   GFRESTIMATED 62   GFRESTBLACK 72       Recent Labs   Lab Test 09/05/19  1013   CR 0.92             Passed - Patient does NOT have a diagnosis of CHF.        Passed - Medication is active on med list        Passed - Patient is not pregnant        Passed - Patient has not had a positive pregnancy test within the past 12 mos.         Passed - Recent (6 mo) or future (30 days) visit within the authorizing provider's specialty     Patient had office visit in the last 6 months or has a visit in the next 30 days with authorizing provider or within the authorizing provider's specialty.  See \"Patient Info\" tab in inbasket, or \"Choose Columns\" in Meds & Orders section of the refill encounter.               hydrochlorothiazide (HYDRODIURIL) 12.5 MG tablet [Pharmacy Med Name: HYDROCHLOROTHIAZIDE 12.5 MG TB] 90 tablet 3     Sig: TAKE 1 TABLET BY MOUTH EVERY DAY       Diuretics (Including Combos) Protocol Failed - 3/11/2020  8:47 AM        Failed - Blood pressure under 140/90 in past 12 months     BP Readings from Last 3 Encounters:   12/31/19 (!) 145/85   10/01/19 (!) 168/99   09/25/19 136/70                 Failed - " "Medication is active on med list        Passed - Recent (12 mo) or future (30 days) visit within the authorizing provider's specialty     Patient has had an office visit with the authorizing provider or a provider within the authorizing providers department within the previous 12 mos or has a future within next 30 days. See \"Patient Info\" tab in inbasket, or \"Choose Columns\" in Meds & Orders section of the refill encounter.              Passed - Patient is age 18 or older        Passed - No active pregancy on record        Passed - Normal serum creatinine on file in past 12 months     Recent Labs   Lab Test 09/05/19  1013   CR 0.92              Passed - Normal serum potassium on file in past 12 months     Recent Labs   Lab Test 09/25/19  1647   POTASSIUM 5.2                    Passed - Normal serum sodium on file in past 12 months     Recent Labs   Lab Test 09/25/19  1559                 Passed - No positive pregnancy test in past 12 months           irbesartan (AVAPRO) 150 MG tablet [Pharmacy Med Name: IRBESARTAN 150 MG TABLET] 90 tablet 3     Sig: TAKE 1 TABLET BY MOUTH EVERY DAY       Angiotensin-II Receptors Failed - 3/11/2020  8:47 AM        Failed - Last blood pressure under 140/90 in past 12 months     BP Readings from Last 3 Encounters:   12/31/19 (!) 145/85   10/01/19 (!) 168/99   09/25/19 136/70                 Passed - Recent (12 mo) or future (30 days) visit within the authorizing provider's specialty     Patient has had an office visit with the authorizing provider or a provider within the authorizing providers department within the previous 12 mos or has a future within next 30 days. See \"Patient Info\" tab in inbasket, or \"Choose Columns\" in Meds & Orders section of the refill encounter.              Passed - Medication is active on med list        Passed - Patient is age 18 or older        Passed - No active pregnancy on record        Passed - Normal serum creatinine on file in past 12 months    "  Recent Labs   Lab Test 09/05/19  1013   CR 0.92             Passed - Normal serum potassium on file in past 12 months     Recent Labs   Lab Test 09/25/19  1647   POTASSIUM 5.2                    Passed - No positive pregnancy test in past 12 months

## 2020-03-12 RX ORDER — IRBESARTAN 150 MG/1
TABLET ORAL
Qty: 30 TABLET | Refills: 0 | Status: SHIPPED | OUTPATIENT
Start: 2020-03-12 | End: 2020-03-20

## 2020-03-12 RX ORDER — HYDROCHLOROTHIAZIDE 12.5 MG/1
TABLET ORAL
Qty: 30 TABLET | Refills: 0 | Status: SHIPPED | OUTPATIENT
Start: 2020-03-12 | End: 2020-03-20

## 2020-03-12 RX ORDER — METOPROLOL SUCCINATE 100 MG/1
TABLET, EXTENDED RELEASE ORAL
Qty: 45 TABLET | Refills: 0 | Status: SHIPPED | OUTPATIENT
Start: 2020-03-12 | End: 2020-03-20

## 2020-03-12 NOTE — TELEPHONE ENCOUNTER
Routing refill request to provider for review/approval because:  Drug interaction warning  BPs out of range.  Please authorize if appropriate.  Thanks,  Kamille Casey RN    Due for apt- added in pharm comments to schedule.

## 2020-03-22 ENCOUNTER — HEALTH MAINTENANCE LETTER (OUTPATIENT)
Age: 75
End: 2020-03-22

## 2020-03-24 ENCOUNTER — VIRTUAL VISIT (OUTPATIENT)
Dept: FAMILY MEDICINE | Facility: CLINIC | Age: 75
End: 2020-03-24
Payer: MEDICARE

## 2020-03-24 DIAGNOSIS — E11.9 TYPE 2 DIABETES MELLITUS WITHOUT COMPLICATION, WITHOUT LONG-TERM CURRENT USE OF INSULIN (H): Primary | ICD-10-CM

## 2020-03-24 DIAGNOSIS — I10 HTN (HYPERTENSION), BENIGN: ICD-10-CM

## 2020-03-24 PROCEDURE — G2012 BRIEF CHECK IN BY MD/QHP: HCPCS | Performed by: PHYSICIAN ASSISTANT

## 2020-03-24 RX ORDER — BIOTIN 1 MG
1000 TABLET ORAL DAILY
COMMUNITY
End: 2021-04-15

## 2020-03-24 RX ORDER — CLOBETASOL PROPIONATE 0.05 G/ML
SPRAY TOPICAL
COMMUNITY
End: 2021-04-15

## 2020-03-24 NOTE — PROGRESS NOTES
"Haritha Cat is a 75 year old female who is being evaluated via a telephone visit.      The patient has been notified of following (by AJIT Beltrán CMA      \"We have found that certain health care needs can be provided without the need for a physical exam.  This service lets us provide the care you need with a short phone conversation.  If a prescription is necessary we can send it directly to your pharmacy.  If lab work is needed we can place an order for that and you can then stop by our lab to have the test done at a later time.    This telephone visit will be conducted via 3 way call with the you (the patient) , the physician/provider, and a me all on the line at the same time.  This allows your physician/provider to have the phone conversation with you while I will be taking notes for your medical record.  We will have full access to your [place] medical record during this entire phone call.    Since this is like an office visit,  will bill your insurance company for this service.  Please check with your medical insurance if this type of telephone/virtual is covered . You may be responsible for the cost of this service if insurance coverage is denied.  The typical cost is $30 (10min), $59(11-20min) and $85 (21-30min)     If during the course of the call the physician/provider feels a telephone visit is not appropriate, you will not be charged for this service\"    Consent has been obtained for this service by care team member: yes.  See the scanned image in the medical record.    S: The history as provided by the patient to the provider during this 3 way call include diabetes, HTN follow up.    She and her  have been walking for exercise (4-5 times per week).    DM: she doesn't regularly check her blood sugars although does have an Accucheck but doesn't know how to use it.  Her weight is stable.  Her last eye exam was last week    Lab Results   Component Value Date    A1C 6.4 09/05/2019    " A1C 7.3 03/26/2019    A1C 7.0 03/11/2019    A1C 7.1 09/13/2018    A1C 7.3 11/15/2017     HTN: -142/65 on average.    Her back surgery was 10/1/19 and she is doing well and feels fully recovered but still has numbness from R knee to groin.    She did see derm for hair loss and they prescribed clobetasol which helps.  She had a biopsy of her scalp.    Pertinent parts of the the patient's medical history reviewed and confirmed by the provider included : PMH/meds/chart notes    Assessment and Plan:     (E11.9) Type 2 diabetes mellitus without complication, without long-term current use of insulin (H)  (primary encounter diagnosis)  Comment: pt to learn how to use the Accucheck (check with pharmacist)  Plan: cut back on etoh, keep walking. RTC 3 months for labs    (I10) HTN (hypertension), benign  Comment:   Plan: cont same, cont to monitor.      Amisha Plaza PA-C        Total time of call between patient and provider was 18 minutes   ===================================================    I have reviewed the note as documented above.  This accurately captures the substance of my conversation with the patient,

## 2020-04-13 DIAGNOSIS — I10 HTN (HYPERTENSION), BENIGN: ICD-10-CM

## 2020-04-13 NOTE — TELEPHONE ENCOUNTER
"  irbesartan (AVAPRO) 150 MG tablet  90 tablet  0  3/20/2020          Last Written Prescription Date:  03/20/2020  Last Fill Quantity: 90,  # refills: 0   Last office visit: 3/24/2020 with prescribing provider:  Amisha Plaza   Future Office Visit:  Unknown    Requested Prescriptions   Pending Prescriptions Disp Refills     irbesartan (AVAPRO) 150 MG tablet [Pharmacy Med Name: IRBESARTAN 150 MG TABLET] 90 tablet 0     Sig: TAKE 1 TABLET BY MOUTH EVERY DAY       Angiotensin-II Receptors Failed - 4/13/2020 11:49 AM        Failed - Last blood pressure under 140/90 in past 12 months     BP Readings from Last 3 Encounters:   12/31/19 (!) 145/85   10/01/19 (!) 168/99   09/25/19 136/70                 Passed - Recent (12 mo) or future (30 days) visit within the authorizing provider's specialty     Patient has had an office visit with the authorizing provider or a provider within the authorizing providers department within the previous 12 mos or has a future within next 30 days. See \"Patient Info\" tab in inbasket, or \"Choose Columns\" in Meds & Orders section of the refill encounter.              Passed - Medication is active on med list        Passed - Patient is age 18 or older        Passed - No active pregnancy on record        Passed - Normal serum creatinine on file in past 12 months     Recent Labs   Lab Test 09/05/19  1013   CR 0.92       Ok to refill medication if creatinine is low          Passed - Normal serum potassium on file in past 12 months     Recent Labs   Lab Test 09/25/19  1647   POTASSIUM 5.2                    Passed - No positive pregnancy test in past 12 months             "

## 2020-04-14 RX ORDER — IRBESARTAN 150 MG/1
TABLET ORAL
Qty: 90 TABLET | Refills: 0 | Status: SHIPPED | OUTPATIENT
Start: 2020-04-14 | End: 2020-09-09

## 2020-04-14 NOTE — TELEPHONE ENCOUNTER
Routing refill request to provider for review/approval because:  Labs out of range:  BP    ANIBAL AlbrechtN, RN  [employer] [facility] [facility]

## 2020-06-30 DIAGNOSIS — R60.0 BILATERAL LOWER EXTREMITY EDEMA: ICD-10-CM

## 2020-06-30 DIAGNOSIS — I10 HTN (HYPERTENSION), BENIGN: ICD-10-CM

## 2020-07-01 RX ORDER — HYDROCHLOROTHIAZIDE 12.5 MG/1
12.5 TABLET ORAL DAILY
Qty: 90 TABLET | Refills: 0 | Status: SHIPPED | OUTPATIENT
Start: 2020-07-01 | End: 2020-09-09

## 2020-07-01 RX ORDER — METOPROLOL SUCCINATE 100 MG/1
TABLET, EXTENDED RELEASE ORAL
Qty: 135 TABLET | Refills: 0 | Status: SHIPPED | OUTPATIENT
Start: 2020-07-01 | End: 2021-01-14

## 2020-08-24 DIAGNOSIS — E11.9 TYPE 2 DIABETES MELLITUS WITHOUT COMPLICATION, WITHOUT LONG-TERM CURRENT USE OF INSULIN (H): ICD-10-CM

## 2020-08-25 NOTE — TELEPHONE ENCOUNTER
Filled per [facility] protocol.   Patient has appointment scheduled for 9/9/2020 with provider  JHON Crawford, RN  [employer] [facility]

## 2020-09-09 ENCOUNTER — OFFICE VISIT (OUTPATIENT)
Dept: FAMILY MEDICINE | Facility: CLINIC | Age: 75
End: 2020-09-09
Payer: MEDICARE

## 2020-09-09 VITALS
HEIGHT: 66 IN | SYSTOLIC BLOOD PRESSURE: 145 MMHG | BODY MASS INDEX: 31.98 KG/M2 | OXYGEN SATURATION: 96 % | HEART RATE: 68 BPM | DIASTOLIC BLOOD PRESSURE: 81 MMHG | WEIGHT: 199 LBS

## 2020-09-09 DIAGNOSIS — E78.5 HYPERLIPIDEMIA LDL GOAL <100: ICD-10-CM

## 2020-09-09 DIAGNOSIS — M51.26 LUMBAR DISC HERNIATION: ICD-10-CM

## 2020-09-09 DIAGNOSIS — E83.52 HYPERCALCEMIA: ICD-10-CM

## 2020-09-09 DIAGNOSIS — R60.0 BILATERAL LOWER EXTREMITY EDEMA: ICD-10-CM

## 2020-09-09 DIAGNOSIS — I10 HTN (HYPERTENSION), BENIGN: ICD-10-CM

## 2020-09-09 DIAGNOSIS — E87.5 HYPERKALEMIA: ICD-10-CM

## 2020-09-09 DIAGNOSIS — E87.1 HYPONATREMIA: ICD-10-CM

## 2020-09-09 DIAGNOSIS — Z00.00 MEDICARE ANNUAL WELLNESS VISIT, SUBSEQUENT: Primary | ICD-10-CM

## 2020-09-09 DIAGNOSIS — E11.9 TYPE 2 DIABETES MELLITUS WITHOUT COMPLICATION, WITHOUT LONG-TERM CURRENT USE OF INSULIN (H): ICD-10-CM

## 2020-09-09 LAB
CREAT UR-MCNC: 203 MG/DL
ERYTHROCYTE [DISTWIDTH] IN BLOOD BY AUTOMATED COUNT: 11.9 % (ref 10–15)
HBA1C MFR BLD: 6.2 % (ref 0–5.6)
HCT VFR BLD AUTO: 42 % (ref 35–47)
HGB BLD-MCNC: 14.5 G/DL (ref 11.7–15.7)
MCH RBC QN AUTO: 32.2 PG (ref 26.5–33)
MCHC RBC AUTO-ENTMCNC: 34.5 G/DL (ref 31.5–36.5)
MCV RBC AUTO: 93 FL (ref 78–100)
MICROALBUMIN UR-MCNC: 27 MG/L
MICROALBUMIN/CREAT UR: 13.45 MG/G CR (ref 0–25)
PLATELET # BLD AUTO: 252 10E9/L (ref 150–450)
RBC # BLD AUTO: 4.51 10E12/L (ref 3.8–5.2)
WBC # BLD AUTO: 5.5 10E9/L (ref 4–11)

## 2020-09-09 PROCEDURE — 36415 COLL VENOUS BLD VENIPUNCTURE: CPT | Performed by: PHYSICIAN ASSISTANT

## 2020-09-09 PROCEDURE — 82043 UR ALBUMIN QUANTITATIVE: CPT | Performed by: PHYSICIAN ASSISTANT

## 2020-09-09 PROCEDURE — 85027 COMPLETE CBC AUTOMATED: CPT | Performed by: PHYSICIAN ASSISTANT

## 2020-09-09 PROCEDURE — G0439 PPPS, SUBSEQ VISIT: HCPCS | Performed by: PHYSICIAN ASSISTANT

## 2020-09-09 PROCEDURE — 99207 C FOOT EXAM  NO CHARGE: CPT | Performed by: PHYSICIAN ASSISTANT

## 2020-09-09 PROCEDURE — 83036 HEMOGLOBIN GLYCOSYLATED A1C: CPT | Performed by: PHYSICIAN ASSISTANT

## 2020-09-09 PROCEDURE — 80053 COMPREHEN METABOLIC PANEL: CPT | Performed by: PHYSICIAN ASSISTANT

## 2020-09-09 PROCEDURE — 80061 LIPID PANEL: CPT | Performed by: PHYSICIAN ASSISTANT

## 2020-09-09 RX ORDER — FAMOTIDINE 10 MG
10 TABLET ORAL PRN
COMMUNITY

## 2020-09-09 RX ORDER — HYDROCHLOROTHIAZIDE 12.5 MG/1
12.5 TABLET ORAL DAILY
Qty: 90 TABLET | Refills: 3 | Status: SHIPPED | OUTPATIENT
Start: 2020-09-09 | End: 2021-09-29

## 2020-09-09 RX ORDER — IRBESARTAN 150 MG/1
150 TABLET ORAL DAILY
Qty: 90 TABLET | Refills: 3 | Status: SHIPPED | OUTPATIENT
Start: 2020-09-09 | End: 2021-04-15

## 2020-09-09 ASSESSMENT — ACTIVITIES OF DAILY LIVING (ADL): CURRENT_FUNCTION: NO ASSISTANCE NEEDED

## 2020-09-09 ASSESSMENT — MIFFLIN-ST. JEOR: SCORE: 1424.41

## 2020-09-09 NOTE — PROGRESS NOTES
"SUBJECTIVE:   Haritha Cat is a 75 year old female who presents for Preventive Visit.    She is struggling with not being able to hug her grandchildren during COVID    Diabetes:     She has eye exam end of the month    Lab Results   Component Value Date    A1C 6.4 09/05/2019    A1C 7.3 03/26/2019    A1C 7.0 03/11/2019    A1C 7.1 09/13/2018    A1C 7.3 11/15/2017     HTN: Her readings are around 140s/80s    Her back is better, but R leg is bothering her    Are you in the first 12 months of your Medicare coverage?  No    physical  Healthy Habits:    In general, how would you rate your overall health?  Very good    Frequency of exercise:  4-5 days/week    Duration of exercise:  15-30 minutes    Do you usually eat at least 4 servings of fruit and vegetables a day, include whole grains    & fiber and avoid regularly eating high fat or \"junk\" foods?  Yes    Taking medications regularly:  Yes    Barriers to taking medications:  None    Medication side effects:  None    Ability to successfully perform activities of daily living:  No assistance needed    Home Safety:  No safety concerns identified    Hearing Impairment:  No hearing concerns    In the past 6 months, have you been bothered by leaking of urine?  No    In general, how would you rate your overall mental or emotional health?  Good      PHQ-2 Total Score:    Additional concerns today:  No    Do you feel safe in your environment? Yes    Have you ever done Advance Care Planning? (For example, a Health Directive, POLST, or a discussion with a medical provider or your loved ones about your wishes): No, advance care planning information given to patient to review.  Patient plans to discuss their wishes with loved ones or provider.        Fall risk  Fallen 2 or more times in the past year?: No  Any fall with injury in the past year?: No    Cognitive Screening   1) Repeat 3 items (Leader, Season, Table)    2) Clock draw: NORMAL  3) 3 item recall: Recalls 3 " objects  Results: 3 items recalled: COGNITIVE IMPAIRMENT LESS LIKELY    Mini-CogTM Copyright JOSÉ MIGUEL Jones. Licensed by the author for use in [facility]; reprinted with permission (ray@.[facility]). All rights reserved.      Do you have sleep apnea, excessive snoring or daytime drowsiness?: no    Reviewed and updated as needed this visit by clinical staff  Tobacco  Allergies  Meds         Reviewed and updated as needed this visit by Provider        Social History     Tobacco Use     Smoking status: Never Smoker     Smokeless tobacco: Never Used   Substance Use Topics     Alcohol use: Yes     Alcohol/week: 0.0 standard drinks     Comment: 0-3 drinks per week     If you drink alcohol do you typically have >3 drinks per day or >7 drinks per week? No    Alcohol Use 3/8/2019   Prescreen: >3 drinks/day or >7 drinks/week? No   Prescreen: >3 drinks/day or >7 drinks/week? -       Current providers sharing in care for this patient include:   Patient Care Team:  Amisha Plaza PA-C as PCP - General (Internal Medicine)  Amisha Plaza PA-C as Assigned PCP    The following health maintenance items are reviewed in Epic and correct as of today:  Health Maintenance   Topic Date Due     EYE EXAM  1945     HEPATITIS B IMMUNIZATION (1 of 3 - Risk 3-dose series) 02/23/1966     ZOSTER IMMUNIZATION (2 of 3) 10/02/2014     ADVANCE CARE PLANNING  07/12/2017     A1C  03/05/2020     MEDICARE ANNUAL WELLNESS VISIT  03/11/2020     LIPID  03/11/2020     MICROALBUMIN  03/11/2020     DIABETIC FOOT EXAM  03/11/2020     INFLUENZA VACCINE (1) 09/01/2020     BMP  09/05/2020     MAMMO SCREENING  03/11/2021     FALL RISK ASSESSMENT  03/24/2021     COLORECTAL CANCER SCREENING  05/15/2023     DTAP/TDAP/TD IMMUNIZATION (3 - Td) 08/20/2025     DEXA  Completed     HEPATITIS C SCREENING  Completed     PHQ-2  Completed     PNEUMOCOCCAL IMMUNIZATION 65+ LOW/MEDIUM RISK  Completed     IPV IMMUNIZATION  Aged Out     MENINGITIS IMMUNIZATION   Aged Out     Past Medical History:   Diagnosis Date     Abscess of abdominal cavity (H) 11/02    cecal perf, prob from diverticulitis     Allergic rhinitis      Colon polyp 2015    colonoscopy due 5 years.     Diverticulosis      DM type 2 (diabetes mellitus, type 2) (H)      Elevated LFTs     fatty liver dz     GERD (gastroesophageal reflux disease)      Hepatitis A      Herpes zoster 11/28/11     HTN (hypertension), benign      Osteopenia     DXA 2008     Osteopenia 2014     Past Surgical History:   Procedure Laterality Date     CATARACT IOL, RT/LT       COLONOSCOPY N/A 5/15/2018    Procedure: COLONOSCOPY;  colonoscopy;  Surgeon: Lucero Acharya MD;  Location:  GI     DISCECTOMY LUMBAR POSTERIOR MICROSCOPIC ONE LEVEL Right 10/1/2019    Procedure: LUMBAR MICRODISCECTOMY RIGHT L2-3;  Surgeon: Wolfgang Peng MD;  Location:  OR     varicose vein surgery      Dr. Bowen     wisdom teeth  age 27     Current Outpatient Medications   Medication Sig Dispense Refill     famotidine (PEPCID) 10 MG tablet Take by mouth as needed       hydrochlorothiazide (HYDRODIURIL) 12.5 MG tablet Take 1 tablet (12.5 mg) by mouth daily 90 tablet 3     irbesartan (AVAPRO) 150 MG tablet Take 1 tablet (150 mg) by mouth daily 90 tablet 3     metFORMIN (GLUCOPHAGE) 500 MG tablet TAKE 2 TABLETS BY MOUTH TWICE A DAY WITH MEALS 120 tablet 3     Acetaminophen 325 MG CAPS Take 325-650 mg by mouth every 4 hours as needed       aspirin 81 MG tablet Take  by mouth daily.       biotin 1000 MCG TABS tablet Take 1,000 mcg by mouth daily       clobetasol propionate 0.05 % LIQD        metoprolol succinate ER (TOPROL-XL) 100 MG 24 hr tablet TAKE 1 AND 1/2 TABLETS BY MOUTH EVERY DAY (Patient not taking: Reported on 9/9/2020) 135 tablet 0     psyllium (METAMUCIL SMOOTH TEXTURE) 63 % POWD Take 1 teaspoonful by mouth daily. Mix in 8 ounces of water       STATIN NOT PRESCRIBED, INTENTIONAL, Statin not prescribed intentionally due to Other LDL at goal;  "pt doesn't want to add another medication (This option does not exclude patient from measure)  0         Review of Systems  Constitutional, HEENT, cardiovascular, pulmonary, GI, , musculoskeletal, neuro, skin, endocrine and psych systems are negative, except as otherwise noted.    OBJECTIVE:   BP (!) 145/81 (BP Location: Left arm, Patient Position: Sitting)   Pulse 68   Ht 1.676 m (5' 6\")   Wt 90.3 kg (199 lb)   SpO2 96%   BMI 32.12 kg/m   Estimated body mass index is 32.12 kg/m  as calculated from the following:    Height as of this encounter: 1.676 m (5' 6\").    Weight as of this encounter: 90.3 kg (199 lb).  Physical Exam  GENERAL APPEARANCE: healthy, alert and no distress  EYES: Eyes grossly normal to inspection, PERRL and conjunctivae and sclerae normal  HENT: ear canals and TM's normal, nose and mouth without ulcers or lesions, oropharynx clear and oral mucous membranes moist  NECK: no adenopathy, no asymmetry, masses, or scars and thyroid normal to palpation  RESP: lungs clear to auscultation - no rales, rhonchi or wheezes  BREAST: normal without masses, tenderness or nipple discharge and no palpable axillary masses or adenopathy  CV: regular rate and rhythm, normal S1 S2, no S3 or S4, no murmur, click or rub, no peripheral edema and peripheral pulses strong  ABDOMEN: soft, nontender, no hepatosplenomegaly, no masses and bowel sounds normal  MS: no musculoskeletal defects are noted and gait is age appropriate without ataxia  SKIN: no suspicious lesions or rashes  NEURO: Normal strength and tone, sensory exam grossly normal, mentation intact and speech normal  PSYCH: mentation appears normal and affect normal/bright    Results for orders placed or performed in visit on 09/09/20   Hemoglobin A1c     Status: Abnormal   Result Value Ref Range    Hemoglobin A1C 6.2 (H) 0 - 5.6 %   CBC with platelets     Status: None   Result Value Ref Range    WBC 5.5 4.0 - 11.0 10e9/L    RBC Count 4.51 3.8 - 5.2 10e12/L    " Hemoglobin 14.5 11.7 - 15.7 g/dL    Hematocrit 42.0 35.0 - 47.0 %    MCV 93 78 - 100 fl    MCH 32.2 26.5 - 33.0 pg    MCHC 34.5 31.5 - 36.5 g/dL    RDW 11.9 10.0 - 15.0 %    Platelet Count 252 150 - 450 10e9/L   Albumin Random Urine Quantitative with Creat Ratio     Status: None   Result Value Ref Range    Creatinine Urine 203 mg/dL    Albumin Urine mg/L 27 mg/L    Albumin Urine mg/g Cr 13.45 0 - 25 mg/g Cr         ASSESSMENT / PLAN:   Assessment and Plan:     (Z00.00) Medicare annual wellness visit, subsequent  (primary encounter diagnosis)  Comment: recd pt schedule her mammogram which is overdue. Number given.  Recd she call insurance RE: shingrex. Recd high dose flu shot this fall.  Plan: CBC with platelets            (E11.9) Type 2 diabetes mellitus without complication, without long-term current use of insulin (H)  Comment: schedule annual eye exam  Plan: FOOT EXAM, Hemoglobin A1c, Albumin Random Urine        Quantitative with Creat Ratio, metFORMIN         (GLUCOPHAGE) 500 MG tablet            (E78.5) Hyperlipidemia LDL goal <100  Comment:   Plan: Lipid panel reflex to direct LDL Fasting            (I10) HTN (hypertension), benign  Comment: she doesn't think she is taking metoprolol for some reason so agrees to check her bottles when she gets home and will f/u  Plan: Comprehensive metabolic panel,         hydrochlorothiazide (HYDRODIURIL) 12.5 MG         tablet, irbesartan (AVAPRO) 150 MG tablet            (R60.0) Bilateral lower extremity edema  Comment:   Plan: hydrochlorothiazide (HYDRODIURIL) 12.5 MG         tablet            (M51.26) Lumbar disc herniation  Comment: had discectomy last Oct  Plan: still has some burning pain R ant thigh so will f/u with surgeon    Patient Instructions   Go off of Aleve as this can raise your blood pressure.  Switch to tylenol and tylenol PM    Mammogram due [phone]    Shingrex    High dose flu shot    Eye exam        COUNSELING:  Reviewed preventive health  "counseling, as reflected in patient instructions    Estimated body mass index is 32.12 kg/m  as calculated from the following:    Height as of this encounter: 1.676 m (5' 6\").    Weight as of this encounter: 90.3 kg (199 lb).        She reports that she has never smoked. She has never used smokeless tobacco.      Appropriate preventive services were discussed with this patient, including applicable screening as appropriate for cardiovascular disease, diabetes, osteopenia/osteoporosis, and glaucoma.  As appropriate for age/gender, discussed screening for colorectal cancer, prostate cancer, breast cancer, and cervical cancer. Checklist reviewing preventive services available has been given to the patient.    Reviewed patients plan of care and provided an AVS. The Basic Care Plan (routine screening as documented in Health Maintenance) for Haritha meets the Care Plan requirement. This Care Plan has been established and reviewed with the Patient.    Counseling Resources:  ATP IV Guidelines  Pooled Cohorts Equation Calculator  Breast Cancer Risk Calculator  Breast Cancer: Medication to Reduce Risk  FRAX Risk Assessment  ICSI Preventive Guidelines  Dietary Guidelines for Americans, 2010  USDA's MyPlate  ASA Prophylaxis  Lung CA Screening    Amisha Plaza PA-C  [facility]    Identified Health Risks:  "

## 2020-09-09 NOTE — PATIENT INSTRUCTIONS
Go off of Aleve as this can raise your blood pressure.  Switch to tylenol and tylenol PM    Mammogram due [phone]    Shingrex    High dose flu shot    Eye exam

## 2020-09-10 LAB
ALBUMIN SERPL-MCNC: 4.6 G/DL (ref 3.4–5)
ALP SERPL-CCNC: 110 U/L (ref 40–150)
ALT SERPL W P-5'-P-CCNC: 32 U/L (ref 0–50)
ANION GAP SERPL CALCULATED.3IONS-SCNC: 5 MMOL/L (ref 3–14)
AST SERPL W P-5'-P-CCNC: 25 U/L (ref 0–45)
BILIRUB SERPL-MCNC: 1.3 MG/DL (ref 0.2–1.3)
BUN SERPL-MCNC: 17 MG/DL (ref 7–30)
CALCIUM SERPL-MCNC: 10.7 MG/DL (ref 8.5–10.1)
CHLORIDE SERPL-SCNC: 100 MMOL/L (ref 94–109)
CHOLEST SERPL-MCNC: 185 MG/DL
CO2 SERPL-SCNC: 26 MMOL/L (ref 20–32)
CREAT SERPL-MCNC: 0.99 MG/DL (ref 0.52–1.04)
GFR SERPL CREATININE-BSD FRML MDRD: 56 ML/MIN/{1.73_M2}
GLUCOSE SERPL-MCNC: 154 MG/DL (ref 70–99)
HDLC SERPL-MCNC: 63 MG/DL
LDLC SERPL CALC-MCNC: 88 MG/DL
NONHDLC SERPL-MCNC: 122 MG/DL
POTASSIUM SERPL-SCNC: 5.9 MMOL/L (ref 3.4–5.3)
PROT SERPL-MCNC: 7.6 G/DL (ref 6.8–8.8)
SODIUM SERPL-SCNC: 131 MMOL/L (ref 133–144)
TRIGL SERPL-MCNC: 172 MG/DL

## 2020-09-11 NOTE — RESULT ENCOUNTER NOTE
Pat,    Your electrolytes are off.  Your sodium is low and your potassium and calcium levels are high.  I am not sure of the etiology of this besides you taking a diuretic, so let's start but just rechecking the labs next week.  Schedule a lab only for next week and I will pend the orders.  Your glucose here was 154.  Your diabetes control test (hemoglobin A1c) looks very good at 6.2%.    Your cholesterol profile looks good.  Your white and red blood cell counts are normal.  Your urine microalbumin was normal.    Let me know if you have any questions.    Amisha Plaza PA-C

## 2020-09-21 ENCOUNTER — HOSPITAL ENCOUNTER (OUTPATIENT)
Dept: MAMMOGRAPHY | Facility: CLINIC | Age: 75
Discharge: HOME | End: 2020-09-21
Attending: PHYSICIAN ASSISTANT | Admitting: PHYSICIAN ASSISTANT
Payer: MEDICARE

## 2020-09-21 DIAGNOSIS — Z12.31 VISIT FOR SCREENING MAMMOGRAM: ICD-10-CM

## 2020-09-21 PROCEDURE — 77067 SCR MAMMO BI INCL CAD: CPT

## 2020-09-22 ENCOUNTER — TRANSFERRED RECORDS (OUTPATIENT)
Dept: MULTI SPECIALTY CLINIC | Facility: CLINIC | Age: 75
End: 2020-09-22

## 2020-09-22 LAB
RETINOPATHY: NEGATIVE
RETINOPATHY: NORMAL

## 2020-09-23 DIAGNOSIS — E83.52 HYPERCALCEMIA: ICD-10-CM

## 2020-09-23 DIAGNOSIS — E87.5 HYPERKALEMIA: ICD-10-CM

## 2020-09-23 LAB
ANION GAP SERPL CALCULATED.3IONS-SCNC: 9 MMOL/L (ref 3–14)
BUN SERPL-MCNC: 12 MG/DL (ref 7–30)
CALCIUM SERPL-MCNC: 9.7 MG/DL (ref 8.5–10.1)
CHLORIDE SERPL-SCNC: 102 MMOL/L (ref 94–109)
CO2 SERPL-SCNC: 25 MMOL/L (ref 20–32)
CREAT SERPL-MCNC: 0.91 MG/DL (ref 0.52–1.04)
GFR SERPL CREATININE-BSD FRML MDRD: 62 ML/MIN/{1.73_M2}
GLUCOSE SERPL-MCNC: 136 MG/DL (ref 70–99)
POTASSIUM SERPL-SCNC: 5.2 MMOL/L (ref 3.4–5.3)
PTH-INTACT SERPL-MCNC: 33 PG/ML (ref 18–80)
SODIUM SERPL-SCNC: 136 MMOL/L (ref 133–144)

## 2020-09-23 PROCEDURE — 83970 ASSAY OF PARATHORMONE: CPT | Performed by: PHYSICIAN ASSISTANT

## 2020-09-23 PROCEDURE — 36415 COLL VENOUS BLD VENIPUNCTURE: CPT | Performed by: PHYSICIAN ASSISTANT

## 2020-09-23 PROCEDURE — 80048 BASIC METABOLIC PNL TOTAL CA: CPT | Performed by: PHYSICIAN ASSISTANT

## 2020-09-23 PROCEDURE — 82306 VITAMIN D 25 HYDROXY: CPT | Performed by: PHYSICIAN ASSISTANT

## 2020-09-24 LAB — DEPRECATED CALCIDIOL+CALCIFEROL SERPL-MC: 30 UG/L (ref 20–75)

## 2020-09-24 NOTE — RESULT ENCOUNTER NOTE
Pat,     Your calcium level normalized.   Your vitamin D level is slightly low.  Start taking vitamin D3 1000U/day with food.  Vitamin D deficiency is common in our [place] as this comes from sunshine.  Your parathyroid test was in normal range.    Amisha Plaza PA-C

## 2020-09-29 ENCOUNTER — TELEPHONE (OUTPATIENT)
Dept: FAMILY MEDICINE | Facility: CLINIC | Age: 75
End: 2020-09-29

## 2020-09-29 NOTE — TELEPHONE ENCOUNTER
Please abstract the following data from this visit with this patient into the appropriate field in Epic:    Tests that can be patient reported without a hard copy:    Eye exam with ophthalmology on this date: 09/22/2020 at [facility]    Marian Angel CMA

## 2020-10-20 ENCOUNTER — OFFICE VISIT (OUTPATIENT)
Dept: FAMILY MEDICINE | Facility: CLINIC | Age: 75
End: 2020-10-20
Payer: MEDICARE

## 2020-10-20 VITALS
SYSTOLIC BLOOD PRESSURE: 142 MMHG | HEIGHT: 66 IN | TEMPERATURE: 98.2 F | WEIGHT: 197 LBS | BODY MASS INDEX: 31.66 KG/M2 | DIASTOLIC BLOOD PRESSURE: 70 MMHG | HEART RATE: 68 BPM | OXYGEN SATURATION: 98 %

## 2020-10-20 DIAGNOSIS — H26.9 CATARACT OF RIGHT EYE, UNSPECIFIED CATARACT TYPE: Primary | ICD-10-CM

## 2020-10-20 DIAGNOSIS — Z23 NEED FOR PROPHYLACTIC VACCINATION AND INOCULATION AGAINST INFLUENZA: ICD-10-CM

## 2020-10-20 DIAGNOSIS — Z01.818 PREOP GENERAL PHYSICAL EXAM: ICD-10-CM

## 2020-10-20 PROCEDURE — 99214 OFFICE O/P EST MOD 30 MIN: CPT | Mod: 25 | Performed by: PHYSICIAN ASSISTANT

## 2020-10-20 PROCEDURE — 90662 IIV NO PRSV INCREASED AG IM: CPT | Performed by: PHYSICIAN ASSISTANT

## 2020-10-20 PROCEDURE — G0008 ADMIN INFLUENZA VIRUS VAC: HCPCS | Performed by: PHYSICIAN ASSISTANT

## 2020-10-20 RX ORDER — MULTIVIT-MIN/IRON/FOLIC ACID/K 18-600-40
1 CAPSULE ORAL DAILY
COMMUNITY

## 2020-10-20 ASSESSMENT — MIFFLIN-ST. JEOR: SCORE: 1415.34

## 2020-10-20 NOTE — PROGRESS NOTES
[address] [address]  Phone: [phone]  Primary Provider: Amisha Plaza      PREOPERATIVE EVALUATION:  Today's date: 10/20/2020    Haritha Cat is a 75 year old female who presents for a preoperative evaluation.    Surgical Information:  Surgery/Procedure: cataract and lens astigmatism  right eye  Surgery Location:  [facility]  Surgeon:  Dr. Escalona  Surgery Date: 11/5/20  Time of Surgery: 7am  Where patient plans to recover: At home with family  Fax number for surgical facility: [phone]    Type of Anesthesia Anticipated: to be determined    Subjective     HPI related to upcoming procedure: cataract    Preop Questions 10/20/2020   1. Have you ever had a heart attack or stroke? No   2. Have you ever had surgery on your heart or blood vessels, such as a stent placement, a coronary artery bypass, or surgery on an artery in your head, neck, heart, or legs? No   3. Do you have chest pain with activity? No   4. Do you have a history of  heart failure? No   5. Do you currently have a cold, bronchitis or symptoms of other infection? No   6. Do you have a cough, shortness of breath, or wheezing? No   7. Do you or anyone in your family have previous history of blood clots? No   8. Do you or does anyone in your family have a serious bleeding problem such as prolonged bleeding following surgeries or cuts? No   9. Have you ever had problems with anemia or been told to take iron pills? No   10. Have you had any abnormal blood loss such as black, tarry or bloody stools, or abnormal vaginal bleeding? No   11. Have you ever had a blood transfusion? No   12. Are you willing to have a blood transfusion if it is medically needed before, during, or after your surgery? Yes   13. Have you or any of your relatives ever had problems with anesthesia? No   14. Do you have sleep apnea, excessive snoring or daytime drowsiness? No   15. Do you have any  artifical heart valves or other implanted medical devices like a pacemaker, defibrillator, or continuous glucose monitor? No   16. Do you have artificial joints? No   17. Are you allergic to latex? No     Review of Systems  CONSTITUTIONAL: NEGATIVE for fever, chills, change in weight  ENT/MOUTH: NEGATIVE for ear, mouth and throat problems  RESP: NEGATIVE for significant cough or SOB  CV: NEGATIVE for chest pain, palpitations or peripheral edema    Patient Active Problem List    Diagnosis Date Noted     Lumbar disc herniation 06/04/2019     Priority: Medium     Rotator cuff syndrome of right shoulder 03/11/2019     Priority: Medium     Needle phobia 07/11/2017     Priority: Medium     Vasovagal syncope 07/11/2017     Priority: Medium     Type 2 diabetes mellitus without complications (H) 10/23/2015     Priority: Medium     Colon polyp      Priority: Medium     colonoscopy due 5 years.       Chronic headaches 04/23/2014     Priority: Medium     HTN (hypertension), benign      Priority: Medium     GERD (gastroesophageal reflux disease)      Priority: Medium     Hyperlipidemia LDL goal <100 05/09/2012     Priority: Medium     Osteopenia      Priority: Medium      Past Medical History:   Diagnosis Date     Abscess of abdominal cavity (H) 11/02    cecal perf, prob from diverticulitis     Allergic rhinitis      Colon polyp 2015    colonoscopy due 5 years.     Diverticulosis      DM type 2 (diabetes mellitus, type 2) (H)      Elevated LFTs     fatty liver dz     GERD (gastroesophageal reflux disease)      Hepatitis A      Herpes zoster 11/28/11     HTN (hypertension), benign      Osteopenia     DXA 2008     Osteopenia 2014     Past Surgical History:   Procedure Laterality Date     CATARACT IOL, RT/LT       COLONOSCOPY N/A 5/15/2018    Procedure: COLONOSCOPY;  colonoscopy;  Surgeon: Lucero Acharya MD;  Location: [facility]     DISCECTOMY LUMBAR POSTERIOR MICROSCOPIC ONE LEVEL Right 10/1/2019    Procedure: LUMBAR  MICRODISCECTOMY RIGHT L2-3;  Surgeon: Wolfgang Peng MD;  Location: [facility] OR     varicose vein surgery      Dr. Bowen     wisdom teeth  age 27     Current Outpatient Medications   Medication Sig Dispense Refill     Acetaminophen 325 MG CAPS Take 325-650 mg by mouth every 4 hours as needed       aspirin 81 MG tablet Take  by mouth daily.       biotin 1000 MCG TABS tablet Take 1,000 mcg by mouth daily       clobetasol propionate 0.05 % LIQD        famotidine (PEPCID) 10 MG tablet Take by mouth as needed       hydrochlorothiazide (HYDRODIURIL) 12.5 MG tablet Take 1 tablet (12.5 mg) by mouth daily 90 tablet 3     irbesartan (AVAPRO) 150 MG tablet Take 1 tablet (150 mg) by mouth daily 90 tablet 3     metFORMIN (GLUCOPHAGE) 500 MG tablet TAKE 2 TABLETS BY MOUTH TWICE A DAY WITH MEALS 120 tablet 3     metoprolol succinate ER (TOPROL-XL) 100 MG 24 hr tablet TAKE 1 AND 1/2 TABLETS BY MOUTH EVERY  tablet 0     psyllium (METAMUCIL SMOOTH TEXTURE) 63 % POWD Take 1 teaspoonful by mouth daily. Mix in 8 ounces of water       STATIN NOT PRESCRIBED, INTENTIONAL, Statin not prescribed intentionally due to Other LDL at goal; pt doesn't want to add another medication (This option does not exclude patient from measure)  0     Vitamin D, Cholecalciferol, 25 MCG (1000 UT) TABS          Allergies   Allergen Reactions     Amoxicillin Hives     Hydrochlorothiazide Other (See Comments)     Hyponatremia on higher dose     Lisinopril Cough     Lotrel Hives        Social History     Tobacco Use     Smoking status: Never Smoker     Smokeless tobacco: Never Used   Substance Use Topics     Alcohol use: Yes     Alcohol/week: 0.0 standard drinks     Comment: 0-3 drinks per week     Family History   Problem Relation Age of Onset     Hypertension Mother      Thyroid Disease Mother      Neurologic Disorder Mother         Parkinsons and RA     Hypertension Father      Diabetes Father      Diabetes Sister      Hypertension Sister      Diabetes  "Maternal Grandmother         also cad, OA, parkinsons     Diabetes Maternal Grandfather      Alcoholism Sister 59     History   Drug Use No         Objective     BP (!) 142/70   Pulse 68   Temp 98.2  F (36.8  C) (Oral)   Ht 1.676 m (5' 6\")   Wt 89.4 kg (197 lb)   SpO2 98%   Breastfeeding No   BMI 31.80 kg/m      Physical Exam  GENERAL APPEARANCE: healthy, alert and no distress  HENT: ear canals and TM's normal and nose and mouth without ulcers or lesions  RESP: lungs clear to auscultation - no rales, rhonchi or wheezes  CV: regular rate and rhythm, normal S1 S2, no S3 or S4 and no murmur, click or rub   ABDOMEN: soft, nontender, no HSM or masses and bowel sounds normal  NEURO: Normal strength and tone, sensory exam grossly normal, mentation intact and speech normal        Diagnostics:  No labs were ordered during this visit.   No EKG required for low risk surgery (cataract, skin procedure, breast biopsy, etc).      Assessment and Plan:     (H26.9) Cataract of right eye, unspecified cataract type  (primary encounter diagnosis)  Comment:   Plan: Proceed with surgery as planned.  Take all medications as prescribed.    (Z01.818) Preop general physical exam  Comment:   Plan:     (Z23) Need for prophylactic vaccination and inoculation against influenza  Comment:   Plan: FLUZONE HIGH DOSE 65+  [32571]              Amisha Plaza PA-C      "

## 2020-12-17 DIAGNOSIS — E11.9 TYPE 2 DIABETES MELLITUS WITHOUT COMPLICATION, WITHOUT LONG-TERM CURRENT USE OF INSULIN (H): ICD-10-CM

## 2021-01-13 DIAGNOSIS — I10 HTN (HYPERTENSION), BENIGN: ICD-10-CM

## 2021-01-14 RX ORDER — METOPROLOL SUCCINATE 100 MG/1
TABLET, EXTENDED RELEASE ORAL
Qty: 135 TABLET | Refills: 2 | Status: SHIPPED | OUTPATIENT
Start: 2021-01-14 | End: 2021-10-19

## 2021-03-14 DIAGNOSIS — E11.9 TYPE 2 DIABETES MELLITUS WITHOUT COMPLICATION, WITHOUT LONG-TERM CURRENT USE OF INSULIN (H): ICD-10-CM

## 2021-03-17 NOTE — TELEPHONE ENCOUNTER
Last a1c just over 6 months ago, but last OV notes from 10/20/20 say to return in 1 year.  Pended 6 mo.  Jennifer Dugan, RN  [facility] RN Triage Team

## 2021-03-20 ENCOUNTER — HEALTH MAINTENANCE LETTER (OUTPATIENT)
Age: 76
End: 2021-03-20

## 2021-04-09 ENCOUNTER — MYC MEDICAL ADVICE (OUTPATIENT)
Dept: FAMILY MEDICINE | Facility: CLINIC | Age: 76
End: 2021-04-09

## 2021-04-12 NOTE — TELEPHONE ENCOUNTER
The appointment has been scheduled 4/15 with Anjelica.    Will close.  Jyoti Guerrero RN  [facility]

## 2021-04-15 ENCOUNTER — OFFICE VISIT (OUTPATIENT)
Dept: FAMILY MEDICINE | Facility: CLINIC | Age: 76
End: 2021-04-15
Payer: MEDICARE

## 2021-04-15 VITALS
SYSTOLIC BLOOD PRESSURE: 172 MMHG | BODY MASS INDEX: 30.83 KG/M2 | WEIGHT: 191 LBS | OXYGEN SATURATION: 98 % | DIASTOLIC BLOOD PRESSURE: 95 MMHG | HEART RATE: 68 BPM | TEMPERATURE: 98.1 F

## 2021-04-15 DIAGNOSIS — H26.9 CATARACT OF RIGHT EYE, UNSPECIFIED CATARACT TYPE: ICD-10-CM

## 2021-04-15 DIAGNOSIS — I10 HTN (HYPERTENSION), BENIGN: ICD-10-CM

## 2021-04-15 DIAGNOSIS — Z01.818 PREOP GENERAL PHYSICAL EXAM: Primary | ICD-10-CM

## 2021-04-15 DIAGNOSIS — S76.302A LEFT HAMSTRING INJURY, INITIAL ENCOUNTER: ICD-10-CM

## 2021-04-15 PROCEDURE — 99214 OFFICE O/P EST MOD 30 MIN: CPT | Performed by: NURSE PRACTITIONER

## 2021-04-15 RX ORDER — IRBESARTAN 300 MG/1
300 TABLET ORAL DAILY
Qty: 90 TABLET | Refills: 3 | Status: SHIPPED | OUTPATIENT
Start: 2021-04-15 | End: 2022-04-14

## 2021-04-15 RX ORDER — VIT C/VIT D3/E/ZINC/ELDERBERRY 65 MG-3.15
TABLET,CHEWABLE ORAL
COMMUNITY
End: 2021-10-13

## 2021-04-15 RX ORDER — COVID-19 ANTIGEN TEST
220 KIT MISCELLANEOUS DAILY
COMMUNITY

## 2021-04-15 NOTE — PROGRESS NOTES
[address] [facility] [address]  Phone: [phone]  Primary Provider: Amisha Plaza  Pre-op Performing Provider: ELÍAS JURADO      PREOPERATIVE EVALUATION:  Today's date: 4/15/2021    Haritha Cat is a 76 year old female who presents for a preoperative evaluation.    Surgical Information:  Surgery/Procedure: Cataract Surgery R   Surgery Location: [facility]  Surgeon: Dr. Escalona  Surgery Date: 04/22/2021  Time of Surgery: 9 a.m.  Where patient plans to recover: At home with family  Fax number for surgical facility: [phone]    Type of Anesthesia Anticipated: to be determined    Assessment & Plan     The proposed surgical procedure is considered LOW risk.    Problem List Items Addressed This Visit     HTN (hypertension), benign    Relevant Medications    irbesartan (AVAPRO) 300 MG tablet      Other Visit Diagnoses     Preop general physical exam    -  Primary    Cataract of right eye, unspecified cataract type        Left hamstring injury, initial encounter        Relevant Medications    naproxen sodium 220 MG capsule    Other Relevant Orders    [facility]        Referred to PT for L hamstring injury 1.5 weeks ago.       Risks and Recommendations:  The patient has the following additional risks and recommendations for perioperative complications:   - elevated BP     Medication Instructions:  Patient is to take all scheduled medications on the day of surgery  Hold vitamins morning of surgery     RECOMMENDATION:  APPROVAL GIVEN to proceed with proposed procedure pending recheck of BP.   Irbesartan dose is increased today. She will return on Monday for recheck.           Subjective     HPI related to upcoming procedure: going for R cataract and astigmatism surgery   BP at home has been in he 130s systolic. She stopped drinking daily but is disappointed she has not lost any weight or her BP hasn't gone down      Also  injured left hamstring about 12 days ago when she was in [place] walking on the beach       Preop Questions 10/20/2020   1. Have you ever had a heart attack or stroke? No   2. Have you ever had surgery on your heart or blood vessels, such as a stent placement, a coronary artery bypass, or surgery on an artery in your head, neck, heart, or legs? No   3. Do you have chest pain with activity? No   4. Do you have a history of  heart failure? No   5. Do you currently have a cold, bronchitis or symptoms of other infection? No   6. Do you have a cough, shortness of breath, or wheezing? No   7. Do you or anyone in your family have previous history of blood clots? No   8. Do you or does anyone in your family have a serious bleeding problem such as prolonged bleeding following surgeries or cuts? No   9. Have you ever had problems with anemia or been told to take iron pills? No   10. Have you had any abnormal blood loss such as black, tarry or bloody stools, or abnormal vaginal bleeding? No   11. Have you ever had a blood transfusion? No   12. Are you willing to have a blood transfusion if it is medically needed before, during, or after your surgery? Yes   13. Have you or any of your relatives ever had problems with anesthesia? No   14. Do you have sleep apnea, excessive snoring or daytime drowsiness? No   15. Do you have any artifical heart valves or other implanted medical devices like a pacemaker, defibrillator, or continuous glucose monitor? No   16. Do you have artificial joints? No   17. Are you allergic to latex? No     Health Care Directive:  Patient does not have a Health Care Directive or Living Will: Discussed advance care planning with patient; however, patient declined at this time.    Preoperative Review of :   reviewed - no record of controlled substances prescribed.      Status of Chronic Conditions:  See problem list for active medical problems.  Problems all longstanding and stable, except as  noted/documented.  See ROS for pertinent symptoms related to these conditions.      Review of Systems  Constitutional, neuro, ENT, endocrine, pulmonary, cardiac, gastrointestinal, genitourinary, musculoskeletal, integument and psychiatric systems are negative, except as otherwise noted.    Patient Active Problem List    Diagnosis Date Noted     Lumbar disc herniation 06/04/2019     Priority: Medium     Rotator cuff syndrome of right shoulder 03/11/2019     Priority: Medium     Needle phobia 07/11/2017     Priority: Medium     Vasovagal syncope 07/11/2017     Priority: Medium     Type 2 diabetes mellitus without complications (H) 10/23/2015     Priority: Medium     Colon polyp      Priority: Medium     colonoscopy due 5 years.       Chronic headaches 04/23/2014     Priority: Medium     HTN (hypertension), benign      Priority: Medium     GERD (gastroesophageal reflux disease)      Priority: Medium     Hyperlipidemia LDL goal <100 05/09/2012     Priority: Medium     Osteopenia      Priority: Medium      Past Medical History:   Diagnosis Date     Abscess of abdominal cavity (H) 11/02    cecal perf, prob from diverticulitis     Allergic rhinitis      Colon polyp 2015    colonoscopy due 5 years.     Diverticulosis      DM type 2 (diabetes mellitus, type 2) (H)      Elevated LFTs     fatty liver dz     GERD (gastroesophageal reflux disease)      Hepatitis A      Herpes zoster 11/28/11     HTN (hypertension), benign      Osteopenia     DXA 2008     Osteopenia 2014     Past Surgical History:   Procedure Laterality Date     CATARACT IOL, RT/LT       COLONOSCOPY N/A 5/15/2018    Procedure: COLONOSCOPY;  colonoscopy;  Surgeon: Lucero Acharya MD;  Location:  GI     DISCECTOMY LUMBAR POSTERIOR MICROSCOPIC ONE LEVEL Right 10/1/2019    Procedure: LUMBAR MICRODISCECTOMY RIGHT L2-3;  Surgeon: Wolfgang Peng MD;  Location:  OR     varicose vein surgery      Dr. Jessi monkdom teeth  age 27     Current Outpatient  Medications   Medication Sig Dispense Refill     Acetaminophen 325 MG CAPS Take 325-650 mg by mouth every 4 hours as needed       aspirin 81 MG tablet Take  by mouth daily.       famotidine (PEPCID) 10 MG tablet Take by mouth as needed       hydrochlorothiazide (HYDRODIURIL) 12.5 MG tablet Take 1 tablet (12.5 mg) by mouth daily 90 tablet 3     irbesartan (AVAPRO) 150 MG tablet Take 1 tablet (150 mg) by mouth daily 90 tablet 3     metFORMIN (GLUCOPHAGE) 500 MG tablet TAKE 2 TABLETS BY MOUTH TWICE A DAY WITH MEALS 360 tablet 1     metoprolol succinate ER (TOPROL-XL) 100 MG 24 hr tablet TAKE 1.5 TABLETS BY MOUTH EVERY  tablet 2     psyllium (METAMUCIL SMOOTH TEXTURE) 63 % POWD Take 1 teaspoonful by mouth daily. Mix in 8 ounces of water       STATIN NOT PRESCRIBED, INTENTIONAL, Statin not prescribed intentionally due to Other LDL at goal; pt doesn't want to add another medication (This option does not exclude patient from measure)  0     Vitamin D, Cholecalciferol, 25 MCG (1000 UT) TABS          Allergies   Allergen Reactions     Amoxicillin Hives     Hydrochlorothiazide Other (See Comments)     Hyponatremia on higher dose     Lisinopril Cough     Lotrel Hives        Social History     Tobacco Use     Smoking status: Never Smoker     Smokeless tobacco: Never Used   Substance Use Topics     Alcohol use: Yes     Alcohol/week: 0.0 standard drinks     Comment: 0-3 drinks per week     Family History   Problem Relation Age of Onset     Hypertension Mother              Thyroid Disease Mother              Neurologic Disorder Mother         Parkinsons and RA     Hypertension Father              Diabetes Father              Diabetes Sister      Hypertension Sister      Diabetes Maternal Grandmother              Diabetes Maternal Grandfather              Alcoholism Sister 59     Diabetes Son      Hypertension Son      History   Drug Use No         Objective     BP (!) 172/95  (BP Location: Right arm, Cuff Size: Adult Large)   Pulse 68   Temp 98.1  F (36.7  C) (Tympanic)   Wt 86.6 kg (191 lb)   SpO2 98%   BMI 30.83 kg/m      Physical Exam    GENERAL APPEARANCE: healthy, alert and no distress     EYES: EOMI     RESP: lungs clear to auscultation - no rales, rhonchi or wheezes     CV: regular rates and rhythm, normal S1 S2, no S3 or S4 and no murmur, click or rub     MS: extremities normal- no gross deformities noted, no evidence of inflammation in joints, FROM in all extremities.     SKIN: no suspicious lesions or rashes     NEURO: Normal strength and tone, sensory exam grossly normal, mentation intact and speech normal     PSYCH: mentation appears normal. and affect normal/bright    Recent Labs   Lab Test 09/23/20  0819 09/09/20  0952 09/25/19  1647 09/05/19  1013 09/05/19  1013 04/21/19  0957   HGB  --  14.5 14.3  --   --  13.4   PLT  --  252  --   --   --  209    131*  --    < > 131* 133   POTASSIUM 5.2 5.9* 5.2  --  4.6 4.4   CR 0.91 0.99  --   --  0.92 0.87   A1C  --  6.2*  --   --  6.4*  --     < > = values in this interval not displayed.        Diagnostics:  No labs were ordered during this visit.   No EKG required for low risk surgery (cataract, skin procedure, breast biopsy, etc).    Revised Cardiac Risk Index (RCRI):  The patient has the following serious cardiovascular risks for perioperative complications:   - No serious cardiac risks = 0 points     RCRI Interpretation: 0 points: Class I (very low risk - 0.4% complication rate)           Signed Electronically by: HNAY Thrasher CNP  Copy of this evaluation report is provided to requesting physician.

## 2021-04-15 NOTE — PATIENT INSTRUCTIONS
Preparing for Your Surgery  Getting started  A nurse will call you to review your health history and instructions. They will give you an arrival time based on your scheduled surgery time.  Please be ready to share the following:    Your doctor's clinic name and phone number    Your medical, surgical and anesthesia history    A list of allergies and sensitivities    A list of medicines, including herbal treatments and over-the-counter drugs    Whether the patient has a legal guardian (ask how to send us the papers in advance)  If you have a child who's having surgery, please ask for a copy of Preparing for Your Child's Surgery.    Preparing for surgery    Within 30 days of surgery: Have a pre-op exam (sometimes called an H&P, or History and Physical). This can be done at a clinic or pre-operative center.  ? If you're having a , you may not need this exam. Talk to your care team    At your pre-op exam, talk to your care team about all medicines you take. If you need to stop any medicines before surgery, ask when to start taking them again.  ? We do this for your safety. Many medicines can make you bleed too much during surgery. Some change how well surgery (anesthesia) drugs work.    Call your insurance company to let them know you're having surgery. (If you don't have insurance, call [phone].)    Call your clinic if there's any change in your health. This includes signs of a cold or flu (sore throat, runny nose, cough, rash, fever). It also includes a scrape or scratch near the surgery site.    If you have questions on the day of surgery, call your hospital or surgery center.  Eating and drinking guidelines  For your safety: Unless your surgeon tells you otherwise, follow the guidelines below.    Eat and drink as usual until 8 hours before surgery. After that, no food or milk.    Drink clear liquids until 2 hours before surgery. These are liquids you can see through, like water, Gatorade and Propel  Water. You may also have black coffee and tea (no cream or milk).    Nothing by mouth within 2 hours of surgery. This includes gum, candy and breath mints.    If you drink, stop drinking alcohol the night before surgery.    If your care team tells you to take medicine on the morning of surgery, it's okay to take it with a sip of water.  Preventing infection    Shower or bathe the night before and morning of your surgery. Follow the instructions your clinic gave you. (If no instructions, use regular soap.)    Don't shave or clip hair near your surgery site. We'll remove the hair if needed.    Don't smoke or vape the morning of surgery. You may chew nicotine gum up to 2 hours before surgery. A nicotine patch is okay.  ? Note: Some surgeries require you to completely quit smoking and nicotine. Check with your surgeon.    Your care team will make every effort to keep you safe from infection. We will:  ? Clean our hands often with soap and water (or an alcohol-based hand rub).  ? Clean the skin at your surgery site with a special soap that kills germs.  ? Give you a special gown to keep you warm. (Cold raises the risk of infection.)  ? Wear special hair covers, masks, gowns and gloves during surgery.  ? Give antibiotic medicine, if prescribed. Not all surgeries need antibiotics.  What to bring on the day of surgery    Photo ID and insurance card    Copy of your health care directive, if you have one    Glasses and hearing aides (bring cases)  ? You can't wear contacts during surgery    Inhaler and eye drops, if you use them (tell us about these when you arrive)    CPAP machine or breathing device, if you use them    A few personal items, if spending the night    If you have . . .  ? A pacemaker or ICD (cardiac defibrillator): Bring the ID card.  ? An implanted stimulator: Bring the remote control.  ? A legal guardian: Bring a copy of the certified (court-stamped) guardianship papers.  Please remove any jewelry, including  body piercings. Leave jewelry and other valuables at home.  If you're going home the day of surgery  Important: If you don't follow the rules below, we must cancel your surgery.     Arrange for someone to drive you home after surgery. You may not drive, take a taxi or take public transportation by yourself (unless you'll have local anesthesia only).    Arrange for a responsible adult to stay with you overnight. If you don't, we may keep you in the hospital overnight, and you may need to pay the costs yourself.  Questions?   If you have any questions for your care team, list them here: _________________________________________________________________________________________________________________________________________________________________________________________________________________________________________________________________________________________________________________________  For informational purposes only. Not to replace the advice of your health care provider. Copyright   2003, 2019 [facility] [facility] [employer]. All rights reserved. Clinically reviewed by Chey Núñez MD. SMARTworks 931821 - REV 4/20.    Before Your Procedure or Hospital Admission  Testing for COVID-19 (Coronavirus)  Thank you for choosing [facility] for your health care needs. This is a very challenging time for everyone. The World Health Organization and the [facility] have declared the COVID-19 virus a pandemic.   Our goal is to keep you and our team here at [facility] safe and healthy. We've taken several steps to make this happen. For example:    We screen our staff, care teams and patients for COVID-19.    Everyone at [facility] must wear a mask and stay 6 feet apart.    We are limiting hospital and clinic visitors.  Before you come in  All patients must get tested for COVID-19. Your test needs to happen 2 to 4 days before you check in to the hospital or surgery site.   A clinic scheduler will call  "about a week in advance to set up a testing time at one of our labs where we'll take a swab of your nose or throat.  Note: If you go to a clinic or pharmacy like [facility] or [facility] for your test, make sure it's a \"RT-PCR\" test, not a \"rapid\" COVID-19 test. (See Questions and Answers below.)  After the test, please stay at home and stay out of contact with other people. It will be harder for you to recover if you get COVID-19 before your treatment.  Please follow all current safety guidelines, including:    Limit trips outside your home.    Limit the number of people you see.    Always wear a mask outside your home.    Use social distancing. (Stay 6 feet away from others whenever you can.)    Wash your hands often.  If your test shows you have COVID-19  If your test is positive, we'll let you know. A positive test means that you have the virus.   We'll probably have to postpone your admission, surgery or procedure. Your doctor will discuss this with you. After that, we'll let you know what to do and when you can reschedule.   We may need to cancel your treatment on short notice for other reasons, too.  If your test shows you DON'T have COVID-19  Even if your test is negative, you may still get COVID-19. It's rare but, sometimes, the test result is wrong. You could also catch the virus after taking the test.   There's a very small chance that you could catch COVID-19 in the hospital or surgery center. [facility] has taken many steps to prevent this from happening.   Day of your surgery or procedure    Please come wearing a mask or something else that covers both your nose and mouth.    When you arrive, we'll ask you some questions to find out if you have any signs or symptoms of COVID-19.    Ask your care team if you can have visitors. All visitors must wear masks and will be screened for signs of COVID-19.  ? Even if no visitors are allowed, you can still have with you:    Your legal guardian or legal decision " "maker    A parent and one other visitor, if you are younger than 18 years old    A partner and a , if you are in labor  ? We might need to teach you about taking care of yourself after surgery. If so, a visitor can come into the hospital to learn about it, too.  ? The rules for visitors change often, depending on how much the virus is spreading. To learn more, see Visiting a Loved One in the Hospital during the COVID-19 Outbreak.  Please call your care team, hospital or surgery center if you have any questions. We thank you for your understanding and for choosing [facility] for your care.   Questions and Answers  Does it matter where I get tested for COVID-19?  Yes. We urge you to get tested at one of our [facility] COVID-19 testing sites. We process these tests in our lab and can get the results quickly. Your [facility] care team needs to get your results before you check in.  What should I do if I can't get tested at [facility]?  You can get tested somewhere else, but you'll need to take these extra steps:  1. Contact your family doctor or clinic to arrange your test.  2. Take the test within 4 days of your surgery or procedure. We can't accept tests older than 4 days.  3. Make sure your doctor or clinic faxes your results to [facility] at [phone].  If we don't get your results in time, we may have to postpone or cancel your treatment.  Ask if you're getting a \"RT-PCR\" COVID-19 test. It should NOT be a \"rapid\" COVID-19 test. Many drug stores use \"rapid\" tests, but they may not be as accurate. We don't accept the results of \"rapid\" tests.  For informational purposes only. Not to replace the advice of your health care provider. Copyright   2020 [facility] [employer]. All rights reserved. Clinically reviewed by [facility] and the [facility] COVID-19 Clinical Team. [employer] [id number] - REV 10/20.      Increase irbesartan to 300mg. You can take 2 of your " pills. Your next refill will be the 300mg pill.     Return on Monday for a blood pressure recheck     No vitamins the morning of surgery     Follow up with PT. [address]

## 2021-04-19 ENCOUNTER — OFFICE VISIT (OUTPATIENT)
Dept: NURSING | Facility: CLINIC | Age: 76
End: 2021-04-19
Payer: MEDICARE

## 2021-04-19 VITALS — SYSTOLIC BLOOD PRESSURE: 138 MMHG | DIASTOLIC BLOOD PRESSURE: 72 MMHG

## 2021-04-19 DIAGNOSIS — Z01.30 BP CHECK: Primary | ICD-10-CM

## 2021-04-19 PROCEDURE — 99207 PR NO CHARGE NURSE ONLY: CPT

## 2021-04-19 NOTE — PROGRESS NOTES
Haritha Cat is a 76 year old patient who comes in today for a Blood Pressure check.  Initial BP:  /72 (BP Location: Left arm, Patient Position: Sitting, Cuff Size: Adult Regular)      Data Unavailable  Disposition: follow-up as previously indicated by provider.    Bella FYLNN MA on 4/19/2021 at 10:50 AM

## 2021-04-21 ENCOUNTER — THERAPY VISIT (OUTPATIENT)
Dept: PHYSICAL THERAPY | Facility: CLINIC | Age: 76
End: 2021-04-21
Payer: MEDICARE

## 2021-04-21 DIAGNOSIS — S76.302A LEFT HAMSTRING INJURY, INITIAL ENCOUNTER: ICD-10-CM

## 2021-04-21 DIAGNOSIS — S76.312A LEFT HAMSTRING MUSCLE STRAIN: ICD-10-CM

## 2021-04-21 PROCEDURE — 97110 THERAPEUTIC EXERCISES: CPT | Mod: GP | Performed by: PHYSICAL THERAPIST

## 2021-04-21 PROCEDURE — 97014 ELECTRIC STIMULATION THERAPY: CPT | Mod: GP | Performed by: PHYSICAL THERAPIST

## 2021-04-21 PROCEDURE — 97161 PT EVAL LOW COMPLEX 20 MIN: CPT | Mod: GP | Performed by: PHYSICAL THERAPIST

## 2021-04-21 NOTE — PROGRESS NOTES
Physical Therapy Initial Evaluation  Subjective:  Patient is referred with a 3 week hx of L posterior thigh and buttock pain. She recalls trying to get out of a alvarez lounge and feeling a sharp pain in the left mid hamstring area. She noted difficulty walking afterward, especially in the sand. Sxs are worse with sitting on a hard chair, walking more than a mile or squatting. PMH includes lumbar discectomy in 2019. MD visit 3/31/21.    The history is provided by the patient.   Therapist Generated HPI Evaluation         Type of problem:  Left knee (left hamstring).    This is a new condition.  Occurance: getting up from lounge chair.  Where condition occurred: in the community.  Site of Pain: left mid and lateral hamstring, L buttock.  Pain is described as aching and is constant.  Pain radiates to:  Thigh and gluteals. Pain is worse during the day.  Since onset symptoms are unchanged.  Associated symptoms:  Loss of strength. Symptoms are exacerbated by sitting, descending stairs, ascending stairs, walking and bending/squatting            Patient Health History  Haritha Cat being seen for left hamstring/buttock pain.          Pain is reported as 7/10 on pain scale.  General health as reported by patient is good.  Pertinent medical history includes: high blood pressure, diabetes and numbness/tingling.   Red flags:  Pain at rest/night.     Surgeries include:  Orthopedic surgery. Other surgery history details: lumbar discectomy 2019.    Current medications:  High blood pressure medication and other. Other medications details: diabetes meds.                                           Objective:    Gait:    Gait Type:  Antalgic   Assistive Devices:  None      Flexibility/Screens:       Lower Extremity:  Decreased left lower extremity flexibility:Hamstrings                                                        Knee Evaluation:  ROM:  AROM: normal  PROM: normal (ERP with L knee flexion)            Strength:      Extension:  Left: 5/5   Pain:      Right: 5/5   Pain:  Flexion:  Left: 3/5    Pain:++      Right: 5/5   Pain:    Quad Set Left: WNL    Pain:   Quad Set Right: WNL    Pain:  Ligament Testing:  Not Assessed                Special Tests: Not Assessed      Palpation:  Palpation of knee: mid and lateral left hamstring.  Left knee tenderness present at:  Biceps Femoral    Edema:  Normal    Mobility Testing:  Normal            Functional Testing:  not assessed                  General     ROS    Assessment/Plan:    Patient is a 76 year old female with left hamstring left side knee complaints.    Patient has the following significant findings with corresponding treatment plan.                Diagnosis 1:  Left hamstring strain  Pain -  hot/cold therapy, electric stimulation, manual therapy, self management, education and home program  Decreased ROM/flexibility - manual therapy, therapeutic exercise and home program  Decreased strength - therapeutic exercise, therapeutic activities and home program  Impaired gait - gait training and home program  Impaired muscle performance - neuro re-education and home program  Decreased function - therapeutic activities and home program    Therapy Evaluation Codes:   1) History comprised of:   Personal factors that impact the plan of care:      None.    Comorbidity factors that impact the plan of care are:      None.     Medications impacting care: High blood pressure.  2) Examination of Body Systems comprised of:   Body structures and functions that impact the plan of care:      left hamstring.   Activity limitations that impact the plan of care are:      Bending, Running, Sitting, Squatting/kneeling, Stairs and Walking.  3) Clinical presentation characteristics are:   Evolving/Changing.  4) Decision-Making    Moderate complexity using standardized patient assessment instrument and/or measureable assessment of functional outcome.  Cumulative Therapy Evaluation is: Moderate  complexity.    Previous and current functional limitations:  (See Goal Flow Sheet for this information)    Short term and Long term goals: (See Goal Flow Sheet for this information)     Communication ability:  Patient appears to be able to clearly communicate and understand verbal and written communication and follow directions correctly.  Treatment Explanation - The following has been discussed with the patient:   RX ordered/plan of care  Anticipated outcomes  Possible risks and side effects  This patient would benefit from PT intervention to resume normal activities.   Rehab potential is good.    Frequency:  1 X week, once daily  Duration:  for 4 weeks  Discharge Plan:  Achieve all LTG.  Independent in home treatment program.  Reach maximal therapeutic benefit.    Please refer to the daily flowsheet for treatment today, total treatment time and time spent performing 1:1 timed codes.

## 2021-04-21 NOTE — LETTER
DEPARTMENT OF HEALTH AND HUMAN SERVICES  CENTERS FOR MEDICARE & MEDICAID SERVICES    PLAN/UPDATED PLAN OF PROGRESS FOR OUTPATIENT REHABILITATION    PATIENTS NAME:  Haritha Cat   : 1945  PROVIDER NUMBER:    [id number]  HICN:  [id number]  PROVIDER NAME: [facility]  MEDICAL RECORD NUMBER: [id number]   START OF CARE DATE:  SOC Date: 21   TYPE:  PT    PRIMARY/TREATMENT DIAGNOSIS: (Pertinent Medical Diagnosis)     Left hamstring injury, initial encounter  Left hamstring muscle strain    VISITS FROM START OF CARE:  Rxs Used: 1     Physical Therapy Initial Evaluation  Subjective:  Patient is referred with a 3 week hx of L posterior thigh and buttock pain. She recalls trying to get out of a alvarez lounge and feeling a sharp pain in the left mid hamstring area. She noted difficulty walking afterward, especially in the sand. Sxs are worse with sitting on a hard chair, walking more than a mile or squatting. PMH includes lumbar discectomy in 2019. MD visit 3/31/21.  The history is provided by the patient.   Therapist Generated HPI Evaluation  Type of problem:  Left knee (left hamstring).  This is a new condition.  Occurance: getting up from lounge chair.  Where condition occurred: in the community.  Site of Pain: left mid and lateral hamstring, L buttock.  Pain is described as aching and is constant.  Pain radiates to:  Thigh and gluteals. Pain is worse during the day.  Since onset symptoms are unchanged.  Associated symptoms:  Loss of strength. Symptoms are exacerbated by sitting, descending stairs, ascending stairs, walking and bending/squatting    Patient Health History  Haritha Cat being seen for left hamstring/buttock pain.   Pain is reported as 7/10 on pain scale.  General health as reported by patient is good.  Pertinent medical history includes: high blood pressure, diabetes and numbness/tingling.   Red flags:  Pain at rest/night.  Surgeries include:   Orthopedic surgery. Other surgery history details: lumbar discectomy 2019.    Current medications:  High blood pressure medication and other. Other medications details: diabetes meds.       Objective:  Gait:    Gait Type:  Antalgic   Assistive Devices:  None  PATIENTS NAME:  Haritha Cat   : 1945    Flexibility/Screens:   Lower Extremity:  Decreased left lower extremity flexibility:Hamstrings  Knee Evaluation:  ROM:  AROM: normal  PROM: normal (ERP with L knee flexion)    Strength:   Extension:  Left: 5/5   Pain:      Right: 5/5   Pain:  Flexion:  Left: 3/5    Pain:++      Right: 5/5   Pain:    Quad Set Left: WNL    Pain:   Quad Set Right: WNL    Pain:  Ligament Testing:  Not Assessed  Special Tests: Not Assessed  Palpation:  Palpation of knee: mid and lateral left hamstring.  Left knee tenderness present at:  Biceps Femoral  Edema:  Normal  Mobility Testing:  Normal  Functional Testing:  not assessed    Assessment/Plan:    Patient is a 76 year old female with left hamstring left side knee complaints.    Patient has the following significant findings with corresponding treatment plan.                Diagnosis 1:  Left hamstring strain  Pain -  hot/cold therapy, electric stimulation, manual therapy, self management, education and home program  Decreased ROM/flexibility - manual therapy, therapeutic exercise and home program  Decreased strength - therapeutic exercise, therapeutic activities and home program  Impaired gait - gait training and home program  Impaired muscle performance - neuro re-education and home program  Decreased function - therapeutic activities and home program    Therapy Evaluation Codes:   1) History comprised of:   Personal factors that impact the plan of care:      None.    Comorbidity factors that impact the plan of care are:      None.     Medications impacting care: High blood pressure.  2) Examination of Body Systems comprised of:   Body structures and functions that impact the  "plan of care:      left hamstring.   Activity limitations that impact the plan of care are:      Bending, Running, Sitting, Squatting/kneeling, Stairs and Walking.  3) Clinical presentation characteristics are:   Evolving/Changing.  4) Decision-Making    Moderate complexity using standardized patient assessment instrument and/or measureable assessment of functional outcome.  Cumulative Therapy Evaluation is: Moderate complexity.    Previous and current functional limitations:  (See Goal Flow Sheet for this information)    Short term and Long term goals: (See Goal Flow Sheet for this information)     PATIENTS NAME:  Haritha Cat   : 1945    Communication ability:  Patient appears to be able to clearly communicate and understand verbal and written communication and follow directions correctly.  Treatment Explanation - The following has been discussed with the patient:   RX ordered/plan of care  Anticipated outcomes  Possible risks and side effects  This patient would benefit from PT intervention to resume normal activities.   Rehab potential is good.    Frequency:  1 X week, once daily  Duration:  for 4 weeks  Discharge Plan:  Achieve all LTG.  Independent in home treatment program.  Reach maximal therapeutic benefit.    Caregiver Signature/Credentials _____________________________ Date ________       Treating Provider: Daryn Farmer PT   I have reviewed and certified the need for these services and plan of treatment while under my care.        PHYSICIAN'S SIGNATURE:   _____________________________________  Date___________     Anjelica He PA-C    Certification period:  Beginning of Cert date period: 21 to  End of Cert period date: 21     Functional Level Progress Report: Please see attached \"Goal Flow sheet for Functional level.\"    ____X____ Continue Services or       ________ DC Services                Service dates: From  SOC Date: 21 date to present                         "

## 2021-04-21 NOTE — LETTER
DEPARTMENT OF HEALTH AND HUMAN SERVICES  [facility] MEDICA[facility]    PLAN/UPDATED PLAN OF PROGRESS FOR OUTPATIENT REHABILITATION     PATIENTS NAME:  Haritha Cat     : 1945    PROVIDER NUMBER:    [id number]    HICN:  [id number]     PROVIDER NAME: [facility]    MEDICAL RECORD NUMBER: [id number]     START OF CARE DATE:  SOC Date: 21   TYPE:  PT    PRIMARY/TREATMENT DIAGNOSIS: (Pertinent Medical Diagnosis)     Left hamstring injury, initial encounter  Left hamstring muscle strain    VISITS FROM START OF CARE:  Rxs Used: 1     Physical Therapy Initial Evaluation  Subjective:  Patient is referred with a 3 week hx of L posterior thigh and buttock pain. She recalls trying to get out of a alvarez lounge and feeling a sharp pain in the left mid hamstring area. She noted difficulty walking afterward, especially in the sand. Sxs are worse with sitting on a hard chair, walking more than a mile or squatting. PMH includes lumbar discectomy in 2019.  The history is provided by the patient.   Therapist Generated HPI Evaluation  Type of problem:  Left knee (left hamstring).  This is a new condition.  Occurance: getting up from lounge chair.  Where condition occurred: in the community.  Site of Pain: left mid and lateral hamstring, L buttock.  Pain is described as aching and is constant.  Pain radiates to:  Thigh and gluteals. Pain is worse during the day.  Since onset symptoms are unchanged.  Associated symptoms:  Loss of strength. Symptoms are exacerbated by sitting, descending stairs, ascending stairs, walking and bending/squatting    Patient Health History  Haritha Cat being seen for left hamstring/buttock pain.   Pain is reported as 7/10 on pain scale.  General health as reported by patient is good.  Pertinent medical history includes: high blood pressure, diabetes and numbness/tingling.   Red flags:  Pain at rest/night.  Surgeries include: Orthopedic  surgery. Other surgery history details: lumbar discectomy 2019.    Current medications:  High blood pressure medication and other.(diabetes meds.)    PATIENTS NAME:  Haritha Cat   : 1945           Objective:  Gait:    Gait Type:  Antalgic   Assistive Devices:  None  Flexibility/Screens:   Lower Extremity:  Decreased left lower extremity flexibility:Hamstrings  Knee Evaluation:  ROM:  AROM: normal  PROM: normal (ERP with L knee flexion)    Strength:   Extension:  Left: 5/5   Pain:      Right: 5/5   Pain:  Flexion:  Left: 3/5    Pain:++      Right: 5/5   Pain:    Quad Set Left: WNL    Pain:   Quad Set Right: WNL    Pain:  Ligament Testing:  Not Assessed  Special Tests: Not Assessed  Palpation:  Palpation of knee: mid and lateral left hamstring.  Left knee tenderness present at:  Biceps Femoral  Edema:  Normal  Mobility Testing:  Normal  Functional Testing:  not assessed    Assessment/Plan:    Patient is a 76 year old female with left hamstring left side knee complaints.    Patient has the following significant findings with corresponding treatment plan.                Diagnosis 1:  Left hamstring strain  Pain -  hot/cold therapy, electric stimulation, manual therapy, self management, education and home program  Decreased ROM/flexibility - manual therapy, therapeutic exercise and home program  Decreased strength - therapeutic exercise, therapeutic activities and home program  Impaired gait - gait training and home program  Impaired muscle performance - neuro re-education and home program  Decreased function - therapeutic activities and home program    Therapy Evaluation Codes:   1) History comprised of:   Personal factors that impact the plan of care:      None.    Comorbidity factors that impact the plan of care are:      None.     Medications impacting care: High blood pressure.  2) Examination of Body Systems comprised of:   Body structures and functions that impact the plan of care:      left  "hamstring.   Activity limitations that impact the plan of care are:      Bending, Running, Sitting, Squatting/kneeling, Stairs and Walking.  3) Clinical presentation characteristics are:   Evolving/Changing.  4) Decision-Making    Moderate complexity using standardized patient assessment instrument and/or measureable assessment of functional outcome.  Cumulative Therapy Evaluation is: Moderate complexity.    PATIENTS NAME:  Haritha Cat   : 1945    Previous and current functional limitations:  (See Goal Flow Sheet for this information)    Short term and Long term goals: (See Goal Flow Sheet for this information)     Communication ability:  Patient appears to be able to clearly communicate and understand verbal and written communication and follow directions correctly.  Treatment Explanation - The following has been discussed with the patient:   RX ordered/plan of care  Anticipated outcomes  Possible risks and side effects  This patient would benefit from PT intervention to resume normal activities.   Rehab potential is good.    Frequency:  1 X week, once daily  Duration:  for 4 weeks  Discharge Plan:  Achieve all LTG.  Independent in home treatment program.  Reach maximal therapeutic benefit.        Caregiver Signature/Credentials _____________________________ Date ________       Treating Provider: Daryn Farmer PT   I have reviewed and certified the need for these services and plan of treatment while under my care.        PHYSICIAN'S SIGNATURE:   _________________________________________  Date___________   HANY Thrasher, CNP    Certification period:  Beginning of Cert date period: 21 to  End of Cert period date: 21     Functional Level Progress Report: Please see attached \"Goal Flow sheet for Functional level.\"    ____X____ Continue Services or       ________ DC Services                Service dates: From  SOC Date: 21 date to present                         "

## 2021-04-28 ENCOUNTER — THERAPY VISIT (OUTPATIENT)
Dept: PHYSICAL THERAPY | Facility: CLINIC | Age: 76
End: 2021-04-28
Payer: MEDICARE

## 2021-04-28 DIAGNOSIS — S76.312D STRAIN OF LEFT HAMSTRING MUSCLE, SUBSEQUENT ENCOUNTER: ICD-10-CM

## 2021-04-28 PROCEDURE — 97110 THERAPEUTIC EXERCISES: CPT | Mod: GP | Performed by: PHYSICAL THERAPIST

## 2021-04-28 PROCEDURE — 97035 APP MDLTY 1+ULTRASOUND EA 15: CPT | Mod: GP | Performed by: PHYSICAL THERAPIST

## 2021-04-28 PROCEDURE — 97140 MANUAL THERAPY 1/> REGIONS: CPT | Mod: GP | Performed by: PHYSICAL THERAPIST

## 2021-05-05 ENCOUNTER — THERAPY VISIT (OUTPATIENT)
Dept: PHYSICAL THERAPY | Facility: CLINIC | Age: 76
End: 2021-05-05
Payer: MEDICARE

## 2021-05-05 DIAGNOSIS — S76.312D STRAIN OF LEFT HAMSTRING MUSCLE, SUBSEQUENT ENCOUNTER: ICD-10-CM

## 2021-05-05 PROCEDURE — 97110 THERAPEUTIC EXERCISES: CPT | Mod: GP | Performed by: PHYSICAL THERAPIST

## 2021-05-05 NOTE — PROGRESS NOTES
Subjective:  HPI  Physical Exam                    Objective:  System    Physical Exam    General     ROS    Assessment/Plan:    PROGRESS  REPORT    Progress reporting period is from 4-21-21 to 5-5-21.       SUBJECTIVE  Subjective changes noted by patient:   Pt. is 10 min late today then reports she can only stay for 15 minutes then has to be home to watch her grandchildren. Fortunately she reports much improved L hamstring pain this week. yesterday she was able to walk over 30 minutes without pain. She also is going up and down stairs without favoring the L side anymore. She was sore for days after last visit with the MFR, however the pain has been much less since it recovered. Pt. plans to continue her HEP with no further PT visits planned unless increased problems arise.       Current pain level is  2/10.     Previous pain level was  7/10.   Changes in function:  Yes (See Goal flowsheet attached for changes in current functional level)  Adverse reaction to treatment or activity: None    OBJECTIVE  Changes noted in objective findings:  Amb - no limp noted on L. Strength L hams 4/5; hip ext 4/5     ASSESSMENT/PLAN  Updated problem list and treatment plan: Diagnosis 1:  L hamstring pain  Pain -  US, electric stimulation, manual therapy, self management and education  Decreased ROM/flexibility - manual therapy and therapeutic exercise  Decreased strength - therapeutic exercise and therapeutic activities  Impaired muscle performance - neuro re-education  Decreased function - therapeutic activities  STG/LTGs have been met or progress has been made towards goals:  Yes (See Goal flow sheet completed today.)  Assessment of Progress: Patient is meeting short term goals and is progressing towards long term goals.  Self Management Plans:  Patient is independent in a home treatment program.  Patient is independent in self management of symptoms.  I have re-evaluated this patient and find that the nature, scope, duration and  intensity of the therapy is appropriate for the medical condition of the patient.  Haritha continues to require the following intervention to meet STG and LTG's:  PT intervention is no longer required to meet STG/LTG.    Recommendations:  This patient is ready to be discharged from therapy and continue their home treatment program.    Please refer to the daily flowsheet for treatment today, total treatment time and time spent performing 1:1 timed codes.

## 2021-05-12 NOTE — PROGRESS NOTES
Physical Therapy Initial Evaluation  Subjective:    Patient Health History             Pertinent medical history includes: high blood pressure, diabetes and numbness/tingling.   Red flags:  Pain at rest/night.     Surgeries include:  Orthopedic surgery (Herniated disk).    Current medications:  High blood pressure medication (diabetes).    Current occupation is retired (retired).   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                                    Objective:  System    Physical Exam    General     ROS    Assessment/Plan:

## 2021-05-20 ENCOUNTER — OFFICE VISIT (OUTPATIENT)
Dept: FAMILY MEDICINE | Facility: CLINIC | Age: 76
End: 2021-05-20
Payer: MEDICARE

## 2021-05-20 VITALS
HEIGHT: 66 IN | DIASTOLIC BLOOD PRESSURE: 76 MMHG | TEMPERATURE: 97.1 F | SYSTOLIC BLOOD PRESSURE: 134 MMHG | HEART RATE: 60 BPM | WEIGHT: 188.8 LBS | OXYGEN SATURATION: 99 % | BODY MASS INDEX: 30.34 KG/M2

## 2021-05-20 DIAGNOSIS — H25.9 SENILE CATARACT OF LEFT EYE, UNSPECIFIED AGE-RELATED CATARACT TYPE: Primary | ICD-10-CM

## 2021-05-20 DIAGNOSIS — E11.9 TYPE 2 DIABETES MELLITUS WITHOUT COMPLICATION, WITHOUT LONG-TERM CURRENT USE OF INSULIN (H): ICD-10-CM

## 2021-05-20 DIAGNOSIS — I10 HTN (HYPERTENSION), BENIGN: ICD-10-CM

## 2021-05-20 DIAGNOSIS — Z01.818 PREOP GENERAL PHYSICAL EXAM: ICD-10-CM

## 2021-05-20 LAB — HBA1C MFR BLD: 6.3 % (ref 0–5.6)

## 2021-05-20 PROCEDURE — 99214 OFFICE O/P EST MOD 30 MIN: CPT | Performed by: PHYSICIAN ASSISTANT

## 2021-05-20 PROCEDURE — 83036 HEMOGLOBIN GLYCOSYLATED A1C: CPT | Performed by: PHYSICIAN ASSISTANT

## 2021-05-20 PROCEDURE — 36415 COLL VENOUS BLD VENIPUNCTURE: CPT | Performed by: PHYSICIAN ASSISTANT

## 2021-05-20 ASSESSMENT — MIFFLIN-ST. JEOR: SCORE: 1373.14

## 2021-05-20 NOTE — PROGRESS NOTES
[address] [facility] [address]  Phone: [phone]  Primary Provider: Amisha Moore PA-C  Pre-op Performing Provider: AMISHA MOORE PA-C      PREOPERATIVE EVALUATION:  Today's date: 5/20/2021    Haritha Cat is a 76 year old female who presents for a preoperative evaluation.    Surgical Information:  Surgery/Procedure: L cataract emulsification and astigmatism correction  Surgery Location: [facility]  Surgeon: Dr. Escalona  Surgery Date: 06/03/2021  Time of Surgery: 0800  Where patient plans to recover: At home with family  Fax number for surgical facility: [phone]    Type of Anesthesia Anticipated: Monitor Anesthesia Care    -  Subjective     HPI related to upcoming procedure: L cataract      Preop Questions 5/20/2021   1. Have you ever had a heart attack or stroke? No   2. Have you ever had surgery on your heart or blood vessels, such as a stent placement, a coronary artery bypass, or surgery on an artery in your head, neck, heart, or legs? No   3. Do you have chest pain with activity? No   4. Do you have a history of  heart failure? No   5. Do you currently have a cold, bronchitis or symptoms of other infection? No   6. Do you have a cough, shortness of breath, or wheezing? No   7. Do you or anyone in your family have previous history of blood clots? No   8. Do you or does anyone in your family have a serious bleeding problem such as prolonged bleeding following surgeries or cuts? No   9. Have you ever had problems with anemia or been told to take iron pills? No   -10. Have you had any abnormal blood loss such as black, tarry or bloody stools, or abnormal vaginal bleeding? No   11. Have you ever had a blood transfusion? No   12. Are you willing to have a blood transfusion if it is medically needed before, during, or after your surgery? NO    13. Have you or any of your relatives ever had problems with anesthesia? No   14. Do you have sleep apnea, excessive  snoring or daytime drowsiness? No   15. Do you have any artifical heart valves or other implanted medical devices like a pacemaker, defibrillator, or continuous glucose monitor? No   16. Do you have artificial joints? No   17. Are you allergic to latex? No     Health Care Directive:  Patient does not have a Health Care Directive or Living Will: Patient states has Advance Directive and will bring in a copy to clinic.    Status of Chronic Conditions:  DIABETES - Patient has a longstanding history of DiabetesType Type II . Patient is being treated with none and denies significant side effects. Control has been good. Complicating factors include but are not limited to: hypertension.     HYPERTENSION - Patient has longstanding history of HTN , currently denies any symptoms referable to elevated blood pressure. Specifically denies chest pain, palpitations, dyspnea, orthopnea, PND or peripheral edema. Blood pressure readings have been in normal range. Current medication regimen is as listed below. Patient denies any side effects of medication.       Review of Systems  CONSTITUTIONAL: NEGATIVE for fever, chills, change in weight  ENT/MOUTH: NEGATIVE for ear, mouth and throat problems  RESP: NEGATIVE for significant cough or SOB  CV: NEGATIVE for chest pain, palpitations or peripheral edema    Patient Active Problem List    Diagnosis Date Noted     Left hamstring injury, initial encounter 04/21/2021     Priority: Medium     Left hamstring muscle strain 04/21/2021     Priority: Medium     Lumbar disc herniation 06/04/2019     Priority: Medium     Rotator cuff syndrome of right shoulder 03/11/2019     Priority: Medium     Needle phobia 07/11/2017     Priority: Medium     Vasovagal syncope 07/11/2017     Priority: Medium     Type 2 diabetes mellitus without complications (H) 10/23/2015     Priority: Medium     Colon polyp      Priority: Medium     colonoscopy due 5 years.       Chronic headaches 04/23/2014     Priority: Medium      HTN (hypertension), benign      Priority: Medium     GERD (gastroesophageal reflux disease)      Priority: Medium     Hyperlipidemia LDL goal <100 05/09/2012     Priority: Medium     Osteopenia      Priority: Medium      Past Medical History:   Diagnosis Date     Abscess of abdominal cavity (H) 11/02    cecal perf, prob from diverticulitis     Allergic rhinitis      Colon polyp 2015    colonoscopy due 5 years.     Diverticulosis      DM type 2 (diabetes mellitus, type 2) (H)      Elevated LFTs     fatty liver dz     GERD (gastroesophageal reflux disease)      Hepatitis A      Herpes zoster 11/28/11     HTN (hypertension), benign      Osteopenia     DXA 2008     Osteopenia 2014     Past Surgical History:   Procedure Laterality Date     CATARACT IOL, RT/LT       COLONOSCOPY N/A 5/15/2018    Procedure: COLONOSCOPY;  colonoscopy;  Surgeon: Lucero Acharya MD;  Location:  GI     DISCECTOMY LUMBAR POSTERIOR MICROSCOPIC ONE LEVEL Right 10/1/2019    Procedure: LUMBAR MICRODISCECTOMY RIGHT L2-3;  Surgeon: Wolfgang Peng MD;  Location:  OR     varicose vein surgery      Dr. Bowen     wisdom teeth  age 27     Current Outpatient Medications   Medication Sig Dispense Refill     Acetaminophen 325 MG CAPS Take 325-650 mg by mouth every 4 hours as needed       aspirin 81 MG tablet Take  by mouth daily.       Calcium-Vitamin D-Vitamin K (VIACTIV PO)        diphenhydrAMINE-APAP, sleep, (TYLENOL PM EXTRA STRENGTH PO)        famotidine (PEPCID) 10 MG tablet Take by mouth as needed       hydrochlorothiazide (HYDRODIURIL) 12.5 MG tablet Take 1 tablet (12.5 mg) by mouth daily 90 tablet 3     irbesartan (AVAPRO) 300 MG tablet Take 1 tablet (300 mg) by mouth daily (Patient taking differently: Take 600 mg by mouth daily ) 90 tablet 3     metFORMIN (GLUCOPHAGE) 500 MG tablet TAKE 2 TABLETS BY MOUTH TWICE A DAY WITH MEALS 360 tablet 1     metoprolol succinate ER (TOPROL-XL) 100 MG 24 hr tablet TAKE 1.5 TABLETS BY MOUTH EVERY  " tablet 2     Misc Natural Products (AIRBORNE ELDERBERRY) CHEW        naproxen sodium 220 MG capsule Take 220 mg by mouth 2 times daily (with meals)       psyllium (METAMUCIL SMOOTH TEXTURE) 63 % POWD Take 1 teaspoonful by mouth daily. Mix in 8 ounces of water       STATIN NOT PRESCRIBED, INTENTIONAL, Statin not prescribed intentionally due to Other LDL at goal; pt doesn't want to add another medication (This option does not exclude patient from measure)  0     Vitamin D, Cholecalciferol, 25 MCG (1000 UT) TABS          Allergies   Allergen Reactions     Amoxicillin Hives     Hydrochlorothiazide Other (See Comments)     Hyponatremia on higher dose     Lisinopril Cough     Lotrel Hives        Social History     Tobacco Use     Smoking status: Never Smoker     Smokeless tobacco: Never Used   Substance Use Topics     Alcohol use: Not Currently     Alcohol/week: 0.0 standard drinks     Comment: 0-3 drinks per week     Family History   Problem Relation Age of Onset     Hypertension Mother              Thyroid Disease Mother              Neurologic Disorder Mother         Parkinsons and RA     Hypertension Father              Diabetes Father              Diabetes Sister      Hypertension Sister      Diabetes Maternal Grandmother              Diabetes Maternal Grandfather              Alcoholism Sister 59     Diabetes Son      Hypertension Son      History   Drug Use No         Objective     /76 (BP Location: Left arm, Patient Position: Sitting, Cuff Size: Adult Regular)   Pulse 60   Temp 97.1  F (36.2  C) (Temporal)   Ht 1.676 m (5' 6\")   Wt 85.6 kg (188 lb 12.8 oz)   SpO2 99%   BMI 30.47 kg/m      Physical Exam  GENERAL APPEARANCE: healthy, alert and no distress  HENT: ear canals and TM's normal and nose and mouth without ulcers or lesions  RESP: lungs clear to auscultation - no rales, rhonchi or wheezes  CV: regular rate and rhythm, normal S1 S2, no S3 or " S4 and no murmur, click or rub   ABDOMEN: soft, nontender, no HSM or masses and bowel sounds normal  NEURO: Normal strength and tone, sensory exam grossly normal, mentation intact and speech normal        Diagnostics:     No EKG required for low risk surgery (cataract, skin procedure, breast biopsy, etc).     Assessment and Plan:     (H25.9) Senile cataract of left eye, unspecified age-related cataract type  (primary encounter diagnosis)  Comment:   Plan: pt medically cleared for surgery as planned. She is vaccinated for covid and facility not requiring pre covid testing.    (Z01.818) Preop general physical exam  Comment:   Plan: proceed with surgery    (E11.9) Type 2 diabetes mellitus without complication, without long-term current use of insulin (H)  Comment:   Plan: Hemoglobin A1c            (I10) HTN (hypertension), benign  Comment:   Plan: well controlled, cont same.        Signed Electronically by: Amisha Plaza PA-C  Copy of this evaluation report is provided to requesting physician.

## 2021-05-20 NOTE — RESULT ENCOUNTER NOTE
Pat,    Your hemoglobin A1c looks good at 6.3%.     It was great catching up.    Amisha Plaza PA-C

## 2021-07-07 ENCOUNTER — TRANSFERRED RECORDS (OUTPATIENT)
Dept: HEALTH INFORMATION MANAGEMENT | Facility: CLINIC | Age: 76
End: 2021-07-07

## 2021-07-13 ENCOUNTER — TRANSFERRED RECORDS (OUTPATIENT)
Dept: HEALTH INFORMATION MANAGEMENT | Facility: CLINIC | Age: 76
End: 2021-07-13
Payer: MEDICARE

## 2021-07-13 LAB — RETINOPATHY: NORMAL

## 2021-07-15 ENCOUNTER — TRANSFERRED RECORDS (OUTPATIENT)
Dept: HEALTH INFORMATION MANAGEMENT | Facility: CLINIC | Age: 76
End: 2021-07-15

## 2021-09-04 ENCOUNTER — HEALTH MAINTENANCE LETTER (OUTPATIENT)
Age: 76
End: 2021-09-04

## 2021-09-20 DIAGNOSIS — E11.9 TYPE 2 DIABETES MELLITUS WITHOUT COMPLICATION, WITHOUT LONG-TERM CURRENT USE OF INSULIN (H): ICD-10-CM

## 2021-09-30 ENCOUNTER — TRANSFERRED RECORDS (OUTPATIENT)
Dept: HEALTH INFORMATION MANAGEMENT | Facility: CLINIC | Age: 76
End: 2021-09-30

## 2021-10-13 ENCOUNTER — OFFICE VISIT (OUTPATIENT)
Dept: FAMILY MEDICINE | Facility: CLINIC | Age: 76
End: 2021-10-13
Payer: MEDICARE

## 2021-10-13 VITALS
TEMPERATURE: 96.6 F | OXYGEN SATURATION: 99 % | RESPIRATION RATE: 17 BRPM | HEIGHT: 66 IN | BODY MASS INDEX: 30.53 KG/M2 | HEART RATE: 62 BPM | DIASTOLIC BLOOD PRESSURE: 68 MMHG | WEIGHT: 190 LBS | SYSTOLIC BLOOD PRESSURE: 122 MMHG

## 2021-10-13 DIAGNOSIS — M51.26 LUMBAR DISC HERNIATION: ICD-10-CM

## 2021-10-13 DIAGNOSIS — I10 HTN (HYPERTENSION), BENIGN: ICD-10-CM

## 2021-10-13 DIAGNOSIS — M54.42 CHRONIC BILATERAL LOW BACK PAIN WITH LEFT-SIDED SCIATICA: ICD-10-CM

## 2021-10-13 DIAGNOSIS — Z01.818 PRE-OPERATIVE GENERAL PHYSICAL EXAMINATION: Primary | ICD-10-CM

## 2021-10-13 DIAGNOSIS — G89.29 CHRONIC BILATERAL LOW BACK PAIN WITH LEFT-SIDED SCIATICA: ICD-10-CM

## 2021-10-13 DIAGNOSIS — E11.9 TYPE 2 DIABETES MELLITUS WITHOUT COMPLICATION, WITHOUT LONG-TERM CURRENT USE OF INSULIN (H): ICD-10-CM

## 2021-10-13 LAB
ALBUMIN SERPL-MCNC: 4.4 G/DL (ref 3.4–5)
ALP SERPL-CCNC: 114 U/L (ref 40–150)
ALT SERPL W P-5'-P-CCNC: 25 U/L (ref 0–50)
ANION GAP SERPL CALCULATED.3IONS-SCNC: 7 MMOL/L (ref 3–14)
AST SERPL W P-5'-P-CCNC: 22 U/L (ref 0–45)
BILIRUB SERPL-MCNC: 1.2 MG/DL (ref 0.2–1.3)
BUN SERPL-MCNC: 17 MG/DL (ref 7–30)
CALCIUM SERPL-MCNC: 10 MG/DL (ref 8.5–10.1)
CHLORIDE BLD-SCNC: 97 MMOL/L (ref 94–109)
CO2 SERPL-SCNC: 26 MMOL/L (ref 20–32)
CREAT SERPL-MCNC: 0.88 MG/DL (ref 0.52–1.04)
ERYTHROCYTE [DISTWIDTH] IN BLOOD BY AUTOMATED COUNT: 12.9 % (ref 10–15)
GFR SERPL CREATININE-BSD FRML MDRD: 65 ML/MIN/1.73M2
GLUCOSE BLD-MCNC: 133 MG/DL (ref 70–99)
HBA1C MFR BLD: 6.4 % (ref 0–5.6)
HCT VFR BLD AUTO: 41.5 % (ref 35–47)
HGB BLD-MCNC: 14.3 G/DL (ref 11.7–15.7)
MCH RBC QN AUTO: 31.6 PG (ref 26.5–33)
MCHC RBC AUTO-ENTMCNC: 34.5 G/DL (ref 31.5–36.5)
MCV RBC AUTO: 92 FL (ref 78–100)
PLATELET # BLD AUTO: 282 10E3/UL (ref 150–450)
POTASSIUM BLD-SCNC: 5.3 MMOL/L (ref 3.4–5.3)
PROT SERPL-MCNC: 7.8 G/DL (ref 6.8–8.8)
RBC # BLD AUTO: 4.53 10E6/UL (ref 3.8–5.2)
SODIUM SERPL-SCNC: 130 MMOL/L (ref 133–144)
WBC # BLD AUTO: 5.8 10E3/UL (ref 4–11)

## 2021-10-13 PROCEDURE — 36415 COLL VENOUS BLD VENIPUNCTURE: CPT | Performed by: INTERNAL MEDICINE

## 2021-10-13 PROCEDURE — 85027 COMPLETE CBC AUTOMATED: CPT | Performed by: INTERNAL MEDICINE

## 2021-10-13 PROCEDURE — 93000 ELECTROCARDIOGRAM COMPLETE: CPT | Performed by: INTERNAL MEDICINE

## 2021-10-13 PROCEDURE — 83036 HEMOGLOBIN GLYCOSYLATED A1C: CPT | Performed by: INTERNAL MEDICINE

## 2021-10-13 PROCEDURE — 99215 OFFICE O/P EST HI 40 MIN: CPT | Performed by: INTERNAL MEDICINE

## 2021-10-13 PROCEDURE — 80053 COMPREHEN METABOLIC PANEL: CPT | Performed by: INTERNAL MEDICINE

## 2021-10-13 ASSESSMENT — MIFFLIN-ST. JEOR: SCORE: 1378.58

## 2021-10-13 NOTE — PROGRESS NOTES
[address], [address]  [facility] [address]  Phone: [phone]  Primary Provider: Amisha Plaza  Pre-op Performing Provider: BRENDA GRUBER      PREOPERATIVE EVALUATION:  Today's date: 10/13/2021    Haritha Cat is a 76 year old female who presents for a preoperative evaluation.    Surgical Information:  Surgery/Procedure:   Back Surgery  Surgery Location: [facility]   Surgeon: Dr. Wolfgang Peng  Surgery Date: 11-5-2021  Time of Surgery: 2 PM  Where patient plans to recover: At home with family  Fax number for surgical facility: [phone]    Type of Anesthesia Anticipated: General    Assessment & Plan     The proposed surgical procedure is considered INTERMEDIATE risk.    Problem List Items Addressed This Visit        Endocrine    Type 2 diabetes mellitus without complications (H)    Relevant Orders    Hemoglobin A1c (Completed)       Circulatory    HTN (hypertension), benign    Relevant Orders    Comprehensive metabolic panel (BMP + Alb, Alk Phos, ALT, AST, Total. Bili, TP) (Completed)    CBC with platelets (Completed)       Musculoskeletal and Integumentary    Lumbar disc herniation      Other Visit Diagnoses     Pre-operative general physical examination    -  Primary    Relevant Orders    EKG 12-lead complete w/read - Clinics (Completed)    Chronic bilateral low back pain with left-sided sciatica                   Risks and Recommendations:  The patient has the following additional risks and recommendations for perioperative complications:   - Consult Hospitalist / IM to assist with post-op medical management    Medication Instructions:   - aspirin: Discontinue aspirin 7-10 days prior to procedure to reduce bleeding risk. It should be resumed postoperatively.    - ACE/ARB: HOLD due to exceptional risk of hypotension during surgery.    - Beta Blockers: Continue taking on the day of surgery.   - Diuretics: HOLD on the day of surgery.   -  metformin: HOLD day of surgery.   - naproxen (Aleve, Naprosyn): HOLD 4 days before surgery.     RECOMMENDATION:  APPROVAL GIVEN to proceed with proposed procedure pending review of diagnostic evaluation.    Addendum reviewed labs, sodium was low at 130.  Advised patient to restrict fluid and repeat sodium level in 1 week.  Once sodium was corrected patient will be cleared for surgery.    Addendum repeat labs on 10/20 showed sodium corrected to 135.,  Potassium upper range of normal 5.3, slight decrease in kidney function test due to limited fluid restriction.  Patient will be cleared for surgery.  Recommend monitoring of electrolytes and blood pressure closely postop.    Total time spent was 40 minutes review of records, exam and recommendation        Subjective     HPI related to upcoming procedure: ?decompression sx    No CP, no dyspnea    Can do all work at home, gardening , washing, yard work...    In charge of house, clean house, tries to walk at least a mile day,.does not walk fast, according to how she feels that day due to pain, before pain start to increase , she walked 2 miles a day, pain exacerbated  back in March, goes to basement  for laundry, no history of orthopnea palpitations or dizziness, presyncope or syncope       No h/o DVT or VTE, no bleeding issues. No fhx of DVT    No problems with anesthesia    No GI symptoms; diarrhea or GERD [although listed in her problem list]    Takes NSAIDS aleve..2 in morning and 1 before bed time, and sometimes before dinner    No SOB,     No cough or phlegm      At home in 130's or 140's, always higher in clinic--    Sx on November 4th,         Preop Questions 10/11/2021   1. Have you ever had a heart attack or stroke? No   2. Have you ever had surgery on your heart or blood vessels, such as a stent placement, a coronary artery bypass, or surgery on an artery in your head, neck, heart, or legs? No   3. Do you have chest pain with activity? No   4. Do you have a  history of  heart failure? No   5. Do you currently have a cold, bronchitis or symptoms of other infection? No   6. Do you have a cough, shortness of breath, or wheezing? No   7. Do you or anyone in your family have previous history of blood clots? No   8. Do you or does anyone in your family have a serious bleeding problem such as prolonged bleeding following surgeries or cuts? No   9. Have you ever had problems with anemia or been told to take iron pills? No   10. Have you had any abnormal blood loss such as black, tarry or bloody stools, or abnormal vaginal bleeding? No   11. Have you ever had a blood transfusion? No   12. Are you willing to have a blood transfusion if it is medically needed before, during, or after your surgery? Yes   13. Have you or any of your relatives ever had problems with anesthesia? No   14. Do you have sleep apnea, excessive snoring or daytime drowsiness? No   15. Do you have any artifical heart valves or other implanted medical devices like a pacemaker, defibrillator, or continuous glucose monitor? No   16. Do you have artificial joints? No   17. Are you allergic to latex? No       Health Care Directive:  Patient does not have a Health Care Directive or Living Will: Patient states has Advance Directive and will bring in a copy to clinic.    Preoperative Review of :   reviewed - no record of controlled substances prescribed.      Status of Chronic Conditions:  DIABETES - Patient has a longstanding history of DiabetesType Type II . Patient is being treated with oral agents and denies significant side effects. Control has been good. Complicating factors include but are not limited to: hypertension and hyperlipidemia.     HYPERLIPIDEMIA - Patient has a long history of significant Hyperlipidemia requiring medication for treatment with recent good control. Patient reports is NOT taking statins     HYPERTENSION - Patient has longstanding history of HTN , currently denies any symptoms  referable to elevated blood pressure. Specifically denies chest pain, palpitations, dyspnea, orthopnea, PND or peripheral edema. Blood pressure readings have been in normal range. Current medication regimen is as listed below. Patient denies any side effects of medication.       Review of Systems  Constitutional, neuro, ENT, endocrine, pulmonary, cardiac, gastrointestinal, genitourinary, musculoskeletal, integument and psychiatric systems are negative, except as otherwise noted.    Patient Active Problem List    Diagnosis Date Noted     Left hamstring injury, initial encounter 04/21/2021     Priority: Medium     Left hamstring muscle strain 04/21/2021     Priority: Medium     Lumbar disc herniation 06/04/2019     Priority: Medium     Rotator cuff syndrome of right shoulder 03/11/2019     Priority: Medium     Needle phobia 07/11/2017     Priority: Medium     Vasovagal syncope 07/11/2017     Priority: Medium     Type 2 diabetes mellitus without complications (H) 10/23/2015     Priority: Medium     Colon polyp      Priority: Medium     colonoscopy due 5 years.       Chronic headaches 04/23/2014     Priority: Medium     HTN (hypertension), benign      Priority: Medium     GERD (gastroesophageal reflux disease)      Priority: Medium     Hyperlipidemia LDL goal <100 05/09/2012     Priority: Medium     Osteopenia      Priority: Medium      Past Medical History:   Diagnosis Date     Abscess of abdominal cavity (H) 11/02    cecal perf, prob from diverticulitis     Allergic rhinitis      Colon polyp 2015    colonoscopy due 5 years.     Diverticulosis      DM type 2 (diabetes mellitus, type 2) (H)      Elevated LFTs     fatty liver dz     GERD (gastroesophageal reflux disease)      Hepatitis A      Herpes zoster 11/28/11     HTN (hypertension), benign      Osteopenia     DXA 2008     Osteopenia 2014     Past Surgical History:   Procedure Laterality Date     CATARACT IOL, RT/LT       COLONOSCOPY N/A 5/15/2018    Procedure:  COLONOSCOPY;  colonoscopy;  Surgeon: Lucero Acharya MD;  Location:  GI     DISCECTOMY LUMBAR POSTERIOR MICROSCOPIC ONE LEVEL Right 10/1/2019    Procedure: LUMBAR MICRODISCECTOMY RIGHT L2-3;  Surgeon: Wolfgang Peng MD;  Location:  OR     varicose vein surgery      Dr. Bowen     wisdom teeth  age 27     Current Outpatient Medications   Medication Sig Dispense Refill     Acetaminophen 325 MG CAPS Take 325-650 mg by mouth every 4 hours as needed       aspirin 81 MG tablet Take  by mouth daily.       Calcium-Vitamin D-Vitamin K (VIACTIV PO)        diphenhydrAMINE-APAP, sleep, (TYLENOL PM EXTRA STRENGTH PO)        famotidine (PEPCID) 10 MG tablet Take by mouth as needed       hydrochlorothiazide (HYDRODIURIL) 12.5 MG tablet TAKE 1 TABLET BY MOUTH EVERY DAY 90 tablet 0     irbesartan (AVAPRO) 300 MG tablet Take 1 tablet (300 mg) by mouth daily 90 tablet 3     metFORMIN (GLUCOPHAGE) 500 MG tablet TAKE 2 TABLETS BY MOUTH TWICE A DAY WITH MEALS 360 tablet 0     metoprolol succinate ER (TOPROL-XL) 100 MG 24 hr tablet TAKE 1.5 TABLETS BY MOUTH EVERY DAY (Patient taking differently: Take 100 mg by mouth daily ) 135 tablet 2     naproxen sodium 220 MG capsule Take 220 mg by mouth 2 times daily (with meals)       psyllium (METAMUCIL SMOOTH TEXTURE) 63 % POWD Take 1 teaspoonful by mouth daily. Mix in 8 ounces of water       STATIN NOT PRESCRIBED, INTENTIONAL, Statin not prescribed intentionally due to Other LDL at goal; pt doesn't want to add another medication (This option does not exclude patient from measure)  0     Vitamin D, Cholecalciferol, 25 MCG (1000 UT) TABS          Allergies   Allergen Reactions     Amoxicillin Hives     Hydrochlorothiazide Other (See Comments)     Hyponatremia on higher dose     Lisinopril Cough     Lotrel Hives        Social History     Tobacco Use     Smoking status: Never Smoker     Smokeless tobacco: Never Used   Substance Use Topics     Alcohol use: Not Currently     Alcohol/week:  "0.0 standard drinks     Comment: 0-3 drinks per week     Family History   Problem Relation Age of Onset     Hypertension Mother              Thyroid Disease Mother              Neurologic Disorder Mother         Parkinsons and RA     Hypertension Father              Diabetes Father              Diabetes Sister      Hypertension Sister      Diabetes Maternal Grandmother              Diabetes Maternal Grandfather              Alcoholism Sister 59     Diabetes Son      Hypertension Son      History   Drug Use No         Objective     /68   Pulse 62   Temp (!) 96.6  F (35.9  C) (Temporal)   Resp 17   Ht 1.676 m (5' 6\")   Wt 86.2 kg (190 lb)   SpO2 99%   BMI 30.67 kg/m      Physical Exam    GENERAL APPEARANCE: healthy, alert and no distress     EYES: EOMI, PERRL     HENT: ear canals and TM's normal and nose and mouth without ulcers or lesions     NECK: no adenopathy, no asymmetry, masses, or scars and thyroid normal to palpation     RESP: lungs clear to auscultation - no rales, rhonchi or wheezes     CV: regular rates and rhythm, normal S1 S2, no S3 or S4 and no murmur, click or rub     ABDOMEN:  soft, nontender, no HSM or masses and bowel sounds normal     MS: extremities normal- no gross deformities noted, no evidence of inflammation in joints, FROM in all extremities.     SKIN: no suspicious lesions or rashes     NEURO: Normal strength and tone, sensory exam grossly normal, mentation intact and speech normal     PSYCH: mentation appears normal. and affect normal/bright     LYMPHATICS: No cervical adenopathy    Recent Labs   Lab Test 21  0830 20  0819 20  0952   HGB  --   --  14.5   PLT  --   --  252   NA  --  136 131*   POTASSIUM  --  5.2 5.9*   CR  --  0.91 0.99   A1C 6.3*  --  6.2*        Diagnostics:  No results found for this or any previous visit (from the past 24 hour(s)).   EKG: appears normal, NSR, normal axis, normal intervals, no " acute ST/T changes c/w ischemia, no LVH by voltage criteria, unchanged from previous tracings    Revised Cardiac Risk Index (RCRI):  The patient has the following serious cardiovascular risks for perioperative complications:   - No serious cardiac risks = 0 points     RCRI Interpretation: 0 points: Class I (very low risk - 0.4% complication rate)           Signed Electronically by: Maribel France MD  Copy of this evaluation report is provided to requesting physician.

## 2021-10-13 NOTE — PATIENT INSTRUCTIONS
Medication Instructions:     - aspirin: Discontinue aspirin 7-10 days prior to procedure to reduce bleeding risk. It should be resumed postoperatively.      - ACE/ARB: HOLD due to exceptional risk of hypotension during surgery.      - Beta Blockers: Continue taking on the day of surgery.     - Diuretics hydrochlorothiazide : HOLD on the day of surgery.     - metformin: HOLD day of surgery.       - naproxen (Aleve, Naprosyn): HOLD 4 days before surgery.

## 2021-10-18 ENCOUNTER — DOCUMENTATION ONLY (OUTPATIENT)
Dept: LAB | Facility: CLINIC | Age: 76
End: 2021-10-18

## 2021-10-18 DIAGNOSIS — E87.1 HYPONATREMIA: Primary | ICD-10-CM

## 2021-10-18 NOTE — PROGRESS NOTES
Pt coming in for labs tomorrow for repeat sodium/potassium. No orders in chart, please place necessary orders.     Chaparrita Dsouza on 10/18/2021 at 8:29 AM

## 2021-10-19 ENCOUNTER — LAB (OUTPATIENT)
Dept: LAB | Facility: CLINIC | Age: 76
End: 2021-10-19
Payer: MEDICARE

## 2021-10-19 DIAGNOSIS — E87.1 HYPONATREMIA: ICD-10-CM

## 2021-10-19 PROCEDURE — 80048 BASIC METABOLIC PNL TOTAL CA: CPT

## 2021-10-19 PROCEDURE — 36415 COLL VENOUS BLD VENIPUNCTURE: CPT

## 2021-10-20 LAB
ANION GAP SERPL CALCULATED.3IONS-SCNC: 1 MMOL/L (ref 3–14)
BUN SERPL-MCNC: 20 MG/DL (ref 7–30)
CALCIUM SERPL-MCNC: 9.9 MG/DL (ref 8.5–10.1)
CHLORIDE BLD-SCNC: 104 MMOL/L (ref 94–109)
CO2 SERPL-SCNC: 30 MMOL/L (ref 20–32)
CREAT SERPL-MCNC: 1.1 MG/DL (ref 0.52–1.04)
GFR SERPL CREATININE-BSD FRML MDRD: 50 ML/MIN/1.73M2
GLUCOSE BLD-MCNC: 104 MG/DL (ref 70–99)
POTASSIUM BLD-SCNC: 5.3 MMOL/L (ref 3.4–5.3)
SODIUM SERPL-SCNC: 135 MMOL/L (ref 133–144)

## 2021-10-30 ENCOUNTER — HEALTH MAINTENANCE LETTER (OUTPATIENT)
Age: 76
End: 2021-10-30

## 2021-11-18 ENCOUNTER — TRANSFERRED RECORDS (OUTPATIENT)
Dept: HEALTH INFORMATION MANAGEMENT | Facility: CLINIC | Age: 76
End: 2021-11-18
Payer: MEDICARE

## 2021-11-20 PROBLEM — S76.312A LEFT HAMSTRING MUSCLE STRAIN: Status: RESOLVED | Noted: 2021-04-21 | Resolved: 2021-11-20

## 2021-11-23 DIAGNOSIS — I10 HTN (HYPERTENSION), BENIGN: ICD-10-CM

## 2021-11-24 RX ORDER — METOPROLOL SUCCINATE 100 MG/1
TABLET, EXTENDED RELEASE ORAL
Qty: 135 TABLET | Refills: 3 | Status: SHIPPED | OUTPATIENT
Start: 2021-11-24 | End: 2022-04-14

## 2022-01-11 ENCOUNTER — HOSPITAL ENCOUNTER (OUTPATIENT)
Dept: MAMMOGRAPHY | Facility: CLINIC | Age: 77
Discharge: HOME | End: 2022-01-11
Attending: PHYSICIAN ASSISTANT | Admitting: PHYSICIAN ASSISTANT
Payer: MEDICARE

## 2022-01-11 DIAGNOSIS — Z12.31 VISIT FOR SCREENING MAMMOGRAM: ICD-10-CM

## 2022-01-11 PROCEDURE — 77067 SCR MAMMO BI INCL CAD: CPT

## 2022-01-26 ENCOUNTER — TRANSFERRED RECORDS (OUTPATIENT)
Dept: HEALTH INFORMATION MANAGEMENT | Facility: CLINIC | Age: 77
End: 2022-01-26
Payer: MEDICARE

## 2022-04-07 ASSESSMENT — ENCOUNTER SYMPTOMS
HEMATURIA: 0
HEARTBURN: 0
JOINT SWELLING: 1
NERVOUS/ANXIOUS: 0
PARESTHESIAS: 0
COUGH: 0
NAUSEA: 0
HEMATOCHEZIA: 0
DIARRHEA: 0
EYE PAIN: 0
SORE THROAT: 0
FREQUENCY: 0
ABDOMINAL PAIN: 0
DIZZINESS: 0
WEAKNESS: 0
SHORTNESS OF BREATH: 0
DYSURIA: 0
CHILLS: 0
MYALGIAS: 0
CONSTIPATION: 0
ARTHRALGIAS: 0
FEVER: 0
HEADACHES: 0
BREAST MASS: 0
PALPITATIONS: 0

## 2022-04-07 ASSESSMENT — ACTIVITIES OF DAILY LIVING (ADL): CURRENT_FUNCTION: NO ASSISTANCE NEEDED

## 2022-04-14 ENCOUNTER — OFFICE VISIT (OUTPATIENT)
Dept: FAMILY MEDICINE | Facility: CLINIC | Age: 77
End: 2022-04-14
Payer: MEDICARE

## 2022-04-14 VITALS
TEMPERATURE: 97.1 F | BODY MASS INDEX: 29.57 KG/M2 | HEART RATE: 60 BPM | DIASTOLIC BLOOD PRESSURE: 85 MMHG | RESPIRATION RATE: 18 BRPM | OXYGEN SATURATION: 99 % | SYSTOLIC BLOOD PRESSURE: 141 MMHG | HEIGHT: 66 IN | WEIGHT: 184 LBS

## 2022-04-14 DIAGNOSIS — G89.29 CHRONIC RIGHT-SIDED LOW BACK PAIN, UNSPECIFIED WHETHER SCIATICA PRESENT: ICD-10-CM

## 2022-04-14 DIAGNOSIS — E11.9 TYPE 2 DIABETES MELLITUS WITHOUT COMPLICATION, WITHOUT LONG-TERM CURRENT USE OF INSULIN (H): ICD-10-CM

## 2022-04-14 DIAGNOSIS — I10 HTN (HYPERTENSION), BENIGN: ICD-10-CM

## 2022-04-14 DIAGNOSIS — Z00.00 MEDICARE ANNUAL WELLNESS VISIT, SUBSEQUENT: Primary | ICD-10-CM

## 2022-04-14 DIAGNOSIS — M54.50 CHRONIC RIGHT-SIDED LOW BACK PAIN, UNSPECIFIED WHETHER SCIATICA PRESENT: ICD-10-CM

## 2022-04-14 DIAGNOSIS — E78.5 HYPERLIPIDEMIA LDL GOAL <100: ICD-10-CM

## 2022-04-14 LAB
ERYTHROCYTE [DISTWIDTH] IN BLOOD BY AUTOMATED COUNT: 12.5 % (ref 10–15)
HBA1C MFR BLD: 6.5 % (ref 0–5.6)
HCT VFR BLD AUTO: 40.2 % (ref 35–47)
HGB BLD-MCNC: 13.6 G/DL (ref 11.7–15.7)
MCH RBC QN AUTO: 31.2 PG (ref 26.5–33)
MCHC RBC AUTO-ENTMCNC: 33.8 G/DL (ref 31.5–36.5)
MCV RBC AUTO: 92 FL (ref 78–100)
PLATELET # BLD AUTO: 318 10E3/UL (ref 150–450)
RBC # BLD AUTO: 4.36 10E6/UL (ref 3.8–5.2)
WBC # BLD AUTO: 6.2 10E3/UL (ref 4–11)

## 2022-04-14 PROCEDURE — 85027 COMPLETE CBC AUTOMATED: CPT | Performed by: PHYSICIAN ASSISTANT

## 2022-04-14 PROCEDURE — 36415 COLL VENOUS BLD VENIPUNCTURE: CPT | Performed by: PHYSICIAN ASSISTANT

## 2022-04-14 PROCEDURE — 83036 HEMOGLOBIN GLYCOSYLATED A1C: CPT | Performed by: PHYSICIAN ASSISTANT

## 2022-04-14 PROCEDURE — 99213 OFFICE O/P EST LOW 20 MIN: CPT | Mod: 25 | Performed by: PHYSICIAN ASSISTANT

## 2022-04-14 PROCEDURE — G0439 PPPS, SUBSEQ VISIT: HCPCS | Performed by: PHYSICIAN ASSISTANT

## 2022-04-14 PROCEDURE — 80053 COMPREHEN METABOLIC PANEL: CPT | Performed by: PHYSICIAN ASSISTANT

## 2022-04-14 PROCEDURE — 80061 LIPID PANEL: CPT | Performed by: PHYSICIAN ASSISTANT

## 2022-04-14 PROCEDURE — 82043 UR ALBUMIN QUANTITATIVE: CPT | Performed by: PHYSICIAN ASSISTANT

## 2022-04-14 RX ORDER — METOPROLOL SUCCINATE 100 MG/1
150 TABLET, EXTENDED RELEASE ORAL DAILY
Qty: 135 TABLET | Refills: 3 | Status: SHIPPED | OUTPATIENT
Start: 2022-04-14 | End: 2023-04-25

## 2022-04-14 RX ORDER — IRBESARTAN 300 MG/1
300 TABLET ORAL DAILY
Qty: 90 TABLET | Refills: 3 | Status: SHIPPED | OUTPATIENT
Start: 2022-04-14 | End: 2023-05-03

## 2022-04-14 RX ORDER — GABAPENTIN 100 MG/1
100 CAPSULE ORAL 3 TIMES DAILY
Qty: 90 CAPSULE | Refills: 1 | Status: SHIPPED | OUTPATIENT
Start: 2022-04-14 | End: 2023-05-03

## 2022-04-14 ASSESSMENT — ACTIVITIES OF DAILY LIVING (ADL): CURRENT_FUNCTION: NO ASSISTANCE NEEDED

## 2022-04-14 ASSESSMENT — PAIN SCALES - GENERAL: PAINLEVEL: MODERATE PAIN (4)

## 2022-04-14 NOTE — PROGRESS NOTES
"SUBJECTIVE:   Haritha Cat is a 77 year old female who presents for Preventive Visit.    Mammogram up to date  Colonoscopy due next year.  Shingrex peterson    Continues to have shooting pain right low back limiting her walking  She has had 2 surgeries for her low back by Dr. Peng  She takes a medrol dose pack that he has prescribed which helps temp  The pain does radiate down the right leg so lying on R side can make that worse.  She has to use a cane to walk long distances  Would like a handicap parking sticker for when she has to park far away.    HTN: 125/71 at home.  BPs tend to range from 125-145 and never higher.    DM: she does not check her blood sugars.  Lab Results   Component Value Date    A1C 6.4 10/13/2021    A1C 6.3 05/20/2021    A1C 6.2 09/09/2020    A1C 6.4 09/05/2019    A1C 7.3 03/26/2019    A1C 7.0 03/11/2019           Patient has been advised of split billing requirements and indicates understanding: Yes  Are you in the first 12 months of your Medicare coverage?  No    Healthy Habits:     In general, how would you rate your overall health?  Good    Frequency of exercise:  2-3 days/week    Duration of exercise:  15-30 minutes    Do you usually eat at least 4 servings of fruit and vegetables a day, include whole grains    & fiber and avoid regularly eating high fat or \"junk\" foods?  No    Taking medications regularly:  Yes    Barriers to taking medications:  None    Medication side effects:  None    Ability to successfully perform activities of daily living:  No assistance needed    Home Safety:  Lack of grab bars in the bathroom    Hearing Impairment:  No hearing concerns    In the past 6 months, have you been bothered by leaking of urine?  No    In general, how would you rate your overall mental or emotional health?  Good      PHQ-2 Total Score: 0    Additional concerns today:  Yes    Do you feel safe in your environment? Yes    Have you ever done Advance Care Planning? (For example, a Health " Directive, POLST, or a discussion with a medical provider or your loved ones about your wishes): Yes, patient states has an Advance Care Planning document and will bring a copy to the clinic.       Fall risk  Fallen 2 or more times in the past year?: No  Any fall with injury in the past year?: No    Cognitive Screening   1) Repeat 3 items (Leader, Season, Table)    2) Clock draw: NORMAL  3) 3 item recall: Recalls 2 objects   Results: NORMAL clock, 1-2 items recalled: COGNITIVE IMPAIRMENT LESS LIKELY    Mini-CogTM Copyright JOSÉ MIGUEL Jones. Licensed by the author for use in [facility]; reprinted with permission (ray@[facility]). All rights reserved.      Do you have sleep apnea, excessive snoring or daytime drowsiness?: no    Reviewed and updated as needed this visit by clinical staff   Tobacco   Meds                Reviewed and updated as needed this visit by Provider                   Social History     Tobacco Use     Smoking status: Never Smoker     Smokeless tobacco: Never Used   Substance Use Topics     Alcohol use: Not Currently     Comment: 0-3 drinks per week     If you drink alcohol do you typically have >3 drinks per day or >7 drinks per week? No      Current providers sharing in care for this patient include:   Patient Care Team:  Amisha Plaza PA-C as PCP - General (Internal Medicine)  Amisha Plaza PA-C as Assigned PCP    The following health maintenance items are reviewed in Epic and correct as of today:  Health Maintenance Due   Topic Date Due     ANNUAL REVIEW OF  ORDERS  Never done     ZOSTER IMMUNIZATION (2 of 3) 10/02/2014     LIPID  09/09/2021     MICROALBUMIN  09/09/2021     DIABETIC FOOT EXAM  09/09/2021     EYE EXAM  09/22/2021     FALL RISK ASSESSMENT  10/20/2021     A1C  04/13/2022       Pertinent mammograms are reviewed under the imaging tab.    Past Medical History:   Diagnosis Date     Abscess of abdominal cavity (H) 11/02    cecal perf, prob from diverticulitis      Allergic rhinitis      Colon polyp 2015    colonoscopy due 5 years.     Diverticulosis      DM type 2 (diabetes mellitus, type 2) (H)      Elevated LFTs     fatty liver dz     GERD (gastroesophageal reflux disease)      Hepatitis A      Herpes zoster 11/28/11     HTN (hypertension), benign      Osteopenia     DXA 2008     Osteopenia 2014     Past Surgical History:   Procedure Laterality Date     CATARACT IOL, RT/LT       COLONOSCOPY N/A 5/15/2018    Procedure: COLONOSCOPY;  colonoscopy;  Surgeon: Lucero Acharya MD;  Location:  GI     DISCECTOMY LUMBAR POSTERIOR MICROSCOPIC ONE LEVEL Right 10/1/2019    Procedure: LUMBAR MICRODISCECTOMY RIGHT L2-3;  Surgeon: Wolfgang Peng MD;  Location:  OR     varicose vein surgery      Dr. Bowen     wisdom teeth  age 27     Current Outpatient Medications   Medication Sig Dispense Refill     Acetaminophen 325 MG CAPS Take 325-650 mg by mouth every 4 hours as needed       aspirin 81 MG tablet Take  by mouth daily.       Calcium-Vitamin D-Vitamin K (VIACTIV PO)        diphenhydrAMINE-APAP, sleep, (TYLENOL PM EXTRA STRENGTH PO)        famotidine (PEPCID) 10 MG tablet Take by mouth as needed       hydrochlorothiazide (HYDRODIURIL) 12.5 MG tablet TAKE 1 TABLET BY MOUTH EVERY DAY 90 tablet 3     irbesartan (AVAPRO) 300 MG tablet Take 1 tablet (300 mg) by mouth daily 90 tablet 3     metFORMIN (GLUCOPHAGE) 500 MG tablet TAKE 2 TABLETS BY MOUTH TWICE A DAY WITH MEALS 360 tablet 2     metoprolol succinate ER (TOPROL-XL) 100 MG 24 hr tablet TAKE 1.5 TABLETS BY MOUTH EVERY  tablet 3     naproxen sodium 220 MG capsule Take 220 mg by mouth 2 times daily (with meals)       psyllium 63 % POWD Take 1 teaspoonful by mouth daily. Mix in 8 ounces of water       Vitamin D, Cholecalciferol, 25 MCG (1000 UT) TABS        STATIN NOT PRESCRIBED, INTENTIONAL, Statin not prescribed intentionally due to Other LDL at goal; pt doesn't want to add another medication (This option does not  "exclude patient from measure) (Patient not taking: Reported on 4/14/2022)  0         Review of Systems  Constitutional, HEENT, cardiovascular, pulmonary, GI, , musculoskeletal, neuro, skin, endocrine and psych systems are negative, except as otherwise noted.    OBJECTIVE:   BP (!) 141/85 (BP Location: Left arm, Patient Position: Sitting, Cuff Size: Adult Large)   Pulse 60   Temp 97.1  F (36.2  C) (Temporal)   Resp 18   Ht 1.664 m (5' 5.5\")   Wt 83.5 kg (184 lb)   SpO2 99%   BMI 30.15 kg/m   Estimated body mass index is 30.15 kg/m  as calculated from the following:    Height as of this encounter: 1.664 m (5' 5.5\").    Weight as of this encounter: 83.5 kg (184 lb).  Physical Exam  GENERAL APPEARANCE: healthy, alert and no distress  EYES: Eyes grossly normal to inspection, PERRL and conjunctivae and sclerae normal  HENT: ear canals and TM's normal, nose and mouth without ulcers or lesions, oropharynx clear and oral mucous membranes moist  NECK: no adenopathy, no asymmetry, masses, or scars and thyroid normal to palpation  RESP: lungs clear to auscultation - no rales, rhonchi or wheezes  BREAST: normal without masses, tenderness or nipple discharge and no palpable axillary masses or adenopathy  CV: regular rate and rhythm, normal S1 S2, no S3 or S4, no murmur, click or rub, no peripheral edema and peripheral pulses strong  ABDOMEN: soft, nontender, no hepatosplenomegaly, no masses and bowel sounds normal  MS: no musculoskeletal defects are noted and gait is age appropriate without ataxia  SKIN: no suspicious lesions or rashes  NEURO: Normal strength and tone, sensory exam grossly normal, mentation intact and speech normal  PSYCH: mentation appears normal and affect normal/bright        ASSESSMENT / PLAN:   Assessment and Plan:     (Z00.00) Medicare annual wellness visit, subsequent  (primary encounter diagnosis)  Comment: recd checking insurance RE: shingrex.  Mammogram UTD. Colonoscopy due next year.  Plan: " "CBC with platelets            (E11.9) Type 2 diabetes mellitus without complication, without long-term current use of insulin (H)  Comment:   Plan: Albumin Random Urine Quantitative with Creat         Ratio, HEMOGLOBIN A1C, metFORMIN (GLUCOPHAGE)         500 MG tablet            (I10) HTN (hypertension), benign  Comment: well controlled, cont same.  Plan: metoprolol succinate ER (TOPROL-XL) 100 MG 24         hr tablet, Comprehensive metabolic panel (BMP +        Alb, Alk Phos, ALT, AST, Total. Bili, TP),         irbesartan (AVAPRO) 300 MG tablet            (E78.5) Hyperlipidemia LDL goal <100  Comment:   Plan: Lipid panel reflex to direct LDL Fasting            (M54.50,  G89.29) Chronic right-sided low back pain, unspecified whether sciatica present  Comment:   Plan: gabapentin (NEURONTIN) 100 MG capsule        Start with 100mg at bedtime and titrate by 100mg Q5d as needed. F/u one month. Consider pain clinic if continues to have issues.        Patient has been advised of split billing requirements and indicates understanding: Yes    COUNSELING:  Reviewed preventive health counseling, as reflected in patient instructions    Estimated body mass index is 30.15 kg/m  as calculated from the following:    Height as of this encounter: 1.664 m (5' 5.5\").    Weight as of this encounter: 83.5 kg (184 lb).        She reports that she has never smoked. She has never used smokeless tobacco.      Appropriate preventive services were discussed with this patient, including applicable screening as appropriate for cardiovascular disease, diabetes, osteopenia/osteoporosis, and glaucoma.  As appropriate for age/gender, discussed screening for colorectal cancer, prostate cancer, breast cancer, and cervical cancer. Checklist reviewing preventive services available has been given to the patient.    Reviewed patients plan of care and provided an AVS. The Basic Care Plan (routine screening as documented in Health Maintenance) for Haritha " meets the Care Plan requirement. This Care Plan has been established and reviewed with the Patient.    Counseling Resources:  ATP IV Guidelines  Pooled Cohorts Equation Calculator  Breast Cancer Risk Calculator  Breast Cancer: Medication to Reduce Risk  FRAX Risk Assessment  ICSI Preventive Guidelines  Dietary Guidelines for Americans, 2010  USDA's MyPlate  ASA Prophylaxis  Lung CA Screening    [facility]    Identified Health Risks:

## 2022-04-14 NOTE — Clinical Note
Please abstract the following data from this visit with this patient into the appropriate field in Epic:  Tests that can be patient reported without a hard copy:  Eye exam with ophthalmology on this date: 07/13/2021 at [facility]

## 2022-04-14 NOTE — NURSING NOTE
"    Initial BP:  BP (!) 141/85 (BP Location: Left arm, Patient Position: Sitting, Cuff Size: Adult Large)   Pulse 60   Temp 97.1  F (36.2  C) (Temporal)   Resp 18   Ht 1.664 m (5' 5.5\")   Wt 83.5 kg (184 lb)   SpO2 99%   BMI 30.15 kg/m       60  Disposition: provider notified while patient in the clinic    Marian Angel CMA    "

## 2022-04-15 LAB
ALBUMIN SERPL-MCNC: 4.3 G/DL (ref 3.4–5)
ALP SERPL-CCNC: 107 U/L (ref 40–150)
ALT SERPL W P-5'-P-CCNC: 22 U/L (ref 0–50)
ANION GAP SERPL CALCULATED.3IONS-SCNC: 9 MMOL/L (ref 3–14)
AST SERPL W P-5'-P-CCNC: 18 U/L (ref 0–45)
BILIRUB SERPL-MCNC: 1.2 MG/DL (ref 0.2–1.3)
BUN SERPL-MCNC: 22 MG/DL (ref 7–30)
CALCIUM SERPL-MCNC: 9.7 MG/DL (ref 8.5–10.1)
CHLORIDE BLD-SCNC: 100 MMOL/L (ref 94–109)
CHOLEST SERPL-MCNC: 164 MG/DL
CO2 SERPL-SCNC: 24 MMOL/L (ref 20–32)
CREAT SERPL-MCNC: 1 MG/DL (ref 0.52–1.04)
CREAT UR-MCNC: 172 MG/DL
FASTING STATUS PATIENT QL REPORTED: YES
GFR SERPL CREATININE-BSD FRML MDRD: 58 ML/MIN/1.73M2
GLUCOSE BLD-MCNC: 131 MG/DL (ref 70–99)
HDLC SERPL-MCNC: 59 MG/DL
LDLC SERPL CALC-MCNC: 80 MG/DL
MICROALBUMIN UR-MCNC: 19 MG/L
MICROALBUMIN/CREAT UR: 11.05 MG/G CR (ref 0–25)
NONHDLC SERPL-MCNC: 105 MG/DL
POTASSIUM BLD-SCNC: 4.6 MMOL/L (ref 3.4–5.3)
PROT SERPL-MCNC: 7.4 G/DL (ref 6.8–8.8)
SODIUM SERPL-SCNC: 133 MMOL/L (ref 133–144)
TRIGL SERPL-MCNC: 123 MG/DL

## 2022-04-18 NOTE — RESULT ENCOUNTER NOTE
It was a pleasure seeing you for your physical examination.  I wanted to get back to you with your test results.    I am happy to report that your CBC or complete blood count is normal with no signs of anemia, leukemia or platelet abnormalities. Your chemistry panel shows no a blood sugar of 131. Your hemoglobin A1c looks great at  6.5%.  Your urine microalbumin was normal. Your blood salts, kidney tests, liver tests, and proteins are all fine.    Your total cholesterol is 164 with the normal range being below 200.  Your HDL or good cholesterol is 59 with the normal range being above 50.  Your LDL or bad cholesterol is 80 with the normal range being below 100.    Amisha Plaza PA-C

## 2022-06-01 ENCOUNTER — TRANSFERRED RECORDS (OUTPATIENT)
Dept: MULTI SPECIALTY CLINIC | Facility: CLINIC | Age: 77
End: 2022-06-01

## 2022-06-01 LAB — RETINOPATHY: NORMAL

## 2022-10-22 ENCOUNTER — HEALTH MAINTENANCE LETTER (OUTPATIENT)
Age: 77
End: 2022-10-22

## 2022-12-09 NOTE — TELEPHONE ENCOUNTER
December 9, 2022       Shahbaz Lehman MD  [address] [place] [address]  Via In Basket      Patient: Cesar Negron   YOB: 1961   Date of Visit: 12/9/2022       Dear Dr. Lehman:    I saw your patient, Cesar Negron, for an evaluation. Below are my notes for this visit with him.    If you have questions, please do not hesitate to call me.          Sincerely,        Nino Donovan MD        CC: No Recipients  Nino Donovan MD  12/9/2022  3:35 PM  Signed  CHIEF COMPLAINT:    Follow up sleep apnea.    HISTORY:     This is a 61 year old male who is here for a 1 year follow up of sleep apnea  Reports using CPAP regularly with good tolerance  Sleeps well through the night and wakes up refreshed with no fatigue or drowsiness most of the days  Denies any issues or concerns with the use of the CPAP device or the mask      PAST MEDICAL HISTORY:      PASQUALE (obstructive sleep apnea)                                   Comment: Uses C-pap most nights    GERD (gastroesophageal reflux disease)                        HLD (hyperlipidemia)                                            Comment: MD monitoring    Psoriasis                                                     PONV (postoperative nausea and vomiting)                      MEDICATIONS:    Current Outpatient Medications   Medication Sig Dispense Refill   • ketorolac (TORADOL) 10 MG tablet Take 10 mg by mouth.     • betamethasone dipropionate (DIPROSONE) 0.05 % ointment Apply twice a day to affected area of back as needed 45 g 11   • clobetasol (TEMOVATE) 0.05 % topical solution Apply twice a day to scaly rash of scalp as needed for scaling 50 mL 11   • clobetasol 0.05 % shampoo Apply shampoo daily to the scaly areas of the scalp and then 15 minutes later rinse it out in the shower. 118 mL 11   • docusate sodium-sennosides (SENOKOT S) 50-8.6 MG per tablet Take 2 tablets by mouth Daily As Needed for Constipation. 30 tablet 1   • HYDROcodone-acetaminophen (NORCO) 5-325  Routing refill request to provider for review/approval because:  Labs out of range:  BP  Labs not current:  LDL    ANIBAL AlbrechtN, RN  [employer] Triage           MG per tablet Take 1 to 2 tablets by mouth every 4 to 6 hours as needed for pain. 15 tablet 0   • ketorolac (TORADOL) 10 MG tablet Take 1 tablet by mouth every 6 hours for 3 days. 12 tablet 0   • omeprazole (PRILOSEC) 20 MG capsule Take 20 mg by mouth daily.       No current facility-administered medications for this visit.       ALLERGIES:    ALLERGIES:   Allergen Reactions   • Penicillins HIVES         I have reviewed family and social history in Epic.      REVIEW OF SYSTEMS:    Constitutional:  Denies fever or chills   Eyes:  Denies change in visual acuity   HENT:  Denies nasal congestion or sore throat   Respiratory:  Denies cough or shortness of breath   Cardiovascular:  Denies chest pain or edema     PHYSICAL EXAM:    Visit Vitals  BP (!) 170/100   Pulse 81   Temp 97.6 °F (36.4 °C)   Resp 14   Ht 5' 10\" (1.778 m)   Wt 116.1 kg (256 lb)   SpO2 96%   BMI 36.73 kg/m²       Constitutional:  Well developed, well nourished, no acute distress, non-toxic appearance   Eyes:  PERRL(Pupils equal, round, reactive to light), conjunctivae normal   Head, Ears, Nose, Throat:  Atraumatic, external ears normal, nose normal, oropharynx moist, no pharyngeal exudates. Neck- normal range of motion, no tenderness, supple   Respiratory:  No respiratory distress, normal breath sounds, no rales, no wheezing   Cardiovascular:  Normal rate, normal rhythm, no murmurs, no gallops, no rubs       ASSESSMENT:    1. PASQUALE on CPAP          PLAN:    CPAP data reviewed and discussed with the patient  Currently on CPAP at 7 cm  Average daily usage is 6 hour and 10 minutes  Average apnea-hypopnea index is 0.9  Mean leakage from the mask is 0.0 and 95th percentile leakage is 0.0  Mean pressure used is 7 cm and 95th percentile pressure used is 7 cm  Patient mentioned that his current CPAP device is more than 6 years old and he would like to have a prescription for new CPAP device    Informed that CPAP is effective with current pressure settings and  compliance is good  Recommended to continue to use CPAP at the current settings  Prescription for CPAP supplies and a new CPAP device at 7 cm sent  Follow-up 1 year      Orders Placed This Encounter   • SERVICE TO HOME CARE RESPIRATORY THERAPY       Return in about 1 year (around 12/9/2023) for ANNUAL FOLLOW-UP PASQUALE/CPAP DOWNLOAD REVIEW.

## 2023-02-11 DIAGNOSIS — E11.9 TYPE 2 DIABETES MELLITUS WITHOUT COMPLICATION, WITHOUT LONG-TERM CURRENT USE OF INSULIN (H): ICD-10-CM

## 2023-03-11 DIAGNOSIS — R60.0 BILATERAL LOWER EXTREMITY EDEMA: ICD-10-CM

## 2023-03-11 DIAGNOSIS — I10 HTN (HYPERTENSION), BENIGN: ICD-10-CM

## 2023-03-13 RX ORDER — HYDROCHLOROTHIAZIDE 12.5 MG/1
TABLET ORAL
Qty: 90 TABLET | Refills: 3 | Status: SHIPPED | OUTPATIENT
Start: 2023-03-13 | End: 2023-11-10

## 2023-04-09 ENCOUNTER — NURSE TRIAGE (OUTPATIENT)
Dept: NURSING | Facility: CLINIC | Age: 78
End: 2023-04-09
Payer: MEDICARE

## 2023-04-09 NOTE — TELEPHONE ENCOUNTER
Pt calling with concerns about;    10 days cold/cough with dark yellow mucus  Worsening cough in last few days  Headache  Face/sinus pain/pressure     Pt Denies;  Difficulty breathing/SOB  Fever    According to the protocol, patient should see a HCP within 24 hours.  Care advice given/when to call back. Patient verbalizes understanding and agrees with plan of care. Transferred to scheduling.     Nieves Starks RN, Nurse Advisor 10:49 AM 4/9/2023  Reason for Disposition    Fever present > 3 days (72 hours)    Additional Information    Negative: SEVERE difficulty breathing (e.g., struggling for each breath, speaks in single words)    Negative: Sounds like a life-threatening emergency to the triager    Negative: [1] Sinus infection AND [2] taking an antibiotic AND [3] symptoms continue    Negative: [1] Difficulty breathing AND [2] not from stuffy nose (e.g., not relieved by cleaning out the nose)    Negative: [1] SEVERE headache AND [2] fever    Negative: [1] Redness or swelling on the cheek, forehead or around the eye AND [2] fever    Negative: Fever > 104 F (40 C)    Negative: Patient sounds very sick or weak to the triager    Negative: [1] SEVERE pain AND [2] not improved 2 hours after pain medicine    Negative: [1] Redness or swelling on the cheek, forehead or around the eye AND [2] no fever    Negative: [1] Fever > 101 F (38.3 C) AND [2] age > 60 years    Negative: [1] Fever > 100.0 F (37.8 C) AND [2] bedridden (e.g., nursing home patient, CVA, chronic illness, recovering from surgery)    Negative: [1] Fever > 100.0 F (37.8 C) AND [2] diabetes mellitus or weak immune system (e.g., HIV positive, cancer chemo, splenectomy, organ transplant, chronic steroids)    Protocols used: SINUS PAIN OR CONGESTION-A-AH

## 2023-04-23 DIAGNOSIS — I10 HTN (HYPERTENSION), BENIGN: ICD-10-CM

## 2023-04-25 RX ORDER — METOPROLOL SUCCINATE 100 MG/1
TABLET, EXTENDED RELEASE ORAL
Qty: 135 TABLET | Refills: 0 | Status: SHIPPED | OUTPATIENT
Start: 2023-04-25 | End: 2023-05-03

## 2023-05-02 ASSESSMENT — ENCOUNTER SYMPTOMS
EYE PAIN: 0
HEMATOCHEZIA: 0
COUGH: 0
WEAKNESS: 0
SHORTNESS OF BREATH: 0
CHILLS: 0
HEMATURIA: 0
FEVER: 0
JOINT SWELLING: 1
PARESTHESIAS: 0
DYSURIA: 0
BREAST MASS: 0
HEARTBURN: 0
DIARRHEA: 0
ABDOMINAL PAIN: 0
NERVOUS/ANXIOUS: 0
MYALGIAS: 0
HEADACHES: 0
NAUSEA: 0
PALPITATIONS: 0
ARTHRALGIAS: 1
CONSTIPATION: 0
FREQUENCY: 0
SORE THROAT: 0
DIZZINESS: 0

## 2023-05-02 ASSESSMENT — ACTIVITIES OF DAILY LIVING (ADL): CURRENT_FUNCTION: NO ASSISTANCE NEEDED

## 2023-05-03 ENCOUNTER — OFFICE VISIT (OUTPATIENT)
Dept: FAMILY MEDICINE | Facility: CLINIC | Age: 78
End: 2023-05-03
Payer: MEDICARE

## 2023-05-03 VITALS
DIASTOLIC BLOOD PRESSURE: 96 MMHG | HEIGHT: 66 IN | HEART RATE: 60 BPM | RESPIRATION RATE: 16 BRPM | WEIGHT: 186.2 LBS | SYSTOLIC BLOOD PRESSURE: 182 MMHG | BODY MASS INDEX: 29.92 KG/M2 | OXYGEN SATURATION: 100 % | TEMPERATURE: 97.6 F

## 2023-05-03 DIAGNOSIS — E11.9 TYPE 2 DIABETES MELLITUS WITHOUT COMPLICATION, WITHOUT LONG-TERM CURRENT USE OF INSULIN (H): ICD-10-CM

## 2023-05-03 DIAGNOSIS — Z78.0 ASYMPTOMATIC POSTMENOPAUSAL STATUS: ICD-10-CM

## 2023-05-03 DIAGNOSIS — I10 WHITE COAT SYNDROME WITH DIAGNOSIS OF HYPERTENSION: ICD-10-CM

## 2023-05-03 DIAGNOSIS — K63.5 POLYP OF COLON, UNSPECIFIED PART OF COLON, UNSPECIFIED TYPE: ICD-10-CM

## 2023-05-03 DIAGNOSIS — Z00.00 ROUTINE HISTORY AND PHYSICAL EXAMINATION OF ADULT: Primary | ICD-10-CM

## 2023-05-03 DIAGNOSIS — K21.9 GASTROESOPHAGEAL REFLUX DISEASE WITHOUT ESOPHAGITIS: ICD-10-CM

## 2023-05-03 DIAGNOSIS — M85.80 OSTEOPENIA, UNSPECIFIED LOCATION: ICD-10-CM

## 2023-05-03 DIAGNOSIS — E78.5 HYPERLIPIDEMIA LDL GOAL <100: ICD-10-CM

## 2023-05-03 DIAGNOSIS — I10 HTN (HYPERTENSION), BENIGN: ICD-10-CM

## 2023-05-03 DIAGNOSIS — Z12.11 COLON CANCER SCREENING: ICD-10-CM

## 2023-05-03 LAB
ALBUMIN SERPL BCG-MCNC: 4.6 G/DL (ref 3.5–5.2)
ALP SERPL-CCNC: 97 U/L (ref 35–104)
ALT SERPL W P-5'-P-CCNC: 14 U/L (ref 10–35)
ANION GAP SERPL CALCULATED.3IONS-SCNC: 14 MMOL/L (ref 7–15)
AST SERPL W P-5'-P-CCNC: 26 U/L (ref 10–35)
BASOPHILS # BLD AUTO: 0 10E3/UL (ref 0–0.2)
BASOPHILS NFR BLD AUTO: 1 %
BILIRUB SERPL-MCNC: 0.9 MG/DL
BUN SERPL-MCNC: 18.3 MG/DL (ref 8–23)
CALCIUM SERPL-MCNC: 10.2 MG/DL (ref 8.8–10.2)
CHLORIDE SERPL-SCNC: 93 MMOL/L (ref 98–107)
CHOLEST SERPL-MCNC: 176 MG/DL
CREAT SERPL-MCNC: 1.04 MG/DL (ref 0.51–0.95)
CREAT UR-MCNC: 46.8 MG/DL
DEPRECATED HCO3 PLAS-SCNC: 24 MMOL/L (ref 22–29)
EOSINOPHIL # BLD AUTO: 0.2 10E3/UL (ref 0–0.7)
EOSINOPHIL NFR BLD AUTO: 3 %
ERYTHROCYTE [DISTWIDTH] IN BLOOD BY AUTOMATED COUNT: 11.8 % (ref 10–15)
GFR SERPL CREATININE-BSD FRML MDRD: 55 ML/MIN/1.73M2
GLUCOSE SERPL-MCNC: 103 MG/DL (ref 70–99)
HBA1C MFR BLD: 6.5 % (ref 0–5.6)
HCT VFR BLD AUTO: 39.2 % (ref 35–47)
HDLC SERPL-MCNC: 66 MG/DL
HGB BLD-MCNC: 12.9 G/DL (ref 11.7–15.7)
IMM GRANULOCYTES # BLD: 0 10E3/UL
IMM GRANULOCYTES NFR BLD: 0 %
LDLC SERPL CALC-MCNC: 83 MG/DL
LYMPHOCYTES # BLD AUTO: 1.9 10E3/UL (ref 0.8–5.3)
LYMPHOCYTES NFR BLD AUTO: 28 %
MCH RBC QN AUTO: 29.6 PG (ref 26.5–33)
MCHC RBC AUTO-ENTMCNC: 32.9 G/DL (ref 31.5–36.5)
MCV RBC AUTO: 90 FL (ref 78–100)
MICROALBUMIN UR-MCNC: <12 MG/L
MICROALBUMIN/CREAT UR: NORMAL MG/G{CREAT}
MONOCYTES # BLD AUTO: 0.7 10E3/UL (ref 0–1.3)
MONOCYTES NFR BLD AUTO: 10 %
NEUTROPHILS # BLD AUTO: 3.9 10E3/UL (ref 1.6–8.3)
NEUTROPHILS NFR BLD AUTO: 58 %
NONHDLC SERPL-MCNC: 110 MG/DL
PLATELET # BLD AUTO: 242 10E3/UL (ref 150–450)
POTASSIUM SERPL-SCNC: 5.4 MMOL/L (ref 3.4–5.3)
PROT SERPL-MCNC: 7.2 G/DL (ref 6.4–8.3)
RBC # BLD AUTO: 4.36 10E6/UL (ref 3.8–5.2)
SODIUM SERPL-SCNC: 131 MMOL/L (ref 136–145)
TRIGL SERPL-MCNC: 137 MG/DL
WBC # BLD AUTO: 6.7 10E3/UL (ref 4–11)

## 2023-05-03 PROCEDURE — 85025 COMPLETE CBC W/AUTO DIFF WBC: CPT | Performed by: INTERNAL MEDICINE

## 2023-05-03 PROCEDURE — 80053 COMPREHEN METABOLIC PANEL: CPT | Performed by: INTERNAL MEDICINE

## 2023-05-03 PROCEDURE — 83036 HEMOGLOBIN GLYCOSYLATED A1C: CPT | Performed by: INTERNAL MEDICINE

## 2023-05-03 PROCEDURE — 80061 LIPID PANEL: CPT | Performed by: INTERNAL MEDICINE

## 2023-05-03 PROCEDURE — 99214 OFFICE O/P EST MOD 30 MIN: CPT | Mod: 25 | Performed by: INTERNAL MEDICINE

## 2023-05-03 PROCEDURE — 82043 UR ALBUMIN QUANTITATIVE: CPT | Performed by: INTERNAL MEDICINE

## 2023-05-03 PROCEDURE — G0439 PPPS, SUBSEQ VISIT: HCPCS | Performed by: INTERNAL MEDICINE

## 2023-05-03 PROCEDURE — 36415 COLL VENOUS BLD VENIPUNCTURE: CPT | Performed by: INTERNAL MEDICINE

## 2023-05-03 PROCEDURE — 99207 PR FOOT EXAM NO CHARGE: CPT | Mod: 25 | Performed by: INTERNAL MEDICINE

## 2023-05-03 PROCEDURE — 82570 ASSAY OF URINE CREATININE: CPT | Performed by: INTERNAL MEDICINE

## 2023-05-03 RX ORDER — IRBESARTAN 300 MG/1
300 TABLET ORAL DAILY
Qty: 90 TABLET | Refills: 3 | Status: SHIPPED | OUTPATIENT
Start: 2023-05-03 | End: 2023-05-12

## 2023-05-03 RX ORDER — METOPROLOL SUCCINATE 100 MG/1
100 TABLET, EXTENDED RELEASE ORAL DAILY
Qty: 90 TABLET | Refills: 3 | Status: SHIPPED | OUTPATIENT
Start: 2023-05-03 | End: 2024-02-14

## 2023-05-03 ASSESSMENT — ENCOUNTER SYMPTOMS
MYALGIAS: 0
CHILLS: 0
DIARRHEA: 0
COUGH: 0
DIZZINESS: 0
PARESTHESIAS: 0
FEVER: 0
SORE THROAT: 0
HEARTBURN: 0
FREQUENCY: 0
NAUSEA: 0
ARTHRALGIAS: 1
HEMATOCHEZIA: 0
DYSURIA: 0
EYE PAIN: 0
HEADACHES: 0
ABDOMINAL PAIN: 0
CONSTIPATION: 0
SHORTNESS OF BREATH: 0
BREAST MASS: 0
HEMATURIA: 0
WEAKNESS: 0
PALPITATIONS: 0
JOINT SWELLING: 1
NERVOUS/ANXIOUS: 0

## 2023-05-03 ASSESSMENT — PAIN SCALES - GENERAL: PAINLEVEL: NO PAIN (0)

## 2023-05-03 ASSESSMENT — ACTIVITIES OF DAILY LIVING (ADL): CURRENT_FUNCTION: NO ASSISTANCE NEEDED

## 2023-05-03 NOTE — PROGRESS NOTES
"SUBJECTIVE:   Pat is a 78 year old who presents for Preventive Visit.    Patient has been advised of split billing requirements and indicates understanding: Yes  Are you in the first 12 months of your Medicare coverage?  No    Healthy Habits:     In general, how would you rate your overall health?  Good    Frequency of exercise:  2-3 days/week    Duration of exercise:  15-30 minutes    Do you usually eat at least 4 servings of fruit and vegetables a day, include whole grains    & fiber and avoid regularly eating high fat or \"junk\" foods?  No    Taking medications regularly:  Yes    Medication side effects:  None    Ability to successfully perform activities of daily living:  No assistance needed    Home Safety:  No safety concerns identified    Hearing Impairment:  No hearing concerns    In the past 6 months, have you been bothered by leaking of urine?  No    In general, how would you rate your overall mental or emotional health?  Excellent      PHQ-2 Total Score: 0    Additional concerns today:  Yes      Have you ever done Advance Care Planning? (For example, a Health Directive, POLST, or a discussion with a medical provider or your loved ones about your wishes): No, advance care planning information given to patient to review.  Patient declined advance care planning discussion at this time.       Fall risk  Fallen 2 or more times in the past year?: No  Any fall with injury in the past year?: No    Do you have sleep apnea, excessive snoring or daytime drowsiness?: no    Reviewed and updated as needed this visit by clinical staff   Tobacco  Allergies  Meds              Reviewed and updated as needed this visit by Provider                 Social History     Tobacco Use     Smoking status: Never     Smokeless tobacco: Never   Vaping Use     Vaping status: Not on file   Substance Use Topics     Alcohol use: Not Currently     Comment: 0-3 drinks per week             5/2/2023     2:39 PM   Alcohol Use   Prescreen: >3 " drinks/day or >7 drinks/week? No     Do you have a current opioid prescription? No  Do you use any other controlled substances or medications that are not prescribed by a provider? None              Current providers sharing in care for this patient include:   Patient Care Team:  Sherry Ribera DO as PCP - General (Internal Medicine)  Maribel France MD as Assigned PCP    The following health maintenance items are reviewed in Epic and correct as of today:  Health Maintenance   Topic Date Due     ZOSTER IMMUNIZATION (2 of 3) 10/02/2014     EYE EXAM  07/13/2022     A1C  10/14/2022     BMP  04/14/2023     LIPID  04/14/2023     MICROALBUMIN  04/14/2023     ANNUAL REVIEW OF HM ORDERS  04/14/2023     MEDICARE ANNUAL WELLNESS VISIT  04/14/2023     COLORECTAL CANCER SCREENING  05/15/2023     DEXA  03/11/2024     DIABETIC FOOT EXAM  05/03/2024     FALL RISK ASSESSMENT  05/03/2024     DTAP/TDAP/TD IMMUNIZATION (2 - Td or Tdap) 08/20/2025     ADVANCE CARE PLANNING  05/03/2028     HEPATITIS C SCREENING  Completed     PHQ-2 (once per calendar year)  Completed     INFLUENZA VACCINE  Completed     Pneumococcal Vaccine: 65+ Years  Completed     COVID-19 Vaccine  Completed     IPV IMMUNIZATION  Aged Out     MENINGITIS IMMUNIZATION  Aged Out     MAMMO SCREENING  Discontinued             Pertinent mammograms are reviewed under the imaging tab.    Review of Systems   Constitutional: Negative for chills and fever.   HENT: Negative for congestion, ear pain, hearing loss and sore throat.    Eyes: Negative for pain and visual disturbance.   Respiratory: Negative for cough and shortness of breath.    Cardiovascular: Negative for chest pain, palpitations and peripheral edema.   Gastrointestinal: Negative for abdominal pain, constipation, diarrhea, heartburn, hematochezia and nausea.   Breasts:  Negative for tenderness, breast mass and discharge.   Genitourinary: Negative for dysuria, frequency, genital sores, hematuria, pelvic pain,  "urgency, vaginal bleeding and vaginal discharge.   Musculoskeletal: Positive for arthralgias and joint swelling. Negative for myalgias.   Skin: Negative for rash.   Neurological: Negative for dizziness, weakness, headaches and paresthesias.   Psychiatric/Behavioral: Negative for mood changes. The patient is not nervous/anxious.          OBJECTIVE:   BP (!) 182/96 (BP Location: Right arm, Patient Position: Sitting, Cuff Size: Adult Regular)   Pulse 60   Temp 97.6  F (36.4  C)   Resp 16   Ht 1.664 m (5' 5.5\")   Wt 84.5 kg (186 lb 3.2 oz)   SpO2 100%   BMI 30.51 kg/m   Estimated body mass index is 30.51 kg/m  as calculated from the following:    Height as of this encounter: 1.664 m (5' 5.5\").    Weight as of this encounter: 84.5 kg (186 lb 3.2 oz).  Physical Exam    GENERAL APPEARANCE: AAOx3, no distress. Well developed.    RESP: Lungs CTA bilaterally. No w/r/r. No distress     CV: RRR, S1/S2 present. No m/r/c.     ABDOMEN:  soft, nontender, no distention. No rebound or guarding.     EXT: No c/c/e in lower extremities b/l. No rashes or deformities noted.    PSYCH: appropriate mood and affect.   Diabetic foot exam: no trophic changes or ulcerative lesions, normal sensory exam and normal monofilament exam          ASSESSMENT / PLAN:   Haritha was seen today for physical.    Diagnoses and all orders for this visit:    Type 2 diabetes mellitus without complication, without long-term current use of insulin (H)  Discussed shingrix-she will consider doing through pharmacy  Update labs  She is UTD with eye exam  -     CBC with Platelets & Differential; Future  -     Comprehensive metabolic panel; Future  -     Hemoglobin A1c; Future  -     Lipid panel reflex to direct LDL Fasting; Future  -     Albumin Random Urine Quantitative with Creat Ratio; Future  -     FOOT EXAM  -     metFORMIN (GLUCOPHAGE) 500 MG tablet; Take 2 tablets (1,000 mg) by mouth 2 times daily (with meals)    Osteopenia, unspecified " location  Asymptomatic postmenopausal status  Update DXA, encourage increasing activity/weight bearing exercise as she admits doing less since the pandemic  -     DX Hip/Pelvis/Spine; Future    Polyp of colon, unspecified part of colon, unspecified type  Colon cancer screening  Due for likely last colonoscopy for screening. Shared decision making/discussion. She would like to proceed.  -     Colonoscopy Screening  Referral; Future    HTN (hypertension), benign  White coat syndrome with diagnosis of hypertension    High, asymptomatic  She reports monitors at home and avg 130s/60s and no concerning symptoms. Reports hx white coat htn.  Continue medications as prescribed  Continue monitoring  Goals reviewed and should follow-up if above goal at home  -     irbesartan (AVAPRO) 300 MG tablet; Take 1 tablet (300 mg) by mouth daily  -     metoprolol succinate ER (TOPROL XL) 100 MG 24 hr tablet; Take 1 tablet (100 mg) by mouth daily          She reports that she has never smoked. She has never used smokeless tobacco.      Sherry Ribera DO  [facility]

## 2023-05-03 NOTE — Clinical Note
Please abstract the following data from this visit with this patient into the appropriate field in Epic:  Tests that can be patient reported without a hard copy:  Eye exam with ophthalmology on this date: 06/2022 Exam Location: [facility]  Other Tests found in the patient's chart through Chart Review/Care Everywhere:  {Abstract Quality List (Optional):209999}  Note to Abstraction: If this section is blank, no results were found via Chart Review/Care Everywhere.

## 2023-05-11 ENCOUNTER — LAB (OUTPATIENT)
Dept: LAB | Facility: CLINIC | Age: 78
End: 2023-05-11
Payer: MEDICARE

## 2023-05-11 DIAGNOSIS — E11.9 TYPE 2 DIABETES MELLITUS WITHOUT COMPLICATION, WITHOUT LONG-TERM CURRENT USE OF INSULIN (H): ICD-10-CM

## 2023-05-11 LAB — POTASSIUM SERPL-SCNC: 5.7 MMOL/L (ref 3.4–5.3)

## 2023-05-11 PROCEDURE — 84132 ASSAY OF SERUM POTASSIUM: CPT

## 2023-05-11 PROCEDURE — 36415 COLL VENOUS BLD VENIPUNCTURE: CPT

## 2023-05-12 ENCOUNTER — TELEPHONE (OUTPATIENT)
Dept: FAMILY MEDICINE | Facility: CLINIC | Age: 78
End: 2023-05-12
Payer: MEDICARE

## 2023-05-12 RX ORDER — IRBESARTAN 150 MG/1
150 TABLET ORAL AT BEDTIME
Qty: 90 TABLET | Refills: 3 | Status: SHIPPED | OUTPATIENT
Start: 2023-05-12 | End: 2023-11-10

## 2023-05-12 RX ORDER — AMLODIPINE BESYLATE 5 MG/1
5 TABLET ORAL DAILY
Qty: 90 TABLET | Refills: 3 | Status: SHIPPED | OUTPATIENT
Start: 2023-05-12 | End: 2023-11-10

## 2023-05-12 NOTE — TELEPHONE ENCOUNTER
Sherry Ribera,   P Cs Triage Im  Please call patient:   Potassium remains elevated/worsening which is dangerous. Please be sure she is not taking any dedicated over the counter potassium supplementation.   Recommendation:   Potassium can be high from the irbesartan. Please decrease from 300mg to 150mg nightly. New rx sent for 150mg dose.   Continue hydrochlorothiazide as she is doing   Start amlodipine 5mg daily-rx sent   Keep BP log     Please schedule her in office with me in 1 week for appt to review meds and follow-up     Dr Ribera

## 2023-05-12 NOTE — TELEPHONE ENCOUNTER
Called patient regarding PCP message below. She is not taking any supplemental potassium. Scheduled for next week per PCP message message.    Appointments in Next Year    May 19, 2023  1:30 PM  (Arrive by 1:10 PM)  Provider Visit with Sherry Ribera DO  [facility] ([facility] ) [phone]            Nichole Ferrara RN on 5/12/2023 at 10:02 AM

## 2023-05-15 ENCOUNTER — MYC MEDICAL ADVICE (OUTPATIENT)
Dept: FAMILY MEDICINE | Facility: CLINIC | Age: 78
End: 2023-05-15
Payer: MEDICARE

## 2023-05-22 ENCOUNTER — OFFICE VISIT (OUTPATIENT)
Dept: FAMILY MEDICINE | Facility: CLINIC | Age: 78
End: 2023-05-22
Payer: MEDICARE

## 2023-05-22 VITALS
SYSTOLIC BLOOD PRESSURE: 138 MMHG | RESPIRATION RATE: 16 BRPM | TEMPERATURE: 97.7 F | OXYGEN SATURATION: 98 % | DIASTOLIC BLOOD PRESSURE: 74 MMHG | HEART RATE: 63 BPM | BODY MASS INDEX: 29.51 KG/M2 | HEIGHT: 66 IN | WEIGHT: 183.6 LBS

## 2023-05-22 DIAGNOSIS — E87.5 HYPERKALEMIA: ICD-10-CM

## 2023-05-22 DIAGNOSIS — I10 HTN (HYPERTENSION), BENIGN: Primary | ICD-10-CM

## 2023-05-22 LAB
ANION GAP SERPL CALCULATED.3IONS-SCNC: 14 MMOL/L (ref 7–15)
BUN SERPL-MCNC: 22.3 MG/DL (ref 8–23)
CALCIUM SERPL-MCNC: 10.3 MG/DL (ref 8.8–10.2)
CHLORIDE SERPL-SCNC: 96 MMOL/L (ref 98–107)
CREAT SERPL-MCNC: 1.07 MG/DL (ref 0.51–0.95)
DEPRECATED HCO3 PLAS-SCNC: 22 MMOL/L (ref 22–29)
GFR SERPL CREATININE-BSD FRML MDRD: 54 ML/MIN/1.73M2
GLUCOSE SERPL-MCNC: 128 MG/DL (ref 70–99)
POTASSIUM SERPL-SCNC: 4.6 MMOL/L (ref 3.4–5.3)
SODIUM SERPL-SCNC: 132 MMOL/L (ref 136–145)

## 2023-05-22 PROCEDURE — 36415 COLL VENOUS BLD VENIPUNCTURE: CPT | Performed by: INTERNAL MEDICINE

## 2023-05-22 PROCEDURE — 99214 OFFICE O/P EST MOD 30 MIN: CPT | Performed by: INTERNAL MEDICINE

## 2023-05-22 PROCEDURE — 80048 BASIC METABOLIC PNL TOTAL CA: CPT | Performed by: INTERNAL MEDICINE

## 2023-05-22 ASSESSMENT — PAIN SCALES - GENERAL: PAINLEVEL: NO PAIN (0)

## 2023-05-22 NOTE — PROGRESS NOTES
"  Assessment & Plan     HTN (hypertension), benign  Hyperkalemia  Controlled BP with recent adjustments  We decreased irbesartan and added amlodipine, will recheck K   She has also reduced NSAID use to prn rather than routine which we have discussed in the past  - Basic metabolic panel  (Ca, Cl, CO2, Creat, Gluc, K, Na, BUN)      Return in about 1 year (around 5/22/2024) for Routine preventive, sooner if symptoms worsen or do not improve.        DO FREDRICK Cardenas [facility] ENEIDA Fofana is a 78 year old, presenting for the following health issues:  RECHECK    Ct is here for BP follow-up and lab recheck  She is monitoring BP at home, well controlled 120s-130  She has started decreasing NSAID use and adding tylenol  Denies excessive use of any one high potassium food such as bananas. States overall balances meals/moderation    Review of Systems   Constitutional, HEENT, cardiovascular, pulmonary, gi and gu systems are negative, except as otherwise noted.      Objective    /74 (BP Location: Left arm, Patient Position: Sitting, Cuff Size: Adult Regular)   Pulse 63   Temp 97.7  F (36.5  C)   Resp 16   Ht 1.664 m (5' 5.5\")   Wt 83.3 kg (183 lb 9.6 oz)   SpO2 98%   BMI 30.09 kg/m    Body mass index is 30.09 kg/m .  Physical Exam     GENERAL APPEARANCE: AAOx3, no distress. Well developed.    PSYCH: appropriate mood and affect.                   "

## 2023-11-05 ENCOUNTER — HEALTH MAINTENANCE LETTER (OUTPATIENT)
Age: 78
End: 2023-11-05

## 2023-11-08 ENCOUNTER — OFFICE VISIT (OUTPATIENT)
Dept: URGENT CARE | Facility: URGENT CARE | Age: 78
End: 2023-11-08
Payer: MEDICARE

## 2023-11-08 ENCOUNTER — ANCILLARY PROCEDURE (OUTPATIENT)
Dept: GENERAL RADIOLOGY | Facility: CLINIC | Age: 78
End: 2023-11-08
Attending: STUDENT IN AN ORGANIZED HEALTH CARE EDUCATION/TRAINING PROGRAM
Payer: MEDICARE

## 2023-11-08 ENCOUNTER — HOSPITAL ENCOUNTER (OUTPATIENT)
Dept: CT IMAGING | Facility: CLINIC | Age: 78
Discharge: HOME | End: 2023-11-08
Attending: STUDENT IN AN ORGANIZED HEALTH CARE EDUCATION/TRAINING PROGRAM | Admitting: STUDENT IN AN ORGANIZED HEALTH CARE EDUCATION/TRAINING PROGRAM
Payer: MEDICARE

## 2023-11-08 VITALS
DIASTOLIC BLOOD PRESSURE: 88 MMHG | SYSTOLIC BLOOD PRESSURE: 142 MMHG | TEMPERATURE: 97.5 F | OXYGEN SATURATION: 99 % | HEART RATE: 115 BPM

## 2023-11-08 DIAGNOSIS — R00.0 TACHYCARDIA: ICD-10-CM

## 2023-11-08 DIAGNOSIS — R06.09 DYSPNEA ON EXERTION: ICD-10-CM

## 2023-11-08 DIAGNOSIS — I48.91 ATRIAL FIBRILLATION, UNSPECIFIED TYPE (H): ICD-10-CM

## 2023-11-08 DIAGNOSIS — I50.21 ACUTE SYSTOLIC HEART FAILURE (H): Primary | ICD-10-CM

## 2023-11-08 LAB
ANION GAP SERPL CALCULATED.3IONS-SCNC: 12 MMOL/L (ref 7–15)
BUN SERPL-MCNC: 19.3 MG/DL (ref 8–23)
CALCIUM SERPL-MCNC: 9.7 MG/DL (ref 8.8–10.2)
CHLORIDE SERPL-SCNC: 98 MMOL/L (ref 98–107)
CREAT SERPL-MCNC: 1.03 MG/DL (ref 0.51–0.95)
D DIMER PPP FEU-MCNC: 3.42 UG/ML FEU (ref 0–0.5)
DEPRECATED HCO3 PLAS-SCNC: 23 MMOL/L (ref 22–29)
EGFRCR SERPLBLD CKD-EPI 2021: 56 ML/MIN/1.73M2
ERYTHROCYTE [DISTWIDTH] IN BLOOD BY AUTOMATED COUNT: 12.6 % (ref 10–15)
GLUCOSE SERPL-MCNC: 154 MG/DL (ref 70–99)
HCT VFR BLD AUTO: 37.6 % (ref 35–47)
HGB BLD-MCNC: 12.2 G/DL (ref 11.7–15.7)
MCH RBC QN AUTO: 29.2 PG (ref 26.5–33)
MCHC RBC AUTO-ENTMCNC: 32.4 G/DL (ref 31.5–36.5)
MCV RBC AUTO: 90 FL (ref 78–100)
PLATELET # BLD AUTO: 269 10E3/UL (ref 150–450)
POTASSIUM SERPL-SCNC: 4.5 MMOL/L (ref 3.4–5.3)
RBC # BLD AUTO: 4.18 10E6/UL (ref 3.8–5.2)
SODIUM SERPL-SCNC: 133 MMOL/L (ref 135–145)
WBC # BLD AUTO: 7.7 10E3/UL (ref 4–11)

## 2023-11-08 PROCEDURE — G1010 CDSM STANSON: HCPCS

## 2023-11-08 PROCEDURE — 36415 COLL VENOUS BLD VENIPUNCTURE: CPT

## 2023-11-08 PROCEDURE — 93000 ELECTROCARDIOGRAM COMPLETE: CPT

## 2023-11-08 PROCEDURE — 250N000009 HC RX 250: Performed by: STUDENT IN AN ORGANIZED HEALTH CARE EDUCATION/TRAINING PROGRAM

## 2023-11-08 PROCEDURE — 99215 OFFICE O/P EST HI 40 MIN: CPT | Mod: 25

## 2023-11-08 PROCEDURE — 71046 X-RAY EXAM CHEST 2 VIEWS: CPT | Mod: TC | Performed by: RADIOLOGY

## 2023-11-08 PROCEDURE — 250N000011 HC RX IP 250 OP 636: Performed by: STUDENT IN AN ORGANIZED HEALTH CARE EDUCATION/TRAINING PROGRAM

## 2023-11-08 PROCEDURE — 85379 FIBRIN DEGRADATION QUANT: CPT

## 2023-11-08 PROCEDURE — 99417 PROLNG OP E/M EACH 15 MIN: CPT

## 2023-11-08 PROCEDURE — 80048 BASIC METABOLIC PNL TOTAL CA: CPT

## 2023-11-08 PROCEDURE — 85027 COMPLETE CBC AUTOMATED: CPT

## 2023-11-08 RX ORDER — FUROSEMIDE 20 MG
40 TABLET ORAL 2 TIMES DAILY
Qty: 12 TABLET | Refills: 0 | Status: SHIPPED | OUTPATIENT
Start: 2023-11-08 | End: 2023-11-16

## 2023-11-08 RX ORDER — DILTIAZEM HYDROCHLORIDE 120 MG/1
120 CAPSULE, EXTENDED RELEASE ORAL DAILY
Qty: 30 CAPSULE | Refills: 0 | Status: SHIPPED | OUTPATIENT
Start: 2023-11-08 | End: 2023-11-10

## 2023-11-08 RX ORDER — IOPAMIDOL 755 MG/ML
69 INJECTION, SOLUTION INTRAVASCULAR ONCE
Status: COMPLETED | OUTPATIENT
Start: 2023-11-08 | End: 2023-11-08

## 2023-11-08 RX ADMIN — IOPAMIDOL 69 ML: 755 INJECTION, SOLUTION INTRAVENOUS at 18:57

## 2023-11-08 RX ADMIN — SODIUM CHLORIDE 93 ML: 9 INJECTION, SOLUTION INTRAVENOUS at 18:57

## 2023-11-08 NOTE — PROGRESS NOTES
Assessment & Plan     Acute systolic heart failure (H)  Atrial fibrillation, unspecified type (H)  Patient presents to urgent care with acute shortness of breath that started 2 days ago.  She had traveled to [place] and was gone for 5 days. She forgot to take her blood pressure medications with her.  When she returned home her blood pressure was 160/112. She restarted her BP medications but continued to have shortness of breath. She has no past medical history of heart failure, rhythm disturbance, asthma, COPD. She has never smoked. Family history of a-fib in her brother who just recently was diagnosed with this and had ablation.   EKG shows atrial fibrillation with rate of 124. D-dimer is elevated at 3.24 so I advised r/o PE. The ED has 7 hour wait. I talked to the ED physician and discussed whether she could be seen sooner for PE rule out but given she is vitally stable they cannot expedite evaluation. CT is able to get patient in for CTA to work up for PE. CTA is negative for PE but does show mild pulmonary edema and small bilateral pleural effusions. I called on-call cardiologist to consult on treatment of acute heart failure and atrial fibrillation and Dr. Pride kindly assisted in advising on a treatment plan for Pat. He recommended starting anticoagulation for a-fib with Xarelto or Eliquis (whichever insurance will cover), adding diltiazem for rate control, furosemide 40 mg bid for diuresis and urgent referral to cardiology for close follow up in 1-2 days. I will call the heart clinic in the morning to make sure Pat is able to be seen by them by Friday this week.   Addendum: 11/9/23 - Xarelto not covered by insurance. Sent prescription for Eliquis.    Tachycardia  - EKG 12-lead complete w/read - Clinics  - CT Chest Pulmonary Embolism w Contrast  - Basic metabolic panel  (Ca, Cl, CO2, Creat, Gluc, K, Na, BUN)  - D dimer, quantitative  - CBC with platelets  - Basic metabolic panel  (Ca, Cl, CO2, Creat, Gluc,  K, Na, BUN)  - D dimer, quantitative  - CBC with platelets      Dyspnea on exertion  - EKG 12-lead complete w/read - Clinics  - XR Chest 2 Views  - CT Chest Pulmonary Embolism w Contrast  - Basic metabolic panel  (Ca, Cl, CO2, Creat, Gluc, K, Na, BUN)  - D dimer, quantitative  - CBC with platelets  - Basic metabolic panel  (Ca, Cl, CO2, Creat, Gluc, K, Na, BUN)  - D dimer, quantitative  - CBC with platelets     150 minutes spent by me on the date of the encounter doing chart review, review of test results, interpretation of tests, patient visit, documentation, discussion with other provider(s), and discussion with family         No follow-ups on file.    Kaity Alston, NP-C    Urgent Care Provider  [facility] ENEIDA Fofana is a 78 year old female who presents to clinic today for the following health issues:  Chief Complaint   Patient presents with    Breathing Problem     Pt reports being SOB since Monday stating walking from car to inside of building 'I was out of breath'.  and patient were up in [facility] over the past weekend and she forgot her meds not resuming them until Mon.     HPI  Patient presents to urgent care with acute shortness of breath that started 2 days ago.  She had traveled to [place] and was gone for 5 days. She forgot to take her blood pressure medications with her.  When she returned home her blood pressure was 160/112. She restarted her BP medications but continued to have shortness of breath. She is not having chest pain or palpitations. Denies headaches, weakness, numbness or tingling.   She has history of chronic hyponatremia and was advised to increase salt in her diet to keep this in the normal range. She had elevated potassium May so irbesartan was decreased to 150 mg and potassium normalized. She has no past medical history of heart failure, rhythm disturbance, asthma, COPD. She has never smoked. Family history of a-fib in her brother  who just recently was diagnosed with this and had ablation.       Patient Active Problem List   Diagnosis    Osteopenia    Hyperlipidemia LDL goal <100    HTN (hypertension), benign    GERD (gastroesophageal reflux disease)    Chronic headaches    Colon polyp    Type 2 diabetes mellitus without complications (H)    Needle phobia    Vasovagal syncope    Rotator cuff syndrome of right shoulder    Lumbar disc herniation    Left hamstring injury, initial encounter    White coat syndrome with diagnosis of hypertension     Current Outpatient Medications   Medication    amLODIPine (NORVASC) 5 MG tablet    famotidine (PEPCID) 10 MG tablet    hydrochlorothiazide (HYDRODIURIL) 12.5 MG tablet    irbesartan (AVAPRO) 150 MG tablet    metFORMIN (GLUCOPHAGE) 500 MG tablet    metoprolol succinate ER (TOPROL XL) 100 MG 24 hr tablet    naproxen sodium 220 MG capsule    Vitamin D, Cholecalciferol, 25 MCG (1000 UT) TABS    Acetaminophen 325 MG CAPS    diphenhydrAMINE-APAP, sleep, (TYLENOL PM EXTRA STRENGTH PO)     No current facility-administered medications for this visit.         Review of Systems  Constitutional, HEENT, cardiovascular, pulmonary, GI, , musculoskeletal, neuro, skin, endocrine and psych systems are negative, except as otherwise noted.      Objective    BP (!) 142/88   Pulse 115   Temp 97.5  F (36.4  C) (Tympanic)   SpO2 99%   Physical Exam   GENERAL: alert and short of breath with conversation  EYES: Eyes grossly normal to inspection, PERRL and conjunctivae and sclerae normal  HENT: ear canals and TM's normal, nose and mouth without ulcers or lesions  NECK: no adenopathy, no asymmetry, masses, or scars and thyroid normal to palpation  RESP: lungs clear to auscultation - no rales, rhonchi or wheezes, dyspneic with conversation  CV: regular rate and rhythm, normal S1 S2, no S3 or S4, no murmur, click or rub, trace peripheral edema   ABDOMEN: soft, nontender  MS: no gross musculoskeletal defects noted, no  edema  SKIN: no suspicious lesions or rashes  NEURO: Normal strength and tone, mentation intact and speech normal  PSYCH: mentation appears normal, affect normal/bright    Results for orders placed or performed during the hospital encounter of 11/08/23   CT Chest Pulmonary Embolism w Contrast     Status: None    Narrative    EXAM: CT CHEST PULMONARY EMBOLISM W CONTRAST  LOCATION: [facility]  DATE: 11/8/2023    INDICATION: acute onset dyspnea on exertion, tachycardia, acute a fib, pulmonary vascular congestion on CXR, recent 10 hours in car for travel; suspect PE  COMPARISON: None.  TECHNIQUE: CT chest pulmonary angiogram during arterial phase injection of IV contrast. Multiplanar reformats and MIP reconstructions were performed. Dose reduction techniques were used.   CONTRAST: 69mL ISOVUE 370    FINDINGS:  ANGIOGRAM CHEST: Pulmonary arteries are normal caliber and negative for pulmonary emboli. Thoracic aorta is not well opacified and is  indeterminate for dissection. No CT evidence of right heart strain.    LUNGS AND PLEURA: Bilateral groundglass opacities with smooth interlobular septal thickening. Small bilateral pleural effusions. No suspicious solid nodules.    MEDIASTINUM/AXILLAE: Cardiomegaly with biatrial enlargement. No enlarged thoracic lymph nodes.    CORONARY ARTERY CALCIFICATION: None.    UPPER ABDOMEN: Rounded homogeneous soft tissue medial to the spleen, probably a splenule.    MUSCULOSKELETAL: Normal.      Impression    IMPRESSION:  1.  No acute pulmonary emboli.    2.  Manifestations of congestive heart failure, including mild pulmonary edema and small bilateral pleural effusions.   Results for orders placed or performed in visit on 11/08/23   Basic metabolic panel  (Ca, Cl, CO2, Creat, Gluc, K, Na, BUN)     Status: Abnormal   Result Value Ref Range    Sodium 133 (L) 135 - 145 mmol/L    Potassium 4.5 3.4 - 5.3 mmol/L    Chloride 98 98 - 107 mmol/L    Carbon Dioxide (CO2) 23  22 - 29 mmol/L    Anion Gap 12 7 - 15 mmol/L    Urea Nitrogen 19.3 8.0 - 23.0 mg/dL    Creatinine 1.03 (H) 0.51 - 0.95 mg/dL    GFR Estimate 56 (L) >60 mL/min/1.73m2    Calcium 9.7 8.8 - 10.2 mg/dL    Glucose 154 (H) 70 - 99 mg/dL   D dimer, quantitative     Status: Abnormal   Result Value Ref Range    D-Dimer Quantitative 3.42 (H) 0.00 - 0.50 ug/mL FEU    Narrative    This D-dimer assay is intended for use in conjunction with a clinical pretest probability assessment model to exclude pulmonary embolism (PE) and deep venous thrombosis (DVT) in outpatients suspected of PE or DVT. The cut-off value is 0.50 ug/mL FEU.    For patients 50 years of age or older, the application of age-adjusted cut-off values for D-Dimer may increase the specificity without significant effect on sensitivity. The literature suggested calculation age adjusted cut-off in ug/L = age in years x 10 ug/L. The results in this laboratory are reported as ug/mL rather than ug/L. The calculation for age adjusted cut off in ug/mL= age in years x 0.01 ug/mL. For example, the cut off for a 76 year old male is 76 x 0.01 ug/mL = 0.76 ug/mL (760 ug/L).    M Sonal et al. Age adjusted D-dimer cut-off levels to rule out pulmonary embolism: The ADJUST-PE Study. TORIN 2014;311:4677-3534.; HJ Portia et al. Diagnostic accuracy of conventional or age adjusted D-dimer cutoff values in older patients with suspected venous thromboembolism. Systemic review and meta-analysis. BMJ 2013:346:f2492.   CBC with platelets     Status: Normal   Result Value Ref Range    WBC Count 7.7 4.0 - 11.0 10e3/uL    RBC Count 4.18 3.80 - 5.20 10e6/uL    Hemoglobin 12.2 11.7 - 15.7 g/dL    Hematocrit 37.6 35.0 - 47.0 %    MCV 90 78 - 100 fL    MCH 29.2 26.5 - 33.0 pg    MCHC 32.4 31.5 - 36.5 g/dL    RDW 12.6 10.0 - 15.0 %    Platelet Count 269 150 - 450 10e3/uL   Results for orders placed or performed in visit on 11/08/23   XR Chest 2 Views     Status: None    Narrative    XR CHEST 2  VIEWS   11/8/2023 4:01 PM     HISTORY: acute onset dyspnea on exertion 3 days ago; Dyspnea on  exertion    COMPARISON: None.      Impression    IMPRESSION: Cardiac silhouette is mildly enlarged. There is pulmonary  vascular congestion with mild interstitial edema. Small bilateral  pleural effusions with associated compressive atelectasis. No  pneumothorax. No acute bony abnormality.    POOJA HITCHCOCK MD         SYSTEM ID:  [id number]     EKG - poor R-wave progression, atrial fibrillation with rate of 124, no acute ST or T wave findings

## 2023-11-09 DIAGNOSIS — I48.91 ATRIAL FIBRILLATION, UNSPECIFIED TYPE (H): ICD-10-CM

## 2023-11-09 NOTE — PATIENT INSTRUCTIONS
Start diltiazem 120 mg once daily    Start furosemide 40 mg twice daily. This is a diuretic to treat fluid retention so it will make you urinate more frequently    Start rivaroxaban (Xarelto) blood thinner for a-fib to prevent blood clots.     Urgent referral to cardiology to be seen in next couple days.

## 2023-11-10 ENCOUNTER — HOSPITAL ENCOUNTER (OUTPATIENT)
Facility: CLINIC | Age: 78
End: 2023-11-10
Admitting: INTERNAL MEDICINE
Payer: MEDICARE

## 2023-11-10 ENCOUNTER — OFFICE VISIT (OUTPATIENT)
Dept: CARDIOLOGY | Facility: CLINIC | Age: 78
End: 2023-11-10
Attending: STUDENT IN AN ORGANIZED HEALTH CARE EDUCATION/TRAINING PROGRAM
Payer: MEDICARE

## 2023-11-10 ENCOUNTER — TELEPHONE (OUTPATIENT)
Dept: CARDIOLOGY | Facility: CLINIC | Age: 78
End: 2023-11-10

## 2023-11-10 VITALS
DIASTOLIC BLOOD PRESSURE: 76 MMHG | OXYGEN SATURATION: 97 % | BODY MASS INDEX: 30.87 KG/M2 | WEIGHT: 192.1 LBS | SYSTOLIC BLOOD PRESSURE: 120 MMHG | HEART RATE: 123 BPM | HEIGHT: 66 IN

## 2023-11-10 DIAGNOSIS — I48.91 ATRIAL FIBRILLATION, UNSPECIFIED TYPE (H): ICD-10-CM

## 2023-11-10 DIAGNOSIS — I50.21 ACUTE SYSTOLIC HEART FAILURE (H): ICD-10-CM

## 2023-11-10 PROCEDURE — 99205 OFFICE O/P NEW HI 60 MIN: CPT | Performed by: INTERNAL MEDICINE

## 2023-11-10 PROCEDURE — 93000 ELECTROCARDIOGRAM COMPLETE: CPT | Performed by: INTERNAL MEDICINE

## 2023-11-10 RX ORDER — DILTIAZEM HYDROCHLORIDE 240 MG/1
240 CAPSULE, EXTENDED RELEASE ORAL DAILY
Qty: 90 CAPSULE | Refills: 3 | Status: SHIPPED | OUTPATIENT
Start: 2023-11-10 | End: 2023-11-29

## 2023-11-10 NOTE — LETTER
11/10/2023    Physician No Ref-Primary  No address on file    RE: Haritha Cat       Dear Colleague,     I had the pleasure of seeing Haritha Cat in the [facility].  HPI and Plan:     It is my pleasure to see your patient Haritha Cat in consultation.  This is a 78-year-old patient who was (Maray noticed increasing shortness of breath which has been getting progressively worse over the last month.  She had forgotten to take her blood pressure medications up with her from home.  She was then seen by nurse practitioner in family practice on the eighth which is 2 days ago.  She was noted to be in atrial fibrillation with rapid ventricular response.  The on-call cardiologist Dr. Pride who is EP physician was called and he recommended starting the patient with anticoagulation with either Xarelto or Eliquis and adding diltiazem for rate control and furosemide.  A prescription was sent for Eliquis by the primary care physician.  She was started on furosemide 20 mg/day.  The patient was then sent for an urgent referral to cardiology.  The patient has not been complaining of any chest pains or chest pressure.  Her [id number] score is 5.  She went to the pharmacy at [facility] in [place] and according the patient either they did not have apixaban or they were looking to get it for her.  To make a long story short the patient is on no anticoagulation at present.  The diltiazem dose was 120 mg.  However the patient is already on amlodipine which would have her taking 2 calcium channel blockers.  The patient is also on hydrochlorothiazide and so now given that she is on furosemide 20 mg she is on 2 diuretics also.  Her ventricular rate is still fast today at 123 bpm.  At rest she does not feel short of breath but when she exercises she becomes short of breath quite quickly.  She feels that her ankles may be slightly more edematous than previously also.  When seen on the eighth her potassium was normal at  4.5.  I do not see a TSH or magnesium.  Her brother recently was diagnosed with atrial fibrillation and had an ablation procedure.    Impression:  1.  Atrial fibrillation with rapid ventricular response.  The patient is not anticoagulated due to some issue with the pharmacy.  She is at significant risk of stroke.  2.  Atrial fibrillation is symptomatic.  Patient is short of breath.  She is not short of breath at rest however and does not have orthopnea or PND.  3.  Has a doubling up of calcium channel blockers and diuretics with the additional medications as described above.  4.  Family history of atrial fibrillation and that her brother has it  5.  [id number] score of 5 indicating that she is at significant annual risk of stroke.    Plan:  1.  Given that the patient is relatively asymptomatic at rest we will arrange for the patient to have an urgent MAMI cardioversion which we will perform on Monday which would be the earliest that we can do it.  The patient had a full breakfast this morning.  2.  I will stop the hydrochlorothiazide.  3.  I will stop the irbesartan and the amlodipine medication so that we can double the dose of the diltiazem for better rate control.  We will continue the Metroprolol succinate 100 mg/day.  4.  I have represcribed the apixaban medication and I have placed in bold writing on the prescription that the apixaban must be given today because she is at significantly increased risk of stroke given her [id number] score of 5.  5.  I will double the dose of diltiazem to 240 mg/day.    6.  I explained the risks and benefits of electrical cardioversion to the patient.  The risk of stroke, thermal burns, bradycardia arrhythmias and tachyarrhythmias.  I also explained the risks associate with transesophageal echocardiography including the risk of oropharyngeal trauma, bleeding, esophageal rupture.  And aspiration.  The patient to me she understood why redoing the procedure she told me understood  the risks associate with the procedures and finally told me that she wished to proceed.    7.  I told the patient that should she start to get increasing shortness of breath over the weekend are waking up at nighttime short of breath or worsening palpitations she is to come into the emergency room immediately.  The patient told me that she understood that    Is my pleasure to be involved in the care of this very nice patient as always has been told to contact me if she is any questions or any concerns.  I have arranged follow-up with an REECE in 1 week's time and follow-up with me in 1 month's time.  She will require an ischemic work-up also.    Toni Rankin MD, FACC, FRCPI  Orders Placed This Encounter   Procedures    Follow-Up with Cardiology REECE    Follow-Up with Cardiology    EKG 12-lead complete w/read - Clinics (performed today)    EKG 12-lead complete w/read - Clinics (to be scheduled)    EKG 12-lead complete w/read - Clinics (to be scheduled)    Cardioversion    MAMI Only- Transesophageal Echocardiogram       Orders Placed This Encounter   Medications    apixaban ANTICOAGULANT (ELIQUIS) 5 MG tablet     Sig: Take 1 tablet (5 mg) by mouth 2 times daily     Dispense:  180 tablet     Refill:  3     THIS IS URGENT AND NEEDS TO BE GIVEN TO THE PATIENT TODAY ! NEWLY DIAGNOSED ATRIAL FIBRILLATION. HIGH RISK OF STROKE    diltiazem ER (DILT-XR) 240 MG 24 hr ER beaded capsule     Sig: Take 1 capsule (240 mg) by mouth daily     Dispense:  90 capsule     Refill:  3       Medications Discontinued During This Encounter   Medication Reason    hydrochlorothiazide (HYDRODIURIL) 12.5 MG tablet     irbesartan (AVAPRO) 150 MG tablet     diltiazem ER (DILT-XR) 120 MG 24 hr capsule Reorder (No AVS)    apixaban ANTICOAGULANT (ELIQUIS) 5 MG tablet Reorder (No AVS)    amLODIPine (NORVASC) 5 MG tablet          Encounter Diagnoses   Name Primary?    Atrial fibrillation, unspecified type (H)     Acute systolic heart failure (H)         CURRENT MEDICATIONS:  Current Outpatient Medications   Medication Sig Dispense Refill    apixaban ANTICOAGULANT (ELIQUIS) 5 MG tablet Take 1 tablet (5 mg) by mouth 2 times daily 180 tablet 3    diltiazem ER (DILT-XR) 240 MG 24 hr ER beaded capsule Take 1 capsule (240 mg) by mouth daily 90 capsule 3    famotidine (PEPCID) 10 MG tablet Take by mouth as needed      furosemide (LASIX) 20 MG tablet Take 2 tablets (40 mg) by mouth 2 times daily 12 tablet 0    metFORMIN (GLUCOPHAGE) 500 MG tablet Take 2 tablets (1,000 mg) by mouth 2 times daily (with meals) 360 tablet 3    metoprolol succinate ER (TOPROL XL) 100 MG 24 hr tablet Take 1 tablet (100 mg) by mouth daily 90 tablet 3    naproxen sodium 220 MG capsule Take 220 mg by mouth 2 times daily (with meals)      Vitamin D, Cholecalciferol, 25 MCG (1000 UT) TABS       Acetaminophen 325 MG CAPS Take 325-650 mg by mouth every 4 hours as needed (Patient not taking: Reported on 11/8/2023)      diphenhydrAMINE-APAP, sleep, (TYLENOL PM EXTRA STRENGTH PO)  (Patient not taking: Reported on 11/8/2023)         ALLERGIES     Allergies   Allergen Reactions    Amoxicillin Hives    Hydrochlorothiazide Other (See Comments)     Hyponatremia on higher dose    Lisinopril Cough    Lotrel Hives       PAST MEDICAL HISTORY:  Past Medical History:   Diagnosis Date    Abscess of abdominal cavity (H) 11/2002    cecal perf, prob from diverticulitis    Allergic rhinitis     Arthritis this year    Right hand, numbness    Colon polyp 2015    colonoscopy due 5 years.    Diverticulosis     DM type 2 (diabetes mellitus, type 2) (H)     Elevated LFTs     fatty liver dz    GERD (gastroesophageal reflux disease)     Hepatitis A     Herpes zoster 11/28/2011    HTN (hypertension), benign     Osteopenia     DXA 2008    Osteopenia 2014       PAST SURGICAL HISTORY:  Past Surgical History:   Procedure Laterality Date    CATARACT IOL, RT/LT      COLONOSCOPY N/A 5/15/2018    Procedure: COLONOSCOPY;   colonoscopy;  Surgeon: Lucero Acharya MD;  Location:  GI    DISCECTOMY LUMBAR POSTERIOR MICROSCOPIC ONE LEVEL Right 10/1/2019    Procedure: LUMBAR MICRODISCECTOMY RIGHT L2-3;  Surgeon: Wolfgang Peng MD;  Location:  OR    varicose vein surgery      Dr. Jessi monkdom teeth  age 27       FAMILY HISTORY:  Family History   Problem Relation Age of Onset    Hypertension Mother             Thyroid Disease Mother             Neurologic Disorder Mother         Parkinsons and RA    Hypertension Father             Diabetes Father             Diabetes Sister             Substance Abuse Sister             Hypertension Sister             Diabetes Maternal Grandmother             Diabetes Maternal Grandfather             Alcoholism Sister 59    Diabetes Son     Hypertension Son        SOCIAL HISTORY:  Social History     Socioeconomic History    Marital status:      Spouse name: None    Number of children: None    Years of education: None    Highest education level: None   Tobacco Use    Smoking status: Never    Smokeless tobacco: Never   Substance and Sexual Activity    Alcohol use: Not Currently     Comment: 0-3 drinks per week    Drug use: No    Sexual activity: Yes     Partners: Male     Birth control/protection: None   Other Topics Concern    Parent/sibling w/ CABG, MI or angioplasty before 65F 55M? No   Social History Narrative     with 2 sons and 5 grandkids.  On [employer]ust[employer] as of 2011.    Son (Bobby) seizure, TBI, brain hemorrhage.       Review of Systems:  Skin:          Eyes:         ENT:         Respiratory:          Cardiovascular:         Gastroenterology:        Genitourinary:         Musculoskeletal:         Neurologic:         Psychiatric:         Heme/Lymph/Imm:         Endocrine:           Physical Exam:  Vitals: /76 (BP Location: Left arm, Patient Position: Sitting)   Pulse (!) 123   Ht 1.664 m (5'  "5.5\")   Wt 87.1 kg (192 lb 1.6 oz)   SpO2 97%   BMI 31.48 kg/m      Constitutional:  cooperative, alert and oriented, well developed, well nourished, in no acute distress        Skin:  warm and dry to the touch          Head:  no masses or lesions        Eyes:  pupils equal and round        Lymph:      ENT:  no pallor or cyanosis        Neck:  carotid pulses are full and equal bilaterally;JVP normal        Respiratory:  normal breath sounds, clear to auscultation, normal A-P diameter, normal symmetry, normal respiratory excursion, no use of accessory muscles         Cardiac:   irregularly irregular rhythm;tachycardic              pulses full and equal                                        GI:  not assessed this visit        Extremities and Muscular Skeletal:  no deformities, clubbing, cyanosis, erythema observed;no edema              Neurological:  no gross motor deficits;affect appropriate        Psych:  Alert and Oriented x 3        CC  HANY Farmer CNP  [address] [place] [address]                  Thank you for allowing me to participate in the care of your patient.      Sincerely,     Toni Rankin MD, MD     [facility]  "

## 2023-11-10 NOTE — TELEPHONE ENCOUNTER
DCCV/MAMI prep instructions    Patient is scheduled for a MAMI with Cardioversion at [facility] - [address], [place] [address] - Main Entrance of the Hospital, on 11/13/23.  Check in time is at 7:30 AM and procedure to follow.    Patient instructed to remain NPO for solid foods 8 hours prior to arrival and may have clear liquids up to 2 hours prior to arrival.    Patient is on oral diabetic medications and has been instructed to reach out to primary care provider for recommendations.     Patient is not taking Digoxin.    Patient is taking taking furosemide and has been instructed to hold it the morning of procedure. and has been advised to hold this the morning of the procedure.    Pt is not on a SGLT2 inhibitor.    Pt is not on a GLP-1 Agonist    Patient advised to take their other daily medications the morning of the procedure with small sips of water.     Verified patient has someone available to drive them home from the hospital and can stay with them for 24 hours after the procedure.     Patient advised to notify care team with any new COVID like symptoms prior to procedure.    Patient will check their temperature the morning of procedure and call [facility] at [phone] if temp is >100.0.    Patient is aware of visitor policy.    Patient expresses understanding of above instructions and denies further questions at this time.      Kasey HAMPTON   [facility]    --------------------------------------------------------------------------------------------------------  ----- Message from Sylwia Torres sent at 11/10/2023 11:21 AM CST -----  Regarding: MAMI w/Cardioversion -- DOS 11/13/23  .DCCV/MAMI Orders    Location: [facility]    Procedure: MAMI with Cardioversion    Diagnosis: Atrial fibrillation, unspecified type (H)    Procedure Date: 11/13/23    Procedure Time: 9.30 AM    Patient Arrival Time: 7.30 AM    Ordering Cardiologist: Dr. Rankin    Performing  Cardiologist: Dr. Lyles    Cardiac Assessment Completed: Yes  Date: 11/10/23  Provider: Sourav Peralta    Patient on Coumadin/Warfarin:  No  Patient on Pradaxa/Xarelto/Eliquis:  Yes  Patient on Invokana/Farxiga/Jardiance/Steglatro:  No  Patient on Adlyxin/Bydureon/Byetta/Mounjaro/Ozempic/Rybelsus/Trulicity/Victoza/Wegovy: No    Previous procedure records requested by MD?  No    Additional labs needed at check in? No    Pt advised to report any COVID like symptoms to care team prior to procedure.    Appointment was scheduled: Face to Face    Special instructions:  Per Sourav Peralta procedure has to be done on Monday    If pt is having a Cardioverion:Yes  Does the pt have a device: No

## 2023-11-10 NOTE — PROGRESS NOTES
HPI and Plan:     It is my pleasure to see your patient Haritha Cat in consultation.  This is a 78-year-old patient who was (Maray noticed increasing shortness of breath which has been getting progressively worse over the last month.  She had forgotten to take her blood pressure medications up with her from home.  She was then seen by nurse practitioner in family practice on the eighth which is 2 days ago.  She was noted to be in atrial fibrillation with rapid ventricular response.  The on-call cardiologist Dr. Pride who is EP physician was called and he recommended starting the patient with anticoagulation with either Xarelto or Eliquis and adding diltiazem for rate control and furosemide.  A prescription was sent for Eliquis by the primary care physician.  She was started on furosemide 20 mg/day.  The patient was then sent for an urgent referral to cardiology.  The patient has not been complaining of any chest pains or chest pressure.  Her [id number]-SYQs score is 5.  She went to the pharmacy at [facility] in [place] and according the patient either they did not have apixaban or they were looking to get it for her.  To make a long story short the patient is on no anticoagulation at present.  The diltiazem dose was 120 mg.  However the patient is already on amlodipine which would have her taking 2 calcium channel blockers.  The patient is also on hydrochlorothiazide and so now given that she is on furosemide 20 mg she is on 2 diuretics also.  Her ventricular rate is still fast today at 123 bpm.  At rest she does not feel short of breath but when she exercises she becomes short of breath quite quickly.  She feels that her ankles may be slightly more edematous than previously also.  When seen on the eighth her potassium was normal at 4.5.  I do not see a TSH or magnesium.  Her brother recently was diagnosed with atrial fibrillation and had an ablation procedure.    Impression:  1.  Atrial fibrillation with rapid ventricular  response.  The patient is not anticoagulated due to some issue with the pharmacy.  She is at significant risk of stroke.  2.  Atrial fibrillation is symptomatic.  Patient is short of breath.  She is not short of breath at rest however and does not have orthopnea or PND.  3.  Has a doubling up of calcium channel blockers and diuretics with the additional medications as described above.  4.  Family history of atrial fibrillation and that her brother has it  5.  TYV3PA4-MHCl score of 5 indicating that she is at significant annual risk of stroke.    Plan:  1.  Given that the patient is relatively asymptomatic at rest we will arrange for the patient to have an urgent MAMI cardioversion which we will perform on Monday which would be the earliest that we can do it.  The patient had a full breakfast this morning.  2.  I will stop the hydrochlorothiazide.  3.  I will stop the irbesartan and the amlodipine medication so that we can double the dose of the diltiazem for better rate control.  We will continue the Metroprolol succinate 100 mg/day.  4.  I have represcribed the apixaban medication and I have placed in bold writing on the prescription that the apixaban must be given today because she is at significantly increased risk of stroke given her QKB0AD6-EXVr score of 5.  5.  I will double the dose of diltiazem to 240 mg/day.    6.  I explained the risks and benefits of electrical cardioversion to the patient.  The risk of stroke, thermal burns, bradycardia arrhythmias and tachyarrhythmias.  I also explained the risks associate with transesophageal echocardiography including the risk of oropharyngeal trauma, bleeding, esophageal rupture.  And aspiration.  The patient to me she understood why redoing the procedure she told me understood the risks associate with the procedures and finally told me that she wished to proceed.    7.  I told the patient that should she start to get increasing shortness of breath over the weekend  are waking up at nighttime short of breath or worsening palpitations she is to come into the emergency room immediately.  The patient told me that she understood that    Is my pleasure to be involved in the care of this very nice patient as always has been told to contact me if she is any questions or any concerns.  I have arranged follow-up with an REECE in 1 week's time and follow-up with me in 1 month's time.  She will require an ischemic work-up also.    Toni Rankin MD, FACC, FRCPI  Orders Placed This Encounter   Procedures    Follow-Up with Cardiology REECE    Follow-Up with Cardiology    EKG 12-lead complete w/read - Clinics (performed today)    EKG 12-lead complete w/read - Clinics (to be scheduled)    EKG 12-lead complete w/read - Clinics (to be scheduled)    Cardioversion    MAMI Only- Transesophageal Echocardiogram       Orders Placed This Encounter   Medications    apixaban ANTICOAGULANT (ELIQUIS) 5 MG tablet     Sig: Take 1 tablet (5 mg) by mouth 2 times daily     Dispense:  180 tablet     Refill:  3     THIS IS URGENT AND NEEDS TO BE GIVEN TO THE PATIENT TODAY ! NEWLY DIAGNOSED ATRIAL FIBRILLATION. HIGH RISK OF STROKE    diltiazem ER (DILT-XR) 240 MG 24 hr ER beaded capsule     Sig: Take 1 capsule (240 mg) by mouth daily     Dispense:  90 capsule     Refill:  3       Medications Discontinued During This Encounter   Medication Reason    hydrochlorothiazide (HYDRODIURIL) 12.5 MG tablet     irbesartan (AVAPRO) 150 MG tablet     diltiazem ER (DILT-XR) 120 MG 24 hr capsule Reorder (No AVS)    apixaban ANTICOAGULANT (ELIQUIS) 5 MG tablet Reorder (No AVS)    amLODIPine (NORVASC) 5 MG tablet          Encounter Diagnoses   Name Primary?    Atrial fibrillation, unspecified type (H)     Acute systolic heart failure (H)        CURRENT MEDICATIONS:  Current Outpatient Medications   Medication Sig Dispense Refill    apixaban ANTICOAGULANT (ELIQUIS) 5 MG tablet Take 1 tablet (5 mg) by mouth 2 times daily 180 tablet  3    diltiazem ER (DILT-XR) 240 MG 24 hr ER beaded capsule Take 1 capsule (240 mg) by mouth daily 90 capsule 3    famotidine (PEPCID) 10 MG tablet Take by mouth as needed      furosemide (LASIX) 20 MG tablet Take 2 tablets (40 mg) by mouth 2 times daily 12 tablet 0    metFORMIN (GLUCOPHAGE) 500 MG tablet Take 2 tablets (1,000 mg) by mouth 2 times daily (with meals) 360 tablet 3    metoprolol succinate ER (TOPROL XL) 100 MG 24 hr tablet Take 1 tablet (100 mg) by mouth daily 90 tablet 3    naproxen sodium 220 MG capsule Take 220 mg by mouth 2 times daily (with meals)      Vitamin D, Cholecalciferol, 25 MCG (1000 UT) TABS       Acetaminophen 325 MG CAPS Take 325-650 mg by mouth every 4 hours as needed (Patient not taking: Reported on 11/8/2023)      diphenhydrAMINE-APAP, sleep, (TYLENOL PM EXTRA STRENGTH PO)  (Patient not taking: Reported on 11/8/2023)         ALLERGIES     Allergies   Allergen Reactions    Amoxicillin Hives    Hydrochlorothiazide Other (See Comments)     Hyponatremia on higher dose    Lisinopril Cough    Lotrel Hives       PAST MEDICAL HISTORY:  Past Medical History:   Diagnosis Date    Abscess of abdominal cavity (H) 11/2002    cecal perf, prob from diverticulitis    Allergic rhinitis     Arthritis this year    Right hand, numbness    Colon polyp 2015    colonoscopy due 5 years.    Diverticulosis     DM type 2 (diabetes mellitus, type 2) (H)     Elevated LFTs     fatty liver dz    GERD (gastroesophageal reflux disease)     Hepatitis A     Herpes zoster 11/28/2011    HTN (hypertension), benign     Osteopenia     DXA 2008    Osteopenia 2014       PAST SURGICAL HISTORY:  Past Surgical History:   Procedure Laterality Date    CATARACT IOL, RT/LT      COLONOSCOPY N/A 5/15/2018    Procedure: COLONOSCOPY;  colonoscopy;  Surgeon: Lucero Acharya MD;  Location: [facility]    DISCECTOMY LUMBAR POSTERIOR MICROSCOPIC ONE LEVEL Right 10/1/2019    Procedure: LUMBAR MICRODISCECTOMY RIGHT L2-3;  Surgeon: Danish  "Wolfgang GREEN MD;  Location: [facility] OR    varicose vein surgery      Dr. Jessi nunez teeth  age 27       FAMILY HISTORY:  Family History   Problem Relation Age of Onset    Hypertension Mother             Thyroid Disease Mother             Neurologic Disorder Mother         Parkinsons and RA    Hypertension Father             Diabetes Father             Diabetes Sister             Substance Abuse Sister             Hypertension Sister             Diabetes Maternal Grandmother             Diabetes Maternal Grandfather             Alcoholism Sister 59    Diabetes Son     Hypertension Son        SOCIAL HISTORY:  Social History     Socioeconomic History    Marital status:      Spouse name: None    Number of children: None    Years of education: None    Highest education level: None   Tobacco Use    Smoking status: Never    Smokeless tobacco: Never   Substance and Sexual Activity    Alcohol use: Not Currently     Comment: 0-3 drinks per week    Drug use: No    Sexual activity: Yes     Partners: Male     Birth control/protection: None   Other Topics Concern    Parent/sibling w/ CABG, MI or angioplasty before 65F 55M? No   Social History Narrative     with 2 sons and 5 grandkids.  On Sandra Bustamante as of 2011.    Son (Bobby) seizure, TBI, brain hemorrhage.       Review of Systems:  Skin:          Eyes:         ENT:         Respiratory:          Cardiovascular:         Gastroenterology:        Genitourinary:         Musculoskeletal:         Neurologic:         Psychiatric:         Heme/Lymph/Imm:         Endocrine:           Physical Exam:  Vitals: /76 (BP Location: Left arm, Patient Position: Sitting)   Pulse (!) 123   Ht 1.664 m (5' 5.5\")   Wt 87.1 kg (192 lb 1.6 oz)   SpO2 97%   BMI 31.48 kg/m      Constitutional:  cooperative, alert and oriented, well developed, well nourished, in no acute distress        Skin:  warm and dry to " the touch          Head:  no masses or lesions        Eyes:  pupils equal and round        Lymph:      ENT:  no pallor or cyanosis        Neck:  carotid pulses are full and equal bilaterally;JVP normal        Respiratory:  normal breath sounds, clear to auscultation, normal A-P diameter, normal symmetry, normal respiratory excursion, no use of accessory muscles         Cardiac:   irregularly irregular rhythm;tachycardic              pulses full and equal                                        GI:  not assessed this visit        Extremities and Muscular Skeletal:  no deformities, clubbing, cyanosis, erythema observed;no edema              Neurological:  no gross motor deficits;affect appropriate        Psych:  Alert and Oriented x 3        CC  HANY Farmer CNP  [address] [place] [address]

## 2023-11-13 ENCOUNTER — ANESTHESIA (OUTPATIENT)
Dept: SURGERY | Facility: CLINIC | Age: 78
End: 2023-11-13
Payer: MEDICARE

## 2023-11-13 ENCOUNTER — ANESTHESIA EVENT (OUTPATIENT)
Dept: SURGERY | Facility: CLINIC | Age: 78
End: 2023-11-13
Payer: MEDICARE

## 2023-11-13 ENCOUNTER — HOSPITAL ENCOUNTER (OUTPATIENT)
Dept: CARDIOLOGY | Facility: CLINIC | Age: 78
Discharge: HOME | End: 2023-11-13
Attending: INTERNAL MEDICINE
Payer: MEDICARE

## 2023-11-13 ENCOUNTER — HOSPITAL ENCOUNTER (OUTPATIENT)
Dept: MEDSURG UNIT | Facility: CLINIC | Age: 78
Discharge: HOME | End: 2023-11-13
Attending: INTERNAL MEDICINE
Payer: MEDICARE

## 2023-11-13 ENCOUNTER — HOSPITAL ENCOUNTER (OUTPATIENT)
Facility: CLINIC | Age: 78
Discharge: HOME | End: 2023-11-13
Admitting: INTERNAL MEDICINE
Payer: MEDICARE

## 2023-11-13 VITALS
WEIGHT: 192 LBS | BODY MASS INDEX: 31.99 KG/M2 | HEART RATE: 70 BPM | TEMPERATURE: 98 F | SYSTOLIC BLOOD PRESSURE: 120 MMHG | HEIGHT: 65 IN | OXYGEN SATURATION: 95 % | DIASTOLIC BLOOD PRESSURE: 74 MMHG | RESPIRATION RATE: 16 BRPM

## 2023-11-13 DIAGNOSIS — I48.91 ATRIAL FIBRILLATION, UNSPECIFIED TYPE (H): ICD-10-CM

## 2023-11-13 LAB
INR PPP: 1.49 (ref 0.85–1.15)
LVEF ECHO: NORMAL
MAGNESIUM SERPL-MCNC: 1.7 MG/DL (ref 1.7–2.3)
POTASSIUM SERPL-SCNC: 3.6 MMOL/L (ref 3.4–5.3)

## 2023-11-13 PROCEDURE — 84132 ASSAY OF SERUM POTASSIUM: CPT | Performed by: INTERNAL MEDICINE

## 2023-11-13 PROCEDURE — 370N000017 HC ANESTHESIA TECHNICAL FEE, PER MIN

## 2023-11-13 PROCEDURE — 93325 DOPPLER ECHO COLOR FLOW MAPG: CPT

## 2023-11-13 PROCEDURE — 999N000010 HC STATISTIC ANES STAT CODE-CRNA PER MINUTE

## 2023-11-13 PROCEDURE — 250N000009 HC RX 250: Performed by: INTERNAL MEDICINE

## 2023-11-13 PROCEDURE — 93325 DOPPLER ECHO COLOR FLOW MAPG: CPT | Mod: 26 | Performed by: INTERNAL MEDICINE

## 2023-11-13 PROCEDURE — 999N000184 HC STATISTIC TELEMETRY

## 2023-11-13 PROCEDURE — 85610 PROTHROMBIN TIME: CPT | Performed by: INTERNAL MEDICINE

## 2023-11-13 PROCEDURE — 93010 ELECTROCARDIOGRAM REPORT: CPT | Performed by: INTERNAL MEDICINE

## 2023-11-13 PROCEDURE — 83735 ASSAY OF MAGNESIUM: CPT | Performed by: INTERNAL MEDICINE

## 2023-11-13 PROCEDURE — 250N000011 HC RX IP 250 OP 636: Performed by: NURSE ANESTHETIST, CERTIFIED REGISTERED

## 2023-11-13 PROCEDURE — 93005 ELECTROCARDIOGRAM TRACING: CPT

## 2023-11-13 PROCEDURE — 999N000183 HC STATISTIC TEE INCLUDES SEDATION

## 2023-11-13 PROCEDURE — 36415 COLL VENOUS BLD VENIPUNCTURE: CPT | Performed by: INTERNAL MEDICINE

## 2023-11-13 PROCEDURE — 250N000013 HC RX MED GY IP 250 OP 250 PS 637: Performed by: INTERNAL MEDICINE

## 2023-11-13 PROCEDURE — 93312 ECHO TRANSESOPHAGEAL: CPT | Mod: 26 | Performed by: INTERNAL MEDICINE

## 2023-11-13 PROCEDURE — 250N000011 HC RX IP 250 OP 636: Mod: JZ | Performed by: INTERNAL MEDICINE

## 2023-11-13 PROCEDURE — 999N000054 HC STATISTIC EKG NON-CHARGEABLE

## 2023-11-13 PROCEDURE — 92960 CARDIOVERSION ELECTRIC EXT: CPT

## 2023-11-13 PROCEDURE — 93320 DOPPLER ECHO COMPLETE: CPT | Mod: 26 | Performed by: INTERNAL MEDICINE

## 2023-11-13 PROCEDURE — 92960 CARDIOVERSION ELECTRIC EXT: CPT | Performed by: INTERNAL MEDICINE

## 2023-11-13 RX ORDER — PROPOFOL 10 MG/ML
INJECTION, EMULSION INTRAVENOUS PRN
Status: DISCONTINUED | OUTPATIENT
Start: 2023-11-13 | End: 2023-11-13

## 2023-11-13 RX ORDER — NALOXONE HYDROCHLORIDE 0.4 MG/ML
0.2 INJECTION, SOLUTION INTRAMUSCULAR; INTRAVENOUS; SUBCUTANEOUS
Status: DISCONTINUED | OUTPATIENT
Start: 2023-11-13 | End: 2023-11-13 | Stop reason: HOSPADM

## 2023-11-13 RX ORDER — GLYCOPYRROLATE 0.2 MG/ML
0.1 INJECTION, SOLUTION INTRAMUSCULAR; INTRAVENOUS ONCE
Status: COMPLETED | OUTPATIENT
Start: 2023-11-13 | End: 2023-11-13

## 2023-11-13 RX ORDER — NALOXONE HYDROCHLORIDE 0.4 MG/ML
0.4 INJECTION, SOLUTION INTRAMUSCULAR; INTRAVENOUS; SUBCUTANEOUS
Status: DISCONTINUED | OUTPATIENT
Start: 2023-11-13 | End: 2023-11-13 | Stop reason: HOSPADM

## 2023-11-13 RX ORDER — LIDOCAINE HYDROCHLORIDE 40 MG/ML
1.5 SOLUTION TOPICAL ONCE
Status: COMPLETED | OUTPATIENT
Start: 2023-11-13 | End: 2023-11-13

## 2023-11-13 RX ORDER — POTASSIUM CHLORIDE 1500 MG/1
40 TABLET, EXTENDED RELEASE ORAL
Status: DISCONTINUED | OUTPATIENT
Start: 2023-11-13 | End: 2023-11-13 | Stop reason: HOSPADM

## 2023-11-13 RX ORDER — SODIUM CHLORIDE 9 MG/ML
1000 INJECTION, SOLUTION INTRAVENOUS CONTINUOUS
Status: DISCONTINUED | OUTPATIENT
Start: 2023-11-13 | End: 2023-11-13 | Stop reason: HOSPADM

## 2023-11-13 RX ORDER — POTASSIUM CHLORIDE 1500 MG/1
20 TABLET, EXTENDED RELEASE ORAL
Status: DISCONTINUED | OUTPATIENT
Start: 2023-11-13 | End: 2023-11-13 | Stop reason: HOSPADM

## 2023-11-13 RX ORDER — ATROPINE SULFATE 0.1 MG/ML
.5-1 INJECTION INTRAVENOUS
Status: DISCONTINUED | OUTPATIENT
Start: 2023-11-13 | End: 2023-11-13 | Stop reason: HOSPADM

## 2023-11-13 RX ORDER — LIDOCAINE 50 MG/G
OINTMENT TOPICAL ONCE
Status: COMPLETED | OUTPATIENT
Start: 2023-11-13 | End: 2023-11-13

## 2023-11-13 RX ORDER — RIVAROXABAN 15 MG-20MG
KIT ORAL
Refills: 0 | OUTPATIENT
Start: 2023-11-13

## 2023-11-13 RX ORDER — DEXTROSE MONOHYDRATE 25 G/50ML
9.5 INJECTION, SOLUTION INTRAVENOUS
Status: DISCONTINUED | OUTPATIENT
Start: 2023-11-13 | End: 2023-11-13 | Stop reason: HOSPADM

## 2023-11-13 RX ORDER — MAGNESIUM SULFATE HEPTAHYDRATE 40 MG/ML
2 INJECTION, SOLUTION INTRAVENOUS
Status: DISCONTINUED | OUTPATIENT
Start: 2023-11-13 | End: 2023-11-13 | Stop reason: HOSPADM

## 2023-11-13 RX ORDER — FENTANYL CITRATE 50 UG/ML
25 INJECTION, SOLUTION INTRAMUSCULAR; INTRAVENOUS
Status: DISCONTINUED | OUTPATIENT
Start: 2023-11-13 | End: 2023-11-13 | Stop reason: HOSPADM

## 2023-11-13 RX ORDER — FLUMAZENIL 0.1 MG/ML
0.2 INJECTION, SOLUTION INTRAVENOUS
Status: DISCONTINUED | OUTPATIENT
Start: 2023-11-13 | End: 2023-11-13 | Stop reason: HOSPADM

## 2023-11-13 RX ORDER — METOPROLOL TARTRATE 1 MG/ML
2.5-5 INJECTION, SOLUTION INTRAVENOUS
Status: DISCONTINUED | OUTPATIENT
Start: 2023-11-13 | End: 2023-11-13 | Stop reason: HOSPADM

## 2023-11-13 RX ADMIN — GLYCOPYRROLATE 0.1 MG: 0.2 INJECTION, SOLUTION INTRAMUSCULAR; INTRAVENOUS at 09:12

## 2023-11-13 RX ADMIN — MIDAZOLAM 1 MG: 1 INJECTION INTRAMUSCULAR; INTRAVENOUS at 09:42

## 2023-11-13 RX ADMIN — PROPOFOL 50 MG: 10 INJECTION, EMULSION INTRAVENOUS at 10:10

## 2023-11-13 RX ADMIN — FENTANYL CITRATE 50 MCG: 50 INJECTION, SOLUTION INTRAMUSCULAR; INTRAVENOUS at 09:42

## 2023-11-13 RX ADMIN — POTASSIUM CHLORIDE 20 MEQ: 1500 TABLET, EXTENDED RELEASE ORAL at 08:45

## 2023-11-13 RX ADMIN — MIDAZOLAM 1 MG: 1 INJECTION INTRAMUSCULAR; INTRAVENOUS at 09:45

## 2023-11-13 RX ADMIN — LIDOCAINE HYDROCHLORIDE 1.5 ML: 40 SOLUTION TOPICAL at 09:13

## 2023-11-13 RX ADMIN — MIDAZOLAM 0.5 MG: 1 INJECTION INTRAMUSCULAR; INTRAVENOUS at 09:52

## 2023-11-13 RX ADMIN — TOPICAL ANESTHETIC 1 ML: 200 SPRAY DENTAL; PERIODONTAL at 09:31

## 2023-11-13 RX ADMIN — MAGNESIUM SULFATE HEPTAHYDRATE 2 G: 40 INJECTION, SOLUTION INTRAVENOUS at 08:46

## 2023-11-13 ASSESSMENT — ENCOUNTER SYMPTOMS: DYSRHYTHMIAS: 1

## 2023-11-13 ASSESSMENT — ACTIVITIES OF DAILY LIVING (ADL)
ADLS_ACUITY_SCORE: 35
ADLS_ACUITY_SCORE: 35

## 2023-11-13 ASSESSMENT — COPD QUESTIONNAIRES: COPD: 0

## 2023-11-13 ASSESSMENT — LIFESTYLE VARIABLES: TOBACCO_USE: 0

## 2023-11-13 NOTE — ANESTHESIA CARE TRANSFER NOTE
Patient: Haritha Cat    Procedure: Procedure(s):  Anesthesia cardioversion       Diagnosis: Afib (H) [I48.91]  Diagnosis Additional Information: No value filed.    Anesthesia Type:   General     Note:    Oropharynx: oropharynx clear of all foreign objects  Level of Consciousness: awake  Oxygen Supplementation: face mask  Level of Supplemental Oxygen (L/min / FiO2): 6  Independent Airway: airway patency satisfactory and stable  Dentition: dentition unchanged  Vital Signs Stable: post-procedure vital signs reviewed and stable  Report to RN Given: handoff report given  Destination: care suites.          Vitals:  Vitals Value Taken Time   /66 11/13/23 1025   Temp     Pulse 68 11/13/23 1026   Resp 16 11/13/23 1020   SpO2 97 % 11/13/23 1026   Vitals shown include unfiled device data.    Electronically Signed By: HANY Campos CRNA  November 13, 2023  10:28 AM

## 2023-11-13 NOTE — TELEPHONE ENCOUNTER
Patient was seen by cardiology who is managing anticoagulation r/t pharmacy issues.  Daphney Alston, CNP

## 2023-11-13 NOTE — PROGRESS NOTES
A/O. Pt denies difficulty swallowing or sleep apnea. No dentures or loose teeth. NPO since 2000. Discharge / post procedure instructions given to pt pre procedure w/ verbal understanding received.  All questions & concerns addressed. Family at bedside.     0925 MD Sedation Assessment completed. Consent signed at this time.    0940Time Out done at this time.    0942 Procedure started     MAMI: Pt tolerated tigre well. VSS. Total sedation given - 2.5 mg Versed & 50 mcg Fentanyl.     CDV: Pt tolerated well. CDV x 1 @ 150 joules. See rhythm strips. Total sedation given by anesthesia -     1015 Procedure completed       ~1130 Pt discharged per w/c to private vehicle. All personal belongings sent with pt. Pt to be NPO until 1200. Both pt & spouse  informed. Verbal understanding received.

## 2023-11-13 NOTE — DISCHARGE INSTRUCTIONS
MAMI  (Transesophageal Echocardiogram)  with Cardioversion Discharge Instructions    After you go home:    Have an adult stay with you for 6 hours.       For 24 hours - due to the sedation you received:  Relax and take it easy.  Do NOT make any important or legal decisions.  Do NOT drive or operate machines at home or at work.  Do NOT drink alcohol.    Diet:    You may resume your normal diet, but no scratchy foods for two days.  If your throat is sore, eat cold, bland or soft foods.  You may have heartburn if the tube used in the exam entered your stomach.  If so:   - Do not eat acidic and spicy foods.   - Do not eat three hours before bedtime.  Clear liquids are okay.   - When lying down, use two pillows to raise your head.    Medicines:    Take your medications, including blood thinners, unless your provider tells you not to.  If you have stopped any medicines, check with your provider about when to restart them.  You may take Tylenol (Acetaminophen) if your throat is sore.  You may take antacids if you have heartburn.      Follow Up Appointments:    Follow up with your cardiologist at [facility] of patient preference as instructed.  Follow up with your primary care provider as needed.    If you ve had a cardioversion:    The skin on your chest or back may feel tender for 48 hours.  If your skin is tender, you may:  Use a cold pack on the site. Never use ice directly on your skin. Use the cold pack for 20 minutes. Remove it for at least 30 minutes before re-using.  Apply 1% hydrocortisone cream to the skin (sold at drug stores)  Take Advil (Ibuprofen) or Tylenol (Acetaminophen).      Call the clinic if:    You have heartburn that is severe or lasts more than 72 hours.  You have a sore throat that feels worse after 72 hours.  You have shortness of breath, neck pain, chest pain, fever, chills, coughing up blood, or other unusual signs.  Call your cardiologist right away if you have an irregular heartbeat,  shortness of breath or feel dizzy.  Questions or concerns      [facility] [place]:    [phone] [facility]MP (7 days a week)

## 2023-11-13 NOTE — PROCEDURES
[facility]    Procedure: Cardioversion    Date/Time: 11/13/2023 10:15 AM    Performed by: Adonay Lyles MD  Authorized by: Toni Rankin MD      UNIVERSAL PROTOCOL   Site Marked: NA  Prior Images Obtained and Reviewed:  Yes  Required items: Required blood products, implants, devices and special equipment available    Patient identity confirmed:  Verbally with patient, arm band, provided demographic data and hospital-assigned identification number  Patient was reevaluated immediately before administering moderate or deep sedation or anesthesia  Confirmation Checklist:  Patient's identity using two indicators, relevant allergies, procedure was appropriate and matched the consent or emergent situation and correct equipment/implants were available  Time out: Immediately prior to the procedure a time out was called    Universal Protocol: the Joint Commission Universal Protocol was followed    Preparation: Patient was prepped and draped in usual sterile fashion       ANESTHESIA    Anesthesia was administered and monitored by anesthesiology.  See anesthesia documentation for details.    SEDATION  Patient Sedated: Yes    Sedation Type:  Deep  Sedation:  Propofol  Vital signs: Vital signs monitored during sedation      PROCEDURE DETAILS  Cardioversion basis: elective  Indications: failure of anti-arrhythmic medications  Pre-procedure rhythm: atrial fibrillation  Patient position: patient was placed in a supine position  Chest area: chest area exposed  Electrodes: pads  Electrodes placed: anterior-posterior  Number of attempts: 1    Details of Attempts:  A single, 150 J, synchronized cardioversion was performed with pads in AP position, converting the patient to SR.   Post-procedure rhythm: normal sinus rhythm  Complications: no complications      PROCEDURE    Patient Tolerance:  Patient tolerated the procedure well with no immediate complications   Patient was instructed to  continue all medications, including Eliquis following cardioversion.

## 2023-11-13 NOTE — ANESTHESIA PREPROCEDURE EVALUATION
Anesthesia Pre-Procedure Evaluation    Patient: Haritha Cat   MRN: [id number] : 1945        Procedure : Procedure(s):  Anesthesia cardioversion          Past Medical History:   Diagnosis Date    Abscess of abdominal cavity (H) 2002    cecal perf, prob from diverticulitis    Allergic rhinitis     Arthritis this year    Right hand, numbness    Colon polyp     colonoscopy due 5 years.    Diverticulosis     DM type 2 (diabetes mellitus, type 2) (H)     Elevated LFTs     fatty liver dz    GERD (gastroesophageal reflux disease)     Hepatitis A     Herpes zoster 2011    HTN (hypertension), benign     Osteopenia     DXA     Osteopenia       Past Surgical History:   Procedure Laterality Date    CATARACT IOL, RT/LT      COLONOSCOPY N/A 5/15/2018    Procedure: COLONOSCOPY;  colonoscopy;  Surgeon: Lucero Acharya MD;  Location:  GI    DISCECTOMY LUMBAR POSTERIOR MICROSCOPIC ONE LEVEL Right 10/1/2019    Procedure: LUMBAR MICRODISCECTOMY RIGHT L2-3;  Surgeon: Wolfgang Peng MD;  Location:  OR    varicose vein surgery      Dr. Bowen    wisdom teeth  age 27      Allergies   Allergen Reactions    Amoxicillin Hives    Hydrochlorothiazide Other (See Comments)     Hyponatremia on higher dose    Lisinopril Cough    Lotrel Hives      Social History     Tobacco Use    Smoking status: Never    Smokeless tobacco: Never   Substance Use Topics    Alcohol use: Not Currently     Comment: 0-3 drinks per week      Wt Readings from Last 1 Encounters:   11/10/23 87.1 kg (192 lb 1.6 oz)        Anesthesia Evaluation            ROS/MED HX  ENT/Pulmonary:     (+)     FRANCES risk factors,  hypertension, obese,   allergic rhinitis,                         (-) tobacco use, asthma, COPD and sleep apnea   Neurologic:  - neg neurologic ROS     Cardiovascular:     (+) Dyslipidemia hypertension- -   -  - -             fainting (syncope).           dysrhythmias, a-fib,             METS/Exercise Tolerance:    "  Hematologic:       Musculoskeletal:   (+)  arthritis,             GI/Hepatic:     (+) GERD,    hepatitis resolved      hepatitis type A,     (-) liver disease   Renal/Genitourinary:       Endo:     (+)  type II DM,   Not using insulin,          Obesity,       Psychiatric/Substance Use:  - neg psychiatric ROS     Infectious Disease:       Malignancy:       Other:            Physical Exam    Airway        Mallampati: III   TM distance: > 3 FB   Neck ROM: full   Mouth opening: > 3 cm    Respiratory Devices and Support         Dental       (+) Modest Abnormalities - crowns, retainers, 1 or 2 missing teeth      Cardiovascular          Rhythm and rate: irregular     Pulmonary   pulmonary exam normal                OUTSIDE LABS:  CBC:   Lab Results   Component Value Date    WBC 7.7 11/08/2023    WBC 6.7 05/03/2023    HGB 12.2 11/08/2023    HGB 12.9 05/03/2023    HCT 37.6 11/08/2023    HCT 39.2 05/03/2023     11/08/2023     05/03/2023     BMP:   Lab Results   Component Value Date     (L) 11/08/2023     (L) 05/22/2023    POTASSIUM 4.5 11/08/2023    POTASSIUM 4.6 05/22/2023    CHLORIDE 98 11/08/2023    CHLORIDE 96 (L) 05/22/2023    CO2 23 11/08/2023    CO2 22 05/22/2023    BUN 19.3 11/08/2023    BUN 22.3 05/22/2023    CR 1.03 (H) 11/08/2023    CR 1.07 (H) 05/22/2023     (H) 11/08/2023     (H) 05/22/2023     COAGS: No results found for: \"PTT\", \"INR\", \"FIBR\"  POC:   Lab Results   Component Value Date     (H) 10/01/2019     HEPATIC:   Lab Results   Component Value Date    ALBUMIN 4.6 05/03/2023    PROTTOTAL 7.2 05/03/2023    ALT 14 05/03/2023    AST 26 05/03/2023    ALKPHOS 97 05/03/2023    BILITOTAL 0.9 05/03/2023     OTHER:   Lab Results   Component Value Date    A1C 6.5 (H) 05/03/2023    NATALYA 9.7 11/08/2023    TSH 3.70 09/05/2019    T4 1.10 08/19/2016       Anesthesia Plan    ASA Status:  2    NPO Status:  NPO Appropriate    Anesthesia Type: General.     - Airway: Native airway "   Induction: Intravenous, Propofol.           Consents    Anesthesia Plan(s) and associated risks, benefits, and realistic alternatives discussed. Questions answered and patient/representative(s) expressed understanding.     - Discussed:     - Discussed with:  Patient      - Extended Intubation/Ventilatory Support Discussed: No.      - Patient is DNR/DNI Status: No     Use of blood products discussed: No .     Postoperative Care            Comments:    Other Comments: Patient is counseled on the anesthesia plan and relevant anesthesia procedures including all risks and benefits. All patient questions were answered.               Yoandy Garza MD

## 2023-11-13 NOTE — ANESTHESIA POSTPROCEDURE EVALUATION
Patient: Haritha Cat    Procedure: Procedure(s):  Anesthesia cardioversion       Anesthesia Type:  General    Note:  Disposition: Inpatient   Postop Pain Control: Uneventful            Sign Out: Well controlled pain   PONV:    Neuro/Psych: Uneventful            Sign Out: Acceptable/Baseline neuro status   Airway/Respiratory: Uneventful            Sign Out: Acceptable/Baseline resp. status   CV/Hemodynamics: Uneventful            Sign Out: Acceptable CV status   Other NRE: NONE   DID A NON-ROUTINE EVENT OCCUR? No           Last vitals:  Vitals:    11/13/23 1059 11/13/23 1100 11/13/23 1100   BP:   120/74   Pulse: 70     Resp:  16    Temp:      SpO2: 95%         Electronically Signed By: Yoandy Garza MD  November 13, 2023  2:12 PM

## 2023-11-13 NOTE — PRE-PROCEDURE
GENERAL PRE-PROCEDURE:   Procedure:  MAMI with possible cardioversion of atrial fibrillation  Date/Time:  11/13/2023 9:26 AM    Verbal consent obtained?: Yes    Written consent obtained?: Yes    Risks and benefits: Risks, benefits and alternatives were discussed    DC Plan: Appropriate discharge home plan in place for patients who are going home after procedure   Consent given by:  Patient  Patient states understanding of procedure being performed: Yes    Patient's understanding of procedure matches consent: Yes    Procedure consent matches procedure scheduled: Yes    Expected level of sedation:  Moderate  Appropriately NPO:  Yes  ASA Class:  2  Mallampati  :  Grade 2- soft palate, base of uvula, tonsillar pillars, and portion of posterior pharyngeal wall visible  Lungs:  Lungs clear with good breath sounds bilaterally  Heart:  A-fib  History & Physical reviewed:  History and physical reviewed and no updates needed  Statement of review:  I have reviewed the lab findings, diagnostic data, medications, and the plan for sedation

## 2023-11-14 ENCOUNTER — TELEPHONE (OUTPATIENT)
Dept: CARDIOLOGY | Facility: CLINIC | Age: 78
End: 2023-11-14
Payer: MEDICARE

## 2023-11-14 ENCOUNTER — TELEPHONE (OUTPATIENT)
Dept: FAMILY MEDICINE | Facility: CLINIC | Age: 78
End: 2023-11-14
Payer: MEDICARE

## 2023-11-14 DIAGNOSIS — I10 HTN (HYPERTENSION), BENIGN: ICD-10-CM

## 2023-11-14 DIAGNOSIS — R60.0 BILATERAL LOWER EXTREMITY EDEMA: ICD-10-CM

## 2023-11-14 LAB
ATRIAL RATE - MUSE: 147 BPM
ATRIAL RATE - MUSE: 64 BPM
DIASTOLIC BLOOD PRESSURE - MUSE: NORMAL MMHG
DIASTOLIC BLOOD PRESSURE - MUSE: NORMAL MMHG
INTERPRETATION ECG - MUSE: NORMAL
INTERPRETATION ECG - MUSE: NORMAL
P AXIS - MUSE: 47 DEGREES
P AXIS - MUSE: NORMAL DEGREES
PR INTERVAL - MUSE: 196 MS
PR INTERVAL - MUSE: NORMAL MS
QRS DURATION - MUSE: 84 MS
QRS DURATION - MUSE: 90 MS
QT - MUSE: 338 MS
QT - MUSE: 442 MS
QTC - MUSE: 455 MS
QTC - MUSE: 491 MS
R AXIS - MUSE: -18 DEGREES
R AXIS - MUSE: -47 DEGREES
SYSTOLIC BLOOD PRESSURE - MUSE: NORMAL MMHG
SYSTOLIC BLOOD PRESSURE - MUSE: NORMAL MMHG
T AXIS - MUSE: -1 DEGREES
T AXIS - MUSE: 251 DEGREES
VENTRICULAR RATE- MUSE: 127 BPM
VENTRICULAR RATE- MUSE: 64 BPM

## 2023-11-14 NOTE — TELEPHONE ENCOUNTER
[facility] Call Center    Phone Message    May a detailed message be left on voicemail: yes     Reason for Call: Other: Pt would like a call back to discuss medication as she is not sure if and when to start taking them again and a few that aren't on her list of meds she believes she is suppose to take     Action Taken: Other: Cardio    Travel Screening: Not Applicable

## 2023-11-14 NOTE — TELEPHONE ENCOUNTER
Patient calling regarding wanting appointment with a PCP. Transferred back to scheduling.     Nichole Ferrara RN on 11/14/2023 at 10:41 AM

## 2023-11-14 NOTE — TELEPHONE ENCOUNTER
Called pt as requested. Pt is asking if she should be taking her irbesartan, amlodipine or hydrochlorothiazide.     Pt had the cardioversion yesterday    Per office note dated 11/10/23, Dr. Rankin recommended:   1.  Given that the patient is relatively asymptomatic at rest we will arrange for the patient to have an urgent MAMI cardioversion which we will perform on Monday which would be the earliest that we can do it.  The patient had a full breakfast this morning.  2.  I will stop the hydrochlorothiazide.  3.  I will stop the irbesartan and the amlodipine medication so that we can double the dose of the diltiazem for better rate control.  We will continue the Metroprolol succinate 100 mg/day.  4.  I have represcribed the apixaban medication and I have placed in bold writing on the prescription that the apixaban must be given today because she is at significantly increased risk of stroke given her [id number] score of 5.  5.  I will double the dose of diltiazem to 240 mg/day.  6.  I explained the risks and benefits of electrical cardioversion to the patient.  The risk of stroke, thermal burns, bradycardia arrhythmias and tachyarrhythmias.  I also explained the risks associate with transesophageal echocardiography including the risk of oropharyngeal trauma, bleeding, esophageal rupture.  And aspiration.  The patient to me she understood why redoing the procedure she told me understood the risks associate with the procedures and finally told me that she wished to proceed.  7.  I told the patient that should she start to get increasing shortness of breath over the weekend are waking up at nighttime short of breath or worsening palpitations she is to come into the emergency room immediately.  The patient told me that she understood that    Pt advised to stay off the irbesartan, amlodipine or hydrochlorothiazide. Pt advised to continue her other medications, especially the Eliquis, Diltiazem & metoprolol. Pt states  the furosemide was only prescribed for 4 days so she has run out of that. She denies shortness of breath or swelling in her feet or ankles. Will message Dr. Rankin to review. Kasey HAMPTON

## 2023-11-15 NOTE — TELEPHONE ENCOUNTER
If she is not short of breath on exertion, do not restart the furosemide.  Do  restart the hydrochlorothiazide.  Do not restart the irbesartan or amlodipine.  Continue with the diltiazem and the metoprolol.  She should have a follow-up visit arranged with an REECE because I set that up.  BP should be rechecked at that time due to the multiple changes in medications.  If shortness of breath should recur she should go immediately to the emergency room to have her EKG checked.  Thanks

## 2023-11-16 RX ORDER — HYDROCHLOROTHIAZIDE 12.5 MG/1
12.5 TABLET ORAL DAILY
Qty: 90 TABLET | Refills: 0 | Status: SHIPPED | OUTPATIENT
Start: 2023-11-16 | End: 2024-02-14

## 2023-11-16 NOTE — TELEPHONE ENCOUNTER
Called pt with recommendations from Dr. Rankin. Pt advised to stay off the furosemide and to restart the hydrochlorothiazide. Prescription escripted to [facility] in [employer] as requested. Pt will continue the Diltiazem, metoprolol & Eliquis. Pt has appt to see Dr. Rankin 11/29/23. Pt advised if the shortness of recurs that she needs to go to the ED, pt verbalized understanding. Kasey HAMPTON

## 2023-11-29 ENCOUNTER — OFFICE VISIT (OUTPATIENT)
Dept: CARDIOLOGY | Facility: CLINIC | Age: 78
End: 2023-11-29
Payer: MEDICARE

## 2023-11-29 VITALS
OXYGEN SATURATION: 96 % | BODY MASS INDEX: 30.08 KG/M2 | HEART RATE: 60 BPM | SYSTOLIC BLOOD PRESSURE: 142 MMHG | DIASTOLIC BLOOD PRESSURE: 76 MMHG | WEIGHT: 187.2 LBS | HEIGHT: 66 IN

## 2023-11-29 DIAGNOSIS — I50.21 ACUTE SYSTOLIC HEART FAILURE (H): ICD-10-CM

## 2023-11-29 DIAGNOSIS — I10 ESSENTIAL HYPERTENSION: ICD-10-CM

## 2023-11-29 DIAGNOSIS — I48.0 PAROXYSMAL ATRIAL FIBRILLATION (H): Primary | ICD-10-CM

## 2023-11-29 PROCEDURE — 99214 OFFICE O/P EST MOD 30 MIN: CPT | Performed by: INTERNAL MEDICINE

## 2023-11-29 RX ORDER — IRBESARTAN 300 MG/1
300 TABLET ORAL AT BEDTIME
Qty: 90 TABLET | Refills: 3 | Status: SHIPPED | OUTPATIENT
Start: 2023-11-29 | End: 2024-02-14

## 2023-11-29 RX ORDER — FLECAINIDE ACETATE 50 MG/1
50 TABLET ORAL 2 TIMES DAILY
Qty: 180 TABLET | Refills: 3 | Status: SHIPPED | OUTPATIENT
Start: 2023-11-29 | End: 2024-08-28

## 2023-11-29 NOTE — LETTER
11/29/2023    [facility]  [address]  [place] [address]    RE: Haritha Cat       Dear Colleague,     I had the pleasure of seeing Haritha Cat in the [facility].  HPI and Plan:   It is my pleasure to see your patient Hartiha Cat in follow-up.  She is a 78-year-old patient who I saw her urgently approximately 2 weeks ago when she was in atrial fibrillation with rapid ventricular response which was markedly symptomatic.  It was likely that the patient also had diastolic heart failure.  3 days later she underwent MAMI directed electrical cardioversion back to sinus rhythm.  The MAMI showed that her ejection fraction was 55 to 60%, the left atrium was severely dilated and the right atrium was moderately dilated.  She had trace mitral and mild tricuspid regurgitation.  The most likely cause for her development of atrial fibrillation was poorly controlled blood pressure.    Now that she is back in sinus rhythm she feels much better and is back to normal.  Her blood pressure is still borderline raised with a systolic pressure of 142.  We did have to change around some of her medications to try and get better rate control just prior to the electrical cardioversion.  Her ventricular rate is now 60 bpm.  Given that this patient was markedly symptomatic it would be best that we would maintain sinus rhythm with an antiarrhythmic drug.  She does require lifelong anticoagulation.    Impression:  1.  Paroxysmal symptomatic atrial fibrillation.  The patient feels markedly better now that she is back in sinus rhythm status post MAMI directed cardioversion.  2.  No bleeding issues with Eliquis though the cost is quite expensive for her.  3.  Will need to rule out ischemia.  4.  Given that she was symptomatic with atrial fibrillation antiarrhythmic therapy is indicated.  5.  Essential hypertension.  Her blood pressure is still not well-controlled and she tells me for years her  blood pressure has been in the 140s.    Plan:  1.  We will start the patient on flecainide 50 mg twice a day.  In 1 week's time we will have the patient perform a treadmill EKG for 2 reasons ,1 to rule out flecainide induced proarrhythmia and 2 , to rule out ischemia.  2.  We will stop the diltiazem medication to allow us to use the flecainide.  She would become bradycardic if she was on metoprolol, diltiazem and flecainide.  3.  Because she has significant hypertension we will restart irbesartan at 300 mg/day.  4.  Will have the patient return to see us again in about 12 weeks time on these medications.  If her blood pressure is still raised we will have to adjust her medications further.  One  possible adjustment would be to switch irbesartan out to olmesartan 40 mg/day which is the most potent of all of the angiotensin receptor blockers      As always the patient is been told to contact me if she has any questions or any concerns.    Toni Rankin MD, FACC, FRCPI    Orders Placed This Encounter   Procedures    Basic metabolic panel    Follow-Up with Cardiology    EKG 12-lead complete w/read - Clinics    Exercise Stress Test (Stress ECG)       Orders Placed This Encounter   Medications    irbesartan (AVAPRO) 300 MG tablet     Sig: Take 1 tablet (300 mg) by mouth at bedtime     Dispense:  90 tablet     Refill:  3    flecainide (TAMBOCOR) 50 MG tablet     Sig: Take 1 tablet (50 mg) by mouth 2 times daily     Dispense:  180 tablet     Refill:  3       Medications Discontinued During This Encounter   Medication Reason    Acetaminophen 325 MG CAPS Stopped by Patient (No AVS)    diphenhydrAMINE-APAP, sleep, (TYLENOL PM EXTRA STRENGTH PO) Stopped by Patient (No AVS)    diltiazem ER (DILT-XR) 240 MG 24 hr ER beaded capsule          Encounter Diagnoses   Name Primary?    Acute systolic heart failure (H)     Paroxysmal atrial fibrillation (H) Yes    Essential hypertension        CURRENT MEDICATIONS:  Current Outpatient  Medications   Medication Sig Dispense Refill    apixaban ANTICOAGULANT (ELIQUIS) 5 MG tablet Take 1 tablet (5 mg) by mouth 2 times daily 180 tablet 3    famotidine (PEPCID) 10 MG tablet Take by mouth as needed      flecainide (TAMBOCOR) 50 MG tablet Take 1 tablet (50 mg) by mouth 2 times daily 180 tablet 3    hydrochlorothiazide (HYDRODIURIL) 12.5 MG tablet Take 1 tablet (12.5 mg) by mouth daily 90 tablet 0    irbesartan (AVAPRO) 300 MG tablet Take 1 tablet (300 mg) by mouth at bedtime 90 tablet 3    metFORMIN (GLUCOPHAGE) 500 MG tablet Take 2 tablets (1,000 mg) by mouth 2 times daily (with meals) 360 tablet 3    metoprolol succinate ER (TOPROL XL) 100 MG 24 hr tablet Take 1 tablet (100 mg) by mouth daily 90 tablet 3    naproxen sodium 220 MG capsule Take 220 mg by mouth 2 times daily (with meals)      Vitamin D, Cholecalciferol, 25 MCG (1000 UT) TABS          ALLERGIES     Allergies   Allergen Reactions    Amoxicillin Hives    Hydrochlorothiazide Other (See Comments)     Hyponatremia on higher dose    Lisinopril Cough    Lotrel Hives       PAST MEDICAL HISTORY:  Past Medical History:   Diagnosis Date    Abscess of abdominal cavity (H) 11/2002    cecal perf, prob from diverticulitis    Allergic rhinitis     Arthritis this year    Right hand, numbness    Colon polyp 2015    colonoscopy due 5 years.    Diverticulosis     DM type 2 (diabetes mellitus, type 2) (H)     Elevated LFTs     fatty liver dz    GERD (gastroesophageal reflux disease)     Hepatitis A     Herpes zoster 11/28/2011    HTN (hypertension), benign     Osteopenia     DXA 2008    Osteopenia 2014       PAST SURGICAL HISTORY:  Past Surgical History:   Procedure Laterality Date    ANESTHESIA CARDIOVERSION N/A 11/13/2023    Procedure: Anesthesia cardioversion;  Surgeon: GENERIC ANESTHESIA PROVIDER;  Location:  OR    CATARACT IOL, RT/LT      COLONOSCOPY N/A 5/15/2018    Procedure: COLONOSCOPY;  colonoscopy;  Surgeon: Lucero Acharya MD;  Location:   "GI    DISCECTOMY LUMBAR POSTERIOR MICROSCOPIC ONE LEVEL Right 10/1/2019    Procedure: LUMBAR MICRODISCECTOMY RIGHT L2-3;  Surgeon: Wolfgang Peng MD;  Location: [facility] OR    varicose vein surgery      Dr. Bowen    wisdom teeth  age 27       FAMILY HISTORY:  Family History   Problem Relation Age of Onset    Hypertension Mother             Thyroid Disease Mother             Neurologic Disorder Mother         Parkinsons and RA    Hypertension Father             Diabetes Father             Diabetes Sister             Substance Abuse Sister             Hypertension Sister             Diabetes Maternal Grandmother             Diabetes Maternal Grandfather             Alcoholism Sister 59    Diabetes Son     Hypertension Son        SOCIAL HISTORY:  Social History     Socioeconomic History    Marital status:      Spouse name: None    Number of children: None    Years of education: None    Highest education level: None   Tobacco Use    Smoking status: Never    Smokeless tobacco: Never   Substance and Sexual Activity    Alcohol use: Not Currently    Drug use: No    Sexual activity: Yes     Partners: Male     Birth control/protection: None   Other Topics Concern    Parent/sibling w/ CABG, MI or angioplasty before 65F 55M? No   Social History Narrative     with 2 sons and 5 grandkids.  On Sandra Bustamante as of 2011.    Son (Bobby) seizure, TBI, brain hemorrhage.       Review of Systems:  Skin:          Eyes:         ENT:         Respiratory:          Cardiovascular:         Gastroenterology:        Genitourinary:         Musculoskeletal:         Neurologic:         Psychiatric:         Heme/Lymph/Imm:         Endocrine:           Physical Exam:  Vitals: BP (!) 142/76   Pulse 60   Ht 1.664 m (5' 5.5\")   Wt 84.9 kg (187 lb 3.2 oz)   SpO2 96%   BMI 30.68 kg/m      Constitutional:  cooperative, alert and oriented, well developed, well " nourished, in no acute distress        Skin:  warm and dry to the touch          Head:  no masses or lesions        Eyes:  pupils equal and round        Lymph:      ENT:  no pallor or cyanosis        Neck:  carotid pulses are full and equal bilaterally;JVP normal        Respiratory:  normal breath sounds, clear to auscultation, normal A-P diameter, normal symmetry, normal respiratory excursion, no use of accessory muscles         Cardiac: regular rhythm;normal S1 and S2;no murmurs, gallops or rubs detected                pulses full and equal                                        GI:  not assessed this visit        Extremities and Muscular Skeletal:  no deformities, clubbing, cyanosis, erythema observed;no edema              Neurological:  no gross motor deficits;affect appropriate        Psych:  Alert and Oriented x 3        CC  Toni Rankin MD  [address]  [place] [address]      Thank you for allowing me to participate in the care of your patient.      Sincerely,     Toni Rankin MD, MD     [facility]

## 2023-11-29 NOTE — PROGRESS NOTES
HPI and Plan:   It is my pleasure to see your patient Haritha Cat in follow-up.  She is a 78-year-old patient who I saw her urgently approximately 2 weeks ago when she was in atrial fibrillation with rapid ventricular response which was markedly symptomatic.  It was likely that the patient also had diastolic heart failure.  3 days later she underwent MAMI directed electrical cardioversion back to sinus rhythm.  The MAMI showed that her ejection fraction was 55 to 60%, the left atrium was severely dilated and the right atrium was moderately dilated.  She had trace mitral and mild tricuspid regurgitation.  The most likely cause for her development of atrial fibrillation was poorly controlled blood pressure.    Now that she is back in sinus rhythm she feels much better and is back to normal.  Her blood pressure is still borderline raised with a systolic pressure of 142.  We did have to change around some of her medications to try and get better rate control just prior to the electrical cardioversion.  Her ventricular rate is now 60 bpm.  Given that this patient was markedly symptomatic it would be best that we would maintain sinus rhythm with an antiarrhythmic drug.  She does require lifelong anticoagulation.    Impression:  1.  Paroxysmal symptomatic atrial fibrillation.  The patient feels markedly better now that she is back in sinus rhythm status post MAMI directed cardioversion.  2.  No bleeding issues with Eliquis though the cost is quite expensive for her.  3.  Will need to rule out ischemia.  4.  Given that she was symptomatic with atrial fibrillation antiarrhythmic therapy is indicated.  5.  Essential hypertension.  Her blood pressure is still not well-controlled and she tells me for years her blood pressure has been in the 140s.    Plan:  1.  We will start the patient on flecainide 50 mg twice a day.  In 1 week's time we will have the patient perform a treadmill EKG for 2 reasons ,1 to rule out flecainide  induced proarrhythmia and 2 , to rule out ischemia.  2.  We will stop the diltiazem medication to allow us to use the flecainide.  She would become bradycardic if she was on metoprolol, diltiazem and flecainide.  3.  Because she has significant hypertension we will restart irbesartan at 300 mg/day.  4.  Will have the patient return to see us again in about 12 weeks time on these medications.  If her blood pressure is still raised we will have to adjust her medications further.  One  possible adjustment would be to switch irbesartan out to olmesartan 40 mg/day which is the most potent of all of the angiotensin receptor blockers      As always the patient is been told to contact me if she has any questions or any concerns.    Toni Rankin MD, FACC, FRCPI    Orders Placed This Encounter   Procedures    Basic metabolic panel    Follow-Up with Cardiology    EKG 12-lead complete w/read - Clinics    Exercise Stress Test (Stress ECG)       Orders Placed This Encounter   Medications    irbesartan (AVAPRO) 300 MG tablet     Sig: Take 1 tablet (300 mg) by mouth at bedtime     Dispense:  90 tablet     Refill:  3    flecainide (TAMBOCOR) 50 MG tablet     Sig: Take 1 tablet (50 mg) by mouth 2 times daily     Dispense:  180 tablet     Refill:  3       Medications Discontinued During This Encounter   Medication Reason    Acetaminophen 325 MG CAPS Stopped by Patient (No AVS)    diphenhydrAMINE-APAP, sleep, (TYLENOL PM EXTRA STRENGTH PO) Stopped by Patient (No AVS)    diltiazem ER (DILT-XR) 240 MG 24 hr ER beaded capsule          Encounter Diagnoses   Name Primary?    Acute systolic heart failure (H)     Paroxysmal atrial fibrillation (H) Yes    Essential hypertension        CURRENT MEDICATIONS:  Current Outpatient Medications   Medication Sig Dispense Refill    apixaban ANTICOAGULANT (ELIQUIS) 5 MG tablet Take 1 tablet (5 mg) by mouth 2 times daily 180 tablet 3    famotidine (PEPCID) 10 MG tablet Take by mouth as needed       flecainide (TAMBOCOR) 50 MG tablet Take 1 tablet (50 mg) by mouth 2 times daily 180 tablet 3    hydrochlorothiazide (HYDRODIURIL) 12.5 MG tablet Take 1 tablet (12.5 mg) by mouth daily 90 tablet 0    irbesartan (AVAPRO) 300 MG tablet Take 1 tablet (300 mg) by mouth at bedtime 90 tablet 3    metFORMIN (GLUCOPHAGE) 500 MG tablet Take 2 tablets (1,000 mg) by mouth 2 times daily (with meals) 360 tablet 3    metoprolol succinate ER (TOPROL XL) 100 MG 24 hr tablet Take 1 tablet (100 mg) by mouth daily 90 tablet 3    naproxen sodium 220 MG capsule Take 220 mg by mouth 2 times daily (with meals)      Vitamin D, Cholecalciferol, 25 MCG (1000 UT) TABS          ALLERGIES     Allergies   Allergen Reactions    Amoxicillin Hives    Hydrochlorothiazide Other (See Comments)     Hyponatremia on higher dose    Lisinopril Cough    Lotrel Hives       PAST MEDICAL HISTORY:  Past Medical History:   Diagnosis Date    Abscess of abdominal cavity (H) 11/2002    cecal perf, prob from diverticulitis    Allergic rhinitis     Arthritis this year    Right hand, numbness    Colon polyp 2015    colonoscopy due 5 years.    Diverticulosis     DM type 2 (diabetes mellitus, type 2) (H)     Elevated LFTs     fatty liver dz    GERD (gastroesophageal reflux disease)     Hepatitis A     Herpes zoster 11/28/2011    HTN (hypertension), benign     Osteopenia     DXA 2008    Osteopenia 2014       PAST SURGICAL HISTORY:  Past Surgical History:   Procedure Laterality Date    ANESTHESIA CARDIOVERSION N/A 11/13/2023    Procedure: Anesthesia cardioversion;  Surgeon: GENERIC ANESTHESIA PROVIDER;  Location:  OR    CATARACT IOL, RT/LT      COLONOSCOPY N/A 5/15/2018    Procedure: COLONOSCOPY;  colonoscopy;  Surgeon: Lucero Acharya MD;  Location:  GI    DISCECTOMY LUMBAR POSTERIOR MICROSCOPIC ONE LEVEL Right 10/1/2019    Procedure: LUMBAR MICRODISCECTOMY RIGHT L2-3;  Surgeon: Wolfgang Peng MD;  Location:  OR    varicose vein surgery      Dr. Bowen     "wisdom teeth  age 27       FAMILY HISTORY:  Family History   Problem Relation Age of Onset    Hypertension Mother             Thyroid Disease Mother             Neurologic Disorder Mother         Parkinsons and RA    Hypertension Father             Diabetes Father             Diabetes Sister             Substance Abuse Sister             Hypertension Sister             Diabetes Maternal Grandmother             Diabetes Maternal Grandfather             Alcoholism Sister 59    Diabetes Son     Hypertension Son        SOCIAL HISTORY:  Social History     Socioeconomic History    Marital status:      Spouse name: None    Number of children: None    Years of education: None    Highest education level: None   Tobacco Use    Smoking status: Never    Smokeless tobacco: Never   Substance and Sexual Activity    Alcohol use: Not Currently    Drug use: No    Sexual activity: Yes     Partners: Male     Birth control/protection: None   Other Topics Concern    Parent/sibling w/ CABG, MI or angioplasty before 65F 55M? No   Social History Narrative     with 2 sons and 5 grandkids.  On Sandra Bustamante as of 2011.    Son (Bobby) seizure, TBI, brain hemorrhage.       Review of Systems:  Skin:          Eyes:         ENT:         Respiratory:          Cardiovascular:         Gastroenterology:        Genitourinary:         Musculoskeletal:         Neurologic:         Psychiatric:         Heme/Lymph/Imm:         Endocrine:           Physical Exam:  Vitals: BP (!) 142/76   Pulse 60   Ht 1.664 m (5' 5.5\")   Wt 84.9 kg (187 lb 3.2 oz)   SpO2 96%   BMI 30.68 kg/m      Constitutional:  cooperative, alert and oriented, well developed, well nourished, in no acute distress        Skin:  warm and dry to the touch          Head:  no masses or lesions        Eyes:  pupils equal and round        Lymph:      ENT:  no pallor or cyanosis        Neck:  carotid " pulses are full and equal bilaterally;JVP normal        Respiratory:  normal breath sounds, clear to auscultation, normal A-P diameter, normal symmetry, normal respiratory excursion, no use of accessory muscles         Cardiac: regular rhythm;normal S1 and S2;no murmurs, gallops or rubs detected                pulses full and equal                                        GI:  not assessed this visit        Extremities and Muscular Skeletal:  no deformities, clubbing, cyanosis, erythema observed;no edema              Neurological:  no gross motor deficits;affect appropriate        Psych:  Alert and Oriented x 3        CC  Toni Rankin MD  [address]  [place] [id number]

## 2023-12-06 ENCOUNTER — HOSPITAL ENCOUNTER (OUTPATIENT)
Dept: CARDIOLOGY | Facility: CLINIC | Age: 78
Discharge: HOME | End: 2023-12-06
Attending: INTERNAL MEDICINE | Admitting: INTERNAL MEDICINE
Payer: MEDICARE

## 2023-12-06 DIAGNOSIS — I48.0 PAROXYSMAL ATRIAL FIBRILLATION (H): ICD-10-CM

## 2023-12-06 PROCEDURE — 93016 CV STRESS TEST SUPVJ ONLY: CPT | Performed by: INTERNAL MEDICINE

## 2023-12-06 PROCEDURE — 93018 CV STRESS TEST I&R ONLY: CPT | Performed by: INTERNAL MEDICINE

## 2023-12-06 PROCEDURE — 93017 CV STRESS TEST TRACING ONLY: CPT

## 2023-12-07 ENCOUNTER — TELEPHONE (OUTPATIENT)
Dept: CARDIOLOGY | Facility: CLINIC | Age: 78
End: 2023-12-07
Payer: MEDICARE

## 2023-12-07 DIAGNOSIS — R06.00 DYSPNEA: ICD-10-CM

## 2023-12-07 DIAGNOSIS — I50.21 ACUTE SYSTOLIC HEART FAILURE (H): ICD-10-CM

## 2023-12-07 DIAGNOSIS — I48.0 PAROXYSMAL ATRIAL FIBRILLATION (H): Primary | ICD-10-CM

## 2023-12-07 DIAGNOSIS — I50.30 DIASTOLIC HEART FAILURE (H): ICD-10-CM

## 2023-12-07 NOTE — TELEPHONE ENCOUNTER
Attempted to call patient with results of stress EKG and recommendations from Dr. Sourav Peralta, line was busy x 2 attempts.  Will try again later.  DULCE Read RN

## 2023-12-07 NOTE — TELEPHONE ENCOUNTER
Reviewed stress EKG showing   This was non-diagnostic stress EKG due to the inability to achieve target heart rate.  A low workload was achieved.  No arrhythmia noted.    Per office note dated 11/29/23, Dr. Rankin recommended:   1.  We will start the patient on flecainide 50 mg twice a day.  In 1 week's time we will have the patient perform a treadmill EKG for 2 reasons ,1 to rule out flecainide induced proarrhythmia and 2 , to rule out ischemia.  2.  We will stop the diltiazem medication to allow us to use the flecainide.  She would become bradycardic if she was on metoprolol, diltiazem and flecainide.  3.  Because she has significant hypertension we will restart irbesartan at 300 mg/day.  4.  Will have the patient return to see us again in about 12 weeks time on these medications.  If her blood pressure is still raised we will have to adjust her medications further.  One  possible adjustment would be to switch irbesartan out to olmesartan 40 mg/day which is the most potent of all of the angiotensin receptor blockers    Pt has appt to see  Dr. Rankin 02/29/2024 at 10:15 AM; will message to review. Kasey HAMPTON

## 2023-12-07 NOTE — TELEPHONE ENCOUNTER
No proarrhythmia on flecainide.  However, this test does not rule out ischemia because she only went 4 minutes 45 seconds.  Should have a Lexiscan study performed to determine if there is any evidence of ischemia.  Thanks

## 2023-12-08 NOTE — TELEPHONE ENCOUNTER
Follow-up patient with results, stress EKG and recommendations for Peralta to get lexiscan.  Chart in chart & will message scheduling to call pt to arrange. Kasey HAMPTON

## 2023-12-18 ENCOUNTER — HOSPITAL ENCOUNTER (OUTPATIENT)
Dept: CARDIOLOGY | Facility: CLINIC | Age: 78
Discharge: HOME | End: 2023-12-18
Attending: INTERNAL MEDICINE
Payer: MEDICARE

## 2023-12-18 VITALS
OXYGEN SATURATION: 97 % | DIASTOLIC BLOOD PRESSURE: 80 MMHG | WEIGHT: 185.8 LBS | SYSTOLIC BLOOD PRESSURE: 142 MMHG | HEIGHT: 67 IN | BODY MASS INDEX: 29.16 KG/M2

## 2023-12-18 VITALS — SYSTOLIC BLOOD PRESSURE: 142 MMHG | DIASTOLIC BLOOD PRESSURE: 80 MMHG | HEART RATE: 68 BPM

## 2023-12-18 DIAGNOSIS — I48.0 PAROXYSMAL ATRIAL FIBRILLATION (H): ICD-10-CM

## 2023-12-18 DIAGNOSIS — R06.00 DYSPNEA: ICD-10-CM

## 2023-12-18 DIAGNOSIS — I50.30 DIASTOLIC HEART FAILURE (H): ICD-10-CM

## 2023-12-18 LAB
CV STRESS MAX HR HE: 96
RATE PRESSURE PRODUCT: NORMAL
STRESS ECHO BASELINE DIASTOLIC HE: 76
STRESS ECHO BASELINE HR: 62 BPM
STRESS ECHO BASELINE SYSTOLIC BP: 134
STRESS ECHO CALCULATED PERCENT HR: 67 %
STRESS ECHO LAST STRESS DIASTOLIC BP: 82
STRESS ECHO LAST STRESS SYSTOLIC BP: 148
STRESS ECHO TARGET HR: 144

## 2023-12-18 PROCEDURE — G1010 CDSM STANSON: HCPCS | Performed by: INTERNAL MEDICINE

## 2023-12-18 PROCEDURE — 93017 CV STRESS TEST TRACING ONLY: CPT

## 2023-12-18 PROCEDURE — 93018 CV STRESS TEST I&R ONLY: CPT | Performed by: INTERNAL MEDICINE

## 2023-12-18 PROCEDURE — 93016 CV STRESS TEST SUPVJ ONLY: CPT | Performed by: INTERNAL MEDICINE

## 2023-12-18 PROCEDURE — G1010 CDSM STANSON: HCPCS

## 2023-12-18 PROCEDURE — A9502 TC99M TETROFOSMIN: HCPCS | Performed by: INTERNAL MEDICINE

## 2023-12-18 PROCEDURE — 78452 HT MUSCLE IMAGE SPECT MULT: CPT | Mod: 26 | Performed by: INTERNAL MEDICINE

## 2023-12-18 PROCEDURE — 343N000001 HC RX 343: Performed by: INTERNAL MEDICINE

## 2023-12-18 PROCEDURE — 250N000011 HC RX IP 250 OP 636: Mod: JZ | Performed by: INTERNAL MEDICINE

## 2023-12-18 RX ORDER — REGADENOSON 0.08 MG/ML
0.4 INJECTION, SOLUTION INTRAVENOUS ONCE
Status: COMPLETED | OUTPATIENT
Start: 2023-12-18 | End: 2023-12-18

## 2023-12-18 RX ORDER — AMINOPHYLLINE 25 MG/ML
50-100 INJECTION, SOLUTION INTRAVENOUS
Status: DISCONTINUED | OUTPATIENT
Start: 2023-12-18 | End: 2023-12-19 | Stop reason: HOSPADM

## 2023-12-18 RX ORDER — ALBUTEROL SULFATE 90 UG/1
2 AEROSOL, METERED RESPIRATORY (INHALATION) EVERY 5 MIN PRN
Status: DISCONTINUED | OUTPATIENT
Start: 2023-12-18 | End: 2023-12-19 | Stop reason: HOSPADM

## 2023-12-18 RX ORDER — CAFFEINE CITRATE 20 MG/ML
60 SOLUTION INTRAVENOUS
Status: DISCONTINUED | OUTPATIENT
Start: 2023-12-18 | End: 2023-12-19 | Stop reason: HOSPADM

## 2023-12-18 RX ORDER — ACYCLOVIR 200 MG/1
0-1 CAPSULE ORAL
Status: DISCONTINUED | OUTPATIENT
Start: 2023-12-18 | End: 2023-12-19 | Stop reason: HOSPADM

## 2023-12-18 RX ADMIN — TETROFOSMIN 3.43 MILLICURIE: 1.38 INJECTION, POWDER, LYOPHILIZED, FOR SOLUTION INTRAVENOUS at 09:54

## 2023-12-18 RX ADMIN — REGADENOSON 0.4 MG: 0.08 INJECTION, SOLUTION INTRAVENOUS at 11:09

## 2023-12-18 RX ADMIN — TETROFOSMIN 9.34 MILLICURIE: 1.38 INJECTION, POWDER, LYOPHILIZED, FOR SOLUTION INTRAVENOUS at 11:16

## 2023-12-19 NOTE — TELEPHONE ENCOUNTER
Reviewed lexiscan showing     The nuclear stress test is negative for inducible myocardial ischemia or infarction.    Left ventricular function is normal.    There is no prior study for comparison.    Pt has appt to see Dr. Rankin 2/29/24; will message to review. Kasey HAMPTON

## 2023-12-19 NOTE — TELEPHONE ENCOUNTER
Attempted to call patient with results and recommendations left on secure voicemail with request to call back with questions or concerns.  DULCE Read RN

## 2024-02-05 ENCOUNTER — TELEPHONE (OUTPATIENT)
Dept: CARDIOLOGY | Facility: CLINIC | Age: 79
End: 2024-02-05
Payer: MEDICARE

## 2024-02-05 ENCOUNTER — DOCUMENTATION ONLY (OUTPATIENT)
Dept: CARDIOLOGY | Facility: CLINIC | Age: 79
End: 2024-02-05
Payer: MEDICARE

## 2024-02-05 DIAGNOSIS — I48.91 ATRIAL FIBRILLATION, UNSPECIFIED TYPE (H): ICD-10-CM

## 2024-02-05 NOTE — TELEPHONE ENCOUNTER
Returned patient's phone call, states her Eliquis at [facility] was $500 for 1 month or $800 for 3-month supply.  Patient states she cannot afford this and even suggested she may need to sell her home to pay for it. Pt states she did discuss with a pharmacist at [facility].  Patient requesting a 1 month supply to be sent to [facility].  Prescription E scripted as requested.  Also discussed patient assistance.  Patient does seem to qualify for Eliquis patient assistance and will be mailed information regarding this. Kasey HAMPTON

## 2024-02-05 NOTE — TELEPHONE ENCOUNTER
[facility] Call Center    Phone Message    May a detailed message be left on voicemail: yes     Reason for Call: Medication Question or concern regarding medication   Prescription Clarification  Name of Medication: apixaban ANTICOAGULANT (ELIQUIS) 5 MG tablet   Prescribing Provider: Dr. Sourav Peralta   Pharmacy:    What on the order needs clarification? Pt would like a call back to discuss medication cost.      Action Taken: Other: Cardiology    Travel Screening: Not Applicable    Thank you!  [facility]

## 2024-02-05 NOTE — PROGRESS NOTES
2-5-2024-pt called Phoebe today asking for Help w/ cost of Eliquis. She thinks she may be able to apply for the Eliquis assistance program, which is based on household size and income.  She plans to purchase a 1 month supply from [facility]. I will put together a Packet with application, income guidelines, instructions and my direct contact information.  Mailed to pt-Tracie Mccollum Lpn      2-7-2024 I received the signed teressa and RX today-faxed to [facility]  Tracie Mccollum lpn

## 2024-02-06 DIAGNOSIS — I48.91 ATRIAL FIBRILLATION, UNSPECIFIED TYPE (H): ICD-10-CM

## 2024-02-06 NOTE — TELEPHONE ENCOUNTER
Patient called back, states the Eliquis was even more expensive at [facility] than at [facility] in [employer].  Patient is requesting a 3-month prescription to go to [facility] in [employer].  Prescription E scripted as requested. Kasey HAMPTON

## 2024-02-07 ENCOUNTER — DOCUMENTATION ONLY (OUTPATIENT)
Dept: CARDIOLOGY | Facility: CLINIC | Age: 79
End: 2024-02-07
Payer: MEDICARE

## 2024-02-07 DIAGNOSIS — I48.91 ATRIAL FIBRILLATION, UNSPECIFIED TYPE (H): ICD-10-CM

## 2024-02-07 NOTE — PROGRESS NOTES
Paged to call patient regarding problem with prescription.  Called at 5:45 with no answer left message on VM.

## 2024-02-14 ENCOUNTER — OFFICE VISIT (OUTPATIENT)
Dept: FAMILY MEDICINE | Facility: CLINIC | Age: 79
End: 2024-02-14
Payer: MEDICARE

## 2024-02-14 VITALS
HEIGHT: 65 IN | HEART RATE: 55 BPM | OXYGEN SATURATION: 97 % | RESPIRATION RATE: 16 BRPM | SYSTOLIC BLOOD PRESSURE: 130 MMHG | BODY MASS INDEX: 30.66 KG/M2 | TEMPERATURE: 97.5 F | WEIGHT: 184 LBS | DIASTOLIC BLOOD PRESSURE: 83 MMHG

## 2024-02-14 DIAGNOSIS — I50.21 ACUTE SYSTOLIC HEART FAILURE (H): ICD-10-CM

## 2024-02-14 DIAGNOSIS — E11.9 TYPE 2 DIABETES MELLITUS WITHOUT COMPLICATION, WITHOUT LONG-TERM CURRENT USE OF INSULIN (H): Primary | ICD-10-CM

## 2024-02-14 DIAGNOSIS — I10 ESSENTIAL HYPERTENSION: ICD-10-CM

## 2024-02-14 DIAGNOSIS — I10 HTN (HYPERTENSION), BENIGN: ICD-10-CM

## 2024-02-14 DIAGNOSIS — R60.0 BILATERAL LOWER EXTREMITY EDEMA: ICD-10-CM

## 2024-02-14 DIAGNOSIS — I48.0 PAROXYSMAL ATRIAL FIBRILLATION (H): ICD-10-CM

## 2024-02-14 DIAGNOSIS — Z12.11 SCREEN FOR COLON CANCER: ICD-10-CM

## 2024-02-14 PROCEDURE — 99214 OFFICE O/P EST MOD 30 MIN: CPT | Performed by: INTERNAL MEDICINE

## 2024-02-14 RX ORDER — IRBESARTAN 300 MG/1
300 TABLET ORAL AT BEDTIME
Qty: 90 TABLET | Refills: 3 | Status: SHIPPED | OUTPATIENT
Start: 2024-02-14 | End: 2024-04-10 | Stop reason: ALTCHOICE

## 2024-02-14 RX ORDER — HYDROCHLOROTHIAZIDE 12.5 MG/1
12.5 TABLET ORAL DAILY
Qty: 90 TABLET | Refills: 3 | Status: SHIPPED | OUTPATIENT
Start: 2024-02-14 | End: 2024-03-20

## 2024-02-14 RX ORDER — METOPROLOL SUCCINATE 100 MG/1
100 TABLET, EXTENDED RELEASE ORAL DAILY
Qty: 90 TABLET | Refills: 3 | Status: SHIPPED | OUTPATIENT
Start: 2024-02-14

## 2024-02-14 ASSESSMENT — PAIN SCALES - GENERAL: PAINLEVEL: NO PAIN (0)

## 2024-02-14 NOTE — PATIENT INSTRUCTIONS
(E11.9) Type 2 diabetes mellitus without complication, without long-term current use of insulin (H)  (primary encounter diagnosis)  Comment: We will check labs when you get labs checked in cardiology.  Continue on metformin.  Eye exam due annually   Plan: HEMOGLOBIN A1C, Albumin Random Urine         Quantitative with Creat Ratio            (Z12.11) Screen for colon cancer  Comment:   Plan: Colonoscopy Screening  Referral            (I10) HTN (hypertension), benign  Comment: blood pressure is stable - we will continue current blood pressure medications and we will check labs as planned   Plan: CBC with platelets            (I48.0) Paroxysmal atrial fibrillation (H)  Comment: continue metoprolol for rate control and we will refer to medical therapeutic management to discuss options for anticoagulation.    Plan: Med Therapy Management Referral          RSV  and Shingrix vaccine is now available.  I would call your insurance to see if a shingles vaccine is covered and get this at your pharmacy

## 2024-02-14 NOTE — PROGRESS NOTES
Subjective   Ct is a 78 year old, presenting for the following health issues:  Diabetes, Musculoskeletal Problem (Numbness in right hand ), and Medication Problem (Irbesartan - patient states that receive a prescription refill of 150 mg but per cardiology should be taking 300 mg. Would like to know how many mg she should take )    History of Present Illness       Diabetes:   She presents for follow up of diabetes.    She is not checking blood glucose.         She has no concerns regarding her diabetes at this time.   She is not experiencing numbness or burning in feet, excessive thirst, blurry vision, weight changes or redness, sores or blisters on feet. The patient has not had a diabetic eye exam in the last 12 months.          She eats 2-3 servings of fruits and vegetables daily.She consumes 0 sweetened beverage(s) daily.She exercises with enough effort to increase her heart rate 10 to 19 minutes per day.    She is taking medications regularly.     Establishment of primary care  I spoke with Haritha today in the clinic for establishment of primary care.  She is feeling relatively well.  Notes that she has had follow-up with cardiology.  She is concerned about the cost of apixaban and wishes to discuss options for management.  Screen for colon cancer   She is due for colonoscopy  Type 2 diabetes mellitus without complication, without long-term current use of insulin (H)   Her blood glucose has been relatively well-controlled.  She is taking only metformin to manage her diabetes.  She is due for an eye exam.  HTN (hypertension), benign   Blood pressure remained stable with current dose of metoprolol, higher dose of irbesartan and hydrochlorothiazide.  She notes that her irbesartan dose was increased to 3 mg, and she has run out of the 3 mg tablets and requests a refill.  Paroxysmal atrial fibrillation (H)   As above, she is doing well with control of her heart rate with metoprolol.  She has not had any  "recurrence since starting flecainide.  She does wish to look at alternative options besides apixaban for stroke prevention.       Review of Systems  Constitutional, HEENT, cardiovascular - as above, pulmonary, GI, , musculoskeletal, neuro, skin, endocrine and psych systems are negative, except as otherwise noted.      Objective    /83 (BP Location: Left arm, Patient Position: Sitting, Cuff Size: Adult Large)   Pulse 55   Temp 97.5  F (36.4  C) (Oral)   Resp 16   Ht 1.651 m (5' 5\")   Wt 83.5 kg (184 lb)   SpO2 97%   BMI 30.62 kg/m    Body mass index is 30.62 kg/m .  Physical Exam   GENERAL: alert and no distress  EYES: Eyes grossly normal to inspection,   HENT: ear canals and TM's normal,   NECK: no adenopathy, no asymmetry, masses, or scars  RESP: lungs clear to auscultation - no rales, rhonchi or wheezes  CV: regular rate and rhythm, normal S1 S2, no S3 or S4, no murmur, click or rub, no peripheral edema  ABDOMEN: soft, nontender, no hepatosplenomegaly, no masses and bowel sounds normal  MS: no gross musculoskeletal defects noted, no edema  SKIN: no suspicious lesions or rashes  NEURO: Normal strength and tone, mentation intact and speech normal  PSYCH: mentation appears normal, affect normal/bright    No results found for any visits on 02/14/24.     Patient Instructions   (E11.9) Type 2 diabetes mellitus without complication, without long-term current use of insulin (H)  (primary encounter diagnosis)  Comment: We will check labs when you get labs checked in cardiology.  Continue on metformin.  Eye exam due annually   Plan: HEMOGLOBIN A1C, Albumin Random Urine         Quantitative with Creat Ratio            (Z12.11) Screen for colon cancer  Comment:   Plan: Colonoscopy Screening  Referral            (I10) HTN (hypertension), benign  Comment: blood pressure is stable - we will continue current blood pressure medications and we will check labs as planned   Plan: CBC with platelets        "     (I48.0) Paroxysmal atrial fibrillation (H)  Comment: continue metoprolol for rate control and we will refer to medical therapeutic management to discuss options for anticoagulation.    Plan: Med Therapy Management Referral          RSV  and Shingrix vaccine is now available.  I would call your insurance to see if a shingles vaccine is covered and get this at your pharmacy             Signed Electronically by: Cristino Contreras MD, MD

## 2024-02-16 ENCOUNTER — TELEPHONE (OUTPATIENT)
Dept: GASTROENTEROLOGY | Facility: CLINIC | Age: 79
End: 2024-02-16
Payer: MEDICARE

## 2024-02-16 DIAGNOSIS — Z12.11 SPECIAL SCREENING FOR MALIGNANT NEOPLASMS, COLON: Primary | ICD-10-CM

## 2024-02-16 NOTE — TELEPHONE ENCOUNTER
"Endoscopy Scheduling Screen    Have you had a positive Covid test in the last 14 days?  No    Are you active on MyChart?   Yes    What insurance is in the chart?  Other:  [employer]bs    Ordering/Referring Provider: POOJA ANDERSON    (If ordering provider performs procedure, schedule with ordering provider unless otherwise instructed. )    BMI: Estimated body mass index is 30.62 kg/m  as calculated from the following:    Height as of 2/14/24: 1.651 m (5' 5\").    Weight as of 2/14/24: 83.5 kg (184 lb).     Sedation Ordered  moderate sedation.   If patient BMI > 50 do not schedule in ASC.    If patient BMI > 45 do not schedule at [facility].    Are you taking methadone or Suboxone?  No    Have you had difficulties, pain, or discomfort during past endoscopy procedures?  No    Are you taking any prescription medications for pain 3 or more times per week?   NO - No RN review required.    Do you have a history of malignant hyperthermia or adverse reaction to anesthesia?  No    (Females) Are you currently pregnant?   No     Have you been diagnosed or told you have pulmonary hypertension?   No    Do you have an LVAD?  No    Have you been told you have moderate to severe sleep apnea?  No    Have you been told you have COPD, asthma, or any other lung disease?  No    Do you have any heart conditions?  Yes     In the past year, have you had any hospitalizations for heart related issues including cardiomyopathy, heart attack, or stent placement?  No    Do you have any implantable devices in your body (pacemaker, ICD)?  No    Do you take nitroglycerine?  No    Have you ever had an organ transplant?   No    Have you ever had or are you awaiting a heart or lung transplant?   No    Have you had a stroke or transient ischemic attack (TIA aka \"mini stroke\" in the last 6 months?   No    Have you been diagnosed with or been told you have cirrhosis of the liver?   No    Are you currently on dialysis?   No    Do you need assistance " "transferring?   No    BMI: Estimated body mass index is 30.62 kg/m  as calculated from the following:    Height as of 2/14/24: 1.651 m (5' 5\").    Weight as of 2/14/24: 83.5 kg (184 lb).     Is patients BMI > 40 and scheduling location [facility]?  No    Do you take an injectable medication for weight loss or diabetes (excluding insulin)?  No    Do you take the medication Naltrexone?  No    Do you take blood thinners?  Yes     Are you taking Effient/Prasugrel?  No, you must contact your prescribing provider for direction on holding or bridging with a different medication.       Prep   Are you currently on dialysis or do you have chronic kidney disease?  No    Do you have a diagnosis of diabetes?  Yes ([facility] Prep)    Do you have a diagnosis of cystic fibrosis (CF)?  No    On a regular basis do you go 3 -5 days between bowel movements?  No    BMI > 40?  No    Preferred Pharmacy:    [facility] [address] [place] [facility] [address]  [address] [facility] [address]  Phone: [phone] Fax: [phone]    [facility] [address] [place] [address] - [address] [facility]  [address] [facility] [address]  Phone: [phone] Fax: [phone]      Final Scheduling Details   Colonoscopy prep sent?  N/A    Procedure scheduled  Colonoscopy    Surgeon:  jeni     Date of procedure:  4/23     Pre-OP / PAC:   No - Not required for this site.    Location  [facility] - Per order.    Sedation   Moderate Sedation - Per order.      Patient Reminders:   You will receive a call from a Nurse to review instructions and health history.  This assessment must be completed prior to your procedure.  Failure to complete the Nurse assessment may result in the procedure being cancelled.      On the day of your procedure, please designate an adult(s) who can drive you home stay with you for the next 24 hours. The medicines used in the exam will make you sleepy. You will not be able to drive.      You cannot take public transportation, ride " share services, or non-medical taxi service without a responsible caregiver.  Medical transport services are allowed with the requirement that a responsible caregiver will receive you at your destination.  We require that drivers and caregivers are confirmed prior to your procedure.

## 2024-02-16 NOTE — TELEPHONE ENCOUNTER
Caller: Ct    Reason for Reschedule/Cancellation   (please be detailed, any staff messages or encounters to note?): needs another date      Prior to reschedule please review:  Ordering Provider: POOJA ANDERSON   Sedation Determined: CS  Does patient have any ASC Exclusions, please identify?: no      Notes on Cancelled Procedure:  Procedure: Lower Endoscopy [Colonoscopy]   Date: 4/23  Location: [facility]; [address] [place] [address]   Surgeon: Natalya      Rescheduled: Yes    Procedure: Lower Endoscopy [Colonoscopy]   Date: 4/19  Location: [facility]; [address] [place] [address]   Surgeon: Andrew  Sedation Level Scheduled  CS,  Reason for Sedation Level order  Prep/Instructions updated and sent: y       Did you cancel or rescheduled an EUS procedure? No.

## 2024-02-29 ENCOUNTER — LAB (OUTPATIENT)
Dept: LAB | Facility: CLINIC | Age: 79
End: 2024-02-29
Payer: MEDICARE

## 2024-02-29 ENCOUNTER — OFFICE VISIT (OUTPATIENT)
Dept: CARDIOLOGY | Facility: CLINIC | Age: 79
End: 2024-02-29
Attending: INTERNAL MEDICINE
Payer: MEDICARE

## 2024-02-29 ENCOUNTER — TELEPHONE (OUTPATIENT)
Dept: CARDIOLOGY | Facility: CLINIC | Age: 79
End: 2024-02-29

## 2024-02-29 VITALS
HEIGHT: 65 IN | OXYGEN SATURATION: 97 % | SYSTOLIC BLOOD PRESSURE: 162 MMHG | HEART RATE: 57 BPM | DIASTOLIC BLOOD PRESSURE: 74 MMHG | BODY MASS INDEX: 31 KG/M2 | WEIGHT: 186.1 LBS

## 2024-02-29 DIAGNOSIS — I10 ESSENTIAL HYPERTENSION: ICD-10-CM

## 2024-02-29 DIAGNOSIS — R03.0 ELEVATED BLOOD PRESSURE READING WITHOUT DIAGNOSIS OF HYPERTENSION: Primary | ICD-10-CM

## 2024-02-29 DIAGNOSIS — I50.21 ACUTE SYSTOLIC HEART FAILURE (H): ICD-10-CM

## 2024-02-29 DIAGNOSIS — R73.9 HYPERGLYCEMIA: ICD-10-CM

## 2024-02-29 DIAGNOSIS — E11.9 TYPE 2 DIABETES MELLITUS WITHOUT COMPLICATION, WITHOUT LONG-TERM CURRENT USE OF INSULIN (H): ICD-10-CM

## 2024-02-29 DIAGNOSIS — I10 HTN (HYPERTENSION), BENIGN: ICD-10-CM

## 2024-02-29 DIAGNOSIS — I48.0 PAROXYSMAL ATRIAL FIBRILLATION (H): ICD-10-CM

## 2024-02-29 LAB
ANION GAP SERPL CALCULATED.3IONS-SCNC: 14 MMOL/L (ref 7–15)
BUN SERPL-MCNC: 15.3 MG/DL (ref 8–23)
CALCIUM SERPL-MCNC: 9.9 MG/DL (ref 8.8–10.2)
CHLORIDE SERPL-SCNC: 95 MMOL/L (ref 98–107)
CREAT SERPL-MCNC: 1.03 MG/DL (ref 0.51–0.95)
DEPRECATED HCO3 PLAS-SCNC: 23 MMOL/L (ref 22–29)
EGFRCR SERPLBLD CKD-EPI 2021: 56 ML/MIN/1.73M2
ERYTHROCYTE [DISTWIDTH] IN BLOOD BY AUTOMATED COUNT: 13.4 % (ref 10–15)
GLUCOSE SERPL-MCNC: 134 MG/DL (ref 70–99)
HBA1C MFR BLD: 6.5 %
HCT VFR BLD AUTO: 40.6 % (ref 35–47)
HGB BLD-MCNC: 13.3 G/DL (ref 11.7–15.7)
MCH RBC QN AUTO: 28.4 PG (ref 26.5–33)
MCHC RBC AUTO-ENTMCNC: 32.8 G/DL (ref 31.5–36.5)
MCV RBC AUTO: 87 FL (ref 78–100)
PLATELET # BLD AUTO: 269 10E3/UL (ref 150–450)
POTASSIUM SERPL-SCNC: 5 MMOL/L (ref 3.4–5.3)
RBC # BLD AUTO: 4.68 10E6/UL (ref 3.8–5.2)
SODIUM SERPL-SCNC: 132 MMOL/L (ref 135–145)
TSH SERPL DL<=0.005 MIU/L-ACNC: 4.02 UIU/ML (ref 0.3–4.2)
WBC # BLD AUTO: 6 10E3/UL (ref 4–11)

## 2024-02-29 PROCEDURE — 80048 BASIC METABOLIC PNL TOTAL CA: CPT | Performed by: INTERNAL MEDICINE

## 2024-02-29 PROCEDURE — 83036 HEMOGLOBIN GLYCOSYLATED A1C: CPT | Performed by: INTERNAL MEDICINE

## 2024-02-29 PROCEDURE — 85027 COMPLETE CBC AUTOMATED: CPT | Performed by: INTERNAL MEDICINE

## 2024-02-29 PROCEDURE — 93000 ELECTROCARDIOGRAM COMPLETE: CPT | Performed by: INTERNAL MEDICINE

## 2024-02-29 PROCEDURE — 84443 ASSAY THYROID STIM HORMONE: CPT | Performed by: INTERNAL MEDICINE

## 2024-02-29 PROCEDURE — 36415 COLL VENOUS BLD VENIPUNCTURE: CPT | Performed by: INTERNAL MEDICINE

## 2024-02-29 PROCEDURE — 99214 OFFICE O/P EST MOD 30 MIN: CPT | Performed by: INTERNAL MEDICINE

## 2024-02-29 NOTE — TELEPHONE ENCOUNTER
Reviewed BMP showing  Recent Labs   Lab Test 02/29/24  0929 11/13/23  0807 11/08/23  1636   *  --  133*   POTASSIUM 5.0 3.6 4.5   CHLORIDE 95*  --  98   CO2 23  --  23   ANIONGAP 14  --  12   *  --  154*   BUN 15.3  --  19.3   CR 1.03*  --  1.03*   NATALYA 9.9  --  9.7     Will message Dr. Rankin to review. Kasey HAMPTON

## 2024-02-29 NOTE — LETTER
2/29/2024    Cristino Contreras MD, MD  [address]  [place] [address]    RE: Haritha Cat       Dear Colleague,     I had the pleasure of seeing Haritha Cat in the [facility].  HPI and Plan:   It is my pleasure to see your patient Haritha Cat who is a patient who I saw late last year when she presented with markedly symptomatic paroxysmal atrial fibrillation with rapid ventricular response.  This patient also is a history of diabetes mellitus and essential hypertension.  The patient was converted back to sinus rhythm and has been maintained in sinus rhythm with flecainide 50 mg twice a day and she is also anticoagulated with Eliquis which she finds very expensive.  She is also taking metoprolol succinate which is required when taking flecainide [other AV katina blocking agents can also be used but in her case we use metoprolol].    Since I last saw her she is feeling well.  She has no recurrence of the atrial fibrillation.  Her breathing is normal.  Her blood pressure was significantly raised this morning but she did not take any of her medications.  She does notice however that her blood pressure is higher in the morning time typically even when she has taken her meds.  She takes all her antihypertensives in the morning time including irbesartan metoprolol and hydrochlorothiazide.  The patient has no ischemic basis for her atrial fibrillation with a normal Lexiscan study and she had no widening of the QRS on stress testing indicating no flecainide induced toxicity.  Twelve-lead electrocardiogram was performed today which I reviewed personally today showed that her QRS interval was 112 which is within normal limits.    Impression:  1.  Paroxysmal symptomatic atrial fibrillation.  Atrial fibrillation has been well-controlled with flecainide and she remains in sinus rhythm with normal QRS intervals.  2.  Essential hypertension.  Today's blood pressure is not  representative of her normal blood pressure as she did not take  any of her antihypertensive this morning.    Plan:  1.  I have asked the patient to take her metoprolol in the evening time but to take the other antihypertensives in the morning time.  In 2 weeks time we will check a 24-hour blood pressure monitor to determine whether she has hypertension or not on her medications.  2.  Dr. Contreras is asked for hemoglobin A1c to be drawn today which I have done.  3.  I will see the patient back again in 1 years time and we will repeat her EKG, basic metabolic profile at that time.  We will contact the patient with the results of the 24-hour blood pressure monitor and make any adjustments as necessary.  4.  She is meeting somebody I believe in our clinic will respect to trying to get the cost of the Eliquis down.    As always the patient been told to contact if she is any questions or any concerns    Toni Rankin MD, FACC, FRCPI.    Orders Placed This Encounter   Procedures    Hemoglobin A1c    Basic metabolic panel    Follow-Up with Cardiology    EKG 12-lead complete w/read - Clinics (to be scheduled)    24 Hour Blood Pressure Monitor - Adult       No orders of the defined types were placed in this encounter.      There are no discontinued medications.      Encounter Diagnoses   Name Primary?    Acute systolic heart failure (H)     Paroxysmal atrial fibrillation (H)     Essential hypertension     Elevated blood pressure reading without diagnosis of hypertension Yes    Hyperglycemia        CURRENT MEDICATIONS:  Current Outpatient Medications   Medication Sig Dispense Refill    apixaban ANTICOAGULANT (ELIQUIS) 5 MG tablet Take 1 tablet (5 mg) by mouth 2 times daily 120 tablet 0    famotidine (PEPCID) 10 MG tablet Take by mouth as needed      flecainide (TAMBOCOR) 50 MG tablet Take 1 tablet (50 mg) by mouth 2 times daily 180 tablet 3    hydroCHLOROthiazide (HYDRODIURIL) 12.5 MG tablet Take 1 tablet (12.5 mg) by  mouth daily 90 tablet 3    irbesartan (AVAPRO) 300 MG tablet Take 1 tablet (300 mg) by mouth at bedtime 90 tablet 3    metFORMIN (GLUCOPHAGE) 500 MG tablet Take 2 tablets (1,000 mg) by mouth 2 times daily (with meals) 360 tablet 3    metoprolol succinate ER (TOPROL XL) 100 MG 24 hr tablet Take 1 tablet (100 mg) by mouth daily 90 tablet 3    naproxen sodium 220 MG capsule Take 220 mg by mouth daily      Vitamin D, Cholecalciferol, 25 MCG (1000 UT) TABS          ALLERGIES     Allergies   Allergen Reactions    Amoxicillin Hives    Hydrochlorothiazide Other (See Comments)     Hyponatremia on higher dose    Lisinopril Cough    Lotrel Hives       PAST MEDICAL HISTORY:  Past Medical History:   Diagnosis Date    Abscess of abdominal cavity (H) 2002    cecal perf, prob from diverticulitis    Allergic rhinitis     Arthritis this year    Right hand, numbness    Colon polyp     colonoscopy due 5 years.    Diverticulosis     DM type 2 (diabetes mellitus, type 2) (H)     Elevated LFTs     fatty liver dz    GERD (gastroesophageal reflux disease)     Hepatitis A     Herpes zoster 2011    HTN (hypertension), benign     Osteopenia     DXA     Osteopenia        PAST SURGICAL HISTORY:  Past Surgical History:   Procedure Laterality Date    ANESTHESIA CARDIOVERSION N/A 2023    Procedure: Anesthesia cardioversion;  Surgeon: GENERIC ANESTHESIA PROVIDER;  Location:  OR    CATARACT IOL, RT/LT      COLONOSCOPY N/A 5/15/2018    Procedure: COLONOSCOPY;  colonoscopy;  Surgeon: Lucero Acharya MD;  Location:  GI    DISCECTOMY LUMBAR POSTERIOR MICROSCOPIC ONE LEVEL Right 10/1/2019    Procedure: LUMBAR MICRODISCECTOMY RIGHT L2-3;  Surgeon: Wolfgang ePng MD;  Location:  OR    varicose vein surgery      Dr. Bowen    wisdom teeth  age 27       FAMILY HISTORY:  Family History   Problem Relation Age of Onset    Hypertension Mother             Thyroid Disease Mother             Neurologic  "Disorder Mother         Parkinsons and RA    Hypertension Father             Diabetes Father             Diabetes Sister             Substance Abuse Sister             Hypertension Sister             Diabetes Maternal Grandmother             Diabetes Maternal Grandfather             Alcoholism Sister 59    Diabetes Son     Hypertension Son        SOCIAL HISTORY:  Social History     Socioeconomic History    Marital status:      Spouse name: None    Number of children: None    Years of education: None    Highest education level: None   Tobacco Use    Smoking status: Never    Smokeless tobacco: Never   Substance and Sexual Activity    Alcohol use: Yes     Comment: rare    Drug use: No    Sexual activity: Yes     Partners: Male     Birth control/protection: None   Other Topics Concern    Parent/sibling w/ CABG, MI or angioplasty before 65F 55M? No   Social History Narrative     with 2 sons and 5 grandkids.  On Sandra Bustamante as of 2011.    Son (Bobby) seizure, TBI, brain hemorrhage.       Review of Systems:  Skin:          Eyes:         ENT:         Respiratory:          Cardiovascular:         Gastroenterology:        Genitourinary:         Musculoskeletal:         Neurologic:         Psychiatric:         Heme/Lymph/Imm:         Endocrine:           Physical Exam:  Vitals: BP (!) 162/74   Pulse 57   Ht 1.651 m (5' 5\")   Wt 84.4 kg (186 lb 1.6 oz)   SpO2 97%   BMI 30.97 kg/m      Constitutional:  cooperative, alert and oriented, well developed, well nourished, in no acute distress overweight      Skin:  warm and dry to the touch          Head:  no masses or lesions        Eyes:  pupils equal and round        Lymph:      ENT:  no pallor or cyanosis        Neck:  carotid pulses are full and equal bilaterally;JVP normal        Respiratory:  normal breath sounds, clear to auscultation, normal A-P diameter, normal symmetry, normal respiratory " excursion, no use of accessory muscles         Cardiac: regular rhythm;normal S1 and S2;no murmurs, gallops or rubs detected                pulses full and equal                                        GI:  not assessed this visit        Extremities and Muscular Skeletal:  no deformities, clubbing, cyanosis, erythema observed;no edema              Neurological:  no gross motor deficits;affect appropriate        Psych:  Alert and Oriented x 3        CC  Toni Rankin MD  [address]  [place] [address]      Thank you for allowing me to participate in the care of your patient.      Sincerely,     Toni Rankin MD, MD     [facility]

## 2024-02-29 NOTE — PROGRESS NOTES
HPI and Plan:   It is my pleasure to see your patient Haritha Cat who is a patient who I saw late last year when she presented with markedly symptomatic paroxysmal atrial fibrillation with rapid ventricular response.  This patient also is a history of diabetes mellitus and essential hypertension.  The patient was converted back to sinus rhythm and has been maintained in sinus rhythm with flecainide 50 mg twice a day and she is also anticoagulated with Eliquis which she finds very expensive.  She is also taking metoprolol succinate which is required when taking flecainide [other AV katina blocking agents can also be used but in her case we use metoprolol].    Since I last saw her she is feeling well.  She has no recurrence of the atrial fibrillation.  Her breathing is normal.  Her blood pressure was significantly raised this morning but she did not take any of her medications.  She does notice however that her blood pressure is higher in the morning time typically even when she has taken her meds.  She takes all her antihypertensives in the morning time including irbesartan metoprolol and hydrochlorothiazide.  The patient has no ischemic basis for her atrial fibrillation with a normal Lexiscan study and she had no widening of the QRS on stress testing indicating no flecainide induced toxicity.  Twelve-lead electrocardiogram was performed today which I reviewed personally today showed that her QRS interval was 112 which is within normal limits.    Impression:  1.  Paroxysmal symptomatic atrial fibrillation.  Atrial fibrillation has been well-controlled with flecainide and she remains in sinus rhythm with normal QRS intervals.  2.  Essential hypertension.  Today's blood pressure is not representative of her normal blood pressure as she did not take  any of her antihypertensive this morning.    Plan:  1.  I have asked the patient to take her metoprolol in the evening time but to take the other antihypertensives in  the morning time.  In 2 weeks time we will check a 24-hour blood pressure monitor to determine whether she has hypertension or not on her medications.  2.  Dr. Contreras is asked for hemoglobin A1c to be drawn today which I have done.  3.  I will see the patient back again in 1 years time and we will repeat her EKG, basic metabolic profile at that time.  We will contact the patient with the results of the 24-hour blood pressure monitor and make any adjustments as necessary.  4.  She is meeting somebody I believe in our clinic will respect to trying to get the cost of the Eliquis down.    As always the patient been told to contact if she is any questions or any concerns    Toni Rankin MD, FACC, FRCPI.    Orders Placed This Encounter   Procedures    Hemoglobin A1c    Basic metabolic panel    Follow-Up with Cardiology    EKG 12-lead complete w/read - Clinics (to be scheduled)    24 Hour Blood Pressure Monitor - Adult       No orders of the defined types were placed in this encounter.      There are no discontinued medications.      Encounter Diagnoses   Name Primary?    Acute systolic heart failure (H)     Paroxysmal atrial fibrillation (H)     Essential hypertension     Elevated blood pressure reading without diagnosis of hypertension Yes    Hyperglycemia        CURRENT MEDICATIONS:  Current Outpatient Medications   Medication Sig Dispense Refill    apixaban ANTICOAGULANT (ELIQUIS) 5 MG tablet Take 1 tablet (5 mg) by mouth 2 times daily 120 tablet 0    famotidine (PEPCID) 10 MG tablet Take by mouth as needed      flecainide (TAMBOCOR) 50 MG tablet Take 1 tablet (50 mg) by mouth 2 times daily 180 tablet 3    hydroCHLOROthiazide (HYDRODIURIL) 12.5 MG tablet Take 1 tablet (12.5 mg) by mouth daily 90 tablet 3    irbesartan (AVAPRO) 300 MG tablet Take 1 tablet (300 mg) by mouth at bedtime 90 tablet 3    metFORMIN (GLUCOPHAGE) 500 MG tablet Take 2 tablets (1,000 mg) by mouth 2 times daily (with meals) 360 tablet 3     metoprolol succinate ER (TOPROL XL) 100 MG 24 hr tablet Take 1 tablet (100 mg) by mouth daily 90 tablet 3    naproxen sodium 220 MG capsule Take 220 mg by mouth daily      Vitamin D, Cholecalciferol, 25 MCG (1000 UT) TABS          ALLERGIES     Allergies   Allergen Reactions    Amoxicillin Hives    Hydrochlorothiazide Other (See Comments)     Hyponatremia on higher dose    Lisinopril Cough    Lotrel Hives       PAST MEDICAL HISTORY:  Past Medical History:   Diagnosis Date    Abscess of abdominal cavity (H) 2002    cecal perf, prob from diverticulitis    Allergic rhinitis     Arthritis this year    Right hand, numbness    Colon polyp     colonoscopy due 5 years.    Diverticulosis     DM type 2 (diabetes mellitus, type 2) (H)     Elevated LFTs     fatty liver dz    GERD (gastroesophageal reflux disease)     Hepatitis A     Herpes zoster 2011    HTN (hypertension), benign     Osteopenia     DXA     Osteopenia        PAST SURGICAL HISTORY:  Past Surgical History:   Procedure Laterality Date    ANESTHESIA CARDIOVERSION N/A 2023    Procedure: Anesthesia cardioversion;  Surgeon: GENERIC ANESTHESIA PROVIDER;  Location:  OR    CATARACT IOL, RT/LT      COLONOSCOPY N/A 5/15/2018    Procedure: COLONOSCOPY;  colonoscopy;  Surgeon: Lucero Acharya MD;  Location:  GI    DISCECTOMY LUMBAR POSTERIOR MICROSCOPIC ONE LEVEL Right 10/1/2019    Procedure: LUMBAR MICRODISCECTOMY RIGHT L2-3;  Surgeon: Wolfgang Peng MD;  Location:  OR    varicose vein surgery      Dr. Bowen    wisdom teeth  age 27       FAMILY HISTORY:  Family History   Problem Relation Age of Onset    Hypertension Mother             Thyroid Disease Mother             Neurologic Disorder Mother         Parkinsons and RA    Hypertension Father             Diabetes Father             Diabetes Sister             Substance Abuse Sister             Hypertension Sister              "Diabetes Maternal Grandmother             Diabetes Maternal Grandfather             Alcoholism Sister 59    Diabetes Son     Hypertension Son        SOCIAL HISTORY:  Social History     Socioeconomic History    Marital status:      Spouse name: None    Number of children: None    Years of education: None    Highest education level: None   Tobacco Use    Smoking status: Never    Smokeless tobacco: Never   Substance and Sexual Activity    Alcohol use: Yes     Comment: rare    Drug use: No    Sexual activity: Yes     Partners: Male     Birth control/protection: None   Other Topics Concern    Parent/sibling w/ CABG, MI or angioplasty before 65F 55M? No   Social History Narrative     with 2 sons and 5 grandkids.  On Sandra Bustamante as of 2011.    Son (Bobby) seizure, TBI, brain hemorrhage.       Review of Systems:  Skin:          Eyes:         ENT:         Respiratory:          Cardiovascular:         Gastroenterology:        Genitourinary:         Musculoskeletal:         Neurologic:         Psychiatric:         Heme/Lymph/Imm:         Endocrine:           Physical Exam:  Vitals: BP (!) 162/74   Pulse 57   Ht 1.651 m (5' 5\")   Wt 84.4 kg (186 lb 1.6 oz)   SpO2 97%   BMI 30.97 kg/m      Constitutional:  cooperative, alert and oriented, well developed, well nourished, in no acute distress overweight      Skin:  warm and dry to the touch          Head:  no masses or lesions        Eyes:  pupils equal and round        Lymph:      ENT:  no pallor or cyanosis        Neck:  carotid pulses are full and equal bilaterally;JVP normal        Respiratory:  normal breath sounds, clear to auscultation, normal A-P diameter, normal symmetry, normal respiratory excursion, no use of accessory muscles         Cardiac: regular rhythm;normal S1 and S2;no murmurs, gallops or rubs detected                pulses full and equal                                        GI:  not assessed this visit    "     Extremities and Muscular Skeletal:  no deformities, clubbing, cyanosis, erythema observed;no edema              Neurological:  no gross motor deficits;affect appropriate        Psych:  Alert and Oriented x 3        CC  Toni Rankin MD  [id number]  [place] [id number]

## 2024-03-01 NOTE — RESULT ENCOUNTER NOTE
Evaristo Mg,    I have had the opportunity to review your recent results and an interpretation is as follows:  Your complete blood counts returned stable   Your hemoglobin A1c shows stable average blood glucose  Your thyroid function is also stable    Congratulations on your excellent results      Sincerely,  Cristino Contreras MD

## 2024-03-05 DIAGNOSIS — I48.91 ATRIAL FIBRILLATION, UNSPECIFIED TYPE (H): ICD-10-CM

## 2024-03-13 ENCOUNTER — VIRTUAL VISIT (OUTPATIENT)
Dept: PHARMACY | Facility: CLINIC | Age: 79
End: 2024-03-13
Payer: MEDICARE

## 2024-03-13 DIAGNOSIS — E11.9 TYPE 2 DIABETES MELLITUS WITHOUT COMPLICATION, WITHOUT LONG-TERM CURRENT USE OF INSULIN (H): ICD-10-CM

## 2024-03-13 DIAGNOSIS — I48.0 PAROXYSMAL ATRIAL FIBRILLATION (H): ICD-10-CM

## 2024-03-13 DIAGNOSIS — K21.9 GASTROESOPHAGEAL REFLUX DISEASE WITHOUT ESOPHAGITIS: ICD-10-CM

## 2024-03-13 DIAGNOSIS — Z78.9 TAKES DIETARY SUPPLEMENTS: ICD-10-CM

## 2024-03-13 DIAGNOSIS — E78.5 HYPERLIPIDEMIA LDL GOAL <100: ICD-10-CM

## 2024-03-13 DIAGNOSIS — I10 HTN (HYPERTENSION), BENIGN: Primary | ICD-10-CM

## 2024-03-13 DIAGNOSIS — M51.26 LUMBAR DISC HERNIATION: ICD-10-CM

## 2024-03-13 PROCEDURE — 99207 PR NO CHARGE LOS: CPT | Mod: 93 | Performed by: PHARMACIST

## 2024-03-13 NOTE — PATIENT INSTRUCTIONS
"Recommendations from today's MTM visit:                                                    MTM (medication therapy management) is a service provided by a clinical pharmacist designed to help you get the most of out of your medicines.   Today we reviewed what your medicines are for, how to know if they are working, that your medicines are safe and how to make your medicine regimen as easy as possible.      Connect with [employer] Patient Assistance Program    Follow-up: Return in about 4 weeks (around 4/10/2024) for check in via [employer].    It was great speaking with you today.  I value your experience and would be very thankful for your time in providing feedback in our clinic survey. In the next few days, you may receive an email or text message from [employer] with a link to a survey related to your  clinical pharmacist.\"     To schedule another MTM appointment, please call the clinic directly or you may call the MTM scheduling line at [phone] or toll-free at [phone].     My Clinical Pharmacist's contact information:                                                      Please feel free to contact me with any questions or concerns you have.      Priscilla French, PharmD, [facility]  Medication Therapy Management Provider  [phone]    "

## 2024-03-13 NOTE — PROGRESS NOTES
Medication Therapy Management (MTM) Encounter    ASSESSMENT:                            Medication Adherence/Access: No issues identified    Hypertension/A. Fib:   Stable, blood pressure generally at goal with home monitoring.  After reviewing income requirements, I believe she should qualify for assistance for Eliquis through [employer] program.    Diabetes:   Stable.  Patient is meeting A1c goal of < 7%.     Hyperlipidemia:   Guidelines don't make recommendations for her clinical scenario (age >76 yo without CAD), would be reasonable to start statin therapy but also reasonable to hold off.  Given relatively well controlled LDL, will not initiate therapy at this time.    GERD:   Stable.     Pain:  Would prefer patient not use NSAIDs due to increased risk of bleeding along with Eliquis, risk of renal dysfunction and potential for elevated blood pressure.  She's not been able to get efficacy from any other OTC pain relievers.  She's aware of bleeding risk and is monitoring.    Supplements:  Stable.     PLAN:                            Reviewed risk of bleeding/elevated blood pressure with NSAID use.  She will continue to monitor, doesn't feel she can change therapy.  Provided with contact information for [employer] Patient Assistance Program.  She will connect with them directly.    Follow-up: Return in about 4 weeks (around 4/10/2024) for check in via [employer].    SUBJECTIVE/OBJECTIVE:                          Ct Cat is a 79 year old female called for an initial visit. She was referred to me from Dr. Simmons.      Reason for visit: Eliquis cost.    Allergies/ADRs: Reviewed in chart  Past Medical History: Reviewed in chart  Tobacco: She reports that she has never smoked. She has never used smokeless tobacco.  Alcohol: rarely    Medication Adherence/Access: no issues reported    Hypertension /A. Fib:  Eliquis 5mg twice daily - last fill was $700 for 2 months, was told that next refill should  be less (left a message for insurance to find out more about this), started Eliquis in November  Flecainide 50mg twice daily  Hydrochlorothiazide 12.5mg daily  Irbesartan 300mg dily  Metoprolol ER 100mg daily  Patient reports no current medication side effects  Patient self monitors blood pressure.  Home BP monitoring is variable - generally 130s/80s     BP Readings from Last 3 Encounters:   02/29/24 (!) 162/74   02/14/24 130/83   12/18/23 (!) 142/80     Pulse Readings from Last 3 Encounters:   02/29/24 57   02/14/24 55   12/18/23 68     Diabetes   Metformin 1000mg twice daily   Not taking aspirin due to Eliquis use  Patient is not experiencing side effects.     Eye exam in the last 12 months? No  Foot exam is up to date  Urine Albumin:   Lab Results   Component Value Date    UMALCR  05/03/2023      Comment:      Unable to calculate, urine albumin and/or urine creatinine is outside detectable limits.  Microalbuminuria is defined as an albumin:creatinine ratio of 17 to 299 for males and 25 to 299 for females. A ratio of albumin:creatinine of 300 or higher is indicative of overt proteinuria.  Due to biologic variability, positive results should be confirmed by a second, first-morning random or 24-hour timed urine specimen. If there is discrepancy, a third specimen is recommended. When 2 out of 3 results are in the microalbuminuria range, this is evidence for incipient nephropathy and warrants increased efforts at glucose control, blood pressure control, and institution of therapy with an angiotensin-converting-enzyme (ACE) inhibitor (if the patient can tolerate it).        Lab Results   Component Value Date    A1C 6.5 (H) 02/29/2024     Hyperlipidemia   No current medications  The 10-year ASCVD risk score (Laura KELLY, et al., 2019) is: 60.9%    Values used to calculate the score:      Age: 79 years      Sex: Female      Is Non- : No      Diabetic: Yes      Tobacco smoker: No      Systolic Blood  Pressure: 162 mmHg      Is BP treated: Yes      HDL Cholesterol: 66 mg/dL      Total Cholesterol: 176 mg/dL       Recent Labs   Lab Test 05/03/23  1331 04/14/22  1109   CHOL 176 164   HDL 66 59   LDL 83 80   TRIG 137 123     GERD    Famotidine 10 mg once daily as needed  Patient feels that current regimen is effective.       Pain:  Naproxen 220mg daily  Has tried non-NSAIDs and doesn't find effective at all  No side effects reported.    Supplements   Vitamin D 1000 international unit(s) daily  No reported issues at this time.         Today's Vitals: There were no vitals taken for this visit.  ----------------    I spent 29 minutes with this patient today. A copy of the visit note was provided to the patient's provider(s).    A summary of these recommendations was sent via [employer].    Priscilla French, PharmD, [facility]  Medication Therapy Management Provider  [phone]     Telemedicine Visit Details  Type of service:  Telephone visit  Start Time: 9:32 AM  End Time: 10:01 AM     Medication Therapy Recommendations  Paroxysmal atrial fibrillation (H)    Current Medication: apixaban ANTICOAGULANT (ELIQUIS) 5 MG tablet   Rationale: Cannot afford medication product - Cost - Adherence   Recommendation: Referral to Service    Status: Patient Agreed - Adherence/Education

## 2024-03-14 ENCOUNTER — HOSPITAL ENCOUNTER (OUTPATIENT)
Dept: CARDIOLOGY | Facility: CLINIC | Age: 79
Discharge: HOME | End: 2024-03-14
Attending: INTERNAL MEDICINE | Admitting: INTERNAL MEDICINE
Payer: MEDICARE

## 2024-03-14 DIAGNOSIS — R03.0 ELEVATED BLOOD PRESSURE READING WITHOUT DIAGNOSIS OF HYPERTENSION: ICD-10-CM

## 2024-03-14 PROCEDURE — 93790 AMBL BP MNTR W/SW I&R: CPT | Performed by: INTERNAL MEDICINE

## 2024-03-14 PROCEDURE — 93788 AMBL BP MNTR W/SW A/R: CPT

## 2024-03-20 ENCOUNTER — TELEPHONE (OUTPATIENT)
Dept: CARDIOLOGY | Facility: CLINIC | Age: 79
End: 2024-03-20
Payer: MEDICARE

## 2024-03-20 DIAGNOSIS — I10 HTN (HYPERTENSION), BENIGN: ICD-10-CM

## 2024-03-20 DIAGNOSIS — I10 ESSENTIAL HYPERTENSION: ICD-10-CM

## 2024-03-20 DIAGNOSIS — I48.91 ATRIAL FIBRILLATION, UNSPECIFIED TYPE (H): Primary | ICD-10-CM

## 2024-03-20 DIAGNOSIS — R60.0 BILATERAL LOWER EXTREMITY EDEMA: ICD-10-CM

## 2024-03-20 RX ORDER — HYDROCHLOROTHIAZIDE 25 MG/1
25 TABLET ORAL DAILY
Qty: 90 TABLET | Refills: 0 | Status: SHIPPED | OUTPATIENT
Start: 2024-03-20 | End: 2024-05-21

## 2024-03-20 NOTE — TELEPHONE ENCOUNTER
Does have mild hypertension.  Will increase the dose of hydrochlorothiazide to 25 mg/day.  Will have patient come back in 2 weeks time for a BMP and a nurse blood pressure visit.  Thanks

## 2024-03-20 NOTE — TELEPHONE ENCOUNTER
Patient calling to see when a nurse will be calling to go over pre assessment and when she will receive instructions on how to do prep.    Writer advised the pre assessment team will be reaching out approximately 2 weeks before her appointment time.    Writer confirmed the date and location of procedure with patient.    Patient has no further questions at this time.    Corinne Kliber, RN  Endoscopy Procedure Pre Assessment RN  [phone] option 4

## 2024-03-20 NOTE — TELEPHONE ENCOUNTER
Reviewed BP monitor showing       Per office note dated 2/29/24, Dr. Rankin recommended:   1.  I have asked the patient to take her metoprolol in the evening time but to take the other antihypertensives in the morning time.  In 2 weeks time we will check a 24-hour blood pressure monitor to determine whether she has hypertension or not on her medications.  2.  Dr. Contreras is asked for hemoglobin A1c to be drawn today which I have done.  3.  I will see the patient back again in 1 years time and we will repeat her EKG, basic metabolic profile at that time.  We will contact the patient with the results of the 24-hour blood pressure monitor and make any adjustments as necessary.  4.  She is meeting somebody I believe in our clinic will respect to trying to get the cost of the Eliquis down.    Will message Dr. Rankin to review. Kasey HAMPTON

## 2024-03-20 NOTE — TELEPHONE ENCOUNTER
Called patient with results of BP monitor and recommendations from Dr. Sourav Peralta to increase hydrochlorothiazide to 25 mg daily with repeat BMP and BP check in 2 weeks.  Patient states she would like to continue to monitor her BP at home using her home cuff.  Patient advised that is fine and it advised her to bring in her home cuff in 2 weeks to correlate with the clinic BP check patient states she will do this.  Prescription E scripted to [facility] as requested and order placed for BMP and BP check in 2 weeks.  Will message scheduling to call patient to arrange. DULCE Read RN

## 2024-03-21 DIAGNOSIS — I48.91 ATRIAL FIBRILLATION, UNSPECIFIED TYPE (H): ICD-10-CM

## 2024-03-21 NOTE — TELEPHONE ENCOUNTER
Prescription for Eliquis 5 mg twice daily faxed to [place] pharmacy at [phone]. Kasey HAMPTON

## 2024-04-02 RX ORDER — BISACODYL 5 MG/1
TABLET, DELAYED RELEASE ORAL
Qty: 4 TABLET | Refills: 0 | Status: SHIPPED | OUTPATIENT
Start: 2024-04-02 | End: 2024-09-05

## 2024-04-02 NOTE — TELEPHONE ENCOUNTER
Standard Gol[facility]ely Bowel Prep. Instructions were sent via [employer]. Bowel prep was sent 4/2/2024 to [facility] [id number] [place] [facility] [address]

## 2024-04-04 ENCOUNTER — TELEPHONE (OUTPATIENT)
Dept: GASTROENTEROLOGY | Facility: CLINIC | Age: 79
End: 2024-04-04
Payer: MEDICARE

## 2024-04-04 DIAGNOSIS — Z12.11 SPECIAL SCREENING FOR MALIGNANT NEOPLASMS, COLON: Primary | ICD-10-CM

## 2024-04-04 NOTE — LETTER
April 8, 2024      Haritha Cat  [address]  [place] [address]              Dear NAYAN Mg Golytely (Colyte, Nulytely)  Prep Instructions for your Colonoscopy  Pre-Assessment Phone Number: [facility]; [phone] option 4    Bowel prep has been sent to:  [facility] [id number] [place] [facility] - [place], [place] - [address]    Please read these instructions carefully at least 7 days prior to your colonoscopy procedure. Be sure to follow all directions completely. The inside of your colon must be clean to allow for a complete examination for the presence of any growths, polyps, and/or abnormalities, as well as their biopsy or removal. A number of tips are included in order to make this part of the procedure as comfortable as possible.    Getting ready:   A nurse will call you to go over instructions and your health history.  It's important to complete the nurse assessment before your procedure. If you don't, your procedure might have to be canceled.  You must arrange for an adult to drive you home after your exam. Your colonoscopy cannot be done unless you have a ride. If you need to use public transportation, someone must ride with you and stay with you for a minimum of 6-24 hours.  Check with your insurance company to be sure they will cover this exam.  If you have diabetes:  Ask to have your exam early in the morning.  Also, ask your doctor if you should change your diet or medicines.    7 days before the exam:  Talk to your prescribing provider: If you take blood thinners (such as Coumadin, Plavix, Xarelto, Eliquis, Lovenox, or others), these medications may need to be stopped temporarily before your procedure. Your prescribing provider will tell you what to do.   Talk to your prescribing provider: If you take prescription NSAIDS (such as Sulindac, Celebrex, Mobic, Relafen). Your prescribing provider will tell you what to do.   Stop taking fiber supplements (bran, Metamucil, Fibercon),  multi-vitamins with iron, and medicines that contain iron.  Continue taking prescribed aspirin; talk to your prescribing doctor with any concerns.  Stop eating corn, popcorn, nuts and foods that contain seeds. These can stay in the colon for many days and they can clog up the colonoscope.     3 days before the exam:  Begin a low-fiber diet: No raw fruits or vegetables, whole wheat, seeds, nuts, popcorn or other high-fiber foods (see list below). No Olestra (a fat substitute).    One day before the exam:  You can have a light, low-fiber breakfast. But drink only clear liquids after 9 a.m. (see list below). Drink at least 8 to 10 full glasses of clear liquids during the day.   Stop taking NSAID pain relievers, such as Advil, Ibuprofen, Motrin, etc.  You may take Tylenol.  Fill the jug that contains the Golytely powder with warm water. Cover and shake until well mixed. Use a full gallon of water. Chill for 3 hours, but do not add ice.  Note: You will start drinking half of the Golytely solution at 6 p.m. The timing of drinking the 2nd half of the Golytely solution depends on your appointment arrival time. See Steps 1-2 below.    Step One:  At 3 p.m., take 2 tablets of Dulcolax (bisacodyl).  At 6 p.m. start drinking the Golytely solution. Drink an 8-ounce glass every 15 minutes until the jug is half empty. Drink each glass quickly.   After you start drinking the solution, stay near a toilet. You may have watery stools (diarrhea), mild cramping, bloating , and nausea.   You may want to use Vaseline on the skin around your anus after each bowel movement to prevent irritation. You can also use wet wipes to prevent irritation.    Step Two:   If you arrive after 11 AM:  At 6 AM on the day of the exam:  Take 2 tablets of Dulcolax (Bisacodyl).   Drink the other half of the Golytely jug.   Drink one 8-ounce glass every 15 minutes until the jug is empty.   Drink each glass quickly.   You should finish the prep at least 4 hours  before the exam.      Day of exam:    You may drink clear liquids only up until 2 hours before your arrival time.  You may take your necessary morning medications with sips of water  Do not take diabetes medicine by mouth until after your exam.  Do not smoke, chew tobacco, or swallow anything, including water or gum for at least 2 hours before your arrival time. This is a safety issue. Your procedure could be cancelled if you do not follow directions.  Please do not wear jewelry (i.e. earrings, rings, necklaces, watches, etc) . Leave your purse, billfold, credit cards, and other valuables at home.   Please arrive with a responsible adult who can take you home after the test and stay with you for a minimum of 24 hours: The medicine used will make you sleepy and forgetful. If you do not have someone to take you home, we will cancel your procedure. If using public transportation you must have someone to ride with you.  Please perform your nebulizer treatments and airway clearance therapy in the morning prior to the procedure (if applicable).  If you have asthma, bring your inhalers.      CLEAR LIQUID DIET   You may have:  Water, tea, coffee (no milk or cream)  Soda pop, Gatorade (not red or purple)  Jell-O, Popsicles (no milk or fruit pieces - not red or purple)  Fat-free soup broth or bouillon  Plain hard candy, such as clear life savers (not red or purple)  Clear juices and fruit-flavored drinks, such as apple juice, white grape juice, Hi-C, and Doug-Aid (not red or purple)   Do not have:  Milk or milk products such as ice cream, malts or shakes, or coffee creamer  Red or purple drinks of any kind such as cranberry juice or grape juice. Avoid red or purple Jell-O, Popsicles, Doug-Aid, sorbet, sherbet and candy  Juices with pulp such as orange, grapefruit, pineapple or tomato juice  Cream soups of any kind  Alcohol and beer  Protein drinks or protein powder     LOW FIBER DIET   You may have:    Starches: White bread,  rolls, biscuits, croissants, [place] toast, white flour tortillas, waffles, pancakes, Greek toast; white rice, noodles, pasta, macaroni; cooked and peeled potatoes; plain crackers, saltines; cooked farina or cream of rice; puffed rice, corn flakes, Rice Krispies, Special K   Vegetables: tender cooked and canned, vegetable broths  Fruits and fruit juices: Strained fruit juice, canned fruit without seeds or skin (not pineapple), applesauce, pear sauce, ripe bananas, melons (not watermelon)   Milk products: Milk (plain or flavored), cheese, cottage cheese, yogurt (no berries), custard, ice cream    Proteins: Tender, well-cooked ground beef, lamb, veal, ham, pork, chicken, turkey, fish or organ meats; eggs; creamy peanut butter   Fats and condiments:  Margarine, butter, oils, mayonnaise, sour cream, salad dressing, plain gravy; spices, cooked herbs; sugar, clear jelly, honey, syrup   Snacks, sweets and drinks: Pretzels, hard candy; plain cakes and cookies (no nuts or seeds); gelatin, plain pudding, sherbet, Popsicles; coffee, tea, carbonated ( fizzy ) drinks Do not have:    Starches: Breads or rolls that contain nuts, seeds or fruit; whole wheat or whole grain breads that contain more than 1 gram of fiber per slice; cornbread; corn or whole wheat tortillas; potatoes with skin; brown rice, wild rice, kasha (buckwheat), and oatmeal   Vegetables: Any raw or steamed vegetables; vegetables with seeds; corn in any form   Fruits and fruit juices: Prunes, prune juice, raisins and other dried fruits, berries and other fruits with seeds, canned pineapple juices with pulp such as orange, grapefruit, pineapple or tomato juice  Milk products: Any yogurt with nuts, seeds or berries   Proteins: Tough, fibrous meats with gristle; cooked dried beans, peas or lentils; crunchy peanut butter  Fats and condiments: Pickles, olives, relish, horseradish; jam, marmalade, preserves   Snacks, sweets and drinks: Popcorn, nuts, seeds, granola,  coconut, candies made with nuts or seeds; all desserts that contain nuts, seeds, raisins and other dried fruits, coconut, whole grains or bran.        FAQ:    How do you know if your colon is cleaned out?   After completing the bowel prep, your bowel movements should be all liquid and yellow. Your bowel movements will look similar to urine in the toilet. If there are pieces of stool (poop) in the toilet, or if you can't see to the bottom of the toilet, please call our office for advice. Call [phone] and ask to speak with a nurse.   Why do you need a responsible  to take you home and stay with you?  We require a responsible adult to take you home for your safety. The sedation medicines used to relax you during the procedure can impair your judgement and reaction time, make you forgetful and possible a little unsteady. Do not drive, make any important decisions, or sign any legal documents for 24 hours after your procedure.   It is normal to feel bloated and gassy after your procedure. Walking will help move the air through your colon. You can take non-aspirin pain relievers that contain acetaminophen (Tylenol).   When can you eat after your procedure?  You may resume your normal diet when you feel ready, unless advised otherwise by the doctor performing your procedure. Do not drink alcohol for 24 hours after your procedure.   You many resume normal activities (work, exercise, etc.) after 24 hours.   When will you get test results?  You should have your procedure results and any lab results (if applicable) by letter, MyChart message, or phone call within 2 weeks. If you have any questions, please call the doctor that referred you for the procedure.       Thank you for choosing [facility], for your procedure. If you are sent a survey regarding your care, please take the time to complete the questionnaire. We value your feedback!             Updated: 6/22/2022

## 2024-04-05 ENCOUNTER — LAB (OUTPATIENT)
Dept: LAB | Facility: CLINIC | Age: 79
End: 2024-04-05
Payer: MEDICARE

## 2024-04-05 ENCOUNTER — TELEPHONE (OUTPATIENT)
Dept: CARDIOLOGY | Facility: CLINIC | Age: 79
End: 2024-04-05

## 2024-04-05 ENCOUNTER — ALLIED HEALTH/NURSE VISIT (OUTPATIENT)
Dept: CARDIOLOGY | Facility: CLINIC | Age: 79
End: 2024-04-05
Payer: MEDICARE

## 2024-04-05 VITALS — HEART RATE: 65 BPM | DIASTOLIC BLOOD PRESSURE: 81 MMHG | SYSTOLIC BLOOD PRESSURE: 147 MMHG

## 2024-04-05 DIAGNOSIS — I50.21 ACUTE SYSTOLIC HEART FAILURE (H): ICD-10-CM

## 2024-04-05 DIAGNOSIS — R73.9 HYPERGLYCEMIA: ICD-10-CM

## 2024-04-05 DIAGNOSIS — I48.91 ATRIAL FIBRILLATION, UNSPECIFIED TYPE (H): ICD-10-CM

## 2024-04-05 DIAGNOSIS — I48.0 PAROXYSMAL ATRIAL FIBRILLATION (H): ICD-10-CM

## 2024-04-05 DIAGNOSIS — R03.0 ELEVATED BLOOD PRESSURE READING WITHOUT DIAGNOSIS OF HYPERTENSION: ICD-10-CM

## 2024-04-05 DIAGNOSIS — I10 ESSENTIAL HYPERTENSION: ICD-10-CM

## 2024-04-05 DIAGNOSIS — I10 HTN (HYPERTENSION), BENIGN: ICD-10-CM

## 2024-04-05 LAB
ANION GAP SERPL CALCULATED.3IONS-SCNC: 13 MMOL/L (ref 7–15)
BUN SERPL-MCNC: 21.6 MG/DL (ref 8–23)
CALCIUM SERPL-MCNC: 9.9 MG/DL (ref 8.8–10.2)
CHLORIDE SERPL-SCNC: 94 MMOL/L (ref 98–107)
CREAT SERPL-MCNC: 1.1 MG/DL (ref 0.51–0.95)
D DIMER PPP FEU-MCNC: 0.47 UG/ML FEU (ref 0–0.5)
DEPRECATED HCO3 PLAS-SCNC: 24 MMOL/L (ref 22–29)
EGFRCR SERPLBLD CKD-EPI 2021: 51 ML/MIN/1.73M2
GLUCOSE SERPL-MCNC: 106 MG/DL (ref 70–99)
POTASSIUM SERPL-SCNC: 5.1 MMOL/L (ref 3.4–5.3)
SODIUM SERPL-SCNC: 131 MMOL/L (ref 135–145)

## 2024-04-05 PROCEDURE — 80048 BASIC METABOLIC PNL TOTAL CA: CPT | Performed by: INTERNAL MEDICINE

## 2024-04-05 PROCEDURE — 99207 PR NO CHARGE LOS: CPT

## 2024-04-05 PROCEDURE — 85379 FIBRIN DEGRADATION QUANT: CPT | Performed by: INTERNAL MEDICINE

## 2024-04-05 PROCEDURE — 36415 COLL VENOUS BLD VENIPUNCTURE: CPT | Performed by: INTERNAL MEDICINE

## 2024-04-05 NOTE — TELEPHONE ENCOUNTER
Patient has hx of cardiac event/procedure in the last 6 months (Atrial fibrillation with RVR - cardioversion on 11/13/23). Contacted cardiologist and ordering provider for approval to proceed in less than 6 months from event. Appropriateness of procedure will be reevaluated according to provider recommendation. Inquiry also sent if patient can hold Eliquis (apixaban) for the recommended 2 days. Awaiting response.     Will need to send updated bowel prep instructions (Golytely) via [employer] if approved to proceed. (Patient was previously sent Miralax prep by [facility] nurse).    -----------------------------------------------------------------------    Pre visit planning completed.      Procedure details:    Patient scheduled for Colonoscopy  on 4/19/24.     Arrival time: 1445. Procedure time 1530    Facility location: [facility]; [address] [place] [id number]. Check in location: [address] [place] [address].     Sedation type: Conscious sedation     Pre op exam needed? N/A    Indication for procedure: Screening      Chart review:     Electronic implanted devices? No    Recent diagnosis of diverticulitis within the last 6 weeks? No    Diabetic? Yes. Diabetic medication HOLDING recommendations: Oral diabetic medications: Yes:  Metformin (glucophage): HOLD day of procedure.       Medication review:    Anticoagulants? Yes Apixaban (Eliquis): Recommended HOLD 2 days before procedure.  Consult with your managing provider.    NSAIDS? Yes.  Naproxen (Naprosyn, Aleve).  Holding interval of 4 days.    Other medication HOLDING recommendations:  N/A      Prep for procedure:     Bowel prep recommendation: Standard Golytely. Bowel prep prescription sent to [facility] [address] [place] [facility] [place] - [address] by [facility] nursing team on 4/2/24  Due to: diabetes.  and GFR consistently <60    Updated prep instructions will need to be sent via [employer] (Patient was previously sent Miralax prep by [facility] nurse).        Xi Carpenter RN  Endoscopy  Procedure Pre Assessment RN  [phone] option 4

## 2024-04-05 NOTE — PROGRESS NOTES
Last office visit: 24    Previous blood pressure:  162/74     Mm Hg     Previous heart rate: 57     bpm    Time of readin:55 pm    Morning medications were taken at: 8:00 AM (hydrochlorothiazide, metformin, eliquis and flecainide)    Today's blood pressure:      147/81 mm Hg  Patient's BP cuff: 145/82 HR 62    Today's heart rate:    65   bpm     Ordering Provider: Dr. Sourav Peralta    Results sent to team # : Team 5RN    Additional comments: Patient has lab draw today for review with Dr. Sourav Peralta.

## 2024-04-05 NOTE — TELEPHONE ENCOUNTER
"Per cardiology provider Toni Rankin MD:   \"Should be fine to hold Eliquis for 2 days before hand.  Electrical cardioversion was in November and the risk of stroke would be extremely low.  Thanks\"    Xi Carpenter RN  Endoscopy Procedure Pre-Assessment RN  [phone], option 4    "

## 2024-04-05 NOTE — TELEPHONE ENCOUNTER
Last office visit: 24    Previous blood pressure:  162/74     Mm Hg     Previous heart rate: 57     bpm    Time of readin:55 pm    Morning medications were taken at: 8:00 AM (hydrochlorothiazide, metformin, eliquis and flecainide)    Today's blood pressure:      147/81 mm Hg  Patient's BP cuff: 145/82 HR 62    Today's heart rate:    65   bpm     Ordering Provider: Dr. Sourav Peralta    Results sent to team # : Team 5RN    Additional comments: Patient has lab draw today for review with Dr. Sourav Peralta

## 2024-04-08 ENCOUNTER — TELEPHONE (OUTPATIENT)
Dept: GASTROENTEROLOGY | Facility: CLINIC | Age: 79
End: 2024-04-08
Payer: MEDICARE

## 2024-04-08 NOTE — TELEPHONE ENCOUNTER
Caller: Ct    Reason for Reschedule/Cancellation   (please be detailed, any staff messages or encounters to note?): Patient has conflict       Prior to reschedule please review:  Ordering Provider: Diane  Sedation Determined: CS  Does patient have any ASC Exclusions, please identify?: N      Notes on Cancelled Procedure:  Procedure: Lower Endoscopy [Colonoscopy]   Date: 4/19/24  Location: [facility]; [address] [place] [address]   Surgeon: Andrew      Rescheduled: Yes,   Procedure: Lower Endoscopy [Colonoscopy]    Date: 7/2/24   Location: [facility]; [address] [place] [address]    Surgeon:    Sedation Level Scheduled  CS ,  Reason for Sedation Level Protocol   Instructions updated and sent: Yes     Does patient need PAC or Pre -Op Rescheduled? : N       Did you cancel or rescheduled an EUS procedure? No.

## 2024-04-08 NOTE — TELEPHONE ENCOUNTER
Attempted to contact patient in order to complete pre assessment questions.     Patient answered but stated she needs to reschedule the 4/19 colonoscopy procedure due to a schedule conflict.     Patient also requests to take the second half of Golytely prep the morning of the procedure.  Advised patient that arrival time will need to be after 11 AM in order to take the second half on day of procedure.     Patient requests prep instructions be sent via LETTER. Sent Standard Golytely prep instructions via letter per patient request.     Warm transferred to Nevaeh in endoscopy scheduling to assist with rescheduling procedure per patient request.       Xi Carpenter RN  Endoscopy Procedure Pre-Assessment RN  [phone], option 4

## 2024-04-09 NOTE — TELEPHONE ENCOUNTER
Reviewed BMP showing  Recent Labs   Lab Test 04/05/24  1313 02/29/24  0929   * 132*   POTASSIUM 5.1 5.0   CHLORIDE 94* 95*   CO2 24 23   ANIONGAP 13 14   * 134*   BUN 21.6 15.3   CR 1.10* 1.03*   NATALYA 9.9 9.9      BP check showed     Today's blood pressure:      147/81 mm Hg w/ HR 65 bpm  Patient's BP cuff: 145/82 HR 62     Will message Dr. Rankin to review. Kasey HAMPTON

## 2024-04-09 NOTE — TELEPHONE ENCOUNTER
Would switch irbesartan to olmesartan 40 mg/day.  Would recheck blood pressure in 2 weeks time at the same time his BMP has been checked.  Thanks

## 2024-04-10 DIAGNOSIS — I50.30 DIASTOLIC HEART FAILURE (H): ICD-10-CM

## 2024-04-10 DIAGNOSIS — I10 ESSENTIAL HYPERTENSION: Primary | ICD-10-CM

## 2024-04-10 RX ORDER — OLMESARTAN MEDOXOMIL 40 MG/1
40 TABLET ORAL DAILY
Qty: 30 TABLET | Refills: 2 | Status: SHIPPED | OUTPATIENT
Start: 2024-04-10 | End: 2024-05-21

## 2024-04-10 NOTE — TELEPHONE ENCOUNTER
Recommendations sent to patient in a chart message.  Prescription E scripted to [facility] and order placed for BMP and BP check in 2 weeks.  Patient advised to call scheduling to arrange.  DULCE Read RN

## 2024-04-11 DIAGNOSIS — I48.91 ATRIAL FIBRILLATION, UNSPECIFIED TYPE (H): ICD-10-CM

## 2024-04-25 ENCOUNTER — ALLIED HEALTH/NURSE VISIT (OUTPATIENT)
Dept: FAMILY MEDICINE | Facility: CLINIC | Age: 79
End: 2024-04-25
Payer: MEDICARE

## 2024-04-25 VITALS
DIASTOLIC BLOOD PRESSURE: 80 MMHG | HEART RATE: 53 BPM | SYSTOLIC BLOOD PRESSURE: 133 MMHG | RESPIRATION RATE: 16 BRPM | OXYGEN SATURATION: 99 %

## 2024-04-25 DIAGNOSIS — I10 HTN (HYPERTENSION), BENIGN: Primary | ICD-10-CM

## 2024-04-25 PROCEDURE — 99207 PR NO CHARGE NURSE ONLY: CPT

## 2024-04-25 ASSESSMENT — PAIN SCALES - GENERAL: PAINLEVEL: NO PAIN (0)

## 2024-04-25 NOTE — NURSING NOTE
Haritha Cat is a 79 year old patient who comes in today for a Blood Pressure check.  Initial BP:  /80 (BP Location: Left arm, Patient Position: Sitting, Cuff Size: Adult Large)   Pulse 53   Resp 16   SpO2 99%        Disposition: results routed to provider.  Patient brought in BP readings done at home  4/24/24  134/75   4/23/24   130/70  4/22/24    138/70     Sherrell Fung MA

## 2024-05-20 ENCOUNTER — TELEPHONE (OUTPATIENT)
Dept: GASTROENTEROLOGY | Facility: CLINIC | Age: 79
End: 2024-05-20
Payer: MEDICARE

## 2024-05-20 SDOH — HEALTH STABILITY: PHYSICAL HEALTH: ON AVERAGE, HOW MANY MINUTES DO YOU ENGAGE IN EXERCISE AT THIS LEVEL?: 20 MIN

## 2024-05-20 SDOH — HEALTH STABILITY: PHYSICAL HEALTH: ON AVERAGE, HOW MANY DAYS PER WEEK DO YOU ENGAGE IN MODERATE TO STRENUOUS EXERCISE (LIKE A BRISK WALK)?: 3 DAYS

## 2024-05-20 ASSESSMENT — SOCIAL DETERMINANTS OF HEALTH (SDOH): HOW OFTEN DO YOU GET TOGETHER WITH FRIENDS OR RELATIVES?: MORE THAN THREE TIMES A WEEK

## 2024-05-20 NOTE — TELEPHONE ENCOUNTER
Caller: Haritha Cat     Reason for Reschedule/Cancellation   (please be detailed, any staff messages or encounters to note?): out of town for the Holiday      Prior to reschedule please review:  Ordering Provider: Diane  Sedation Determined: moderate  Does patient have any ASC Exclusions, please identify?: n      Notes on Cancelled Procedure:  Procedure: Lower Endoscopy [Colonoscopy]   Date: 7/2  Location: [facility]; [address] [place] [address]   Surgeon:       Rescheduled: Yes,   Procedure: Lower Endoscopy [Colonoscopy]    Date: 8/13   Location: [facility]; [address] [place] [address]    Surgeon:    Sedation Level Scheduled  moderate ,  Reason for Sedation Level ordered   Instructions updated and sent: y     Does patient need PAC or Pre -Op Rescheduled? : no       Did you cancel or rescheduled an EUS procedure? No.

## 2024-05-21 ENCOUNTER — OFFICE VISIT (OUTPATIENT)
Dept: FAMILY MEDICINE | Facility: CLINIC | Age: 79
End: 2024-05-21
Payer: MEDICARE

## 2024-05-21 VITALS
BODY MASS INDEX: 31.16 KG/M2 | OXYGEN SATURATION: 99 % | RESPIRATION RATE: 20 BRPM | HEART RATE: 63 BPM | HEIGHT: 65 IN | SYSTOLIC BLOOD PRESSURE: 129 MMHG | TEMPERATURE: 96.9 F | DIASTOLIC BLOOD PRESSURE: 60 MMHG | WEIGHT: 187 LBS

## 2024-05-21 DIAGNOSIS — E11.9 TYPE 2 DIABETES MELLITUS WITHOUT COMPLICATION, WITHOUT LONG-TERM CURRENT USE OF INSULIN (H): ICD-10-CM

## 2024-05-21 DIAGNOSIS — E78.5 HYPERLIPIDEMIA LDL GOAL <100: ICD-10-CM

## 2024-05-21 DIAGNOSIS — Z00.00 ROUTINE GENERAL MEDICAL EXAMINATION AT A HEALTH CARE FACILITY: Primary | ICD-10-CM

## 2024-05-21 DIAGNOSIS — I50.32 CHRONIC DIASTOLIC HEART FAILURE (H): ICD-10-CM

## 2024-05-21 DIAGNOSIS — R60.0 BILATERAL LOWER EXTREMITY EDEMA: ICD-10-CM

## 2024-05-21 DIAGNOSIS — M85.80 OSTEOPENIA, UNSPECIFIED LOCATION: ICD-10-CM

## 2024-05-21 DIAGNOSIS — I10 HTN (HYPERTENSION), BENIGN: ICD-10-CM

## 2024-05-21 DIAGNOSIS — Z29.11 NEED FOR VACCINATION AGAINST RESPIRATORY SYNCYTIAL VIRUS: ICD-10-CM

## 2024-05-21 DIAGNOSIS — Z23 NEED FOR SHINGLES VACCINE: ICD-10-CM

## 2024-05-21 DIAGNOSIS — I48.0 PAROXYSMAL ATRIAL FIBRILLATION (H): ICD-10-CM

## 2024-05-21 DIAGNOSIS — Z12.11 SPECIAL SCREENING FOR MALIGNANT NEOPLASMS, COLON: ICD-10-CM

## 2024-05-21 DIAGNOSIS — I10 ESSENTIAL HYPERTENSION: ICD-10-CM

## 2024-05-21 DIAGNOSIS — Z12.31 ENCOUNTER FOR SCREENING MAMMOGRAM FOR BREAST CANCER: ICD-10-CM

## 2024-05-21 DIAGNOSIS — Z78.0 POST-MENOPAUSAL: ICD-10-CM

## 2024-05-21 LAB
ALT SERPL W P-5'-P-CCNC: 13 U/L (ref 0–50)
CHOLEST SERPL-MCNC: 172 MG/DL
FASTING STATUS PATIENT QL REPORTED: NO
HDLC SERPL-MCNC: 69 MG/DL
LDLC SERPL CALC-MCNC: 79 MG/DL
NONHDLC SERPL-MCNC: 103 MG/DL
TRIGL SERPL-MCNC: 120 MG/DL

## 2024-05-21 PROCEDURE — G0439 PPPS, SUBSEQ VISIT: HCPCS | Performed by: INTERNAL MEDICINE

## 2024-05-21 PROCEDURE — 99214 OFFICE O/P EST MOD 30 MIN: CPT | Mod: 25 | Performed by: INTERNAL MEDICINE

## 2024-05-21 PROCEDURE — 80061 LIPID PANEL: CPT | Performed by: INTERNAL MEDICINE

## 2024-05-21 PROCEDURE — 84460 ALANINE AMINO (ALT) (SGPT): CPT | Performed by: INTERNAL MEDICINE

## 2024-05-21 PROCEDURE — 36415 COLL VENOUS BLD VENIPUNCTURE: CPT | Performed by: INTERNAL MEDICINE

## 2024-05-21 RX ORDER — OLMESARTAN MEDOXOMIL 40 MG/1
40 TABLET ORAL DAILY
Qty: 90 TABLET | Refills: 3 | Status: SHIPPED | OUTPATIENT
Start: 2024-05-21

## 2024-05-21 RX ORDER — HYDROCHLOROTHIAZIDE 25 MG/1
25 TABLET ORAL DAILY
Qty: 90 TABLET | Refills: 3 | Status: SHIPPED | OUTPATIENT
Start: 2024-05-21

## 2024-05-21 RX ORDER — RESPIRATORY SYNCYTIAL VIRUS VACCINE 120MCG/0.5
0.5 KIT INTRAMUSCULAR ONCE
Qty: 1 EACH | Refills: 0 | Status: CANCELLED | OUTPATIENT
Start: 2024-05-21 | End: 2024-05-21

## 2024-05-21 ASSESSMENT — PAIN SCALES - GENERAL: PAINLEVEL: NO PAIN (0)

## 2024-05-21 NOTE — PROGRESS NOTES
Preventive Care Visit  [facility] [place]  Cristino Contreras MD, MD, Internal Medicine  May 21, 2024        Subjective   Pat is a 79 year old, presenting for the following:  Physical        5/21/2024     8:23 AM   Additional Questions   Roomed by [place]         Health Care Directive  Patient does not have a Health Care Directive or Living Will: Patient states has Advance Directive and will bring in a copy to clinic.    HPI        5/20/2024   General Health   How would you rate your overall physical health? Excellent   Feel stress (tense, anxious, or unable to sleep) Only a little   (!) STRESS CONCERN      5/20/2024   Nutrition   Diet: Regular (no restrictions)         5/20/2024   Exercise   Days per week of moderate/strenous exercise 3 days   Average minutes spent exercising at this level 20 min         5/20/2024   Social Factors   Frequency of gathering with friends or relatives More than three times a week   Worry food won't last until get money to buy more No   Food not last or not have enough money for food? No   Do you have housing?  Yes   Are you worried about losing your housing? No   Lack of transportation? No   Unable to get utilities (heat,electricity)? No         5/20/2024   Fall Risk   Fallen 2 or more times in the past year? No    No   Trouble with walking or balance? No    No          5/20/2024   Activities of Daily Living- Home Safety   Needs help with the following daily activites None of the above   Safety concerns in the home None of the above         5/20/2024   Dental   Dentist two times every year? Yes         5/20/2024   Hearing Screening   Hearing concerns? None of the above         5/20/2024   Driving Risk Screening   Patient/family members have concerns about driving No         5/20/2024   General Alertness/Fatigue Screening   Have you been more tired than usual lately? (!) YES         5/20/2024   Urinary Incontinence Screening   Bothered by leaking urine in past 6 months No          5/20/2024   TB Screening   Were you born outside of the [place]? No         Today's PHQ-2 Score:       5/20/2024     2:50 PM   PHQ-2 ( 1999 [employer])   Q1: Little interest or pleasure in doing things 0   Q2: Feeling down, depressed or hopeless 0   PHQ-2 Score 0   Q1: Little interest or pleasure in doing things Not at all   Q2: Feeling down, depressed or hopeless Not at all   PHQ-2 Score 0           5/20/2024   Substance Use   Alcohol more than 3/day or more than 7/wk No   Do you have a current opioid prescription? No   How severe/bad is pain from 1 to 10? 0/10 (No Pain)   Do you use any other substances recreationally? No     Social History     Tobacco Use    Smoking status: Never    Smokeless tobacco: Never   Vaping Use    Vaping status: Never Used   Substance Use Topics    Alcohol use: Not Currently     Comment: rare    Drug use: No           1/11/2022   LAST FHS-7 RESULTS   1st degree relative breast or ovarian cancer No   Any relative bilateral breast cancer No   Any male have breast cancer No   Any ONE woman have BOTH breast AND ovarian cancer No   Any woman with breast cancer before 50yrs No   2 or more relatives with breast AND/OR ovarian cancer No   2 or more relatives with breast AND/OR bowel cancer No        Mammogram Screening - After age 74- determine frequency with patient based on health status, life expectancy and patient goals    ASCVD Risk   The 10-year ASCVD risk score (Laura KELLY, et al., 2019) is: 63.6%    Values used to calculate the score:      Age: 79 years      Sex: Female      Is Non- : No      Diabetic: Yes      Tobacco smoker: No      Systolic Blood Pressure: 168 mmHg      Is BP treated: Yes      HDL Cholesterol: 66 mg/dL      Total Cholesterol: 176 mg/dL          Reviewed and updated as needed this visit by Provider                    Past Medical History:   Diagnosis Date    Abscess of abdominal cavity (H) 11/2002    cecal perf, prob from diverticulitis     Allergic rhinitis     Arthritis this year    Right hand, numbness    Colon polyp 2015    colonoscopy due 5 years.    Diverticulosis     DM type 2 (diabetes mellitus, type 2) (H)     Elevated LFTs     fatty liver dz    GERD (gastroesophageal reflux disease)     Hepatitis A     Herpes zoster 11/28/2011    HTN (hypertension), benign     Osteopenia     DXA 2008    Osteopenia 2014     Current providers sharing in care for this patient include:  Patient Care Team:  Cristino Contreras MD as PCP - General (Internal Medicine)  Felix Florentino MD as MD (Cardiovascular Disease)  Toni Rankin MD as Assigned Heart and Vascular Provider  Priscilla French, PharmD as Pharmacist (Pharmacist)  Cristino Contreras MD as Assigned PCP  Priscilla French PharmD as Assigned MTM Pharmacist    The following health maintenance items are reviewed in Epic and correct as of today:  Health Maintenance   Topic Date Due    HF ACTION PLAN  Never done    RSV VACCINE (Pregnancy & 60+) (1 - 1-dose 60+ series) Never done    ZOSTER IMMUNIZATION (2 of 3) 10/02/2014    COLORECTAL CANCER SCREENING  05/15/2023    EYE EXAM  06/01/2023    DEXA  03/11/2024    COVID-19 Vaccine (6 - 2023-24 season) 04/08/2024    ALT  05/03/2024    LIPID  05/03/2024    DIABETIC FOOT EXAM  05/03/2024    MEDICARE ANNUAL WELLNESS VISIT  05/03/2024    A1C  10/25/2024    BMP  10/25/2024    ANNUAL REVIEW OF HM ORDERS  02/14/2025    CBC  02/28/2025    MICROALBUMIN  04/25/2025    FALL RISK ASSESSMENT  05/21/2025    DTAP/TDAP/TD IMMUNIZATION (2 - Td or Tdap) 08/20/2025    ADVANCE CARE PLANNING  05/03/2028    TSH W/FREE T4 REFLEX  Completed    HEPATITIS C SCREENING  Completed    PHQ-2 (once per calendar year)  Completed    INFLUENZA VACCINE  Completed    Pneumococcal Vaccine: 65+ Years  Completed    IPV IMMUNIZATION  Aged Out    HPV IMMUNIZATION  Aged Out    MENINGITIS IMMUNIZATION  Aged Out    RSV MONOCLONAL ANTIBODY  Aged Out    MAMMO SCREENING   "Discontinued         Type 2 diabetes mellitus without complication, without long-term current use of insulin (H)   Last hemoglobin A1c was reviewed and stable with current dose of metformin.    Osteopenia, unspecified location   Continues on calcium and vitamin D supplements  Hyperlipidemia LDL goal <100   Not taking statin    Paroxysmal atrial fibrillation (H)   Continues on apixaban for stroke prevention.  Had flecainide started this past year as anti-arrhythmia   HTN (hypertension), benign   Blood pressure is excellent today with current blood pressure medications.  No dizziness or light-headedness    Review of Systems  Constitutional, HEENT, cardiovascular, pulmonary, GI, , musculoskeletal, neuro, skin, endocrine and psych systems are negative, except as otherwise noted.     Objective    Exam  /60 (BP Location: Left arm, Patient Position: Chair, Cuff Size: Adult Large)   Pulse 63   Temp 96.9  F (36.1  C) (Temporal)   Resp 20   Ht 1.651 m (5' 5\")   Wt 84.8 kg (187 lb)   SpO2 99%   BMI 31.12 kg/m     Estimated body mass index is 31.12 kg/m  as calculated from the following:    Height as of this encounter: 1.651 m (5' 5\").    Weight as of this encounter: 84.8 kg (187 lb).    Physical Exam  GENERAL: alert and no distress  EYES: Eyes grossly normal to inspection   HENT: ear canals and TM's normal,   NECK: no adenopathy, no asymmetry, masses, or scars  RESP: lungs clear to auscultation - no rales, rhonchi or wheezes  CV: regular rate and rhythm, normal S1 S2, no S3 or S4, no murmur,   ABDOMEN: soft, nontender, no hepatosplenomegaly   MS: no gross musculoskeletal defects noted, no edema  FOOT: Normal sensation bilaterally with no visible ulcerations   SKIN: no suspicious lesions or rashes  NEURO: Normal strength and tone   PSYCH: mentation appears normal, affect normal/bright        5/21/2024   Mini Cog   Clock Draw Score 2 Normal   3 Item Recall 3 objects recalled   Mini Cog Total Score 5 "         Patient Instructions   (Z00.00) Routine general medical examination at a health care facility  (primary encounter diagnosis)  Comment: For routine exam, we reviewed labs as ordered, and will check cholesterol, diabetes mellitus check, liver function.  We will also update vaccination history.  RSV recommended, Shingrix  Mmamogram and bone density scan requested.  Colonoscopy requested   Plan:     (Z23) Need for shingles vaccine  Comment: Shingrix recommended   Plan:     (Z29.11) Need for vaccination against respiratory syncytial virus  Comment: as above   Plan:     (E11.9) Type 2 diabetes mellitus without complication, without long-term current use of insulin (H)  Comment: Check hemoglobin A1c again in 3 months.   Plan:     (M85.80) Osteopenia, unspecified location  Comment: Continue vitamin D and calicum and request.  [facility] (also performs diagnostic mammogram, ultrasound and biopsy) [phone].   Plan:     (E78.5) Hyperlipidemia LDL goal <100  Comment: Conitnue atorvastatin and check fasting lipid panel   Plan:     (I48.0) Paroxysmal atrial fibrillation (H)  Comment: Continue apixaban for stroke prevention and follow up in cardiology   Plan:     (I10) HTN (hypertension), benign  Comment: blood pressure is stable  - continue to monitor at home  Plan:     (R60.0) Bilateral lower extremity edema  Comment: as above  - monitor sodium intake  Plan:     (I10) Essential hypertension  Comment:   Plan:     (I50.32) Chronic diastolic heart failure (H)  Comment: Doing well.  Follow up in cardiology annually   Plan:     (Z78.0) Post-menopausal  Comment: bone density scan requested   Plan:            Signed Electronically by: Cristino Contreras MD, MD

## 2024-05-21 NOTE — PATIENT INSTRUCTIONS
(Z00.00) Routine general medical examination at a health care facility  (primary encounter diagnosis)  Comment: For routine exam, we reviewed labs as ordered, and will check cholesterol, diabetes mellitus check, liver function.  We will also update vaccination history.  RSV recommended, Shingrix  Mmamogram and bone density scan requested.  Colonoscopy requested   Plan:     (Z23) Need for shingles vaccine  Comment: Shingrix recommended   Plan:     (Z29.11) Need for vaccination against respiratory syncytial virus  Comment: as above   Plan:     (E11.9) Type 2 diabetes mellitus without complication, without long-term current use of insulin (H)  Comment: Check hemoglobin A1c again in 3 months.   Plan:     (M85.80) Osteopenia, unspecified location  Comment: Continue vitamin D and calicum and request.  [facility] (also performs diagnostic mammogram, ultrasound and biopsy) [phone].   Plan:     (E78.5) Hyperlipidemia LDL goal <100  Comment: Conitnue atorvastatin and check fasting lipid panel   Plan:     (I48.0) Paroxysmal atrial fibrillation (H)  Comment: Continue apixaban for stroke prevention and follow up in cardiology   Plan:     (I10) HTN (hypertension), benign  Comment: blood pressure is stable  - continue to monitor at home  Plan:     (R60.0) Bilateral lower extremity edema  Comment: as above  - monitor sodium intake  Plan:     (I10) Essential hypertension  Comment:   Plan:     (I50.32) Chronic diastolic heart failure (H)  Comment: Doing well.  Follow up in cardiology annually   Plan:     (Z78.0) Post-menopausal  Comment: bone density scan requested   Plan:

## 2024-05-23 NOTE — RESULT ENCOUNTER NOTE
Evaristo Mg,    I had the opportunity to review your recent labs and a summary of your labs reads as follows:    Your fasting lipid panel show  - normal HDL (good) cholesterol -as your goal is greater than 40  - low LDL (bad) cholesterol as your goal is less than 100  - normal triglyceride levels  Your liver function tests were stable as well    Congratulations on your excellent results      Sincerely,  Cristino Contreras MD

## 2024-07-15 ENCOUNTER — LAB REQUISITION (OUTPATIENT)
Dept: LAB | Facility: CLINIC | Age: 79
End: 2024-07-15
Payer: MEDICARE

## 2024-07-15 DIAGNOSIS — L63.9 ALOPECIA AREATA, UNSPECIFIED: ICD-10-CM

## 2024-07-15 DIAGNOSIS — L65.0 TELOGEN EFFLUVIUM: ICD-10-CM

## 2024-07-15 LAB
BASOPHILS # BLD AUTO: 0.1 10E3/UL (ref 0–0.2)
BASOPHILS NFR BLD AUTO: 1 %
EOSINOPHIL # BLD AUTO: 0.2 10E3/UL (ref 0–0.7)
EOSINOPHIL NFR BLD AUTO: 3 %
ERYTHROCYTE [DISTWIDTH] IN BLOOD BY AUTOMATED COUNT: 12.6 % (ref 10–15)
HCT VFR BLD AUTO: 38.5 % (ref 35–47)
HGB BLD-MCNC: 12.4 G/DL (ref 11.7–15.7)
IMM GRANULOCYTES # BLD: 0 10E3/UL
IMM GRANULOCYTES NFR BLD: 0 %
IRON BINDING CAPACITY (ROCHE): 414 UG/DL (ref 240–430)
IRON SATN MFR SERPL: 25 % (ref 15–46)
IRON SERPL-MCNC: 102 UG/DL (ref 37–145)
LYMPHOCYTES # BLD AUTO: 1.7 10E3/UL (ref 0.8–5.3)
LYMPHOCYTES NFR BLD AUTO: 20 %
MCH RBC QN AUTO: 28.2 PG (ref 26.5–33)
MCHC RBC AUTO-ENTMCNC: 32.2 G/DL (ref 31.5–36.5)
MCV RBC AUTO: 88 FL (ref 78–100)
MONOCYTES # BLD AUTO: 0.8 10E3/UL (ref 0–1.3)
MONOCYTES NFR BLD AUTO: 9 %
NEUTROPHILS # BLD AUTO: 5.7 10E3/UL (ref 1.6–8.3)
NEUTROPHILS NFR BLD AUTO: 67 %
NRBC # BLD AUTO: 0 10E3/UL
NRBC BLD AUTO-RTO: 0 /100
PLATELET # BLD AUTO: 300 10E3/UL (ref 150–450)
RBC # BLD AUTO: 4.39 10E6/UL (ref 3.8–5.2)
T4 FREE SERPL-MCNC: 1.09 NG/DL (ref 0.9–1.7)
TSH SERPL DL<=0.005 MIU/L-ACNC: 3.58 UIU/ML (ref 0.3–4.2)
WBC # BLD AUTO: 8.5 10E3/UL (ref 4–11)

## 2024-07-15 PROCEDURE — 84443 ASSAY THYROID STIM HORMONE: CPT | Mod: ORL | Performed by: DERMATOLOGY

## 2024-07-15 PROCEDURE — 82627 DEHYDROEPIANDROSTERONE: CPT | Mod: ORL | Performed by: DERMATOLOGY

## 2024-07-15 PROCEDURE — 85025 COMPLETE CBC W/AUTO DIFF WBC: CPT | Mod: ORL | Performed by: DERMATOLOGY

## 2024-07-15 PROCEDURE — 83550 IRON BINDING TEST: CPT | Mod: ORL | Performed by: DERMATOLOGY

## 2024-07-15 PROCEDURE — 84439 ASSAY OF FREE THYROXINE: CPT | Mod: ORL | Performed by: DERMATOLOGY

## 2024-07-15 PROCEDURE — 82728 ASSAY OF FERRITIN: CPT | Mod: ORL | Performed by: DERMATOLOGY

## 2024-07-16 LAB
DHEA-S SERPL-MCNC: 36 UG/DL (ref 35–430)
FERRITIN SERPL-MCNC: 29 NG/ML (ref 11–328)

## 2024-08-05 ENCOUNTER — MYC MEDICAL ADVICE (OUTPATIENT)
Dept: GASTROENTEROLOGY | Facility: CLINIC | Age: 79
End: 2024-08-05
Payer: MEDICARE

## 2024-08-05 NOTE — TELEPHONE ENCOUNTER
"Rescheduled Procedure   From prior note:    Per cardiology provider Toni Rankin MD:   \"Should be fine to hold Eliquis for 2 days before hand.  Electrical cardioversion was in November and the risk of stroke would be extremely low.  Thanks\"    --------------------------------------------------------------------------------------------------------------------      Pre visit planning completed.      Procedure details:    Patient scheduled for Colonoscopy on 8/13/24.     Arrival time: 1345. Procedure time 1430    Facility location: [facility]; [address] [place] [address] [place] [id number]. Check in location: [address] [place] [address].     Sedation type: Conscious sedation     Pre op exam needed? No.    Indication for procedure: screening      Chart review:     Electronic implanted devices? No    Recent diagnosis of diverticulitis within the last 6 weeks? No      Medication review:    Diabetic? Yes. Diabetic medication HOLDING recommendations: Oral diabetic medications: Metformin (glucophage): HOLD day of procedure.     Anticoagulants? Yes Apixaban (Eliquis): Recommended HOLD 2 days before procedure.  Consult with your managing provider.    Weight loss medication/injectable? No GLP 1 medications per patient's medication list.  RN will verify with pre-assessment call.    NSAIDS? Yes.  Naproxen (Naprosyn, Aleve).  Holding interval of 4 days.    Other medication HOLDING recommendations:  N/A      Prep for procedure:     Bowel prep recommendation: Standard Golytely. Bowel prep prescription sent to [facility] [id number] [place] [facility] [place] [place] - [address]  Due to: diabetes.     Prep instructions sent via letter 4/8/24         Valeria Kaur RN  Endoscopy Procedure Pre Assessment RN  [phone] option 4    "

## 2024-08-06 RX ORDER — BISACODYL 5 MG/1
TABLET, DELAYED RELEASE ORAL
Qty: 4 TABLET | Refills: 0 | Status: SHIPPED | OUTPATIENT
Start: 2024-08-06 | End: 2024-09-05

## 2024-08-06 RX ORDER — METOCLOPRAMIDE 10 MG/1
TABLET ORAL
Qty: 2 TABLET | Refills: 0 | Status: SHIPPED | OUTPATIENT
Start: 2024-08-06 | End: 2024-09-05

## 2024-08-06 NOTE — TELEPHONE ENCOUNTER
Attempted to contact patient in order to complete pre assessment questions.     No answer. Left message to return call to [phone] option 4    Pre-op needed? No.    Callback required communication sent via [employer].    Valeria Kaur RN  Endoscopy Procedure Pre Assessment

## 2024-08-06 NOTE — TELEPHONE ENCOUNTER
Pre assessment completed for upcoming procedure.   (Please see previous telephone encounter notes for complete details)    Patient  returned call.       Procedure details:    Arrival time and facility location reviewed.    Pre op exam needed? No.    Designated  policy reviewed. Instructed to have someone stay 6  hours post procedure.       Medication review:    Medications reviewed. Please see supporting documentation below. Holding recommendations discussed (if applicable).       Prep for procedure:     Procedure prep instructions reviewed.        Any additional information needed:  N/A      Patient  verbalized understanding and had no questions or concerns at this time.      Jolene Peterson RN  Endoscopy Procedure Pre Assessment   [phone] option 4

## 2024-08-07 ENCOUNTER — OFFICE VISIT (OUTPATIENT)
Dept: FAMILY MEDICINE | Facility: CLINIC | Age: 79
End: 2024-08-07
Payer: MEDICARE

## 2024-08-07 VITALS
DIASTOLIC BLOOD PRESSURE: 70 MMHG | OXYGEN SATURATION: 99 % | HEART RATE: 58 BPM | WEIGHT: 181.7 LBS | TEMPERATURE: 98 F | HEIGHT: 65 IN | SYSTOLIC BLOOD PRESSURE: 125 MMHG | RESPIRATION RATE: 12 BRPM | BODY MASS INDEX: 30.27 KG/M2

## 2024-08-07 DIAGNOSIS — B37.31 YEAST INFECTION OF THE VAGINA: Primary | ICD-10-CM

## 2024-08-07 DIAGNOSIS — N89.8 VAGINAL DISCHARGE: ICD-10-CM

## 2024-08-07 LAB
CLUE CELLS: PRESENT
TRICHOMONAS, WET PREP: ABNORMAL
WBC'S/HIGH POWER FIELD, WET PREP: ABNORMAL
YEAST, WET PREP: ABNORMAL

## 2024-08-07 PROCEDURE — 99213 OFFICE O/P EST LOW 20 MIN: CPT | Performed by: INTERNAL MEDICINE

## 2024-08-07 PROCEDURE — 87210 SMEAR WET MOUNT SALINE/INK: CPT | Performed by: INTERNAL MEDICINE

## 2024-08-07 RX ORDER — CLOTRIMAZOLE 1 %
1 CREAM WITH APPLICATOR VAGINAL AT BEDTIME
Qty: 45 G | Refills: 0 | Status: SHIPPED | OUTPATIENT
Start: 2024-08-07 | End: 2024-09-05

## 2024-08-07 ASSESSMENT — PAIN SCALES - GENERAL: PAINLEVEL: NO PAIN (0)

## 2024-08-07 NOTE — PROGRESS NOTES
"    Subjective   Ct is a 79 year old, presenting for the following health issues:  No chief complaint on file.    History of Present Illness       Reason for visit:  Vaginal discharge, Fever  Symptom onset:  3-7 days ago  Symptoms include:  Some smell, and some itching  Symptom intensity:  Moderate  Symptom progression:  Improving  Had these symptoms before:  No  What makes it worse:  N/A  What makes it better:  N/A    She eats 2-3 servings of fruits and vegetables daily.She consumes 0 sweetened beverage(s) daily.She exercises with enough effort to increase her heart rate 9 or less minutes per day.  She exercises with enough effort to increase her heart rate 3 or less days per week.   She is taking medications regularly.     Vaginal Discharge   Haritha Cat has had noticeable vaginal discharge and odor.  Notes some skin irritation.  Has not taken any antibiotics.        Review of Systems  Constitutional - recent viral illness with fever/chills now resolved, HEENT, cardiovascular, pulmonary, GI - planning for colonoscopy, , musculoskeletal, neuro, skin, endocrine and psych systems are negative, except as otherwise noted.      Objective    BP (!) 140/78 (BP Location: Left arm, Patient Position: Sitting, Cuff Size: Adult Large)   Pulse 58   Temp 98  F (36.7  C) (Tympanic)   Resp 12   Ht 1.651 m (5' 5\")   Wt 82.4 kg (181 lb 11.2 oz)   SpO2 99%   BMI 30.24 kg/m    There is no height or weight on file to calculate BMI.  Physical Exam   GENERAL: alert and no distress  EYES: Eyes grossly normal to inspection,    CV: regular rate and rhythm,   MS: no gross musculoskeletal defects noted, no edema  SKIN: no suspicious lesions or rashes  NEURO: Normal strength and tone, mentation intact and speech normal  PSYCH: mentation appears normal, affect normal/bright    Patient Instructions   (B37.31) Yeast infection of the vagina  (primary encounter diagnosis)  Comment: We will treat empirically with clotrimazole 1 " application daily for 7-10 days  Plan: clotrimazole (LOTRIMIN) 1 % vaginal cream            (N89.8) Vaginal discharge  Comment: Also test for possible bacterial vaginosis and if positive will treat with antibiotics   Plan: clotrimazole (LOTRIMIN) 1 % vaginal cream, Wet         prep - lab collect                    Signed Electronically by: Cristino Contreras MD, MD

## 2024-08-07 NOTE — PATIENT INSTRUCTIONS
(B37.31) Yeast infection of the vagina  (primary encounter diagnosis)  Comment: We will treat empirically with clotrimazole 1 application daily for 7-10 days  Plan: clotrimazole (LOTRIMIN) 1 % vaginal cream            (N89.8) Vaginal discharge  Comment: Also test for possible bacterial vaginosis and if positive will treat with antibiotics   Plan: clotrimazole (LOTRIMIN) 1 % vaginal cream, Wet         prep - lab collect

## 2024-08-08 ENCOUNTER — TELEPHONE (OUTPATIENT)
Dept: FAMILY MEDICINE | Facility: CLINIC | Age: 79
End: 2024-08-08
Payer: MEDICARE

## 2024-08-08 DIAGNOSIS — B96.89 BACTERIAL VAGINITIS: Primary | ICD-10-CM

## 2024-08-08 DIAGNOSIS — N76.0 BACTERIAL VAGINITIS: Primary | ICD-10-CM

## 2024-08-08 RX ORDER — METRONIDAZOLE 500 MG/1
500 TABLET ORAL 2 TIMES DAILY
Qty: 14 TABLET | Refills: 0 | Status: SHIPPED | OUTPATIENT
Start: 2024-08-08 | End: 2024-08-15

## 2024-08-08 NOTE — RESULT ENCOUNTER NOTE
Evaristo gM,    I have had the opportunity to review your recent results and an interpretation is as follows:  Your wet prep did return positive for bacterial vaginosis.  We will treat with metronidazole  500 mg twice per day x 7 days    Sincerely,  Cristino Contreras MD Hypokalemia due to inadequate potassium intake Oral thrush Hypokalemia due to inadequate potassium intake Oral thrush Oral thrush Hypokalemia due to inadequate potassium intake

## 2024-08-08 NOTE — TELEPHONE ENCOUNTER
Can we call Haritha Cat and let her know that     Evaristo Mg,    I have had the opportunity to review your recent results and an interpretation is as follows:  Your wet prep did return positive for bacterial vaginosis.  We will treat with metronidazole  500 mg twice per day x 7 days    Sincerely,  Cristino Contreras MD

## 2024-08-09 ENCOUNTER — TELEPHONE (OUTPATIENT)
Dept: CARDIOLOGY | Facility: CLINIC | Age: 79
End: 2024-08-09
Payer: MEDICARE

## 2024-08-09 NOTE — TELEPHONE ENCOUNTER
Pt reviewed results/notes on MyChart:     Written by Cristino Contreras MD on 8/8/2024  6:15 PM CDT  Seen by patient Ct MADELIN [facility] on 8/8/2024  7:03 PM    Naa Velazco RN  [facility]

## 2024-08-09 NOTE — TELEPHONE ENCOUNTER
Patient called and left  advising she is having colonoscopy on 8/13/24 and they advised her to call our office to confirm duration Dr. Rankin recommends for hold of Eliquis. RN will send to Dr. Rankin fo review.

## 2024-08-09 NOTE — TELEPHONE ENCOUNTER
RN returned patient's call and left detailed VM with Dr. Rankin's recommendations. Advised patient in VM she would need to start holding her Eliquis on Sunday 8/11/24. RN advised patient in VM to callback with any questions.

## 2024-08-13 ENCOUNTER — HOSPITAL ENCOUNTER (OUTPATIENT)
Facility: CLINIC | Age: 79
Discharge: HOME | End: 2024-08-13
Attending: SURGERY | Admitting: SURGERY
Payer: MEDICARE

## 2024-08-13 VITALS
SYSTOLIC BLOOD PRESSURE: 116 MMHG | OXYGEN SATURATION: 94 % | HEART RATE: 66 BPM | DIASTOLIC BLOOD PRESSURE: 62 MMHG | RESPIRATION RATE: 16 BRPM

## 2024-08-13 LAB — COLONOSCOPY: NORMAL

## 2024-08-13 PROCEDURE — 88305 TISSUE EXAM BY PATHOLOGIST: CPT | Mod: TC | Performed by: SURGERY

## 2024-08-13 PROCEDURE — 45385 COLONOSCOPY W/LESION REMOVAL: CPT | Performed by: SURGERY

## 2024-08-13 PROCEDURE — 250N000011 HC RX IP 250 OP 636: Performed by: SURGERY

## 2024-08-13 PROCEDURE — G0500 MOD SEDAT ENDO SERVICE >5YRS: HCPCS | Mod: PT | Performed by: SURGERY

## 2024-08-13 PROCEDURE — 99153 MOD SED SAME PHYS/QHP EA: CPT | Performed by: SURGERY

## 2024-08-13 RX ORDER — NALOXONE HYDROCHLORIDE 0.4 MG/ML
0.4 INJECTION, SOLUTION INTRAMUSCULAR; INTRAVENOUS; SUBCUTANEOUS
Status: DISCONTINUED | OUTPATIENT
Start: 2024-08-13 | End: 2024-08-13 | Stop reason: HOSPADM

## 2024-08-13 RX ORDER — NALOXONE HYDROCHLORIDE 0.4 MG/ML
0.2 INJECTION, SOLUTION INTRAMUSCULAR; INTRAVENOUS; SUBCUTANEOUS
Status: DISCONTINUED | OUTPATIENT
Start: 2024-08-13 | End: 2024-08-13 | Stop reason: HOSPADM

## 2024-08-13 RX ORDER — FLUMAZENIL 0.1 MG/ML
0.2 INJECTION, SOLUTION INTRAVENOUS
Status: DISCONTINUED | OUTPATIENT
Start: 2024-08-13 | End: 2024-08-13 | Stop reason: HOSPADM

## 2024-08-13 RX ORDER — FENTANYL CITRATE 50 UG/ML
INJECTION, SOLUTION INTRAMUSCULAR; INTRAVENOUS PRN
Status: DISCONTINUED | OUTPATIENT
Start: 2024-08-13 | End: 2024-08-13 | Stop reason: HOSPADM

## 2024-08-13 RX ORDER — ONDANSETRON 2 MG/ML
4 INJECTION INTRAMUSCULAR; INTRAVENOUS EVERY 6 HOURS PRN
Status: DISCONTINUED | OUTPATIENT
Start: 2024-08-13 | End: 2024-08-13 | Stop reason: HOSPADM

## 2024-08-13 RX ORDER — LIDOCAINE 40 MG/G
CREAM TOPICAL
Status: DISCONTINUED | OUTPATIENT
Start: 2024-08-13 | End: 2024-08-13 | Stop reason: HOSPADM

## 2024-08-13 RX ORDER — PROCHLORPERAZINE MALEATE 5 MG
5 TABLET ORAL EVERY 6 HOURS PRN
Status: DISCONTINUED | OUTPATIENT
Start: 2024-08-13 | End: 2024-08-13 | Stop reason: HOSPADM

## 2024-08-13 RX ORDER — ONDANSETRON 2 MG/ML
4 INJECTION INTRAMUSCULAR; INTRAVENOUS
Status: DISCONTINUED | OUTPATIENT
Start: 2024-08-13 | End: 2024-08-13 | Stop reason: HOSPADM

## 2024-08-13 RX ORDER — ONDANSETRON 4 MG/1
4 TABLET, ORALLY DISINTEGRATING ORAL EVERY 6 HOURS PRN
Status: DISCONTINUED | OUTPATIENT
Start: 2024-08-13 | End: 2024-08-13 | Stop reason: HOSPADM

## 2024-08-13 ASSESSMENT — ACTIVITIES OF DAILY LIVING (ADL)
ADLS_ACUITY_SCORE: 35
ADLS_ACUITY_SCORE: 35

## 2024-08-13 NOTE — H&P
Pre-Endoscopy History and Physical     Haritha Cat MRN# [id number]   YOB: 1945 Age: 79 year old     Date of Procedure: 8/13/24  Primary care provider: Cristino Contreras  Type of Endoscopy: colonoscopy  Reason for Procedure: personal history of SSA polyps  Type of Anesthesia Anticipated: Moderate Sedation    HPI:    Haritha is a 79 year old female who will be undergoing the above procedure.      A history and physical has been performed. The patient's medications and allergies have been reviewed. The risks and benefits of the procedure including the risk of bleeding, perforation, and missed lesions as well as the sedation options and risks were discussed with the patient.  All questions were answered and informed consent was obtained.        Last Colonoscopy: 2018 100/2, no polyps, diverticulosis.  Family History of Colorectal Cancer: No FH CRC  Allergies   Allergen Reactions    Amoxicillin Hives    Hydrochlorothiazide Other (See Comments)     Hyponatremia on higher dose    Lisinopril Cough    Lotrel Hives        No current facility-administered medications for this encounter.       Medications Prior to Admission   Medication Sig Dispense Refill Last Dose    bisacodyl (DULCOLAX) 5 MG EC tablet Take 2 tablets at 3 pm the day before your procedure. If your procedure is before 11 am, take 2 additional tablets at 11 pm. If your procedure is after 11 am, take 2 additional tablets at 6 am. For additional instructions refer to your colonoscopy prep instructions. 4 tablet 0 8/12/2024    bisacodyl (DULCOLAX) 5 MG EC tablet Take 2 tablets at 3 pm the day before your procedure. If your procedure is before 11 am, take 2 additional tablets at 11 pm. If your procedure is after 11 am, take 2 additional tablets at 6 am. For additional instructions refer to your colonoscopy prep instructions. 4 tablet 0 8/12/2024    clotrimazole (LOTRIMIN) 1 % vaginal cream Place 1 Applicatorful vaginally at bedtime 45 g 0  Past Week    famotidine (PEPCID) 10 MG tablet Take 10 mg by mouth as needed   8/12/2024    Ferrous Gluconate 240 (27 Fe) MG TABS Take 1 tablet by mouth daily Taken with Orange Juice to help absorbance   Past Week    flecainide (TAMBOCOR) 50 MG tablet Take 1 tablet (50 mg) by mouth 2 times daily 180 tablet 3 8/12/2024    ketoconazole (NIZORAL) 2 % external shampoo APPLY AND RINSE AS DIRECTED EVERY OTHER DAY   Unknown    metFORMIN (GLUCOPHAGE) 500 MG tablet Take 2 tablets (1,000 mg) by mouth 2 times daily (with meals) 360 tablet 3 8/12/2024    metoprolol succinate ER (TOPROL XL) 100 MG 24 hr tablet Take 1 tablet (100 mg) by mouth daily 90 tablet 3 8/12/2024    metroNIDAZOLE (FLAGYL) 500 MG tablet Take 1 tablet (500 mg) by mouth 2 times daily for 7 days 14 tablet 0 Unknown    olmesartan (BENICAR) 40 MG tablet Take 1 tablet (40 mg) by mouth daily 90 tablet 3 8/12/2024    polyethylene glycol (GOLYTELY) 236 g suspension The night before the exam at 6 pm drink an 8-ounce glass every 15 minutes until the jug is half empty. If you arrive before 11 AM: Drink the other half of the Golytely jug at 11 PM night before procedure. If you arrive after 11 AM: Drink the other half of the Golytely jug at 6 AM day of procedure. For additional instructions refer to your colonoscopy prep instructions. 4000 mL 0 8/12/2024    apixaban ANTICOAGULANT (ELIQUIS) 5 MG tablet Take 1 tablet (5 mg) by mouth 2 times daily 180 tablet 4 8/10/2024    hydrochlorothiazide (HYDRODIURIL) 25 MG tablet Take 1 tablet (25 mg) by mouth daily 90 tablet 3     metoclopramide (REGLAN) 10 MG tablet Take one tablet 30 minutes prior to drinking bowel prep to help with nausea. 2 tablet 0     naproxen sodium 220 MG capsule Take 220 mg by mouth daily       Vitamin D, Cholecalciferol, 25 MCG (1000 UT) TABS Take 1 tablet by mouth daily          Patient Active Problem List   Diagnosis    Osteopenia    Hyperlipidemia LDL goal <100    HTN (hypertension), benign    GERD  (gastroesophageal reflux disease)    Chronic headaches    Colon polyp    Type 2 diabetes mellitus without complications (H)    Needle phobia    Vasovagal syncope    Rotator cuff syndrome of right shoulder    Lumbar disc herniation    Left hamstring injury, initial encounter    White coat syndrome with diagnosis of hypertension    Paroxysmal atrial fibrillation (H)        Past Medical History:   Diagnosis Date    Abscess of abdominal cavity (H) 2002    cecal perf, prob from diverticulitis    Allergic rhinitis     Arthritis this year    Right hand, numbness    Colon polyp     colonoscopy due 5 years.    Diverticulosis     DM type 2 (diabetes mellitus, type 2) (H)     Elevated LFTs     fatty liver dz    GERD (gastroesophageal reflux disease)     Hepatitis A     Herpes zoster 2011    HTN (hypertension), benign     Osteopenia     DXA     Osteopenia         Past Surgical History:   Procedure Laterality Date    ANESTHESIA CARDIOVERSION N/A 2023    Procedure: Anesthesia cardioversion;  Surgeon: GENERIC ANESTHESIA PROVIDER;  Location: [facility] OR    CATARACT IOL, RT/LT      COLONOSCOPY N/A 5/15/2018    Procedure: COLONOSCOPY;  colonoscopy;  Surgeon: Lucero Acharya MD;  Location:  GI    DISCECTOMY LUMBAR POSTERIOR MICROSCOPIC ONE LEVEL Right 10/1/2019    Procedure: LUMBAR MICRODISCECTOMY RIGHT L2-3;  Surgeon: Wolfgang Peng MD;  Location:  OR    varicose vein surgery      Dr. Bowen    wisdom teeth  age 27       Social History     Tobacco Use    Smoking status: Never    Smokeless tobacco: Never   Substance Use Topics    Alcohol use: Not Currently     Comment: rare       Family History   Problem Relation Age of Onset    Hypertension Mother             Thyroid Disease Mother             Neurologic Disorder Mother         Parkinsons and RA    Hypertension Father             Diabetes Father             Diabetes Sister             Substance Abuse Sister       "       Hypertension Sister             Diabetes Maternal Grandmother             Diabetes Maternal Grandfather             Alcoholism Sister 59    Diabetes Son     Hypertension Son          PHYSICAL EXAM:   /70 (Cuff Size: Adult Large)   Pulse 72   Resp 15   SpO2 100%  Estimated body mass index is 30.24 kg/m  as calculated from the following:    Height as of 24: 1.651 m (5' 5\").    Weight as of 24: 82.4 kg (181 lb 11.2 oz).   Mental status - alert and oriented  RESP: lungs clear  CV: RRR  AIRWAY EXAM: Mallampatti Class I (visualization of the soft palate, fauces, uvula, anterior and posterior pillars)    IMPRESSION   ASA Class 2 - Mild systemic disease      Signed Electronically by: Emelyn Campos MD  2024    Colorectal Surgery  [phone] (office)  [phone] (pager)  [email or web address]          "

## 2024-08-15 LAB
PATH REPORT.COMMENTS IMP SPEC: NORMAL
PATH REPORT.COMMENTS IMP SPEC: NORMAL
PATH REPORT.FINAL DX SPEC: NORMAL
PATH REPORT.GROSS SPEC: NORMAL
PATH REPORT.MICROSCOPIC SPEC OTHER STN: NORMAL
PATH REPORT.RELEVANT HX SPEC: NORMAL
PHOTO IMAGE: NORMAL

## 2024-08-15 PROCEDURE — 88305 TISSUE EXAM BY PATHOLOGIST: CPT | Mod: 26 | Performed by: PATHOLOGY

## 2024-08-21 NOTE — TELEPHONE ENCOUNTER
Nutrition Assessment   Reason for Consult/Assessment: Reassessment    Diagnosis and Hx: Reviewed    Pertinent Nutrition Information: Met with patient. Spoke with RN. Meds reviewed: abx, PPI, remodulin. Labs reviewed.    Diet Order: Cardiac, Fluid restriction (2000 mL)    Diet tolerance: Tolerating with poor appetite/intakes recorded. Appetite declining since admission. Eating % of meals. She reports she feels hungry but when she starts to eat, she feels nauseas after a few bites. Unsure why this is happening. Orders 1-2 meals/day. Has some snacks at bedside (granola bar, trail mix). BM x3.  Food Allergies: None known    Demographic/Anthropometrics Information  Gender: female  Patient Age: 43 year old  Height:   Ht Readings from Last 1 Encounters:   08/15/24 5' 4\" (1.626 m)      Weight:   Wt Readings from Last 1 Encounters:   08/20/24 63.3 kg      BMI:   BMI Readings from Last 1 Encounters:   08/20/24 23.95 kg/m²     Estimated Needs:  Calculated Energy Needs: 1085-7216  kcal   Calculated protein needs: 76-95  g   Calculated Fluid Needs: Per Provider      NFPE  Nutrition Focused Physical Exam  Physical Exam Completed: Yes (08/21/24 0948 : Rosa Isela Ruth, MOISES)   Body Fat  Overall Body Fat: Normal  Muscle Mass  Overall Muscle Mass: Normal                  Treatment Plan/Interventions   Meals & snacks: Liberalized cardiac diet to regular to optimize PO intakes. Fluid restriction ordered per Provider. Encouraged small, frequent meals and snacks to optimize nutrition.    Nutrition supplement therapy: She does not like protein supplement drinks. Agreed to try a Power Crunch bar and PM snack of noreen crackers + PB.       Goals & Monitoring  Goal: Increase oral intake to >/equal 75% of meals and supplements, Tolerate oral diet   Intervention goal status: Some digression away from goal  Time frame to achieve goal: Ongoing    Dietitian will monitor: Anthropometric Measurements, Biochemical data, medical tests, procedures,  Reason for Call:  Other MEDICATION QUESTION    Detailed comments: PT WANTS TO KNOW IF IT IS OK TO TAKE CERTAIN MEDICATIONS THE DAY OF HER FASTING LABS. PLEASE CALL AND ADVISE.    Phone Number Patient can be reached at: Home number on file [phone] (home)    Best Time: ANYTIME    Can we leave a detailed message on this number? YES    Call taken on 1/25/2019 at 11:47 AM by Laurel Mahan       Food, beverage, and nutrient intake          Nutrition Diagnosis/PES   Nutrition Diagnosis: Inadequate oral intake     Related to: Decreased intake, Nausea, poor appetite  As evidenced by: Documented/reported poor oral intake, Diet history/recall, Weight loss over time, decline in appetite and eating % of meals     Primary Nutrition Diagnosis status: Active nutrition diagnosis

## 2024-08-27 DIAGNOSIS — I48.0 PAROXYSMAL ATRIAL FIBRILLATION (H): ICD-10-CM

## 2024-08-28 RX ORDER — FLECAINIDE ACETATE 50 MG/1
50 TABLET ORAL 2 TIMES DAILY
Qty: 180 TABLET | Refills: 3 | Status: SHIPPED | OUTPATIENT
Start: 2024-08-28

## 2024-09-05 ENCOUNTER — OFFICE VISIT (OUTPATIENT)
Dept: OBGYN | Facility: CLINIC | Age: 79
End: 2024-09-05
Payer: MEDICARE

## 2024-09-05 VITALS — BODY MASS INDEX: 29.32 KG/M2 | SYSTOLIC BLOOD PRESSURE: 130 MMHG | WEIGHT: 176.2 LBS | DIASTOLIC BLOOD PRESSURE: 70 MMHG

## 2024-09-05 DIAGNOSIS — N89.8 VAGINAL ODOR: Primary | ICD-10-CM

## 2024-09-05 PROBLEM — S76.302A LEFT HAMSTRING INJURY, INITIAL ENCOUNTER: Status: RESOLVED | Noted: 2021-04-21 | Resolved: 2024-09-05

## 2024-09-05 PROBLEM — F40.298 NEEDLE PHOBIA: Status: RESOLVED | Noted: 2017-07-11 | Resolved: 2024-09-05

## 2024-09-05 PROBLEM — R55 VASOVAGAL SYNCOPE: Status: RESOLVED | Noted: 2017-07-11 | Resolved: 2024-09-05

## 2024-09-05 PROBLEM — M48.00 SPINAL STENOSIS: Status: ACTIVE | Noted: 2024-09-05

## 2024-09-05 PROBLEM — M75.101 ROTATOR CUFF SYNDROME OF RIGHT SHOULDER: Status: RESOLVED | Noted: 2019-03-11 | Resolved: 2024-09-05

## 2024-09-05 PROBLEM — M51.26 LUMBAR DISC HERNIATION: Status: RESOLVED | Noted: 2019-06-04 | Resolved: 2024-09-05

## 2024-09-05 LAB
BACTERIAL VAGINOSIS VAG-IMP: NEGATIVE
CANDIDA DNA VAG QL NAA+PROBE: NOT DETECTED
CANDIDA GLABRATA / CANDIDA KRUSEI DNA: NOT DETECTED
T VAGINALIS DNA VAG QL NAA+PROBE: NOT DETECTED

## 2024-09-05 PROCEDURE — 0352U MULTIPLEX VAGINAL PANEL BY PCR: CPT | Performed by: NURSE PRACTITIONER

## 2024-09-05 PROCEDURE — 99459 PELVIC EXAMINATION: CPT | Performed by: NURSE PRACTITIONER

## 2024-09-05 PROCEDURE — 99203 OFFICE O/P NEW LOW 30 MIN: CPT | Performed by: NURSE PRACTITIONER

## 2024-09-05 NOTE — PROGRESS NOTES
SUBJECTIVE:                                                   Haritha Cat is a 79 year old female who presents to clinic today for the following health issue(s):  Patient presents with:  Vaginal Problem      Additional information: pt has odor and was given a ointment and an antibiotic. Did not help     HPI:  Patient here today with concerns of vaginal odor.  She was seen by primary care about 1 month ago and self swabbed with a positive wet prep for bacterial vaginosis.  She was treated with oral metronidazole.  She states that her symptoms have not resolved.    She has little appetite and feels overall fatigued.  She does not notice any additional discharge but on occasion does have some burning.    She has no urinary leakage and does not wear any incontinence pads.  She denies any changes to detergents or soaps.    She is postmenopausal and on no hormone replacement therapy.    No LMP recorded. Patient is postmenopausal..     Patient is not sexually active, No obstetric history on file..  Using menopause for contraception.    reports that she has never smoked. She has never used smokeless tobacco.    STD testing offered?  Declined    Health maintenance updated:  Dexa    Today's PHQ-2 Score:       5/20/2024     2:50 PM   PHQ-2 ( 1999 [employer])   Q1: Little interest or pleasure in doing things 0   Q2: Feeling down, depressed or hopeless 0   PHQ-2 Score 0   Q1: Little interest or pleasure in doing things Not at all   Q2: Feeling down, depressed or hopeless Not at all   PHQ-2 Score 0     Today's PHQ-9 Score:       6/4/2019    12:42 PM   PHQ-9 SCORE   PHQ-9 Total Score 3     Today's BENITA-7 Score:        No data to display                Problem list and histories reviewed & adjusted, as indicated.  Additional history: as documented.    Patient Active Problem List   Diagnosis    Osteopenia    Hyperlipidemia LDL goal <100    HTN (hypertension), benign    Type 2 diabetes mellitus without complications (H)    White  coat syndrome with diagnosis of hypertension    Paroxysmal atrial fibrillation (H)    Spinal stenosis     Past Surgical History:   Procedure Laterality Date    ANESTHESIA CARDIOVERSION N/A 2023    Procedure: Anesthesia cardioversion;  Surgeon: GENERIC ANESTHESIA PROVIDER;  Location:  OR    CATARACT IOL, RT/LT      COLONOSCOPY N/A 5/15/2018    Procedure: COLONOSCOPY;  colonoscopy;  Surgeon: Lucero Acharya MD;  Location:  GI    COLONOSCOPY N/A 2024    Procedure: Colonoscopy;  Surgeon: Emelyn Campos MD;  Location:  GI    DISCECTOMY LUMBAR POSTERIOR MICROSCOPIC ONE LEVEL Right 10/1/2019    Procedure: LUMBAR MICRODISCECTOMY RIGHT L2-3;  Surgeon: Wolfgang Peng MD;  Location:  OR    varicose vein surgery      Dr. Jessi monkdom teeth  age 27      Social History     Tobacco Use    Smoking status: Never    Smokeless tobacco: Never   Substance Use Topics    Alcohol use: Not Currently     Comment: rare      Problem (# of Occurrences) Relation (Name,Age of Onset)    Substance Abuse (1) Sister (Zuly Brady):     Diabetes (5) Father (Russell Vazquez): , Sister (Zuly Brady): , Maternal Grandmother (Norma Vazquez): , Maternal Grandfather (Gentry Valdez): , Son (Sundeep Cat)    Hypertension (4) Mother (Norma Vazquez): , Father (Russell Vazquez): , Sister (Zuly Vazquez): , Son (Bobby)    Neurologic Disorder (1) Mother (Norma Vazquez): Parkinsons and RA    Thyroid Disease (1) Mother (Norma Vazquez):     Alcoholism (1) Sister (59)              Current Outpatient Medications   Medication Sig Dispense Refill    apixaban ANTICOAGULANT (ELIQUIS) 5 MG tablet Take 1 tablet (5 mg) by mouth 2 times daily 180 tablet 4    famotidine (PEPCID) 10 MG tablet Take 10 mg by mouth as needed      Ferrous Gluconate 240 (27 Fe) MG TABS Take 1 tablet by mouth daily Taken with Orange Juice to help absorbance      flecainide  (TAMBOCOR) 50 MG tablet TAKE 1 TABLET BY MOUTH TWICE A  tablet 3    hydrochlorothiazide (HYDRODIURIL) 25 MG tablet Take 1 tablet (25 mg) by mouth daily 90 tablet 3    ketoconazole (NIZORAL) 2 % external shampoo APPLY AND RINSE AS DIRECTED EVERY OTHER DAY      metFORMIN (GLUCOPHAGE) 500 MG tablet Take 2 tablets (1,000 mg) by mouth 2 times daily (with meals) 360 tablet 3    metoprolol succinate ER (TOPROL XL) 100 MG 24 hr tablet Take 1 tablet (100 mg) by mouth daily 90 tablet 3    naproxen sodium 220 MG capsule Take 220 mg by mouth daily      olmesartan (BENICAR) 40 MG tablet Take 1 tablet (40 mg) by mouth daily 90 tablet 3    Vitamin D, Cholecalciferol, 25 MCG (1000 UT) TABS Take 1 tablet by mouth daily       No current facility-administered medications for this visit.     Allergies   Allergen Reactions    Amoxicillin Hives    Lisinopril Cough         OBJECTIVE:     /70   Wt 79.9 kg (176 lb 3.2 oz)   Breastfeeding No   BMI 29.32 kg/m    Body mass index is 29.32 kg/m .    Exam:  Constitutional:  Appearance: Well nourished, well developed alert, in no acute distress  Neurologic:  Mental Status:  Oriented X3.  Normal strength and tone, sensory exam grossly normal, mentation intact and speech normal.    Psychiatric:  Mentation appears normal and affect normal/bright.  Pelvic Exam:  External Genitalia:     Normal appearance for age, no discharge present, no tenderness present, no inflammatory lesions present, color normal  Vagina:     Normal vaginal vault without central or paravaginal defects, ATROPHIC.  Minimal creamy white discharge in vault.  Bladder:     Nontender to palpation  Urethra:   Urethral Body:  Urethra palpation normal, urethra structural support normal   Urethral Meatus:  No erythema or lesions present  Cervix:     Appearance healthy, no lesions present, nontender to palpation, no bleeding present  Uterus:     Nontender to palpation, no masses present, position anteflexed, mobility:  normal  Adnexa:     No adnexal tenderness present, no adnexal masses present  Perineum:     Perineum within normal limits, no evidence of trauma, no rashes or skin lesions present  Inguinal Lymph Nodes:     No lymphadenopathy present     In-Clinic Test Results:  No results found for this or any previous visit (from the past 24 hour(s)).    ASSESSMENT/PLAN:                                                        ICD-10-CM    1. Vaginal odor  N89.8 Multiplex Vaginal Panel by PCR     NM PELVIC EXAMINATION          There are no Patient Instructions on file for this visit.    79-year-old postmenopausal female with minimal vaginal discharge on exam.  Vaginal culture will be sent and we will treat her accordingly.  We have asked her to sit in bathtub and soak.  We briefly touched on the use of vaginal estradiol cream if needed.    HANY Randall [facility] [place]

## 2024-09-10 ENCOUNTER — TRANSFERRED RECORDS (OUTPATIENT)
Dept: HEALTH INFORMATION MANAGEMENT | Facility: CLINIC | Age: 79
End: 2024-09-10
Payer: MEDICARE

## 2024-11-24 ENCOUNTER — TELEPHONE (OUTPATIENT)
Dept: FAMILY MEDICINE | Facility: CLINIC | Age: 79
End: 2024-11-24

## 2024-11-24 DIAGNOSIS — I10 HTN (HYPERTENSION), BENIGN: Primary | ICD-10-CM

## 2024-11-24 NOTE — TELEPHONE ENCOUNTER
Can we call Haritha Cat and let her know that     Evaristo Mg,    I have had the opportunity to review your recent results and an interpretation is as follows:  Your basic metabolic panel shows worsening renal function and elevated potassium.  I would recommend push fluids 8x8 = 64 oz and recheck in 2 weeks  Your iron studies returned stable    Sincerely,  Cristino Contreras MD

## 2024-11-25 NOTE — TELEPHONE ENCOUNTER
Patient aware of labs. Says she does not want to stop coffee. Has had issue for years states the patient. Will send to Diane as an FYI.     MEMO Hoang  [facility]

## 2024-11-26 ENCOUNTER — HOSPITAL ENCOUNTER (OUTPATIENT)
Dept: MAMMOGRAPHY | Facility: CLINIC | Age: 79
Discharge: HOME | End: 2024-11-26
Attending: INTERNAL MEDICINE
Payer: MEDICARE

## 2024-11-26 ENCOUNTER — HOSPITAL ENCOUNTER (OUTPATIENT)
Dept: BONE DENSITY | Facility: CLINIC | Age: 79
Discharge: HOME | End: 2024-11-26
Attending: INTERNAL MEDICINE
Payer: MEDICARE

## 2024-11-26 DIAGNOSIS — M85.80 OSTEOPENIA, UNSPECIFIED LOCATION: ICD-10-CM

## 2024-11-26 DIAGNOSIS — Z12.31 ENCOUNTER FOR SCREENING MAMMOGRAM FOR BREAST CANCER: ICD-10-CM

## 2024-11-26 DIAGNOSIS — Z78.0 POST-MENOPAUSAL: ICD-10-CM

## 2024-11-26 PROCEDURE — 77063 BREAST TOMOSYNTHESIS BI: CPT

## 2024-11-26 PROCEDURE — 77080 DXA BONE DENSITY AXIAL: CPT

## 2024-11-26 PROCEDURE — 77067 SCR MAMMO BI INCL CAD: CPT

## 2024-12-09 ENCOUNTER — TELEPHONE (OUTPATIENT)
Dept: PHARMACY | Facility: CLINIC | Age: 79
End: 2024-12-09
Payer: MEDICARE

## 2024-12-09 NOTE — TELEPHONE ENCOUNTER
We have been unable to reach this patient for MTM follow-up after several attempts. We will stop reaching out to the patient at this time. Please let us know if we can assist in this patient's care in the future.    Routing to PCP as DOMINIC French, PharmD, [facility]  Medication Therapy Management Provider  [phone]

## 2024-12-17 ENCOUNTER — LAB (OUTPATIENT)
Dept: LAB | Facility: CLINIC | Age: 79
End: 2024-12-17
Payer: MEDICARE

## 2024-12-17 DIAGNOSIS — I10 HTN (HYPERTENSION), BENIGN: ICD-10-CM

## 2024-12-17 LAB
ANION GAP SERPL CALCULATED.3IONS-SCNC: 12 MMOL/L (ref 7–15)
BUN SERPL-MCNC: 19.9 MG/DL (ref 8–23)
CALCIUM SERPL-MCNC: 10.1 MG/DL (ref 8.8–10.4)
CHLORIDE SERPL-SCNC: 98 MMOL/L (ref 98–107)
CREAT SERPL-MCNC: 1.32 MG/DL (ref 0.51–0.95)
EGFRCR SERPLBLD CKD-EPI 2021: 41 ML/MIN/1.73M2
GLUCOSE SERPL-MCNC: 117 MG/DL (ref 70–99)
HCO3 SERPL-SCNC: 27 MMOL/L (ref 22–29)
POTASSIUM SERPL-SCNC: 5.3 MMOL/L (ref 3.4–5.3)
SODIUM SERPL-SCNC: 137 MMOL/L (ref 135–145)

## 2024-12-17 PROCEDURE — 80048 BASIC METABOLIC PNL TOTAL CA: CPT

## 2024-12-17 PROCEDURE — 36415 COLL VENOUS BLD VENIPUNCTURE: CPT

## 2024-12-19 ENCOUNTER — TELEPHONE (OUTPATIENT)
Dept: FAMILY MEDICINE | Facility: CLINIC | Age: 79
End: 2024-12-19
Payer: MEDICARE

## 2024-12-19 DIAGNOSIS — I10 ESSENTIAL HYPERTENSION: ICD-10-CM

## 2024-12-19 DIAGNOSIS — I50.32 CHRONIC DIASTOLIC HEART FAILURE (H): ICD-10-CM

## 2024-12-19 DIAGNOSIS — I50.21 ACUTE SYSTOLIC HEART FAILURE (H): ICD-10-CM

## 2024-12-19 RX ORDER — OLMESARTAN MEDOXOMIL 20 MG/1
20 TABLET ORAL DAILY
Qty: 90 TABLET | Refills: 3 | Status: CANCELLED | OUTPATIENT
Start: 2024-12-19

## 2024-12-19 RX ORDER — IRBESARTAN 150 MG/1
300 TABLET ORAL AT BEDTIME
Qty: 90 TABLET | Refills: 3 | Status: SHIPPED | OUTPATIENT
Start: 2024-12-19

## 2024-12-19 RX ORDER — OLMESARTAN MEDOXOMIL 20 MG/1
20 TABLET ORAL DAILY
Qty: 90 TABLET | Refills: 3 | Status: SHIPPED | OUTPATIENT
Start: 2024-12-19 | End: 2024-12-19

## 2024-12-19 NOTE — TELEPHONE ENCOUNTER
Writer contacted patient and relayed provider message below~  Patient expressed understanding and agrees to reduce olmesartan dose. Patient additionally has scheduled F/U lab + BP check.    Reference message:

## 2024-12-19 NOTE — TELEPHONE ENCOUNTER
FYI - Status Update    Who is Calling: patient    Update: Nurse called to ask pt to split pill in half- Olesartan, and pt is taking Irbesartan 300 g, so is this a new medication you want pt to take?    Does caller want a call/response back: Yes     Could we [facility] this information to you [facility] or would you prefer to receive a phone call?:   Patient would prefer a phone call   Okay to leave a detailed message?: Yes at Cell number on file:    Telephone Information:   Mobile [phone]

## 2024-12-19 NOTE — TELEPHONE ENCOUNTER
I spoke to Haritha and chelsi that she is taking irbesartan 300 mg and we will reduce dose to 150 mg and recheck blood pressure and basic metabolic panel in 2-3 weeks    Cristino Contreras MD

## 2024-12-19 NOTE — TELEPHONE ENCOUNTER
Hi Dr. Contreras,    Patient endorses that she had NEVER DISCONTINUED Irbesartan 300mg and NEVER STARTED Olmesartan 40mg.     Would you like patient to start the full dose of Olmesartan (40mg) or start with 20mg?

## 2024-12-19 NOTE — RESULT ENCOUNTER NOTE
Evaristo Mg,    I have had the opportunity to review your recent results and an interpretation is as follows:  Your follow metabolic panel shows slight improvement in your potassium, but still persistent elevation of your creatinine consistent with slight worsening of your chronic kidney disease.  It may be that the blood pressure medication you are taking is a bit high of a dose, and I recommend educing your olmesartan down from 40 mg to 20 mg (1/2 tablet) and rechecking your blood pressure and labs in the clinic in the next 2 to 4 weeks    Sincerely,  Cristino Contreras MD

## 2024-12-19 NOTE — TELEPHONE ENCOUNTER
Can we call Haritha Cat and let her know that     Evaristo Mg,    I have had the opportunity to review your recent results and an interpretation is as follows:  Your follow metabolic panel shows slight improvement in your potassium, but still persistent elevation of your creatinine consistent with slight worsening of your chronic kidney disease.  It may be that the blood pressure medication you are taking is a bit high of a dose, and I recommend  reducing your olmesartan down from 40 mg to 20 mg (1/2 tablet) and rechecking your blood pressure and labs in the clinic in the next 2 to 4 weeks    Sincerely,  Cristino Contreras MD

## 2025-01-16 ENCOUNTER — ALLIED HEALTH/NURSE VISIT (OUTPATIENT)
Dept: FAMILY MEDICINE | Facility: CLINIC | Age: 80
End: 2025-01-16
Payer: MEDICARE

## 2025-01-16 ENCOUNTER — LAB (OUTPATIENT)
Dept: LAB | Facility: CLINIC | Age: 80
End: 2025-01-16
Payer: MEDICARE

## 2025-01-16 VITALS — HEART RATE: 58 BPM | DIASTOLIC BLOOD PRESSURE: 68 MMHG | OXYGEN SATURATION: 99 % | SYSTOLIC BLOOD PRESSURE: 136 MMHG

## 2025-01-16 DIAGNOSIS — I10 ESSENTIAL HYPERTENSION: ICD-10-CM

## 2025-01-16 DIAGNOSIS — Z01.30 BP CHECK: Primary | ICD-10-CM

## 2025-01-16 DIAGNOSIS — I50.32 CHRONIC DIASTOLIC HEART FAILURE (H): ICD-10-CM

## 2025-01-16 LAB
ANION GAP SERPL CALCULATED.3IONS-SCNC: 13 MMOL/L (ref 7–15)
BUN SERPL-MCNC: 29.4 MG/DL (ref 8–23)
CALCIUM SERPL-MCNC: 10.4 MG/DL (ref 8.8–10.4)
CHLORIDE SERPL-SCNC: 98 MMOL/L (ref 98–107)
CREAT SERPL-MCNC: 1.39 MG/DL (ref 0.51–0.95)
EGFRCR SERPLBLD CKD-EPI 2021: 39 ML/MIN/1.73M2
GLUCOSE SERPL-MCNC: 112 MG/DL (ref 70–99)
HCO3 SERPL-SCNC: 25 MMOL/L (ref 22–29)
POTASSIUM SERPL-SCNC: 5.4 MMOL/L (ref 3.4–5.3)
SODIUM SERPL-SCNC: 136 MMOL/L (ref 135–145)

## 2025-01-16 NOTE — Clinical Note
Initial BP(9:15 am):  BP (!) 146/80 (BP Location: Right arm, Patient Position: Sitting, Cuff Size: Adult Large)   Pulse 58   SpO2 99%     58   2nd BP (9:26 am):  /68 (BP Location: Left arm, Patient Position: Sitting, Cuff Size: Adult Large)   Pulse 58   SpO2 99%     58

## 2025-01-16 NOTE — PROGRESS NOTES
Haritha Cat is a 79 year old patient who comes in today for a Blood Pressure check.  Initial BP(9:15 am):  BP (!) 146/80 (BP Location: Right arm, Patient Position: Sitting, Cuff Size: Adult Large)   Pulse 58   SpO2 99%      58      2nd BP (9:26 am):  /68 (BP Location: Left arm, Patient Position: Sitting, Cuff Size: Adult Large)   Pulse 58   SpO2 99%      58    Disposition: follow-up as previously indicated by provider and results routed to provider.      Bella FLYNN MA on 1/16/2025 at 9:30 AM

## 2025-01-17 ENCOUNTER — TELEPHONE (OUTPATIENT)
Dept: FAMILY MEDICINE | Facility: CLINIC | Age: 80
End: 2025-01-17

## 2025-01-17 DIAGNOSIS — I10 HTN (HYPERTENSION), BENIGN: Primary | ICD-10-CM

## 2025-01-17 NOTE — TELEPHONE ENCOUNTER
Can we call Haritha Cat and let her know that       Evaristo Mg,    I have had the opportunity to review your recent results and an interpretation is as follows:  Your follow-up labs show again elevation of your creatinine and potassium.  I would recommend holding irbesartan altogether and rechecking your blood pressure and labs in 2 weeks    Sincerely,  Cristino Contreras MD

## 2025-01-20 NOTE — TELEPHONE ENCOUNTER
"She read PCP's [employer] message. She notes she stopped taking irbesartan 2 months ago and now she is taking olmesartan 40 mg. PCP is prescriber per bottle.     She read on-line that eliquis can raise blood pressure and wants to know your thoughts. Flecainide and eliquis prescribed by Cardiologist and she feels she has been having \"problems\" ever since she started these medications. She wonders your thoughts on these medications as well. Cannot reach cardiologist to discuss and he has no openings until June.     Lab appointment scheduled in 2 weeks as recommended. She will check blood pressure at home and will send you readings via [employer].     Can we leave a detailed message on this number? YES  Phone number patient can be reached at: Cell number on file:    Telephone Information:   Mobile [phone]       Sona Martinez RN  [facility] Triage    "

## 2025-01-21 ENCOUNTER — E-VISIT (OUTPATIENT)
Dept: FAMILY MEDICINE | Facility: CLINIC | Age: 80
End: 2025-01-21
Payer: MEDICARE

## 2025-01-21 DIAGNOSIS — J01.90 ACUTE SINUSITIS WITH SYMPTOMS > 10 DAYS: Primary | ICD-10-CM

## 2025-01-21 DIAGNOSIS — J02.0 STREPTOCOCCAL PHARYNGITIS: ICD-10-CM

## 2025-01-21 RX ORDER — DOXYCYCLINE 100 MG/1
100 CAPSULE ORAL 2 TIMES DAILY
Qty: 14 CAPSULE | Refills: 0 | Status: SHIPPED | OUTPATIENT
Start: 2025-01-21 | End: 2025-01-23

## 2025-01-22 NOTE — TELEPHONE ENCOUNTER
Triage spoke to pt and relayed provider message below, and pt verbalized understanding.     Pt confirmed she will stop her olmesartan, and her BP this morning was 129/69.    Triage was able to schedule the following -     Jan 23, 2025 10:00 AM  (Arrive by 9:40 AM)  Provider Visit with HANY Thrasher CNP  [facility] ([facility] ) [phone]     Triage also discussed scheduling updated lab-only while pt is on site tomorrow, and pt agreed.    Adeline Humphreys RN

## 2025-01-22 NOTE — TELEPHONE ENCOUNTER
Dr. Contreras, please advise.    Triage spoke to pt and relayed your message below - pt verbalized understanding, and has questions.     Pt wondering if she could move UP her lab-only visit AND if you still want her to stop the olmesartan? Pt would like to move up the lab-only visit for personal reasons also.    Pt also reports that she has not been able to take the doxycycline on an empty stomach ('threw up most of it this morning' per pt) and cannot take it on an empty stomach as directed. Thoughts on alternatives?    Adeline Humphreys RN

## 2025-01-22 NOTE — TELEPHONE ENCOUNTER
OK to discontinue doxycycline.      Would recommend scheduling an office visit with team to assess for possible viral upper respiratory infection    As for blood pressure medications, yes would advise holding olmesartan only and rechecking in 1-2 weeks.  OK to check sooner if needed.    Cristino Contreras MD

## 2025-01-23 ENCOUNTER — OFFICE VISIT (OUTPATIENT)
Dept: FAMILY MEDICINE | Facility: CLINIC | Age: 80
End: 2025-01-23
Payer: MEDICARE

## 2025-01-23 VITALS
SYSTOLIC BLOOD PRESSURE: 120 MMHG | HEART RATE: 52 BPM | RESPIRATION RATE: 16 BRPM | BODY MASS INDEX: 29.14 KG/M2 | WEIGHT: 174.9 LBS | HEIGHT: 65 IN | DIASTOLIC BLOOD PRESSURE: 62 MMHG | OXYGEN SATURATION: 100 % | TEMPERATURE: 96.8 F

## 2025-01-23 DIAGNOSIS — J11.1 INFLUENZA-LIKE ILLNESS: Primary | ICD-10-CM

## 2025-01-23 ASSESSMENT — PAIN SCALES - GENERAL: PAINLEVEL_OUTOF10: MODERATE PAIN (5)

## 2025-01-23 NOTE — PATIENT INSTRUCTIONS
Xlear nasal spray (Regular or Rescue. NOT Max or decongestant). This has grapefruit seed extract that is a natural antimicrobial. This helps fight off any sort of infection in the nose. It also has xylitol that lines the mucus membranes so that viruses and bacteria can stop and take hold.    Used it every few hours initially while you are sick. Then go to twice a day  It can also be used prophylactically. If you are getting sick or around anyone that sick you can use it twice a day.

## 2025-01-23 NOTE — PROGRESS NOTES
"  Assessment & Plan     Influenza-like illness  Suspect viral etiology. Normal exam today other than cough. Mainly having ear symptoms and facial pain suspect d/t virus causing inflammation. Continue with tylenol prn. Can add Xlear nasal spray.  Suspect this to last a couple more weeks at least. Follow-up prn         Subjective   Pat is a 79 year old, presenting for the following health issues:  URI and Ear Problem      1/23/2025    10:10 AM   Additional Questions   Roomed by Sherrell   Accompanied by none     HPI     Was sick in the fall   Lots of coughing with sputum   Ear pain started about 3 days ago- gets a shooting pain on the side of her face   Feels like she is in an echo   No recent fevers     Not taking doxycycline   Took 1 and vomited       Review of Systems  Detailed as above         Objective    /62 (BP Location: Left arm, Patient Position: Sitting, Cuff Size: Adult Large)   Pulse 52   Temp 96.8  F (36  C) (Tympanic)   Resp 16   Ht 1.651 m (5' 5\")   Wt 79.3 kg (174 lb 14.4 oz)   SpO2 100%   BMI 29.10 kg/m    Body mass index is 29.1 kg/m .  Physical Exam  Constitutional:       Appearance: Normal appearance.   HENT:      Head: Normocephalic.      Right Ear: Tympanic membrane, ear canal and external ear normal.      Left Ear: Tympanic membrane, ear canal and external ear normal.      Nose: Nose normal.      Mouth/Throat:      Mouth: Mucous membranes are moist.      Pharynx: Oropharynx is clear. No oropharyngeal exudate or posterior oropharyngeal erythema.   Eyes:      Conjunctiva/sclera: Conjunctivae normal.   Pulmonary:      Effort: Pulmonary effort is normal. No respiratory distress.      Breath sounds: Normal breath sounds.      Comments: Moist cough noted  Musculoskeletal:      Cervical back: Normal range of motion.   Lymphadenopathy:      Cervical: No cervical adenopathy.   Skin:     General: Skin is warm and dry.   Neurological:      Mental Status: She is alert.   Psychiatric:         Mood " and Affect: Mood normal.                  Signed Electronically by: HANY Thrasher CNP

## 2025-02-11 DIAGNOSIS — I50.30 DIASTOLIC HEART FAILURE (H): ICD-10-CM

## 2025-02-11 DIAGNOSIS — I10 HTN (HYPERTENSION), BENIGN: ICD-10-CM

## 2025-02-11 DIAGNOSIS — I50.21 ACUTE SYSTOLIC HEART FAILURE (H): ICD-10-CM

## 2025-02-11 DIAGNOSIS — I48.0 PAROXYSMAL ATRIAL FIBRILLATION (H): Primary | ICD-10-CM

## 2025-02-11 DIAGNOSIS — I10 ESSENTIAL HYPERTENSION: ICD-10-CM

## 2025-02-12 ENCOUNTER — LAB (OUTPATIENT)
Dept: LAB | Facility: CLINIC | Age: 80
End: 2025-02-12
Payer: MEDICARE

## 2025-02-12 DIAGNOSIS — I10 HTN (HYPERTENSION), BENIGN: ICD-10-CM

## 2025-02-12 LAB
ANION GAP SERPL CALCULATED.3IONS-SCNC: 14 MMOL/L (ref 7–15)
BUN SERPL-MCNC: 24.5 MG/DL (ref 8–23)
CALCIUM SERPL-MCNC: 10.4 MG/DL (ref 8.8–10.4)
CHLORIDE SERPL-SCNC: 95 MMOL/L (ref 98–107)
CREAT SERPL-MCNC: 1.21 MG/DL (ref 0.51–0.95)
EGFRCR SERPLBLD CKD-EPI 2021: 46 ML/MIN/1.73M2
GLUCOSE SERPL-MCNC: 111 MG/DL (ref 70–99)
HCO3 SERPL-SCNC: 26 MMOL/L (ref 22–29)
POTASSIUM SERPL-SCNC: 5.1 MMOL/L (ref 3.4–5.3)
SODIUM SERPL-SCNC: 135 MMOL/L (ref 135–145)

## 2025-02-12 PROCEDURE — 36415 COLL VENOUS BLD VENIPUNCTURE: CPT

## 2025-02-12 PROCEDURE — 80048 BASIC METABOLIC PNL TOTAL CA: CPT

## 2025-02-13 NOTE — RESULT ENCOUNTER NOTE
Evaristo Mg,    I have had the opportunity to review your recent results and an interpretation is as follows:  Your follow-up metabolic panel shows improvement in your potassium and creatinine, but still a slight elevation.  I recommend we schedule an in office visit in the next month to recheck your blood pressure and consider adjustment of blood pressure meds and further evaluation of your kidney function at that time    Sincerely,  Cristino Contreras MD

## 2025-02-17 NOTE — TELEPHONE ENCOUNTER
Hold metformin the day before and the day of colonoscopy. Stop aspirin a week before colonoscopy.   Outreach attempt was made to schedule a Medicare Wellness Visit. This was the first attempt. Contact was made, [facility] appointment refused.

## 2025-03-06 DIAGNOSIS — I50.21 ACUTE SYSTOLIC HEART FAILURE (H): ICD-10-CM

## 2025-03-06 DIAGNOSIS — I48.0 PAROXYSMAL ATRIAL FIBRILLATION (H): Primary | ICD-10-CM

## 2025-03-06 DIAGNOSIS — I10 ESSENTIAL HYPERTENSION: ICD-10-CM

## 2025-03-06 DIAGNOSIS — I50.30 DIASTOLIC HEART FAILURE (H): ICD-10-CM

## 2025-03-21 ENCOUNTER — TELEPHONE (OUTPATIENT)
Dept: CARDIOLOGY | Facility: CLINIC | Age: 80
End: 2025-03-21

## 2025-03-21 DIAGNOSIS — I48.91 ATRIAL FIBRILLATION, UNSPECIFIED TYPE (H): ICD-10-CM

## 2025-03-21 NOTE — TELEPHONE ENCOUNTER
[facility] Call Center    Phone Message    May a detailed message be left on voicemail: yes    Reason for Call: Patient called stating she is needing her cardiology team to email her prescription for Eliquis for a 3 month supply.    Email: [email or web address]    Thank you!  [facility]

## 2025-03-24 NOTE — TELEPHONE ENCOUNTER
Kaity Salgado NP  You3 days ago       Printed and on desk.     Daphney Salgado, REECE signed an eliquis rx for pt. Pt has been sent a my chart update to see if ok to email it to her email on file. REECE Gupta is unable to send prescriptions directly to [place]. Kristi HAMPTON March 24, 2025, 9:20 AM

## 2025-04-01 ENCOUNTER — TRANSFERRED RECORDS (OUTPATIENT)
Dept: HEALTH INFORMATION MANAGEMENT | Facility: CLINIC | Age: 80
End: 2025-04-01
Payer: MEDICARE

## 2025-04-05 ENCOUNTER — NURSE TRIAGE (OUTPATIENT)
Dept: NURSING | Facility: CLINIC | Age: 80
End: 2025-04-05
Payer: MEDICARE

## 2025-04-05 NOTE — TELEPHONE ENCOUNTER
Nurse Triage SBAR    Is this a 2nd Level Triage? NO    Situation: Caller unable to get her Eliquis due to an insurance issue.    Background: Patient unsure why insurance is denying this.    Assessment: Patient needs this medication.She tried calling the insurance company. They're closed until Monday. I suggested she ask the pharmacist if she can get a few tablets to cover her until she can talk with the insurance company.  Caller stated understanding and agreement.    Protocol Recommended Disposition:   No disposition on file.    Recommendation: Nothing needed from provider at this time.   Sabine ECHEVARRIA RN [facility]

## 2025-05-01 ENCOUNTER — OFFICE VISIT (OUTPATIENT)
Dept: FAMILY MEDICINE | Facility: CLINIC | Age: 80
End: 2025-05-01
Payer: MEDICARE

## 2025-05-01 VITALS
DIASTOLIC BLOOD PRESSURE: 74 MMHG | WEIGHT: 177.8 LBS | OXYGEN SATURATION: 98 % | SYSTOLIC BLOOD PRESSURE: 126 MMHG | BODY MASS INDEX: 29.62 KG/M2 | HEART RATE: 60 BPM | RESPIRATION RATE: 18 BRPM | HEIGHT: 65 IN | TEMPERATURE: 98 F

## 2025-05-01 DIAGNOSIS — H69.93 DYSFUNCTION OF BOTH EUSTACHIAN TUBES: ICD-10-CM

## 2025-05-01 DIAGNOSIS — H93.8X3 PRESSURE SENSATION IN BOTH EARS: Primary | ICD-10-CM

## 2025-05-01 ASSESSMENT — PAIN SCALES - GENERAL: PAINLEVEL_OUTOF10: NO PAIN (0)

## 2025-05-01 NOTE — PROGRESS NOTES
"  Assessment & Plan   Problem List Items Addressed This Visit    None  Visit Diagnoses       Pressure sensation in both ears    -  Primary    Relevant Orders    Adult ENT  Referral    Dysfunction of both eustachian tubes        Relevant Orders    Adult ENT  Referral             Discussed eustachian tube dysfunction.  Symptoms ongoing for several months.  Has some postnasal drip and minimal phlegm production and some occasional cough.  Advised use of nasal decongestant, and antihistamine nasal saline rinses.  Mechanically blow her nose and blood pressure and eustachian tubes to help open the eustachian tube.  No vertigo no headache no sinus pressure.  No need for antibiotics.    Referral placed to ENT if no improvement in symptoms.  No cerebellar signs neurologically intact and no sinus direct tenderness on exam.  Hemodynamically stable.     BMI  Estimated body mass index is 29.59 kg/m  as calculated from the following:    Height as of this encounter: 1.651 m (5' 5\").    Weight as of this encounter: 80.6 kg (177 lb 12.8 oz).           Subjective   Pat is a 80 year old, presenting for the following health issues:  Follow Up and Ear Problem (/PRESSURE, ITCH )        5/1/2025    11:23 AM   Additional Questions   Roomed by Armen   Accompanied by Not applicable, by themselves     History of Present Illness       Reason for visit:  Both earaches  Symptom onset:  More than a month   She is taking medications regularly.          Review of Systems  Constitutional, HEENT, cardiovascular, pulmonary, gi and gu systems are negative, except as otherwise noted.      Objective    /74   Pulse 60   Temp 98  F (36.7  C) (Oral)   Resp 18   Ht 1.651 m (5' 5\")   Wt 80.6 kg (177 lb 12.8 oz)   SpO2 98%   BMI 29.59 kg/m    Body mass index is 29.59 kg/m .  Physical Exam   GENERAL: alert and no distress  EYES: Eyes grossly normal to inspection, PERRL and conjunctivae and sclerae normal  HENT: ear canals and " TM's normal, nose and mouth without ulcers or lesions  NECK: no adenopathy, no asymmetry, masses, or scars  RESP: lungs clear to auscultation - no rales, rhonchi or wheezes  CV: regular rate and rhythm, normal S1 S2, no S3 or S4, no murmur, click or rub, no peripheral edema  ABDOMEN: soft, nontender, no hepatosplenomegaly, no masses and bowel sounds normal  MS: no gross musculoskeletal defects noted, no edema  SKIN: no suspicious lesions or rashes  NEURO: Normal strength and tone, mentation intact and speech normal  PSYCH: mentation appears normal, affect normal/bright    Lab on 03/07/2025   Component Date Value Ref Range Status    Sodium 03/07/2025 133 (L)  135 - 145 mmol/L Final    Potassium 03/07/2025 4.4  3.4 - 5.3 mmol/L Final    Chloride 03/07/2025 93 (L)  98 - 107 mmol/L Final    Carbon Dioxide (CO2) 03/07/2025 25  22 - 29 mmol/L Final    Anion Gap 03/07/2025 15  7 - 15 mmol/L Final    Urea Nitrogen 03/07/2025 21.7  8.0 - 23.0 mg/dL Final    Creatinine 03/07/2025 1.21 (H)  0.51 - 0.95 mg/dL Final    GFR Estimate 03/07/2025 46 (L)  >60 mL/min/1.73m2 Final    eGFR calculated using 2021 CKD-EPI equation.    Calcium 03/07/2025 10.1  8.8 - 10.4 mg/dL Final    Glucose 03/07/2025 141 (H)  70 - 99 mg/dL Final           Signed Electronically by: Maribel France MD

## 2025-05-01 NOTE — PATIENT INSTRUCTIONS
Suspect eustachian tube malfunction or dysfunction.    Treat the congestion the postnasal drip    Use nasal saline rinses or spray in the nose couple times     Use Flonase to [generic called fluticasone] 2 puffs in each nostril twice a day once a day for at least 2 weeks.  Direct the nasal Flonase spray towards your ear     Can take over-the-counter Claritin 10 mg 1 tablet once daily as needed for the next 2 weeks.    If any ear pain or sinus headache pressure or pain or increase yellow nasal discharge then you will need antibiotics please seek immediate medical attention.    Gently blow close your nostril and blow your mouth to build pressure in the tubes to help opening the eustachian tube to help ventilate the middle ear.    I will place a referral to the ear nose and throat doctor see them in the next couple weeks if you are not feeling better

## 2025-05-03 ENCOUNTER — HEALTH MAINTENANCE LETTER (OUTPATIENT)
Age: 80
End: 2025-05-03

## 2025-05-20 ENCOUNTER — TRANSFERRED RECORDS (OUTPATIENT)
Dept: HEALTH INFORMATION MANAGEMENT | Facility: CLINIC | Age: 80
End: 2025-05-20
Payer: MEDICARE

## 2025-05-20 SDOH — HEALTH STABILITY: PHYSICAL HEALTH: ON AVERAGE, HOW MANY MINUTES DO YOU ENGAGE IN EXERCISE AT THIS LEVEL?: 20 MIN

## 2025-05-20 SDOH — HEALTH STABILITY: PHYSICAL HEALTH: ON AVERAGE, HOW MANY DAYS PER WEEK DO YOU ENGAGE IN MODERATE TO STRENUOUS EXERCISE (LIKE A BRISK WALK)?: 1 DAY

## 2025-05-20 ASSESSMENT — SOCIAL DETERMINANTS OF HEALTH (SDOH): HOW OFTEN DO YOU GET TOGETHER WITH FRIENDS OR RELATIVES?: THREE TIMES A WEEK

## 2025-05-23 ENCOUNTER — OFFICE VISIT (OUTPATIENT)
Dept: FAMILY MEDICINE | Facility: CLINIC | Age: 80
End: 2025-05-23
Payer: MEDICARE

## 2025-05-23 ENCOUNTER — RESULTS FOLLOW-UP (OUTPATIENT)
Dept: FAMILY MEDICINE | Facility: CLINIC | Age: 80
End: 2025-05-23

## 2025-05-23 VITALS
HEART RATE: 55 BPM | TEMPERATURE: 97 F | WEIGHT: 178.2 LBS | OXYGEN SATURATION: 99 % | DIASTOLIC BLOOD PRESSURE: 85 MMHG | BODY MASS INDEX: 29.69 KG/M2 | SYSTOLIC BLOOD PRESSURE: 175 MMHG | HEIGHT: 65 IN | RESPIRATION RATE: 13 BRPM

## 2025-05-23 DIAGNOSIS — I10 HTN (HYPERTENSION), BENIGN: ICD-10-CM

## 2025-05-23 DIAGNOSIS — E11.9 TYPE 2 DIABETES MELLITUS WITHOUT COMPLICATION, WITHOUT LONG-TERM CURRENT USE OF INSULIN (H): Primary | ICD-10-CM

## 2025-05-23 DIAGNOSIS — M51.26 LUMBAR DISC HERNIATION: ICD-10-CM

## 2025-05-23 DIAGNOSIS — Z00.00 ROUTINE GENERAL MEDICAL EXAMINATION AT A HEALTH CARE FACILITY: Primary | ICD-10-CM

## 2025-05-23 DIAGNOSIS — E11.9 TYPE 2 DIABETES MELLITUS WITHOUT COMPLICATION, WITHOUT LONG-TERM CURRENT USE OF INSULIN (H): ICD-10-CM

## 2025-05-23 DIAGNOSIS — E55.9 VITAMIN D DEFICIENCY: ICD-10-CM

## 2025-05-23 DIAGNOSIS — Z13.6 SCREENING FOR CARDIOVASCULAR CONDITION: ICD-10-CM

## 2025-05-23 DIAGNOSIS — E78.5 HYPERLIPIDEMIA LDL GOAL <100: ICD-10-CM

## 2025-05-23 DIAGNOSIS — I48.91 ATRIAL FIBRILLATION, UNSPECIFIED TYPE (H): ICD-10-CM

## 2025-05-23 DIAGNOSIS — M54.17 LUMBOSACRAL RADICULOPATHY AT S1: ICD-10-CM

## 2025-05-23 LAB
EST. AVERAGE GLUCOSE BLD GHB EST-MCNC: 126 MG/DL
HBA1C MFR BLD: 6 % (ref 0–5.6)

## 2025-05-23 PROCEDURE — 80053 COMPREHEN METABOLIC PANEL: CPT | Performed by: INTERNAL MEDICINE

## 2025-05-23 PROCEDURE — 3077F SYST BP >= 140 MM HG: CPT | Performed by: INTERNAL MEDICINE

## 2025-05-23 PROCEDURE — 99397 PER PM REEVAL EST PAT 65+ YR: CPT | Performed by: INTERNAL MEDICINE

## 2025-05-23 PROCEDURE — 82306 VITAMIN D 25 HYDROXY: CPT | Mod: GZ | Performed by: INTERNAL MEDICINE

## 2025-05-23 PROCEDURE — 99213 OFFICE O/P EST LOW 20 MIN: CPT | Mod: 25 | Performed by: INTERNAL MEDICINE

## 2025-05-23 PROCEDURE — 3079F DIAST BP 80-89 MM HG: CPT | Performed by: INTERNAL MEDICINE

## 2025-05-23 PROCEDURE — 83036 HEMOGLOBIN GLYCOSYLATED A1C: CPT | Performed by: INTERNAL MEDICINE

## 2025-05-23 PROCEDURE — 1126F AMNT PAIN NOTED NONE PRSNT: CPT | Performed by: INTERNAL MEDICINE

## 2025-05-23 PROCEDURE — 82043 UR ALBUMIN QUANTITATIVE: CPT | Performed by: INTERNAL MEDICINE

## 2025-05-23 PROCEDURE — 3048F LDL-C <100 MG/DL: CPT | Performed by: INTERNAL MEDICINE

## 2025-05-23 PROCEDURE — 82570 ASSAY OF URINE CREATININE: CPT | Performed by: INTERNAL MEDICINE

## 2025-05-23 PROCEDURE — 80061 LIPID PANEL: CPT | Performed by: INTERNAL MEDICINE

## 2025-05-23 PROCEDURE — 36415 COLL VENOUS BLD VENIPUNCTURE: CPT | Performed by: INTERNAL MEDICINE

## 2025-05-23 PROCEDURE — 3044F HG A1C LEVEL LT 7.0%: CPT | Performed by: INTERNAL MEDICINE

## 2025-05-23 ASSESSMENT — PAIN SCALES - GENERAL: PAINLEVEL_OUTOF10: NO PAIN (0)

## 2025-05-23 NOTE — RESULT ENCOUNTER NOTE
Evaristo Mg,    I have had the opportunity to review your recent results and an interpretation is as follows:  Your A1c looks much improved.  Continue healthy diet and use of metformin    Sincerely,  Cristino Contreras MD

## 2025-05-23 NOTE — PATIENT INSTRUCTIONS
(Z00.00) Routine general medical examination at a health care facility  (primary encounter diagnosis)  Comment: For routine exam, we will draw labs as ordered, cholesterol, diabetes mellitus check, liver function, renal function, and plan for mammogram in the fall.  We will also update vaccination history.  Plan:     (E11.9) Type 2 diabetes mellitus without complication, without long-term current use of insulin (H)  Comment: Check hemoglobin A1c today, urine albumin and plan for eye exam this year.  Continue metformin  Plan: Albumin Random Urine Quantitative with Creat         Ratio, Lipid panel reflex to direct LDL         Non-fasting, HEMOGLOBIN A1C, Vitamin D         Deficiency            (E78.5) Hyperlipidemia LDL goal <100  Comment: check fasting lipid panel   Plan: Lipid panel reflex to direct LDL Non-fasting,         Comprehensive metabolic panel            (Z13.6) Screening for cardiovascular condition  Comment:   Plan:     (I10) HTN (hypertension), benign  Comment: blood pressure is elevated today, but we will check before you go, and we will check again at home  Plan:     (M51.26) Lumbar disc herniation  Comment: Referral for second opinion   Plan: Spine  Referral            (M54.17) Lumbosacral radiculopathy at S1  Comment: as above   Plan: Spine  Referral            (I48.91) Atrial fibrillation, unspecified type (H)  Comment: Continue apixaban   Plan:     (E55.9) Vitamin D deficiency  Comment:   Plan: Vitamin D Deficiency

## 2025-05-23 NOTE — PROGRESS NOTES
Preventive Care Visit  [facility]  Cristino Contreras MD, MD, Internal Medicine  May 23, 2025          Subjective   Pat is a 80 year old, presenting for the following:  Physical           HPI       Advance Care Planning    Discussed advance care planning with patient; informed AVS has link to [employer].        5/20/2025   General Health   How would you rate your overall physical health? Good   Feel stress (tense, anxious, or unable to sleep) Only a little   (!) STRESS CONCERN      5/20/2025   Nutrition   Diet: Regular (no restrictions)         5/20/2025   Exercise   Days per week of moderate/strenous exercise 1 day   Average minutes spent exercising at this level 20 min   (!) EXERCISE CONCERN      5/20/2025   Social Factors   Frequency of gathering with friends or relatives Three times a week   Worry food won't last until get money to buy more No   Food not last or not have enough money for food? No   Do you have housing? (Housing is defined as stable permanent housing and does not include staying outside in a car, in a tent, in an abandoned building, in an overnight shelter, or couch-surfing.) Yes   Are you worried about losing your housing? No   Lack of transportation? No   Unable to get utilities (heat,electricity)? No         5/20/2025   Fall Risk   Fallen 2 or more times in the past year? No    No   Trouble with walking or balance? No    No       Multiple values from one day are sorted in reverse-chronological order          5/20/2025   Activities of Daily Living- Home Safety   Needs help with the following daily activites None of the above   Safety concerns in the home None of the above         5/20/2025   Dental   Dentist two times every year? Yes         5/20/2025   Hearing Screening   Hearing concerns? (!) IT'S HARD TO FOLLOW A CONVERSATION IN A NOISY RESTAURANT OR CROWDED ROOM.    (!) TROUBLE UNDERSTANDING SOFT OR WHISPERED SPEECH.       Multiple values from one day are  sorted in reverse-chronological order         5/20/2025   Driving Risk Screening   Patient/family members have concerns about driving No         5/20/2025   General Alertness/Fatigue Screening   Have you been more tired than usual lately? (!) YES         5/20/2025   Urinary Incontinence Screening   Bothered by leaking urine in past 6 months No         Today's PHQ-2 Score:       5/23/2025    10:13 AM   PHQ-2 ( 1999 Pf[employer])   Q1: Little interest or pleasure in doing things 0   Q2: Feeling down, depressed or hopeless 0   PHQ-2 Score 0    Q1: Little interest or pleasure in doing things Not at all   Q2: Feeling down, depressed or hopeless Not at all   PHQ-2 Score 0       Patient-reported           5/20/2025   Substance Use   Alcohol more than 3/day or more than 7/wk No   Do you have a current opioid prescription? No   How severe/bad is pain from 1 to 10? 1/10   Do you use any other substances recreationally? No     Social History     Tobacco Use    Smoking status: Never    Smokeless tobacco: Never   Vaping Use    Vaping status: Never Used   Substance Use Topics    Alcohol use: Yes     Comment: rare    Drug use: No           11/26/2024   LAST FHS-7 RESULTS   1st degree relative breast or ovarian cancer No   Any relative bilateral breast cancer No   Any male have breast cancer No   Any ONE woman have BOTH breast AND ovarian cancer No   Any woman with breast cancer before 50yrs No   2 or more relatives with breast AND/OR ovarian cancer No   2 or more relatives with breast AND/OR bowel cancer No        Mammogram Screening - After age 74- determine frequency with patient based on health status, life expectancy and patient goals    ASCVD Risk   The 10-year ASCVD risk score (Laura KELLY, et al., 2019) is: 47.6%    Values used to calculate the score:      Age: 80 years      Sex: Female      Is Non- : No      Diabetic: Yes      Tobacco smoker: No      Systolic Blood Pressure: 126 mmHg      Is BP  treated: Yes      HDL Cholesterol: 69 mg/dL      Total Cholesterol: 172 mg/dL          Reviewed and updated as needed this visit by Provider                    Past Medical History:   Diagnosis Date    Abscess of abdominal cavity (H) 11/2002    cecal perf, prob from diverticulitis    Allergic rhinitis     Arthritis this year    Right hand, numbness    Colon polyp 2015    colonoscopy due 5 years.    Diverticulosis     DM type 2 (diabetes mellitus, type 2) (H)     Elevated LFTs     fatty liver dz    GERD (gastroesophageal reflux disease)     Hepatitis A     Herpes zoster 11/28/2011    HTN (hypertension), benign     Osteopenia     DXA 2008    Osteopenia 2014     Current providers sharing in care for this patient include:  Patient Care Team:  Cristino Contreras MD as PCP - General (Internal Medicine)  Felix Florentino MD as MD (Cardiovascular Disease)  Toni Rankin MD as Assigned Heart and Vascular Provider  Cristino Contreras MD as Assigned PCP  Priscilla French PharmD as Assigned MTM Pharmacist  Cassie Spicer APRN CNP as Nurse Practitioner (OB/Gyn)  Cassie Spicer APRN CNP as Assigned OBGYN Provider  Kaity Salgado NP as Nurse Practitioner (Cardiology)    The following health maintenance items are reviewed in Epic and correct as of today:  Health Maintenance   Topic Date Due    HF ACTION PLAN  Never done    ZOSTER IMMUNIZATION (2 of 3) 10/02/2014    RSV VACCINE (1 - 1-dose 75+ series) Never done    EYE EXAM  06/01/2023    ANNUAL REVIEW OF HM ORDERS  02/14/2025    MICROALBUMIN  04/25/2025    COVID-19 Vaccine (7 - 2024-25 season) 05/05/2025    ALT  05/21/2025    LIPID  05/21/2025    A1C  05/22/2025    MEDICARE ANNUAL WELLNESS VISIT  05/21/2025    DTAP/TDAP/TD IMMUNIZATION (2 - Td or Tdap) 08/20/2025    BMP  09/07/2025    DIABETIC FOOT EXAM  11/22/2025    CBC  11/22/2025    FALL RISK ASSESSMENT  05/23/2026    COLORECTAL CANCER SCREENING  08/13/2027    ADVANCE CARE PLANNING   "05/21/2029    DEXA  11/26/2029    TSH W/FREE T4 REFLEX  Completed    HEPATITIS C SCREENING  Completed    PHQ-2 (once per calendar year)  Completed    INFLUENZA VACCINE  Completed    Pneumococcal Vaccine: 50+ Years  Completed    HPV IMMUNIZATION  Aged Out    MENINGITIS IMMUNIZATION  Aged Out    MAMMO SCREENING  Discontinued        Type 2 diabetes mellitus without complication, without long-term current use of insulin (H)   Blood glucose has been stable.  No issues.  Has yet to schedule eye exam   Hyperlipidemia LDL goal <100   Tolerating statins.    HTN (hypertension), benign   Blood pressure has been very well controlled usually.   Recent follow up in cardiology and recommended to continue current blood pressure medications   Lumbar disc herniation  Lumbosacral radiculopathy at S1    Haritha Cat is planning for surgerical fusion in October.  She is still contemplating all options.      Review of Systems  Constitutional, HEENT, cardiovascular, pulmonary, GI, , musculoskeletal, neurology - radiculopathy, skin, endocrine and psych systems are negative, except as otherwise noted.     Objective    Exam  BP (!) 175/85 (BP Location: Right arm, Patient Position: Sitting, Cuff Size: Adult Large)   Pulse 55   Temp 97  F (36.1  C) (Tympanic)   Resp 13   Ht 1.645 m (5' 4.76\")   Wt 80.8 kg (178 lb 3.2 oz)   SpO2 99%   BMI 29.87 kg/m     Estimated body mass index is 29.87 kg/m  as calculated from the following:    Height as of this encounter: 1.645 m (5' 4.76\").    Weight as of this encounter: 80.8 kg (178 lb 3.2 oz).    Physical Exam  GENERAL: alert and no distress  EYES: Eyes grossly normal to inspection   HENT: ear canals and TM's normal, nose and mouth without ulcers or lesions  NECK: no adenopathy, no asymmetry, masses, or scars  RESP: lungs clear to auscultation - no rales, rhonchi or wheezes  CV: regular rate and rhythm, normal S1 S2, no S3 or S4, no murmur,   ABDOMEN: soft, nontender, no hepatosplenomegaly, " no masses and bowel sounds normal  MS: limited hip flexion bilaterally   SKIN: no suspicious lesions or rashes  NEURO: Normal strength and tone, mentation intact and speech normal  PSYCH: mentation appears normal, affect normal/bright          5/21/2024   Mini Cog   Clock Draw Score 2 Normal   3 Item Recall 3 objects recalled   Mini Cog Total Score 5         Patient Instructions   (Z00.00) Routine general medical examination at a health care facility  (primary encounter diagnosis)  Comment: For routine exam, we will draw labs as ordered, cholesterol, diabetes mellitus check, liver function, renal function, and plan for mammogram in the fall.  We will also update vaccination history.  Plan:     (E11.9) Type 2 diabetes mellitus without complication, without long-term current use of insulin (H)  Comment: Check hemoglobin A1c today, urine albumin and plan for eye exam this year.  Continue metformin  Plan: Albumin Random Urine Quantitative with Creat         Ratio, Lipid panel reflex to direct LDL         Non-fasting, HEMOGLOBIN A1C, Vitamin D         Deficiency            (E78.5) Hyperlipidemia LDL goal <100  Comment: check fasting lipid panel   Plan: Lipid panel reflex to direct LDL Non-fasting,         Comprehensive metabolic panel            (Z13.6) Screening for cardiovascular condition  Comment:   Plan:     (I10) HTN (hypertension), benign  Comment: blood pressure is elevated today, but we will check before you go, and we will check again at home  Plan:     (M51.26) Lumbar disc herniation  Comment: Referral for second opinion   Plan: Spine  Referral            (M54.17) Lumbosacral radiculopathy at S1  Comment: as above   Plan: Spine  Referral            (I48.91) Atrial fibrillation, unspecified type (H)  Comment: Continue apixaban   Plan:     (E55.9) Vitamin D deficiency  Comment:   Plan: Vitamin D Deficiency                   Signed Electronically by: Cristino Contreras MD, MD

## 2025-05-24 LAB
ALBUMIN SERPL BCG-MCNC: 4.5 G/DL (ref 3.5–5.2)
ALP SERPL-CCNC: 90 U/L (ref 40–150)
ALT SERPL W P-5'-P-CCNC: 12 U/L (ref 0–50)
ANION GAP SERPL CALCULATED.3IONS-SCNC: 14 MMOL/L (ref 7–15)
AST SERPL W P-5'-P-CCNC: 23 U/L (ref 0–45)
BILIRUB SERPL-MCNC: 1 MG/DL
BUN SERPL-MCNC: 21.4 MG/DL (ref 8–23)
CALCIUM SERPL-MCNC: 10 MG/DL (ref 8.8–10.4)
CHLORIDE SERPL-SCNC: 92 MMOL/L (ref 98–107)
CHOLEST SERPL-MCNC: 190 MG/DL
CREAT SERPL-MCNC: 1.18 MG/DL (ref 0.51–0.95)
CREAT UR-MCNC: 27.6 MG/DL
EGFRCR SERPLBLD CKD-EPI 2021: 47 ML/MIN/1.73M2
FASTING STATUS PATIENT QL REPORTED: NO
FASTING STATUS PATIENT QL REPORTED: NO
GLUCOSE SERPL-MCNC: 102 MG/DL (ref 70–99)
HCO3 SERPL-SCNC: 23 MMOL/L (ref 22–29)
HDLC SERPL-MCNC: 69 MG/DL
LDLC SERPL CALC-MCNC: 96 MG/DL
MICROALBUMIN UR-MCNC: 21.6 MG/L
MICROALBUMIN/CREAT UR: 78.26 MG/G CR (ref 0–25)
NONHDLC SERPL-MCNC: 121 MG/DL
POTASSIUM SERPL-SCNC: 4.7 MMOL/L (ref 3.4–5.3)
PROT SERPL-MCNC: 7.2 G/DL (ref 6.4–8.3)
SODIUM SERPL-SCNC: 129 MMOL/L (ref 135–145)
TRIGL SERPL-MCNC: 125 MG/DL
VIT D+METAB SERPL-MCNC: 48 NG/ML (ref 20–50)

## 2025-05-26 NOTE — TELEPHONE ENCOUNTER
Can we call Haritha Cat and let her know that     Hi Haritha,    I had the opportunity to review your recent labs and a summary of your labs reads as follows:       Your comprehensive metabolic panel showed stable renal function, normal liver function, and stable fasting blood glucose I   - normal HDL (good) cholesterol -as your goal is greater than 40  - low LDL (bad) cholesterol as your goal is less than 100  - normal triglyceride levels  Your urine albumin level returned elevated again, and we should consider adding a medication such as Jardiance to help lower your blood pressure and prevent kidney dysfunction given that you are unable to tolerate lisinopril or losartan.    Sincerely,  Cristino Contreras MD

## 2025-05-26 NOTE — RESULT ENCOUNTER NOTE
Evaristo Mg,    I had the opportunity to review your recent labs and a summary of your labs reads as follows:       Your comprehensive metabolic panel showed stable renal function, normal liver function, and stable fasting blood glucose I   - normal HDL (good) cholesterol -as your goal is greater than 40  - low LDL (bad) cholesterol as your goal is less than 100  - normal triglyceride levels  Your urine albumin level returned elevated again, and we should consider adding a medication such as Jardiance to help lower your blood pressure and prevent kidney dysfunction given that you are unable to tolerate lisinopril or losartan.    Sincerely,  Cristino Contreras MD

## 2025-05-27 NOTE — TELEPHONE ENCOUNTER
Outgoing call to Patient.    Patient agreeable to start Jardiance.    Ira FLYNN,  Triage RN  [facility]

## 2025-05-27 NOTE — TELEPHONE ENCOUNTER
Thank you    Jardiance signed. Please advise to monitor for side effects including urinary frequency and possible urinary tract infection    Follow up in three months to recheck labs.     Cristino Contreras MD

## 2025-05-28 ENCOUNTER — PATIENT OUTREACH (OUTPATIENT)
Dept: CARE COORDINATION | Facility: CLINIC | Age: 80
End: 2025-05-28
Payer: MEDICARE

## 2025-05-30 ENCOUNTER — MYC MEDICAL ADVICE (OUTPATIENT)
Dept: FAMILY MEDICINE | Facility: CLINIC | Age: 80
End: 2025-05-30
Payer: MEDICARE

## 2025-06-02 RX ORDER — LISINOPRIL 5 MG/1
5 TABLET ORAL DAILY
Qty: 90 TABLET | Refills: 1 | Status: CANCELLED | OUTPATIENT
Start: 2025-06-02

## 2025-06-10 ENCOUNTER — TRANSFERRED RECORDS (OUTPATIENT)
Dept: HEALTH INFORMATION MANAGEMENT | Facility: CLINIC | Age: 80
End: 2025-06-10
Payer: MEDICARE

## 2025-06-27 ENCOUNTER — NURSE TRIAGE (OUTPATIENT)
Dept: CARDIOLOGY | Facility: CLINIC | Age: 80
End: 2025-06-27
Payer: MEDICARE

## 2025-06-27 NOTE — TELEPHONE ENCOUNTER
RN called pt to review her symptoms. Pt reports she is currently in [place] spending some time in town, walking and hiking. Pt reports she became winded when walking upstairs or going uphill on hikes. Unable to continue conversation while going uphill and has to stop to recover, which she said is not her norm. No SOB at rest. SOB resolves with rest. She denies chest pain, weight gain,swelling, dizziness or pre-syncope.  Pt reports BP at home prior to leaving was 120s systolic. HR , never faster. Pt advised to continue to monitor, seek care in ED if she develops chest pain, dyspnea or pre-syncope/syncope. Pt will return to the [facility] on Monday. Update to Daphney Salgado NP. Sylvie Vickers RN on 6/27/2025 at 2:11 PM

## 2025-06-27 NOTE — TELEPHONE ENCOUNTER
Patient calling clinic reporting feeling extremely SOB and fatigued faster than normal. Patient stated the last time she had these symptoms she was in afib. Patient stated she was in the car and was unable to take her pulse or BP.     RN advised patient to be seen in UC for evaluation of heart rhythm but patient refused stating she would like to go home and see if symptoms improve. Patient is also requesting message be sent to team to see if she can come in Monday for an appt.    Routing to team for review.

## 2025-06-29 ENCOUNTER — HOSPITAL ENCOUNTER (INPATIENT)
Facility: CLINIC | Age: 80
LOS: 1 days | Discharge: HOME | End: 2025-07-01
Attending: EMERGENCY MEDICINE | Admitting: STUDENT IN AN ORGANIZED HEALTH CARE EDUCATION/TRAINING PROGRAM
Payer: MEDICARE

## 2025-06-29 ENCOUNTER — APPOINTMENT (OUTPATIENT)
Dept: GENERAL RADIOLOGY | Facility: CLINIC | Age: 80
End: 2025-06-29
Attending: EMERGENCY MEDICINE
Payer: MEDICARE

## 2025-06-29 DIAGNOSIS — I50.9 NEW ONSET OF CONGESTIVE HEART FAILURE (H): ICD-10-CM

## 2025-06-29 DIAGNOSIS — I48.19 PERSISTENT ATRIAL FIBRILLATION (H): Primary | ICD-10-CM

## 2025-06-29 DIAGNOSIS — R06.09 DYSPNEA ON EXERTION: ICD-10-CM

## 2025-06-29 DIAGNOSIS — I48.0 PAROXYSMAL ATRIAL FIBRILLATION (H): ICD-10-CM

## 2025-06-29 LAB
ALBUMIN SERPL BCG-MCNC: 4.3 G/DL (ref 3.5–5.2)
ALP SERPL-CCNC: 108 U/L (ref 40–150)
ALT SERPL W P-5'-P-CCNC: 44 U/L (ref 0–50)
ANION GAP SERPL CALCULATED.3IONS-SCNC: 17 MMOL/L (ref 7–15)
ANION GAP SERPL CALCULATED.3IONS-SCNC: 20 MMOL/L (ref 7–15)
AST SERPL W P-5'-P-CCNC: 33 U/L (ref 0–45)
ATRIAL RATE - MUSE: NORMAL BPM
BASOPHILS # BLD AUTO: 0.1 10E3/UL (ref 0–0.2)
BASOPHILS NFR BLD AUTO: 1 %
BILIRUB SERPL-MCNC: 1 MG/DL
BUN SERPL-MCNC: 21.2 MG/DL (ref 8–23)
BUN SERPL-MCNC: 21.4 MG/DL (ref 8–23)
CALCIUM SERPL-MCNC: 9.4 MG/DL (ref 8.8–10.4)
CALCIUM SERPL-MCNC: 9.8 MG/DL (ref 8.8–10.4)
CHLORIDE SERPL-SCNC: 90 MMOL/L (ref 98–107)
CHLORIDE SERPL-SCNC: 92 MMOL/L (ref 98–107)
CREAT SERPL-MCNC: 1.23 MG/DL (ref 0.51–0.95)
CREAT SERPL-MCNC: 1.25 MG/DL (ref 0.51–0.95)
DIASTOLIC BLOOD PRESSURE - MUSE: NORMAL MMHG
EGFRCR SERPLBLD CKD-EPI 2021: 44 ML/MIN/1.73M2
EGFRCR SERPLBLD CKD-EPI 2021: 45 ML/MIN/1.73M2
EOSINOPHIL # BLD AUTO: 0.2 10E3/UL (ref 0–0.7)
EOSINOPHIL NFR BLD AUTO: 3 %
ERYTHROCYTE [DISTWIDTH] IN BLOOD BY AUTOMATED COUNT: 12.3 % (ref 10–15)
FLUAV RNA SPEC QL NAA+PROBE: NEGATIVE
FLUBV RNA RESP QL NAA+PROBE: NEGATIVE
GLUCOSE BLDC GLUCOMTR-MCNC: 157 MG/DL (ref 70–99)
GLUCOSE SERPL-MCNC: 100 MG/DL (ref 70–99)
GLUCOSE SERPL-MCNC: 139 MG/DL (ref 70–99)
HCO3 SERPL-SCNC: 19 MMOL/L (ref 22–29)
HCO3 SERPL-SCNC: 23 MMOL/L (ref 22–29)
HCT VFR BLD AUTO: 36.2 % (ref 35–47)
HGB BLD-MCNC: 12.3 G/DL (ref 11.7–15.7)
HOLD SPECIMEN: NORMAL
IMM GRANULOCYTES # BLD: 0 10E3/UL
IMM GRANULOCYTES NFR BLD: 0 %
INTERPRETATION ECG - MUSE: NORMAL
LYMPHOCYTES # BLD AUTO: 1.4 10E3/UL (ref 0.8–5.3)
LYMPHOCYTES NFR BLD AUTO: 23 %
MAGNESIUM SERPL-MCNC: 1.8 MG/DL (ref 1.7–2.3)
MCH RBC QN AUTO: 29.4 PG (ref 26.5–33)
MCHC RBC AUTO-ENTMCNC: 34 G/DL (ref 31.5–36.5)
MCV RBC AUTO: 86 FL (ref 78–100)
MONOCYTES # BLD AUTO: 0.9 10E3/UL (ref 0–1.3)
MONOCYTES NFR BLD AUTO: 15 %
NEUTROPHILS # BLD AUTO: 3.7 10E3/UL (ref 1.6–8.3)
NEUTROPHILS NFR BLD AUTO: 59 %
NRBC # BLD AUTO: 0 10E3/UL
NRBC BLD AUTO-RTO: 0 /100
NT-PROBNP SERPL-MCNC: 5050 PG/ML (ref 0–624)
P AXIS - MUSE: NORMAL DEGREES
PLATELET # BLD AUTO: 300 10E3/UL (ref 150–450)
POTASSIUM SERPL-SCNC: 3.9 MMOL/L (ref 3.4–5.3)
POTASSIUM SERPL-SCNC: 4.1 MMOL/L (ref 3.4–5.3)
PR INTERVAL - MUSE: NORMAL MS
PROT SERPL-MCNC: 7.1 G/DL (ref 6.4–8.3)
QRS DURATION - MUSE: 104 MS
QT - MUSE: 388 MS
QTC - MUSE: 450 MS
R AXIS - MUSE: -55 DEGREES
RBC # BLD AUTO: 4.19 10E6/UL (ref 3.8–5.2)
RSV RNA SPEC NAA+PROBE: NEGATIVE
SARS-COV-2 RNA RESP QL NAA+PROBE: NEGATIVE
SODIUM SERPL-SCNC: 130 MMOL/L (ref 135–145)
SODIUM SERPL-SCNC: 131 MMOL/L (ref 135–145)
SYSTOLIC BLOOD PRESSURE - MUSE: NORMAL MMHG
T AXIS - MUSE: 5 DEGREES
T4 FREE SERPL-MCNC: 1.29 NG/DL (ref 0.9–1.7)
TROPONIN T SERPL HS-MCNC: 12 NG/L
TROPONIN T SERPL HS-MCNC: 13 NG/L
TSH SERPL DL<=0.005 MIU/L-ACNC: 4.27 UIU/ML (ref 0.3–4.2)
VENTRICULAR RATE- MUSE: 81 BPM
WBC # BLD AUTO: 6.3 10E3/UL (ref 4–11)

## 2025-06-29 PROCEDURE — 250N000013 HC RX MED GY IP 250 OP 250 PS 637: Performed by: INTERNAL MEDICINE

## 2025-06-29 PROCEDURE — 83735 ASSAY OF MAGNESIUM: CPT | Performed by: INTERNAL MEDICINE

## 2025-06-29 PROCEDURE — 99223 1ST HOSP IP/OBS HIGH 75: CPT | Performed by: INTERNAL MEDICINE

## 2025-06-29 PROCEDURE — 71046 X-RAY EXAM CHEST 2 VIEWS: CPT

## 2025-06-29 PROCEDURE — 96376 TX/PRO/DX INJ SAME DRUG ADON: CPT

## 2025-06-29 PROCEDURE — 250N000011 HC RX IP 250 OP 636: Performed by: INTERNAL MEDICINE

## 2025-06-29 PROCEDURE — 82962 GLUCOSE BLOOD TEST: CPT

## 2025-06-29 PROCEDURE — 82247 BILIRUBIN TOTAL: CPT | Performed by: EMERGENCY MEDICINE

## 2025-06-29 PROCEDURE — 99285 EMERGENCY DEPT VISIT HI MDM: CPT | Mod: 25

## 2025-06-29 PROCEDURE — 84443 ASSAY THYROID STIM HORMONE: CPT | Performed by: EMERGENCY MEDICINE

## 2025-06-29 PROCEDURE — 96375 TX/PRO/DX INJ NEW DRUG ADDON: CPT

## 2025-06-29 PROCEDURE — 84155 ASSAY OF PROTEIN SERUM: CPT | Performed by: EMERGENCY MEDICINE

## 2025-06-29 PROCEDURE — 83880 ASSAY OF NATRIURETIC PEPTIDE: CPT | Performed by: EMERGENCY MEDICINE

## 2025-06-29 PROCEDURE — 84484 ASSAY OF TROPONIN QUANT: CPT | Performed by: EMERGENCY MEDICINE

## 2025-06-29 PROCEDURE — G0378 HOSPITAL OBSERVATION PER HR: HCPCS

## 2025-06-29 PROCEDURE — 85025 COMPLETE CBC W/AUTO DIFF WBC: CPT | Performed by: EMERGENCY MEDICINE

## 2025-06-29 PROCEDURE — 250N000011 HC RX IP 250 OP 636: Performed by: EMERGENCY MEDICINE

## 2025-06-29 PROCEDURE — 36415 COLL VENOUS BLD VENIPUNCTURE: CPT | Performed by: EMERGENCY MEDICINE

## 2025-06-29 PROCEDURE — 87637 SARSCOV2&INF A&B&RSV AMP PRB: CPT | Performed by: EMERGENCY MEDICINE

## 2025-06-29 PROCEDURE — 250N000009 HC RX 250: Performed by: INTERNAL MEDICINE

## 2025-06-29 PROCEDURE — 96374 THER/PROPH/DIAG INJ IV PUSH: CPT

## 2025-06-29 PROCEDURE — 93005 ELECTROCARDIOGRAM TRACING: CPT

## 2025-06-29 PROCEDURE — 84439 ASSAY OF FREE THYROXINE: CPT | Performed by: EMERGENCY MEDICINE

## 2025-06-29 RX ORDER — AMOXICILLIN 250 MG
2 CAPSULE ORAL 2 TIMES DAILY PRN
Status: DISCONTINUED | OUTPATIENT
Start: 2025-06-29 | End: 2025-07-01 | Stop reason: HOSPADM

## 2025-06-29 RX ORDER — POLYETHYLENE GLYCOL 3350 17 G/17G
17 POWDER, FOR SOLUTION ORAL DAILY
Status: DISCONTINUED | OUTPATIENT
Start: 2025-06-30 | End: 2025-07-01 | Stop reason: HOSPADM

## 2025-06-29 RX ORDER — PROCHLORPERAZINE MALEATE 5 MG/1
5 TABLET ORAL EVERY 6 HOURS PRN
Status: DISCONTINUED | OUTPATIENT
Start: 2025-06-29 | End: 2025-07-01 | Stop reason: HOSPADM

## 2025-06-29 RX ORDER — ACETAMINOPHEN 325 MG/1
650 TABLET ORAL EVERY 4 HOURS PRN
Status: DISCONTINUED | OUTPATIENT
Start: 2025-06-29 | End: 2025-07-01 | Stop reason: HOSPADM

## 2025-06-29 RX ORDER — ONDANSETRON 2 MG/ML
4 INJECTION INTRAMUSCULAR; INTRAVENOUS EVERY 6 HOURS PRN
Status: DISCONTINUED | OUTPATIENT
Start: 2025-06-29 | End: 2025-07-01 | Stop reason: HOSPADM

## 2025-06-29 RX ORDER — AMOXICILLIN 250 MG
1 CAPSULE ORAL 2 TIMES DAILY PRN
Status: DISCONTINUED | OUTPATIENT
Start: 2025-06-29 | End: 2025-07-01 | Stop reason: HOSPADM

## 2025-06-29 RX ORDER — FAMOTIDINE 10 MG
10 TABLET ORAL DAILY
Status: DISCONTINUED | OUTPATIENT
Start: 2025-06-30 | End: 2025-07-01 | Stop reason: HOSPADM

## 2025-06-29 RX ORDER — FUROSEMIDE 10 MG/ML
40 INJECTION INTRAMUSCULAR; INTRAVENOUS ONCE
Status: COMPLETED | OUTPATIENT
Start: 2025-06-29 | End: 2025-06-29

## 2025-06-29 RX ORDER — ONDANSETRON 4 MG/1
4 TABLET, ORALLY DISINTEGRATING ORAL EVERY 6 HOURS PRN
Status: DISCONTINUED | OUTPATIENT
Start: 2025-06-29 | End: 2025-07-01 | Stop reason: HOSPADM

## 2025-06-29 RX ORDER — DEXTROSE MONOHYDRATE 25 G/50ML
25-50 INJECTION, SOLUTION INTRAVENOUS
Status: DISCONTINUED | OUTPATIENT
Start: 2025-06-29 | End: 2025-07-01 | Stop reason: HOSPADM

## 2025-06-29 RX ORDER — FLECAINIDE ACETATE 50 MG/1
50 TABLET ORAL 2 TIMES DAILY
Status: DISCONTINUED | OUTPATIENT
Start: 2025-06-29 | End: 2025-07-01

## 2025-06-29 RX ORDER — ACETAMINOPHEN 650 MG/1
650 SUPPOSITORY RECTAL EVERY 4 HOURS PRN
Status: DISCONTINUED | OUTPATIENT
Start: 2025-06-29 | End: 2025-07-01 | Stop reason: HOSPADM

## 2025-06-29 RX ORDER — METOPROLOL TARTRATE 1 MG/ML
5 INJECTION, SOLUTION INTRAVENOUS EVERY 6 HOURS
Status: DISCONTINUED | OUTPATIENT
Start: 2025-06-29 | End: 2025-07-01

## 2025-06-29 RX ORDER — NICOTINE POLACRILEX 4 MG
15-30 LOZENGE BUCCAL
Status: DISCONTINUED | OUTPATIENT
Start: 2025-06-29 | End: 2025-07-01 | Stop reason: HOSPADM

## 2025-06-29 RX ORDER — METOPROLOL SUCCINATE 50 MG/1
100 TABLET, EXTENDED RELEASE ORAL DAILY
Status: DISCONTINUED | OUTPATIENT
Start: 2025-06-30 | End: 2025-07-01 | Stop reason: HOSPADM

## 2025-06-29 RX ADMIN — METOPROLOL TARTRATE 5 MG: 5 INJECTION INTRAVENOUS at 22:37

## 2025-06-29 RX ADMIN — FUROSEMIDE 40 MG: 10 INJECTION, SOLUTION INTRAMUSCULAR; INTRAVENOUS at 22:38

## 2025-06-29 RX ADMIN — FLECAINIDE ACETATE 50 MG: 50 TABLET ORAL at 22:38

## 2025-06-29 RX ADMIN — FUROSEMIDE 40 MG: 10 INJECTION, SOLUTION INTRAMUSCULAR; INTRAVENOUS at 16:37

## 2025-06-29 RX ADMIN — APIXABAN 5 MG: 5 TABLET, FILM COATED ORAL at 22:38

## 2025-06-29 ASSESSMENT — ACTIVITIES OF DAILY LIVING (ADL)
ADLS_ACUITY_SCORE: 44
ADLS_ACUITY_SCORE: 41
ADLS_ACUITY_SCORE: 44
ADLS_ACUITY_SCORE: 44
ADLS_ACUITY_SCORE: 35
ADLS_ACUITY_SCORE: 35

## 2025-06-29 NOTE — ED PROVIDER NOTES
History     Chief Complaint:  Generalized Weakness       HPI   Haritha Cat is an anticoagulated 80 year old female on Eliquis for atrial fibrillation with a history of hypertension and type 2 diabetes who presents for evaluation of increased heart rate, shortness of breath, and fatigue. Patient reports that earlier today she was at the grocery store when she experienced shortness of breath after walking down a single bay. She also could not push her cart once it was full, which is abnormal. She also had chills at this time. About 5-6 months ago, she experienced similar symptoms of shortness of breath when she walked a short distance from her car to a building. At that time, she was diagnosed with atrial fibrillation and put on Eliquis. Denies any recent dosage changes. She has been taking her Eliquis as directed. However, she did not take it for 5 days about 3 weeks ago because she was being tested for a future eye surgery. She is ok at rest. Denies any chest discomfort. Denies any swelling in her legs, pain in her calves, or hemoptysis. Denies having any symptoms for atrial fibrillation other than shortness of breath. She recently traveled to the [place]. Denies any rashes or tick bites. Denies any black or bloody stools. Denies nausea, vomiting, or diarrhea.     Independent Historian:    None     Review of External Notes:  I reviewed the office visit note to family practice on May 23, 2025.  Reviewed past medical history.    I reviewed the nurse triage note to cardiology Dr. Sourav Peralta on 6/27/2025.  Patient reported she was in [place] spending time in town walking and hiking and she became winded with ambulation going up hills.  Unable to continue conversation.  No shortness of breath at rest.  Referred to the emergency department.    I reviewed the Lexiscan stress test from December 18, 2023.  Results of the below:      The nuclear stress test is negative for inducible myocardial ischemia or  infarction.    Left ventricular function is normal.    There is no prior study for comparison.    Medications:    Eliquis   Flecainide   Hydrochlorothiazide   Metformin   Toprol XL     Past Medical History:    Osteopenia   Allergic rhinitis   Arthritis   Diverticulosis   Type 2 diabetes   GERD   Hepatitis A   Herpes Zoster   Hypertension   Atrial fibrillation   Spinal stenosis     Past Surgical History:    Anaesthesia cardioversion   Cataract removal, bilateral   Lumbar discectomy   Varicose vein surgery   [place] teeth removal      Physical Exam   Patient Vitals for the past 24 hrs:   BP Temp Pulse Resp SpO2   06/29/25 1520 (!) 141/97 -- 101 15 --   06/29/25 1445 (!) 116/96 -- 94 23 --   06/29/25 1420 -- 98  F (36.7  C) -- -- --   06/29/25 1345 (!) 143/98 -- 87 12 98 %        Physical Exam  General: Well-nourished, appears fatigued   eyes: PERRL, conjunctivae pink no scleral icterus or conjunctival injection  ENT:  Moist mucus membranes, posterior oropharynx clear without erythema or exudates  Respiratory:  Lungs clear to auscultation bilaterally, + bibasilar crackles.  Good air movement  CV: Irregularly irregular rhythm and normal rate, no murmurs/rubs/gallops  GI:  Abdomen soft and non-distended.  Normoactive BS.  No tenderness, guarding or rebound  Skin: Warm, dry.  No rashes or petechiae  Musculoskeletal: 1+ symmetric peripheral edema no calf tenderness  Neuro: Alert and oriented to person/place/time  Psychiatric: Normal affect    Emergency Department Course   ECG  ECG results from 06/29/25   EKG 12-lead, tracing only     Value    Systolic Blood Pressure     Diastolic Blood Pressure     Ventricular Rate 81    Atrial Rate     DE Interval     QRS Duration 104        QTc 450    P Axis     R AXIS -55    T Axis 5    Interpretation ECG      Atrial fibrillation with premature ventricular or aberrantly conducted complexes  Left axis deviation   When compared with ECG of 13-Nov-2023 10:57,  Atrial fibrillation was  not present in the previous EKG.   Read by me at 1421         Imaging:  Chest XR,  PA & LAT   Final Result   IMPRESSION: Stable mild cardiomegaly without pulmonary vascular congestion, focal airspace opacity, pleural effusion or pneumothorax. Lung volume is within normal limits.          Laboratory:  Labs Ordered and Resulted from Time of ED Arrival to Time of ED Departure   COMPREHENSIVE METABOLIC PANEL - Abnormal       Result Value    Sodium 130 (*)     Potassium 3.9      Carbon Dioxide (CO2) 23      Anion Gap 17 (*)     Urea Nitrogen 21.4      Creatinine 1.25 (*)     GFR Estimate 44 (*)     Calcium 9.4      Chloride 90 (*)     Glucose 139 (*)     Alkaline Phosphatase 108      AST 33      ALT 44      Protein Total 7.1      Albumin 4.3      Bilirubin Total 1.0     NT-PROBNP - Abnormal    NT-proBNP 5,050 (*)    TSH WITH FREE T4 REFLEX - Abnormal    TSH 4.27 (*)    TROPONIN T, HIGH SENSITIVITY - Normal    Troponin T, High Sensitivity 13     INFLUENZA A/B, RSV AND SARS-COV2 PCR - Normal    Influenza A PCR Negative      Influenza B PCR Negative      RSV PCR Negative      SARS CoV2 PCR Negative     T4 FREE - Normal    Free T4 1.29     TROPONIN T, HIGH SENSITIVITY - Normal    Troponin T, High Sensitivity 12     CBC WITH PLATELETS AND DIFFERENTIAL    WBC Count 6.3      RBC Count 4.19      Hemoglobin 12.3      Hematocrit 36.2      MCV 86      MCH 29.4      MCHC 34.0      RDW 12.3      Platelet Count 300      % Neutrophils 59      % Lymphocytes 23      % Monocytes 15      % Eosinophils 3      % Basophils 1      % Immature Granulocytes 0      NRBCs per 100 WBC 0      Absolute Neutrophils 3.7      Absolute Lymphocytes 1.4      Absolute Monocytes 0.9      Absolute Eosinophils 0.2      Absolute Basophils 0.1      Absolute Immature Granulocytes 0.0      Absolute NRBCs 0.0          Emergency Department Course & Assessments:    Interventions:  Medications   furosemide (LASIX) injection 40 mg (40 mg Intravenous $Given 6/29/25 2535)         Independent Interpretation (X-rays, CTs, rhythm strip):  I reviewed and interpreted the chest x-ray.  I see no evidence of pneumothorax or pneumonia.    Consultations/Discussion of Management or Tests:    ED Course as of 06/29/25 1822   Sun Jun 29, 2025   1342 I obtained history and examined the patient as noted above.    1630 I consulted with Dr. Marrufo of the hospitalist service who will admit the patient.  Recommended observation admission.   1644 I consulted with Dr. Yanez.  She recommended that we go ahead and admit for further evaluation and management.  Recommended that we hold off on cardioversion at this time.       Social Drivers of Health affecting care:  None     Disposition:  The patient was admitted to the hospital under the care of Dr. Marrufo.    Impression & Plan    CMS Diagnoses: None     Medical Decision Making:  Haritha Cat is a 80 year old female with a history of paroxysmal atrial fibrillation who comes today with dyspnea on exertion and fatigue with exercise intolerance.  She is in atrial fibrillation though this is rate controlled.  It appears that this is paroxysmal and this may be contributing to the symptoms.  It is unclear how long she has been in atrial fibrillation as she does not feel it.  She is on anticoagulation.  She had a break from this 3 weeks prior.  No stroke symptoms.  On examination I suspect she has new onset congestive heart failure.  Her BNP is significantly elevated.  Chest x-ray does not show overt pulmonary edema but does show cardiomegaly.  She is not requiring oxygen.  However, will treat for new onset congestive heart failure and admit for further echocardiogram and workup and consideration of cardioversion.  Consulted with cardiology regarding workup and admission and hospital service and they graciously agreed to admit the patient.    Diagnosis:    ICD-10-CM    1. Dyspnea on exertion  R06.09       2. New onset of congestive heart failure (H)  I50.9        3. Paroxysmal atrial fibrillation (H)  I48.0            Discharge Medications:  New Prescriptions    No medications on file          Scribe Disclosure:  I, Marky Shaw, am serving as a scribe at 2:01 PM on 6/29/2025 to document services personally performed by Kia Alcantara MD based on my observations and the provider's statements to me.  6/29/2025   Kia Alcantara MD Cho, Amy C, MD  06/29/25 1822

## 2025-06-29 NOTE — ED NOTES
[facility]  ED Nurse Handoff Report    ED Chief complaint: Generalized Weakness      ED Diagnosis:   Final diagnoses:   Dyspnea on exertion   New onset of congestive heart failure (H)   Paroxysmal atrial fibrillation (H)       Code Status: not addressed by ed md    Allergies:   Allergies   Allergen Reactions    Amoxicillin Hives    Irbesartan      Hyperkalemia    Lisinopril Cough       Patient Story: Patient has a history of A-fib is on Eliquis, HTN, Type 2 Diabetes. Patient comes into the ED for complaints of SOB, tachycardia, and fatigue.   Focused Assessment:  Patient states was at the grocery store today and walked down oe aisle and became increasingly SOB.  States she could not even push her cart. Patient denies swelling in her extremities, denies CP, denies N/V/D.    Treatments and/or interventions provided: lasix  Patient's response to treatments and/or interventions: stable    To be done/followed up on inpatient unit:  Continue to monitor    Does this patient have any cognitive concerns?: patient alert and oriented    Activity level - Baseline/Home:  Independent  Activity Level - Current:   Stand with Assist    Patient's Preferred language: English   Needed?: No    Isolation: None  Infection: Not Applicable  Sepsis treatment initiated: No  Patient tested for COVID 19 prior to admission: YES  Bariatric?: No    Vital Signs:   Vitals:    06/29/25 1345 06/29/25 1420 06/29/25 1445 06/29/25 1520   BP: (!) 143/98  (!) 116/96 (!) 141/97   Pulse: 87  94 101   Resp: 12  23 15   Temp:  98  F (36.7  C)     SpO2: 98%          Cardiac Rhythm:Cardiac Rhythm: Atrial fibrillation    Was the PSS-3 completed:   Yes  What interventions are required if any?               Family Comments: at the bedside  OBS brochure/video discussed/provided to patient/family: No              Name of person given brochure if not patient: n/a              Relationship to patient: n/a    For the majority of the shift  this patient's behavior was Green.   Behavioral interventions performed were none.    ED NURSE PHONE NUMBER: [phone]

## 2025-06-29 NOTE — ED NOTES
Pt was able to ambulate independently under writer supervision to restroom. Pt returned to bed, rails were placed upward, and call light was provided.

## 2025-06-29 NOTE — H&P
History and Physical  Hospitalist       Date of Admission:  6/29/2025    Assessment & Plan   Haritha Cat is a 80 year old female with history of paroxysmal atrial fibrillation presents with dyspnea on exertion and was noted to be in A-fib with RVR with elevated BNP.  Active Problems:    Paroxysmal atrial fibrillation (H)    Dyspnea on exertion  Possible new onset of congestive heart failure (H)  --Patient was diagnosed with paroxysmal A-fib in 2023, at the time her echo was within normal limit, she also had a stress test, since then she is on Eliquis and has been taking it religiously, she is also on metoprolol and flecainide for rate control.  --Over last 2 months patient has noticed increased exertional dyspnea, on the day of admission she could not walk more than 10 steps without resting.  --Admit to observation, cardiac telemetry, her case was discussed with cardiology, there is a concern whether she has developed new onset congestive heart failure since her BNP is elevated (5050)  --Will obtain echocardiogram, will continue her flecainide and metoprolol, cardiology consulted.  --For now I continued her Eliquis, depending on echocardiogram further plans can be decided.  Not sure whether her A-fib is leading to CHF versus CHF worsening A-fib.  Her first set of troponin are negative here, she also had a normal stress test in 2023.  --Monitor electrolytes and replace, Lasix 40 mg IV given x 1, please reassess her fluid status in a.m. for further diuresis.  --Chest x-ray does not show any pulmonary edema or pleural effusion, if there is no significant cardiac reason for her shortness of breath might need to get a CT chest.  (Not ordered)  --Patient currently remains in room air, denies any chest pain at rest.  --TSH is elevated, T4 within normal limit, subclinical hypothyroidism?    Hyperlipidemia LDL goal <100  --Diet controlled    HTN (hypertension), benign  --Blood  pressures are currently controlled with metoprolol, restarted here    Type 2 diabetes mellitus without complications (H)  --PTA on metformin, will place on sliding scale here, will hold metformin while further testing is planned.  CKD stage III  --Creatinine is stable at 1.2, on reviewing her BMP from past her creatinine always remained between 1.2-1.3.  Clinically Significant Risk Factors Present on Admission         # Hyponatremia: Lowest Na = 130 mmol/L in last 2 days, will monitor as appropriate  # Hypochloremia: Lowest Cl = 90 mmol/L in last 2 days, will monitor as appropriate       # Drug Induced Coagulation Defect: home medication list includes an anticoagulant medication    # Hypertension: Noted on problem list  # Acute heart failure with preserved ejection fraction: heart failure noted on problem list, last echo with EF >50%, and receiving IV diuretics     DVT Prophylaxis: DOAC  Code Status: Full Code    Medically Ready for Discharge: Anticipated Tomorrow      Yoselin Marrufo MD, MD    Primary Care Physician   Cristino Contreras MD    Chief Complaint   Dyspnea on exertion-2 months, worse-1 day    History is obtained from the patient    History of Present Illness   Haritha Cat is a 80 year old female with a history of paroxysmal atrial fibrillation, hypertension presents to the emergency department with 2-month history of shortness of breath, patient has decreased exercise tolerance over this period of time, on the day of admission she was shopping and noted that she could not walk any further, she rested in the store and was noted by a  who contacted EMT and was brought to the emergency department.  Her first set of troponin negative, second remain negative as well, BNP is 5050.  Patient has a diagnosis of paroxysmal A-fib and is on Eliquis, she was noted to have a heart rate between 100-120 when she had the symptoms.  Her diagnosis for A-fib was in 2023, since then she has been  religiously taking Eliquis except almost a month ago she quit taking it for 5 days for an MRI as instructed by her neurosurgeon.  Patient did not have any fever, chills, dysuria, nausea, vomiting or abdominal pain with this.  She did not have any cough or hematemesis.  She denied any chest pain or chest discomfort.  In December 2023 she had a nuclear test stress test which was negative and her left ventricular function was normal and that.  She also mentions being very active prior to this, since 2 months her activity level has gone down due to dyspnea on exertion.  Sodium 130, chloride 90, creatinine 1.25, glucose 139, her TSH seems to be elevated, pending T4.  White count within normal limit, respiratory panel is negative, x-ray chest does not show any abnormalities.    Past Medical History    I have reviewed this patient's medical history and updated it with pertinent information if needed.   Past Medical History:   Diagnosis Date    Abscess of abdominal cavity (H) 11/2002    cecal perf, prob from diverticulitis    Allergic rhinitis     Arthritis this year    Right hand, numbness    Colon polyp 2015    colonoscopy due 5 years.    Diverticulosis     DM type 2 (diabetes mellitus, type 2) (H)     Elevated LFTs     fatty liver dz    GERD (gastroesophageal reflux disease)     Hepatitis A     Herpes zoster 11/28/2011    HTN (hypertension), benign     New onset of congestive heart failure (H) 6/29/2025    Osteopenia     DXA 2008    Osteopenia 2014       Past Surgical History   I have reviewed this patient's surgical history and updated it with pertinent information if needed.  Past Surgical History:   Procedure Laterality Date    ANESTHESIA CARDIOVERSION N/A 11/13/2023    Procedure: Anesthesia cardioversion;  Surgeon: GENERIC ANESTHESIA PROVIDER;  Location:  OR    CATARACT IOL, RT/LT      COLONOSCOPY N/A 5/15/2018    Procedure: COLONOSCOPY;  colonoscopy;  Surgeon: Lucero Acharya MD;  Location:  GI     COLONOSCOPY N/A 2024    Procedure: Colonoscopy;  Surgeon: Emelyn Campos MD;  Location:  GI    DISCECTOMY LUMBAR POSTERIOR MICROSCOPIC ONE LEVEL Right 10/1/2019    Procedure: LUMBAR MICRODISCECTOMY RIGHT L2-3;  Surgeon: Wolfgang Peng MD;  Location:  OR    varicose vein surgery      Dr. Bowen    wisdom teeth  age 27       Prior to Admission Medications   Prior to Admission Medications   Prescriptions Last Dose Informant Patient Reported? Taking?   Vitamin D, Cholecalciferol, 25 MCG (1000 UT) TABS   Yes No   Sig: Take 1 tablet by mouth daily   apixaban ANTICOAGULANT (ELIQUIS) 5 MG tablet   No No   Sig: Take 1 tablet (5 mg) by mouth 2 times daily.   famotidine (PEPCID) 10 MG tablet   Yes No   Sig: Take 10 mg by mouth as needed   flecainide (TAMBOCOR) 50 MG tablet   No No   Sig: Take 1 tablet (50 mg) by mouth 2 times daily.   hydrochlorothiazide (HYDRODIURIL) 25 MG tablet   No No   Sig: Take 1 tablet (25 mg) by mouth daily.   metFORMIN (GLUCOPHAGE) 500 MG tablet   No No   Sig: Take 2 tablets (1,000 mg) by mouth 2 times daily (with meals).   metoprolol succinate ER (TOPROL XL) 100 MG 24 hr tablet   No No   Sig: Take 1 tablet (100 mg) by mouth daily.      Facility-Administered Medications: None     Allergies   Allergies   Allergen Reactions    Amoxicillin Hives    Irbesartan      Hyperkalemia    Lisinopril Cough       Social History   I have reviewed this patient's social history and updated it with pertinent information if needed. Haritha Cat  reports that she has never smoked. She has never used smokeless tobacco. She reports current alcohol use. She reports that she does not use drugs.    Family History   I have reviewed this patient's family history and updated it with pertinent information if needed.   Family History   Problem Relation Age of Onset    Hypertension Mother             Thyroid Disease Mother             Neurologic Disorder Mother         Parkinsons and RA     Hypertension Father             Diabetes Father             Diabetes Sister             Substance Abuse Sister             Hypertension Sister             Diabetes Maternal Grandmother             Diabetes Maternal Grandfather             Alcoholism Sister 59    Diabetes Son     Hypertension Son        Review of Systems   The 10 point Review of Systems is negative other than noted in the HPI or here.     Physical Exam   Temp: 98  F (36.7  C)   BP: (!) 141/97 Pulse: 101   Resp: 15 SpO2: 98 % O2 Device: None (Room air)    Vital Signs with Ranges  Temp:  [98  F (36.7  C)] 98  F (36.7  C)  Pulse:  [] 101  Resp:  [12-23] 15  BP: (116-143)/(96-98) 141/97  SpO2:  [98 %] 98 %  0 lbs 0 oz    Constitutional: Awake, alert, cooperative, no apparent distress.  Eyes: Conjunctiva and pupils examined and normal.  HEENT: Moist mucous membranes, normal dentition.  Respiratory: Sounds equal bilaterally, trace bibasilar crackles noted  Cardiovascular: Regular rate and rhythm, normal S1 and S2, and no murmur noted.  GI: Soft, non-distended, non-tender, normal bowel sounds.  Lymph/Hematologic: No anterior cervical or supraclavicular adenopathy.  Skin: No rashes, no cyanosis, no edema.  Musculoskeletal: No joint swelling, erythema or tenderness.  Neurologic: Cranial nerves 2-12 intact, normal strength and sensation.  Psychiatric: Alert, oriented to person, place and time, no obvious anxiety or depression.    Data   Data reviewed today:  I personally reviewed labs and imaging below.  Recent Labs   Lab 25  1350   WBC 6.3   HGB 12.3   MCV 86      *   POTASSIUM 3.9   CHLORIDE 90*   CO2 23   BUN 21.4   CR 1.25*   ANIONGAP 17*   NATALYA 9.4   *   ALBUMIN 4.3   PROTTOTAL 7.1   BILITOTAL 1.0   ALKPHOS 108   ALT 44   AST 33       Imaging:  Recent Results (from the past 24 hours)   Chest XR,  PA & LAT    Narrative    EXAM: XR CHEST 2 VIEWS  [facility]: [facility]  DATE: 6/29/2025    INDICATION: dyspnea on exertion  COMPARISON: 11/08/2023      Impression    IMPRESSION: Stable mild cardiomegaly without pulmonary vascular congestion, focal airspace opacity, pleural effusion or pneumothorax. Lung volume is within normal limits.

## 2025-06-29 NOTE — PHARMACY-ADMISSION MEDICATION HISTORY
Pharmacist Admission Medication History    Admission medication history is complete. The information provided in this note is only as accurate as the sources available at the time of the update.    Information Source(s): Patient and CareEverywhere/SureScripts via in-person      Changes made to PTA medication list:  Added: None  Deleted: None  Changed: updated famotidine from as needed to daily as needed     Allergies reviewed with patient and updates made in EHR: yes    Medication History Completed By: Liz Thakur, PharmD 6/29/2025 6:39 PM    PTA Med List   Medication Sig Last Dose/Taking    apixaban ANTICOAGULANT (ELIQUIS) 5 MG tablet Take 1 tablet (5 mg) by mouth 2 times daily. 6/29/2025 Morning    famotidine (PEPCID) 10 MG tablet Take 10 mg by mouth daily as needed. Taking As Needed    flecainide (TAMBOCOR) 50 MG tablet Take 1 tablet (50 mg) by mouth 2 times daily. 6/29/2025 Morning    hydrochlorothiazide (HYDRODIURIL) 25 MG tablet Take 1 tablet (25 mg) by mouth daily. 6/29/2025 Morning    metFORMIN (GLUCOPHAGE) 500 MG tablet Take 2 tablets (1,000 mg) by mouth 2 times daily (with meals). 6/29/2025 Morning    metoprolol succinate ER (TOPROL XL) 100 MG 24 hr tablet Take 1 tablet (100 mg) by mouth daily. 6/29/2025 Morning    Vitamin D, Cholecalciferol, 25 MCG (1000 UT) TABS Take 1 tablet by mouth every evening. 6/28/2025 Evening

## 2025-06-29 NOTE — ED NOTES
Bed: ED21  Expected date:   Expected time:   Means of arrival:   Comments:  CHLOÉ 80F WEAK AFIB ETA 10

## 2025-06-30 ENCOUNTER — APPOINTMENT (OUTPATIENT)
Dept: CARDIOLOGY | Facility: CLINIC | Age: 80
DRG: 308 | End: 2025-06-30
Attending: INTERNAL MEDICINE
Payer: MEDICARE

## 2025-06-30 LAB
GLUCOSE BLDC GLUCOMTR-MCNC: 125 MG/DL (ref 70–99)
GLUCOSE BLDC GLUCOMTR-MCNC: 130 MG/DL (ref 70–99)
GLUCOSE BLDC GLUCOMTR-MCNC: 152 MG/DL (ref 70–99)
GLUCOSE BLDC GLUCOMTR-MCNC: 179 MG/DL (ref 70–99)
GLUCOSE BLDC GLUCOMTR-MCNC: 211 MG/DL (ref 70–99)
LVEF ECHO: NORMAL

## 2025-06-30 PROCEDURE — 99223 1ST HOSP IP/OBS HIGH 75: CPT | Mod: 25 | Performed by: INTERNAL MEDICINE

## 2025-06-30 PROCEDURE — G0378 HOSPITAL OBSERVATION PER HR: HCPCS

## 2025-06-30 PROCEDURE — 999N000208 ECHOCARDIOGRAM COMPLETE

## 2025-06-30 PROCEDURE — 96376 TX/PRO/DX INJ SAME DRUG ADON: CPT

## 2025-06-30 PROCEDURE — 93306 TTE W/DOPPLER COMPLETE: CPT | Mod: 26 | Performed by: INTERNAL MEDICINE

## 2025-06-30 PROCEDURE — 255N000002 HC RX 255 OP 636: Performed by: INTERNAL MEDICINE

## 2025-06-30 PROCEDURE — 99233 SBSQ HOSP IP/OBS HIGH 50: CPT | Performed by: NURSE PRACTITIONER

## 2025-06-30 PROCEDURE — 250N000013 HC RX MED GY IP 250 OP 250 PS 637: Performed by: INTERNAL MEDICINE

## 2025-06-30 PROCEDURE — 250N000009 HC RX 250: Performed by: INTERNAL MEDICINE

## 2025-06-30 PROCEDURE — 99418 PROLNG IP/OBS E/M EA 15 MIN: CPT | Performed by: NURSE PRACTITIONER

## 2025-06-30 PROCEDURE — 250N000012 HC RX MED GY IP 250 OP 636 PS 637: Performed by: INTERNAL MEDICINE

## 2025-06-30 PROCEDURE — 120N000001 HC R&B MED SURG/OB

## 2025-06-30 PROCEDURE — 82962 GLUCOSE BLOOD TEST: CPT

## 2025-06-30 RX ORDER — FUROSEMIDE 20 MG/1
20 TABLET ORAL DAILY
Status: DISCONTINUED | OUTPATIENT
Start: 2025-07-01 | End: 2025-07-01 | Stop reason: HOSPADM

## 2025-06-30 RX ADMIN — INSULIN ASPART 1 UNITS: 100 INJECTION, SOLUTION INTRAVENOUS; SUBCUTANEOUS at 08:08

## 2025-06-30 RX ADMIN — INSULIN ASPART 1 UNITS: 100 INJECTION, SOLUTION INTRAVENOUS; SUBCUTANEOUS at 12:42

## 2025-06-30 RX ADMIN — METOPROLOL SUCCINATE 100 MG: 50 TABLET, EXTENDED RELEASE ORAL at 08:10

## 2025-06-30 RX ADMIN — APIXABAN 5 MG: 5 TABLET, FILM COATED ORAL at 20:18

## 2025-06-30 RX ADMIN — APIXABAN 5 MG: 5 TABLET, FILM COATED ORAL at 08:11

## 2025-06-30 RX ADMIN — FLECAINIDE ACETATE 50 MG: 50 TABLET ORAL at 20:18

## 2025-06-30 RX ADMIN — FAMOTIDINE 10 MG: 10 TABLET, FILM COATED ORAL at 08:10

## 2025-06-30 RX ADMIN — PERFLUTREN 10 ML (DILUTED): 6.52 INJECTION, SUSPENSION INTRAVENOUS at 10:06

## 2025-06-30 RX ADMIN — FLECAINIDE ACETATE 50 MG: 50 TABLET ORAL at 08:11

## 2025-06-30 RX ADMIN — METOPROLOL TARTRATE 5 MG: 5 INJECTION INTRAVENOUS at 03:00

## 2025-06-30 RX ADMIN — INSULIN ASPART 2 UNITS: 100 INJECTION, SOLUTION INTRAVENOUS; SUBCUTANEOUS at 18:56

## 2025-06-30 NOTE — PROGRESS NOTES
Obs goals       : -diagnostic tests and consults completed and resulted- not met  -vital signs normal or at patient baseline- met  Nurse to notify provider when observation goals have been met and patient is ready for discharge.

## 2025-06-30 NOTE — PLAN OF CARE
Goal Outcome Evaluation:         DATE & SHIFT: 6-30-25, 1900-0730  PRIMARY Concern: SOB, tachycardia, a fib, suspected CHF  SAFETY RISK Concerns (fall risk, behaviors, etc.): green      Aggression Tool Color: green  Isolation/Type: na  Tests/Procedures for NEXT shift: na pedning consults  Consults? (Pending/following, signed-off?) cards and OT  Where is patient from? (Home, TCU, etc.): home  Other Important info for NEXT shift: consults, IV lasix and IV metoprolol  Anticipated DC date & active delays: TBD  _____________________________________________________________________________  SUMMARY NOTE: 6-30-25, 2100-0730   Orientation/Cognitive: A/O x 4  Observation Goals (Met/ Not Met): not met  Mobility Level/Assist Equipment: ind  Antibiotics & Plan (IV/po, length of tx left): na  Pain Management: na  Tele/VS/O2: VSS on RA ex hypertension, on metoprolol. Tele: A fib with PVCs  ABNL Lab/BG: , 125. Potassium protocol WDL at 4.1, creat 1.23, Na 131, BNP 5, 050  Diet: cardiac, FR 2000  Bowel/Bladder: continent up to BR  Skin Concerns: N/a  Drains/Devices: IV SL  Patient Stated Goal for Today: rest  Other:  Denies nausea. Denies SOB. Lung sounds clear. Bowel sounds active. CMS intact ex L hand numbness baseline and slight BLE edema. Educated on diet and fluid restriction. Continue to monitor.

## 2025-06-30 NOTE — PROGRESS NOTES
RECEIVING UNIT ED HANDOFF REVIEW    ED Nurse Handoff Report was reviewed by: Kanchan Ramirez RN on June 29, 2025 at 7:55 PM

## 2025-06-30 NOTE — PROGRESS NOTES
: -diagnostic tests and consults completed and resulted- not met  -vital signs normal or at patient baseline- met  Nurse to notify provider when observation goals have been met and patient is ready for discharge.

## 2025-06-30 NOTE — PROGRESS NOTES
[facility]    Medicine Progress Note - Hospitalist Service    Date of Admission:  6/29/2025    Assessment & Plan   Haritha Cat is a 80 year old female with history of paroxysmal atrial fibrillation presents with dyspnea on exertion and was noted to be in A-fib with RVR with elevated BNP.    Paroxysmal atrial fibrillation (H)  Dyspnea on exertion  Newly diagnosed HFpEF  *Diagnosed with paroxysmal A-fib in 2023, at the time her echo was within normal limit, she also had a stress test, since then she is on Eliquis, metoprolol and flecainide  *Over last 2 months patient has noticed increased exertional dyspnea, on the day of admission she could not walk more than 10 steps without resting.  *Chest x-ray does not show any pulmonary edema or pleural effusion  --Initially admitted under observation, inpatient as of 6/30   --Cardiology consulted for symptomatic recurrence of AF, appreciate recommendations.   --TTE obtained; EF around 50%, normal LA size.   --Continue PTA flecainide and metoprolol  --Continue PTA Eliquis  --EP consulted for possible increase in flecainide dose and then possible MAMI guided cardioversion vs a-fib ablation vs alternative antiarrhythmic therapy   --Lasix 40 mg IV x2, will start Lasix 20 mg PO daily tomorrow, 7/1  --TSH is elevated, T4 within normal limit, subclinical hypothyroidism?    Hyperlipidemia LDL goal <100  --Diet controlled    HTN, benign  --Controlled  --Continue PTA metoprolol      Type 2 diabetes mellitus without complications (H)  Recent Labs   Lab Test 05/23/25  1138   A1C 6.0*   *PTA regimen includes: metformin   --Hold PTA metformin.  Resume at discharge.    --Sliding scale with AccuChecks QID.    --Glucose checks QID.    --Hypoglycemic protocol in place.      CKD stage III  *Baseline Cr 1.2-1.3  --Creatinine is stable at 1.2, continue to monitor        Observation Goals: -diagnostic tests and consults completed and resulted, -vital signs normal or  "at patient baseline, Nurse to notify provider when observation goals have been met and patient is ready for discharge.  Diet: Fluid restriction 2000 ML FLUID (and additional linked orders)  NPO for Medical/Clinical Reasons Except for: Meds, Ice Chips  2 Gram Sodium Diet    DVT Prophylaxis: DOAC  Rodriguez Catheter: Not present  Lines: None     Cardiac Monitoring: ACTIVE order. Indication: Acute decompensated heart failure (48 hours)  Code Status: Full Code      Clinically Significant Risk Factors Present on Admission         # Hyponatremia: Lowest Na = 130 mmol/L in last 2 days, will monitor as appropriate  # Hypochloremia: Lowest Cl = 90 mmol/L in last 2 days, will monitor as appropriate     # Anion Gap Metabolic Acidosis: Highest Anion Gap = 20 mmol/L in last 2 days, will monitor and treat as appropriate   # Drug Induced Coagulation Defect: home medication list includes an anticoagulant medication    # Hypertension: Noted on problem list  # Heart failure, NOS: heart failure noted on the problem list and last echo with EF 40-50%          # Overweight: Estimated body mass index is 28.71 kg/m  as calculated from the following:    Height as of this encounter: 1.651 m (5' 5\").    Weight as of this encounter: 78.2 kg (172 lb 8 oz).              Social Drivers of Health    Physical Activity: Insufficiently Active (5/20/2025)    Exercise Vital Sign     Days of Exercise per Week: 1 day     Minutes of Exercise per Session: 20 min   Social Connections: Unknown (5/20/2025)    Social Connection and Isolation Panel [NHANES]     Frequency of Social Gatherings with Friends and Family: Three times a week          Disposition Plan     Medically Ready for Discharge: Anticipated Tomorrow           The patient's care was discussed with the Attending Physician, Dr. Moss, Bedside Nurse, Care Coordinator/, Patient, and Patient's Family.    HANY Lopez [facility]  Hospitalist Service  [facility] " Hospital  Securely message with [employer] (more info)  Text page via [facility] Paging/Directory   ______________________________________________________________________    Interval History   No acute events overnight.  Receiving echocardiogram upon my evaluation today, lying flat and denies any shortness of breath.  Overall feeling significantly improved at this time.   at bedside.    Physical Exam   Vital Signs: Temp: 98.4  F (36.9  C) Temp src: Oral BP: (!) 135/90 Pulse: 104   Resp: 18 SpO2: 97 % O2 Device: None (Room air)    Weight: 172 lbs 8 oz    Physical Exam  Vitals and nursing note reviewed.   Constitutional:       General: She is not in acute distress.     Appearance: Normal appearance.   HENT:      Mouth/Throat:      Mouth: Mucous membranes are moist.   Eyes:      Pupils: Pupils are equal, round, and reactive to light.   Cardiovascular:      Rate and Rhythm: Tachycardia present. Rhythm irregularly irregular.      Pulses: Normal pulses.      Heart sounds: Normal heart sounds.   Pulmonary:      Effort: Pulmonary effort is normal. No respiratory distress.      Breath sounds: Normal breath sounds.   Abdominal:      General: Bowel sounds are normal. There is no distension.      Palpations: Abdomen is soft.      Tenderness: There is no abdominal tenderness.   Musculoskeletal:      Right lower leg: No edema.      Left lower leg: No edema.   Skin:     General: Skin is warm and dry.      Capillary Refill: Capillary refill takes less than 2 seconds.   Neurological:      Mental Status: She is alert and oriented to person, place, and time.   Psychiatric:         Mood and Affect: Mood normal.         Behavior: Behavior normal.         Thought Content: Thought content normal.         Judgment: Judgment normal.           Medical Decision Making       75 MINUTES SPENT BY ME on the date of service doing chart review, history, exam, documentation & further activities per the note.      Data     I have personally reviewed  the following data over the past 24 hrs:    6.3  \   12.3   / 300     131 (L) 92 (L) 21.2 /  152 (H)   4.1 19 (L) 1.23 (H) \     ALT: 44 AST: 33 AP: 108 TBILI: 1.0   ALB: 4.3 TOT PROTEIN: 7.1 LIPASE: N/A     Trop: 12 BNP: 5,050 (H)     TSH: 4.27 (H) T4: 1.29 A1C: N/A       Imaging results reviewed over the past 24 hrs:   Recent Results (from the past 24 hours)   Chest XR,  PA & LAT    Narrative    EXAM: XR CHEST 2 VIEWS  LOCATION: [facility]  DATE: 2025    INDICATION: dyspnea on exertion  COMPARISON: 2023      Impression    IMPRESSION: Stable mild cardiomegaly without pulmonary vascular congestion, focal airspace opacity, pleural effusion or pneumothorax. Lung volume is within normal limits.   Echocardiogram Complete   Result Value    LVEF  50%    Narrative    [id number]  [id number]  [id number]  [id number]^LJ^SHAVON^     [facility]  Echocardiography Laboratory  [address] [place] [address]     Name: BARBARA ZAMORA  MRN: [id number]  : 1945  Study Date: 2025 09:47 AM  Age: 80 yrs  Gender: Female  Patient Location: [facility]  Reason For Study: CHF  Ordering Physician: SHAVON CALHOUN  Performed By: Jesica Lebron RDCS     BSA: 1.9 m2  Height: 65 in  Weight: 172 lb  HR: 92  BP: 141/97 mmHg  ______________________________________________________________________________  Procedure  Echocardiogram with two-dimensional, color and spectral Doppler. Definity (NDC  #22480-139) given intravenously.  ______________________________________________________________________________  Interpretation Summary     1. The left ventricle is normal in size. The visual ejection fraction is  estimated at 50%.  2. The right ventricle is normal size. Mildly decreased right ventricular  systolic function  3. No valve disease.     Echo 2023 showed EF 55%, normal RV.  ______________________________________________________________________________  Left Ventricle  The  left ventricle is normal in size. There is normal left ventricular wall  thickness. The visual ejection fraction is estimated at 50%. Diastolic  function not assessed due to atrial fibrillation. Normal left ventricular wall  motion.     Right Ventricle  The right ventricle is normal size. Mildly decreased right ventricular  systolic function.     Atria  Normal left atrial size. Right atrial size is normal. There is no atrial shunt  seen.     Mitral Valve  There is trace to mild mitral regurgitation.     Tricuspid Valve  There is mild (1+) tricuspid regurgitation.     Aortic Valve  The aortic valve is normal in structure and function.     Pulmonic Valve  The pulmonic valve is normal in structure and function.     Vessels  Normal ascending, transverse (arch), and descending aorta. The inferior vena  cava was normal in size with preserved respiratory variability.     Pericardium  There is no pericardial effusion.     Rhythm  The rhythm was atrial fibrillation.  ______________________________________________________________________________  MMode/2D Measurements & Calculations     IVSd: 0.80 cm  LVIDd: 4.5 cm  LVIDs: 3.0 cm  LVPWd: 0.80 cm  FS: 33.3 %  LV mass(C)d: 113.6 grams  LV mass(C)dI: 61.2 grams/m2  Ao root diam: 2.7 cm  asc Aorta Diam: 3.4 cm  LVOT diam: 1.8 cm  LVOT area: 2.5 cm2  Ao root diam index Ht(cm/m): 1.6  Ao root diam index BSA (cm/m2): 1.5  Asc Ao diam index BSA (cm/m2): 1.8  Asc Ao diam index Ht(cm/m): 2.1  LA Volume (BP): 60.1 ml     LA Volume Index (BP): 32.3 ml/m2  RV Base: 3.2 cm  RWT: 0.36  TAPSE: 1.5 cm     Doppler Measurements & Calculations  MV E max mukesh: 76.3 cm/sec  MV dec time: 0.16 sec  TR max mukesh: 245.2 cm/sec  TR max P.3 mmHg  E/E' av.6  Lateral E/e': 8.7  Medial E/e': 20.4  RV S Mukesh: 9.0 cm/sec     ______________________________________________________________________________  Report approved by: Adriel Wen MD on 2025 11:46 AM

## 2025-06-30 NOTE — PROGRESS NOTES
DATE & SHIFT: [id number] 6/30/25  PRIMARY Concern: SOB, afib, tachycardia  SAFETY RISK Concerns (fall risk, behaviors, etc.): none      Aggression Tool Color: green  Isolation/Type: none  Tests/Procedures for NEXT shift: NPO at MN for possible MAMI vs ablation vs other therapy per cards, EP consult  Consults? (Pending/following, signed-off?) cards and OT following, EP pending  Where is patient from? (Home, TCU, etc.): home  Other Important info for NEXT shift: K+ protocol  Anticipated DC date & active delays: 7/1-2 pending work-up  _____________________________________________________________________________  SUMMARY NOTE:   Orientation/Cognitive: a/ox4  Observation Goals (Met/ Not Met): IP  Mobility Level/Assist Equipment: ind  Antibiotics & Plan (IV/po, length of tx left): none  Pain Management: denies  Complete Pain Reassessment: Y/N y Due next: shfit routine  Tele/VS/O2: HR 110s and HTN at times. Tele afib w/ PVCs.  ABNL Lab/BG: Na+ 131, cr 1.23, >152  Diet: 2 g Na+, 2000 ml FR  Bowel/Bladder: continent, voiding, Bmx1 this shift  Skin Concerns: wdl  Drains/Devices: PIV SL, tele  Patient Stated Goal for Today: rest, complete work-up

## 2025-06-30 NOTE — CONSULTS
[facility]    Cardiology Consultation     Date of Admission:  6/29/2025    Assessment & Plan   Haritha Cat is a 80 year old female who was admitted on 6/29/2025.    Paroxysmal atrial fibrillation, now with recurrence of atrial fibrillation, symptomatic in terms of significant fatigue with exertion and some shortness of breath.  CHADS2 Vascor of 6 (age, gender, history of hypertension, diabetes and now congestive heart failure).  She is on flecainide 50 mg twice daily which is on the lower end of the dose.  She also has some chronic kidney disease now with GFR in mid 40s.  Discussed with patient that options include increasing flecainide and then try MAMI guided cardioversion versus A-fib ablation versus alternative antiarrhythmic therapy (per patient anticoagulation was interrupted for about 5 days about 2 weeks ago)..  Recommend review with the EP to go over these options.  Normal LA size on echocardiogram.  Preserved ejection patient congestive heart failure, newly diagnosed with LVEF around 50%.  Likely in the setting of A-fib.  She is feeling much better after 2 doses of diuretics.  Recommend maintenance dose of Lasix 20 mg daily.  Chronically disease stage III with GFR in mid 40s.    Recommendations  Continue flecainide, metoprolol and Eliquis  Lasix 20 mg daily from tomorrow.  N.p.o. since midnight  EP review tomorrow    Discussed in detail with patient and her .      Shon Dempsey MD, MD    Primary Care Physician   Cristino Contreras MD    Reason for Consult   Reason for consult: I was asked to evaluate this patient for congestive heart failure.    History of Present Illness   Haritha Cat is a 80 year old female who presents with dyspnea on exertion and severe fatigue with exertion.  She has history of paroxysmal atrial fibrillation and follows Dr. Sourav Peralta in our clinic.  She is on rhythm control strategy with flecainide 50 mg twice daily.  Stress test in  2023 was negative.  Upon admission she was noted of elevated NT-proBNP.  She received 2 doses of IV Lasix with significant improvement in her symptoms.  She is on metoprolol flecainide and Eliquis.  Patient tells me that Eliquis was interrupted for about 4 to 5 days about 2 weeks ago as she was undergoing MRI for an orthopedic issue involving the back.  She denies any palpitations.  Ventricular rates are borderline elevated.  She is on metoprolol.  No chest discomfort.  Echocardiogram today showed LVEF of 50%, normal LA size.  She denies any dysphagia.    Past Medical History   Past Medical History:   Diagnosis Date    Abscess of abdominal cavity (H) 11/2002    cecal perf, prob from diverticulitis    Allergic rhinitis     Arthritis this year    Right hand, numbness    Colon polyp 2015    colonoscopy due 5 years.    Diverticulosis     DM type 2 (diabetes mellitus, type 2) (H)     Elevated LFTs     fatty liver dz    GERD (gastroesophageal reflux disease)     Hepatitis A     Herpes zoster 11/28/2011    HTN (hypertension), benign     New onset of congestive heart failure (H) 6/29/2025    Osteopenia     DXA 2008    Osteopenia 2014         Past Surgical History   Past Surgical History:   Procedure Laterality Date    ANESTHESIA CARDIOVERSION N/A 11/13/2023    Procedure: Anesthesia cardioversion;  Surgeon: GENERIC ANESTHESIA PROVIDER;  Location:  OR    CATARACT IOL, RT/LT      COLONOSCOPY N/A 5/15/2018    Procedure: COLONOSCOPY;  colonoscopy;  Surgeon: Lucero Acharya MD;  Location:  GI    COLONOSCOPY N/A 8/13/2024    Procedure: Colonoscopy;  Surgeon: Emelyn Campos MD;  Location:  GI    DISCECTOMY LUMBAR POSTERIOR MICROSCOPIC ONE LEVEL Right 10/1/2019    Procedure: LUMBAR MICRODISCECTOMY RIGHT L2-3;  Surgeon: Wolfgang Peng MD;  Location:  OR    varicose vein surgery      Dr. Bowen    wisdom teeth  age 27         Prior to Admission Medications   Prior to Admission Medications   Prescriptions Last  Dose Informant Patient Reported? Taking?   Vitamin D, Cholecalciferol, 25 MCG (1000 UT) TABS 6/28/2025 Evening Self Yes Yes   Sig: Take 1 tablet by mouth every evening.   apixaban ANTICOAGULANT (ELIQUIS) 5 MG tablet 6/29/2025 Morning Self, Pharmacy No Yes   Sig: Take 1 tablet (5 mg) by mouth 2 times daily.   famotidine (PEPCID) 10 MG tablet  Self Yes Yes   Sig: Take 10 mg by mouth daily as needed.   flecainide (TAMBOCOR) 50 MG tablet 6/29/2025 Morning Self, Pharmacy No Yes   Sig: Take 1 tablet (50 mg) by mouth 2 times daily.   hydrochlorothiazide (HYDRODIURIL) 25 MG tablet 6/29/2025 Morning Self, Pharmacy No Yes   Sig: Take 1 tablet (25 mg) by mouth daily.   metFORMIN (GLUCOPHAGE) 500 MG tablet 6/29/2025 Morning Self, Pharmacy No Yes   Sig: Take 2 tablets (1,000 mg) by mouth 2 times daily (with meals).   metoprolol succinate ER (TOPROL XL) 100 MG 24 hr tablet 6/29/2025 Morning Self, Pharmacy No Yes   Sig: Take 1 tablet (100 mg) by mouth daily.      Facility-Administered Medications: None     Current Facility-Administered Medications   Medication Dose Route Frequency Provider Last Rate Last Admin    - MEDICATION INSTRUCTIONS -   Does not apply DOES NOT GO TO Yoselin Dickens MD        acetaminophen (TYLENOL) tablet 650 mg  650 mg Oral Q4H PRN Yoselin Marrufo MD        Or    acetaminophen (TYLENOL) Suppository 650 mg  650 mg Rectal Q4H PRN Yoselin Marrufo MD        apixaban ANTICOAGULANT (ELIQUIS) tablet 5 mg  5 mg Oral BID Yoselin Marrufo MD   5 mg at 06/30/25 0811    Continuing beta blocker from home medication list OR beta blocker order already placed during this visit   Does not apply DOES NOT GO TO Yoselin Dickens MD        glucose gel 15-30 g  15-30 g Oral Q15 Min PRN Yoselin Marrufo MD        Or    dextrose 50 % injection 25-50 mL  25-50 mL Intravenous Q15 Min PRN Yoselin Marrufo MD        Or    glucagon injection 1 mg  1 mg Subcutaneous Q15 Min PRN Yoselin Marrufo MD        famotidine (PEPCID)  tablet 10 mg  10 mg Oral Daily Yoselin Marrufo MD   10 mg at 06/30/25 0810    flecainide (TAMBOCOR) tablet 50 mg  50 mg Oral BID Yoselin Marrufo MD   50 mg at 06/30/25 0811    insulin aspart (NovoLOG) injection (RAPID ACTING)  1-7 Units Subcutaneous TID AC Yoselin Marrufo MD   1 Units at 06/30/25 0808    insulin aspart (NovoLOG) injection (RAPID ACTING)  1-5 Units Subcutaneous At Bedtime Yoselin Marrufo MD        melatonin tablet 1 mg  1 mg Oral At Bedtime PRN Yoselin Marrufo MD        metoprolol (LOPRESSOR) injection 5 mg  5 mg Intravenous Q6H Yoselin Marrufo MD   5 mg at 06/30/25 0300    metoprolol succinate ER (TOPROL XL) 24 hr tablet 100 mg  100 mg Oral Daily Yoselin Marrufo MD   100 mg at 06/30/25 0810    No anticoagulants IF patient has had acute trauma/surgery or recent intracranial, GI or urinary tract bleeding.    Other DOES NOT GO TO Yoselin Dickens MD        ondansetron (ZOFRAN ODT) ODT tab 4 mg  4 mg Oral Q6H PRN Yoselin Marrufo MD        Or    ondansetron (ZOFRAN) injection 4 mg  4 mg Intravenous Q6H PRN Yoselin Marrufo MD        polyethylene glycol (MIRALAX) Packet 17 g  17 g Oral Daily Yoselin Marrufo MD        prochlorperazine (COMPAZINE) injection 5 mg  5 mg Intravenous Q6H PRN Yoselin Marrufo MD        Or    prochlorperazine (COMPAZINE) tablet 5 mg  5 mg Oral Q6H PRN Yoselin Marrufo MD        Reason ACE/ARB/ARNI order not selected   Other DOES NOT GO TO Yoselin Dickens MD senna-docusate (SENOKOT-S/PERICOLACE) 8.6-50 MG per tablet 1 tablet  1 tablet Oral BID PRN Yoselin Marrufo MD        Or    senna-docusate (SENOKOT-S/PERICOLACE) 8.6-50 MG per tablet 2 tablet  2 tablet Oral BID PRN Yoselin Marrufo MD         Current Facility-Administered Medications   Medication Dose Route Frequency Provider Last Rate Last Admin    - MEDICATION INSTRUCTIONS -   Does not apply DOES NOT GO TO Yoselin Dickens MD        acetaminophen (TYLENOL) tablet 650 mg  650 mg Oral Q4H PRN  Yoselin Marrufo MD        Or    acetaminophen (TYLENOL) Suppository 650 mg  650 mg Rectal Q4H PRN Yoselin Marrufo MD        apixaban ANTICOAGULANT (ELIQUIS) tablet 5 mg  5 mg Oral BID Yoselin Marrufo MD   5 mg at 06/30/25 0811    Continuing beta blocker from home medication list OR beta blocker order already placed during this visit   Does not apply DOES NOT GO TO Yoselin Dickens MD        glucose gel 15-30 g  15-30 g Oral Q15 Min PRN Yoselin Marrufo MD        Or    dextrose 50 % injection 25-50 mL  25-50 mL Intravenous Q15 Min PRN Yoselin Marrufo MD        Or    glucagon injection 1 mg  1 mg Subcutaneous Q15 Min PRN Yoselin Marrufo MD        famotidine (PEPCID) tablet 10 mg  10 mg Oral Daily Yoselin Marrufo MD   10 mg at 06/30/25 0810    flecainide (TAMBOCOR) tablet 50 mg  50 mg Oral BID Yoselin Marrufo MD   50 mg at 06/30/25 0811    insulin aspart (NovoLOG) injection (RAPID ACTING)  1-7 Units Subcutaneous TID AC Yoselin Marrufo MD   1 Units at 06/30/25 0808    insulin aspart (NovoLOG) injection (RAPID ACTING)  1-5 Units Subcutaneous At Bedtime Yoselin Marrufo MD        melatonin tablet 1 mg  1 mg Oral At Bedtime PRN Yoselin Marrufo MD        metoprolol (LOPRESSOR) injection 5 mg  5 mg Intravenous Q6H Yoselin Marrufo MD   5 mg at 06/30/25 0300    metoprolol succinate ER (TOPROL XL) 24 hr tablet 100 mg  100 mg Oral Daily Yoselin Marrufo MD   100 mg at 06/30/25 0810    No anticoagulants IF patient has had acute trauma/surgery or recent intracranial, GI or urinary tract bleeding.    Other DOES NOT GO TO Yoselin Dickens MD        ondansetron (ZOFRAN ODT) ODT tab 4 mg  4 mg Oral Q6H PRN Yoselin Marrufo MD        Or    ondansetron (ZOFRAN) injection 4 mg  4 mg Intravenous Q6H PRN Yoselin Marrufo MD        polyethylene glycol (MIRALAX) Packet 17 g  17 g Oral Daily Yoselin Marrufo MD        prochlorperazine (COMPAZINE) injection 5 mg  5 mg Intravenous Q6H PRN Yoselin Marrufo MD        Or     prochlorperazine (COMPAZINE) tablet 5 mg  5 mg Oral Q6H PRN Yoselin Marrufo MD        Reason ACE/ARB/ARNI order not selected   Other DOES NOT GO TO Yoselin Dickens MD        senna-docusate (SENOKOT-S/PERICOLACE) 8.6-50 MG per tablet 1 tablet  1 tablet Oral BID PRN Yoselin Marrufo MD        Or    senna-docusate (SENOKOT-S/PERICOLACE) 8.6-50 MG per tablet 2 tablet  2 tablet Oral BID PRN Yoselin Marrufo MD         Allergies   Allergies   Allergen Reactions    Amoxicillin Hives    Irbesartan      Hyperkalemia    Lisinopril Cough       Social History    reports that she has never smoked. She has never used smokeless tobacco. She reports current alcohol use. She reports that she does not use drugs.      Family History   I have reviewed this patient's family history and updated it with pertinent information if needed.  Family History   Problem Relation Age of Onset    Hypertension Mother             Thyroid Disease Mother             Neurologic Disorder Mother         Parkinsons and RA    Hypertension Father             Diabetes Father             Diabetes Sister             Substance Abuse Sister             Hypertension Sister             Diabetes Maternal Grandmother             Diabetes Maternal Grandfather             Alcoholism Sister 59    Diabetes Son     Hypertension Son           Review of Systems   A comprehensive review of system was performed and is negative other than that noted in the HPI or here.     Physical Exam   Vital Signs with Ranges  Temp:  [97.2  F (36.2  C)-98.4  F (36.9  C)] 98.4  F (36.9  C)  Pulse:  [] 104  Resp:  [12-23] 18  BP: (110-143)/() 135/90  SpO2:  [90 %-98 %] 97 %  Wt Readings from Last 4 Encounters:   25 78.2 kg (172 lb 8 oz)   25 80.8 kg (178 lb 3.2 oz)   25 80.6 kg (177 lb 12.8 oz)   25 79.8 kg (175 lb 14.4 oz)     I/O last 3 completed shifts:  In: 240 [P.O.:240]  Out:  "1500 [Urine:1500]      Vitals: BP (!) 135/90 (BP Location: Right arm, Patient Position: Sitting)   Pulse 104   Temp 98.4  F (36.9  C) (Oral)   Resp 18   Ht 1.651 m (5' 5\")   Wt 78.2 kg (172 lb 8 oz)   SpO2 97%   BMI 28.71 kg/m      Physical Exam:   General patient appears comfortable  Neck normal JVP  Cardiovascular system irregular, borderline tachycardia at a normal rate, no murmur rub or gallop  Respiration clear to auscultation  Extremities no edema    No lab results found in last 7 days.    Invalid input(s): \"TROPONINIES\"    Recent Labs   Lab 06/30/25  0740 06/30/25  0248 06/29/25  2151 06/29/25  1625 06/29/25  1350   WBC  --   --   --   --  6.3   HGB  --   --   --   --  12.3   MCV  --   --   --   --  86   PLT  --   --   --   --  300   NA  --   --   --  131* 130*   POTASSIUM  --   --   --  4.1 3.9   CHLORIDE  --   --   --  92* 90*   CO2  --   --   --  19* 23   BUN  --   --   --  21.2 21.4   CR  --   --   --  1.23* 1.25*   GFRESTIMATED  --   --   --  45* 44*   ANIONGAP  --   --   --  20* 17*   NATALYA  --   --   --  9.8 9.4   * 125* 157* 100* 139*   ALBUMIN  --   --   --   --  4.3   PROTTOTAL  --   --   --   --  7.1   BILITOTAL  --   --   --   --  1.0   ALKPHOS  --   --   --   --  108   ALT  --   --   --   --  44   AST  --   --   --   --  33     Recent Labs   Lab Test 05/23/25  1138 05/21/24  0909   CHOL 190 172   HDL 69 69   LDL 96 79   TRIG 125 120     Recent Labs   Lab 06/29/25  1350   WBC 6.3   HGB 12.3   HCT 36.2   MCV 86        No results for input(s): \"PH\", \"PHV\", \"PO2\", \"PO2V\", \"SAT\", \"PCO2\", \"PCO2V\", \"HCO3\", \"HCO3V\" in the last 168 hours.  Recent Labs   Lab 06/29/25  1350   NTBNP 5,050*     No results for input(s): \"DD\" in the last 168 hours.  No results for input(s): \"SED\", \"CRP\" in the last 168 hours.  Recent Labs   Lab 06/29/25  1350        Recent Labs   Lab 06/29/25  1350   TSH 4.27*     No results for input(s): \"COLOR\", \"APPEARANCE\", \"URINEGLC\", \"URINEBILI\", \"URINEKETONE\", " "\"SG\", \"UBLD\", \"URINEPH\", \"PROTEIN\", \"UROBILINOGEN\", \"NITRITE\", \"LEUKEST\", \"RBCU\", \"WBCU\" in the last 168 hours.    Imaging:  Recent Results (from the past 48 hours)   Chest XR,  PA & LAT    Narrative    EXAM: XR CHEST 2 VIEWS  LOCATION: [facility]  DATE: 2025    INDICATION: dyspnea on exertion  COMPARISON: 2023      Impression    IMPRESSION: Stable mild cardiomegaly without pulmonary vascular congestion, focal airspace opacity, pleural effusion or pneumothorax. Lung volume is within normal limits.   Echocardiogram Complete   Result Value    LVEF  50%    Narrative    [id number]  [id number]  [id number]  [id number]^LJ^SHAVON^     [facility]  Echocardiography Laboratory  [address] [place] [address]     Name: BARBARA ZAMORA  MRN: [id number]  : 1945  Study Date: 2025 09:47 AM  Age: 80 yrs  Gender: Female  Patient Location: [facility]  Reason For Study: CHF  Ordering Physician: SHAVON CALHOUN  Performed By: Jesica Lebron RDCS     BSA: 1.9 m2  Height: 65 in  Weight: 172 lb  HR: 92  BP: 141/97 mmHg  ______________________________________________________________________________  Procedure  Echocardiogram with two-dimensional, color and spectral Doppler. Definity (NDC  #58672-026) given intravenously.  ______________________________________________________________________________  Interpretation Summary     1. The left ventricle is normal in size. The visual ejection fraction is  estimated at 50%.  2. The right ventricle is normal size. Mildly decreased right ventricular  systolic function  3. No valve disease.     Echo 2023 showed EF 55%, normal RV.  ______________________________________________________________________________  Left Ventricle  The left ventricle is normal in size. There is normal left ventricular wall  thickness. The visual ejection fraction is estimated at 50%. Diastolic  function not assessed due to atrial fibrillation. " Normal left ventricular wall  motion.     Right Ventricle  The right ventricle is normal size. Mildly decreased right ventricular  systolic function.     Atria  Normal left atrial size. Right atrial size is normal. There is no atrial shunt  seen.     Mitral Valve  There is trace to mild mitral regurgitation.     Tricuspid Valve  There is mild (1+) tricuspid regurgitation.     Aortic Valve  The aortic valve is normal in structure and function.     Pulmonic Valve  The pulmonic valve is normal in structure and function.     Vessels  Normal ascending, transverse (arch), and descending aorta. The inferior vena  cava was normal in size with preserved respiratory variability.     Pericardium  There is no pericardial effusion.     Rhythm  The rhythm was atrial fibrillation.  ______________________________________________________________________________  MMode/2D Measurements & Calculations     IVSd: 0.80 cm  LVIDd: 4.5 cm  LVIDs: 3.0 cm  LVPWd: 0.80 cm  FS: 33.3 %  LV mass(C)d: 113.6 grams  LV mass(C)dI: 61.2 grams/m2  Ao root diam: 2.7 cm  asc Aorta Diam: 3.4 cm  LVOT diam: 1.8 cm  LVOT area: 2.5 cm2  Ao root diam index Ht(cm/m): 1.6  Ao root diam index BSA (cm/m2): 1.5  Asc Ao diam index BSA (cm/m2): 1.8  Asc Ao diam index Ht(cm/m): 2.1  LA Volume (BP): 60.1 ml     LA Volume Index (BP): 32.3 ml/m2  RV Base: 3.2 cm  RWT: 0.36  TAPSE: 1.5 cm     Doppler Measurements & Calculations  MV E max mukesh: 76.3 cm/sec  MV dec time: 0.16 sec  TR max mukesh: 245.2 cm/sec  TR max P.3 mmHg  E/E' av.6  Lateral E/e': 8.7  Medial E/e': 20.4  RV S Mukesh: 9.0 cm/sec     ______________________________________________________________________________  Report approved by: Adriel Wen MD on 2025 11:46 AM             Echo:  No results found for this or any previous visit (from the past 4320 hours).    Clinically Significant Risk Factors Present on Admission         # Hyponatremia: Lowest Na = 130 mmol/L in last 2 days, will monitor  "as appropriate  # Hypochloremia: Lowest Cl = 90 mmol/L in last 2 days, will monitor as appropriate     # Anion Gap Metabolic Acidosis: Highest Anion Gap = 20 mmol/L in last 2 days, will monitor and treat as appropriate   # Drug Induced Coagulation Defect: home medication list includes an anticoagulant medication    # Hypertension: Noted on problem list  # Heart failure, NOS: heart failure noted on the problem list and last echo with EF 40-50%          # Overweight: Estimated body mass index is 28.71 kg/m  as calculated from the following:    Height as of this encounter: 1.651 m (5' 5\").    Weight as of this encounter: 78.2 kg (172 lb 8 oz).                          "

## 2025-07-01 ENCOUNTER — TELEPHONE (OUTPATIENT)
Dept: CARDIOLOGY | Facility: CLINIC | Age: 80
End: 2025-07-01
Payer: MEDICARE

## 2025-07-01 VITALS
BODY MASS INDEX: 28.74 KG/M2 | OXYGEN SATURATION: 97 % | TEMPERATURE: 97.9 F | WEIGHT: 172.5 LBS | DIASTOLIC BLOOD PRESSURE: 90 MMHG | RESPIRATION RATE: 12 BRPM | HEART RATE: 107 BPM | SYSTOLIC BLOOD PRESSURE: 126 MMHG | HEIGHT: 65 IN

## 2025-07-01 LAB
GLUCOSE BLDC GLUCOMTR-MCNC: 130 MG/DL (ref 70–99)
GLUCOSE BLDC GLUCOMTR-MCNC: 147 MG/DL (ref 70–99)

## 2025-07-01 PROCEDURE — 250N000013 HC RX MED GY IP 250 OP 250 PS 637: Performed by: INTERNAL MEDICINE

## 2025-07-01 PROCEDURE — 99207 PR NO BILLABLE SERVICE THIS VISIT: CPT | Performed by: STUDENT IN AN ORGANIZED HEALTH CARE EDUCATION/TRAINING PROGRAM

## 2025-07-01 PROCEDURE — 99239 HOSP IP/OBS DSCHRG MGMT >30: CPT | Performed by: NURSE PRACTITIONER

## 2025-07-01 PROCEDURE — 99222 1ST HOSP IP/OBS MODERATE 55: CPT | Performed by: INTERNAL MEDICINE

## 2025-07-01 RX ORDER — FLECAINIDE ACETATE 50 MG/1
100 TABLET ORAL 2 TIMES DAILY
Status: DISCONTINUED | OUTPATIENT
Start: 2025-07-01 | End: 2025-07-01 | Stop reason: HOSPADM

## 2025-07-01 RX ORDER — FLECAINIDE ACETATE 100 MG/1
100 TABLET ORAL 2 TIMES DAILY
Qty: 30 TABLET | Refills: 0 | Status: SHIPPED | OUTPATIENT
Start: 2025-07-01

## 2025-07-01 RX ORDER — FUROSEMIDE 20 MG/1
20 TABLET ORAL DAILY
Qty: 30 TABLET | Refills: 0 | Status: SHIPPED | OUTPATIENT
Start: 2025-07-02

## 2025-07-01 RX ADMIN — FAMOTIDINE 10 MG: 10 TABLET, FILM COATED ORAL at 08:12

## 2025-07-01 RX ADMIN — APIXABAN 5 MG: 5 TABLET, FILM COATED ORAL at 08:12

## 2025-07-01 RX ADMIN — FLECAINIDE ACETATE 50 MG: 50 TABLET ORAL at 08:12

## 2025-07-01 RX ADMIN — METOPROLOL SUCCINATE 100 MG: 50 TABLET, EXTENDED RELEASE ORAL at 08:12

## 2025-07-01 RX ADMIN — FUROSEMIDE 20 MG: 20 TABLET ORAL at 08:12

## 2025-07-01 ASSESSMENT — ACTIVITIES OF DAILY LIVING (ADL)
ADLS_ACUITY_SCORE: 35

## 2025-07-01 NOTE — PROGRESS NOTES
DATE & SHIFT: 6/30/25, [id number]  PRIMARY Concern: SOB, afib, tachycardia  SAFETY RISK Concerns (fall risk, behaviors, etc.): none      Aggression Tool Color: green  Isolation/Type: none  Tests/Procedures for NEXT shift: NPO at MN for possible MAMI vs ablation vs other therapy per cards, EP consult  Consults? (Pending/following, signed-off?) cards and OT following, EP pending  Where is patient from? (Home, TCU, etc.): home  Other Important info for NEXT shift: K+ protocol  Anticipated DC date & active delays: 7/1-2 pending work-up  _____________________________________________________________________________  SUMMARY NOTE:   Orientation/Cognitive: a/ox4. Very pleasant and cooperative   Observation Goals (Met/ Not Met): IP  Mobility Level/Assist Equipment: ind  Antibiotics & Plan (IV/po, length of tx left): none  Pain Management: denies  Complete Pain Reassessment: Y/N y Due next: shfit routine  Tele/VS/O2: HR 100s and HTN at times. Tele afib w/ PVCs.  ABNL Lab/BG: Na+ 131, cr 1.23, BG  131 at 2200  Diet: 2 g Na+, 2000 ml FR  Bowel/Bladder: continent, voiding, Bmx1 this shift  Skin Concerns: wdl  Drains/Devices: PIV SL, tele  Patient Stated Goal for Today: rest, complete work-up

## 2025-07-01 NOTE — PLAN OF CARE
Pt A&O, VSS. On RA. Tele Afib RVR . Provider aware. IV SL. Denies pain or SOB. Up ind in room. Echo completed. Cardiology consult completed. Ok for discharge home with output cardioversion and follow up. Educated patient and spouse on importance of taking meds on schedule, helped set up reminders in phone. All discharge instructions, meds, and follow ups reviewed. Alls questions answered. Ambulated independently to exit with spouse.

## 2025-07-01 NOTE — CONSULTS
[facility]    Electrophysiology Consultation     Date of Admission:  6/29/2025  Date of Consult: 07/01/25    Assessment & Plan   Haritha Cat is a 80 year old female who was admitted on 6/29/2025. We were asked to see the patient for atrial fibrillation.    Presenting EKG shows atrial fibrillation with rate of 80 bpm.  Telemetry shows Afib with rates ranging 's.    Echocardiogram performed yesterday showed low normal LV function, EF 50%, mildly decreased RV function, normal atrial size, and no significant valvular disease.  She presented with symptoms of fatigue and shortness of breath for a few months.  She remains symptomatic despite reasonable rate control.  We discussed options for rhythm control and ultimately decided to increase her flecainide dosage and schedule her for cardioversion in about 1 weeks.  She will need to be on anticoagulation consistently for at least 4 weeks prior to cardioversion and she did hold it for a few days at the beginning of June.        - Increase flecainide to 100 mg BID  - Continue Eliquis 5 mg BID -- stressed importance of not missing any doses prior to cardioversion  - Continue metoprolol 100 mg daily for now, anticipate decreasing dosage after cardioversion  - Will schedule cardioversion in about 1 week  - Will schedule follow up in EP clinic in about 1 month  - Okay to discharge from EP standpoint       Arun Macdonald MD        Code Status    Full Code    Reason for Consult   Reason for consult: Consult performed at the request of the attending physician, Yoselin Marrufo MD, for evaluation of atrial fibrillation      Chief Complaint   Shortness of breath, fatigue    History is obtained from the patient and EPIC chart.      History of Present Illness   Haritha Cat is a 80 year old female with type 2 diabetes, hypertension, CKD stage III, and atrial fibrillation, who presents with generalized weakness, shortness of breath, and fatigue.  She  was noted to have elevated BNP and was given IV Lasix with improvement in symptoms.  She reports having symptoms for the past few months.  She does not feel the Afib so is not sure when she went back into the arrhythmia.  She initially thought her symptoms were due to her back pain.      She follows with Dr. Rankin in cardiology clinic.  She was first diagnosed with Afib about 2 years ago.  She was admitted to the hospital and underwent MAMI guided cardioversion at that time.  She has been on flecainide 50 mg BID for rhythm control as well as metoprolol since then.  She was off Eliquis for a few days the first week of June when she had her MRI for back issues. She has been taking it consistently since then.  She is now scheduled for back surgery in October.         Past Medical History   I have reviewed this patient's medical history and updated it with pertinent information if needed.   Past Medical History:   Diagnosis Date    Abscess of abdominal cavity (H) 11/2002    cecal perf, prob from diverticulitis    Allergic rhinitis     Arthritis this year    Right hand, numbness    Colon polyp 2015    colonoscopy due 5 years.    Diverticulosis     DM type 2 (diabetes mellitus, type 2) (H)     Elevated LFTs     fatty liver dz    GERD (gastroesophageal reflux disease)     Hepatitis A     Herpes zoster 11/28/2011    HTN (hypertension), benign     New onset of congestive heart failure (H) 6/29/2025    Osteopenia     DXA 2008    Osteopenia 2014       Past Surgical History   I have reviewed this patient's surgical history and updated it with pertinent information if needed.  Past Surgical History:   Procedure Laterality Date    ANESTHESIA CARDIOVERSION N/A 11/13/2023    Procedure: Anesthesia cardioversion;  Surgeon: GENERIC ANESTHESIA PROVIDER;  Location:  OR    CATARACT IOL, RT/LT      COLONOSCOPY N/A 5/15/2018    Procedure: COLONOSCOPY;  colonoscopy;  Surgeon: Lucero Acharya MD;  Location:  GI    COLONOSCOPY  N/A 8/13/2024    Procedure: Colonoscopy;  Surgeon: Emelyn Campos MD;  Location:  GI    DISCECTOMY LUMBAR POSTERIOR MICROSCOPIC ONE LEVEL Right 10/1/2019    Procedure: LUMBAR MICRODISCECTOMY RIGHT L2-3;  Surgeon: Wolfgang Peng MD;  Location:  OR    varicose vein surgery      Dr. Bowen    wisdom teeth  age 27       Prior to Admission Medications   Prior to Admission Medications   Prescriptions Last Dose Informant Patient Reported? Taking?   Vitamin D, Cholecalciferol, 25 MCG (1000 UT) TABS 6/28/2025 Evening Self Yes Yes   Sig: Take 1 tablet by mouth every evening.   apixaban ANTICOAGULANT (ELIQUIS) 5 MG tablet 6/29/2025 Morning Self, Pharmacy No Yes   Sig: Take 1 tablet (5 mg) by mouth 2 times daily.   famotidine (PEPCID) 10 MG tablet  Self Yes Yes   Sig: Take 10 mg by mouth daily as needed.   flecainide (TAMBOCOR) 50 MG tablet 6/29/2025 Morning Self, Pharmacy No Yes   Sig: Take 1 tablet (50 mg) by mouth 2 times daily.   hydrochlorothiazide (HYDRODIURIL) 25 MG tablet 6/29/2025 Morning Self, Pharmacy No Yes   Sig: Take 1 tablet (25 mg) by mouth daily.   metFORMIN (GLUCOPHAGE) 500 MG tablet 6/29/2025 Morning Self, Pharmacy No Yes   Sig: Take 2 tablets (1,000 mg) by mouth 2 times daily (with meals).   metoprolol succinate ER (TOPROL XL) 100 MG 24 hr tablet 6/29/2025 Morning Self, Pharmacy No Yes   Sig: Take 1 tablet (100 mg) by mouth daily.      Facility-Administered Medications: None         Medications   Current Facility-Administered Medications   Medication Dose Route Frequency Provider Last Rate Last Admin    - MEDICATION INSTRUCTIONS -   Does not apply DOES NOT GO TO Yoselin Dickens MD        Continuing beta blocker from home medication list OR beta blocker order already placed during this visit   Does not apply DOES NOT GO TO Yoselin Dickens MD        No anticoagulants IF patient has had acute trauma/surgery or recent intracranial, GI or urinary tract bleeding.    Other DOES NOT GO TO MAR  Yoselin Marrufo MD        Reason ACE/ARB/ARNI order not selected   Other DOES NOT GO TO Yoselin Dickens MD         Current Facility-Administered Medications   Medication Dose Route Frequency Provider Last Rate Last Admin    apixaban ANTICOAGULANT (ELIQUIS) tablet 5 mg  5 mg Oral BID Yoselin Marrufo MD   5 mg at 25 08    famotidine (PEPCID) tablet 10 mg  10 mg Oral Daily Yoselin Marrufo MD   10 mg at 25 08    flecainide (TAMBOCOR) tablet 50 mg  50 mg Oral BID Yoselin Marrufo MD   50 mg at 25 08    furosemide (LASIX) tablet 20 mg  20 mg Oral Daily Shon Dempsey MD   20 mg at 25 08    insulin aspart (NovoLOG) injection (RAPID ACTING)  1-7 Units Subcutaneous TID AC Yoselin Marrufo MD   2 Units at 25    insulin aspart (NovoLOG) injection (RAPID ACTING)  1-5 Units Subcutaneous At Bedtime Yoselin Marrufo MD        metoprolol succinate ER (TOPROL XL) 24 hr tablet 100 mg  100 mg Oral Daily Yoselin Marrufo MD   100 mg at 25    polyethylene glycol (MIRALAX) Packet 17 g  17 g Oral Daily Yoselin Marrufo MD           Allergies   Allergies   Allergen Reactions    Amoxicillin Hives    Irbesartan      Hyperkalemia    Lisinopril Cough       Social History   I have updated and reviewed the following Social History Narrative:   Social History     Social History Narrative     with 2 sons and 5 grandkids.  On Sandra Bustamante as of 2011.    Son (Bobby) seizure, TBI, brain hemorrhage.        Family History   I have reviewed this patient's family history and updated it with pertinent information if needed.   Family History   Problem Relation Age of Onset    Hypertension Mother             Thyroid Disease Mother             Neurologic Disorder Mother         Parkinsons and RA    Hypertension Father             Diabetes Father             Diabetes Sister             Substance Abuse Sister             Hypertension Sister              Diabetes Maternal Grandmother             Diabetes Maternal Grandfather             Alcoholism Sister 59    Diabetes Son     Hypertension Son        Review of Systems   A complete 10-point review of systems was done and is negative with the exceptions noted in HPI.      Physical Exam   Temp: 98.1  F (36.7  C) Temp src: Oral BP: (!) 143/88 Pulse: 99   Resp: 12 SpO2: 98 % O2 Device: None (Room air)    Vital Signs with Ranges  Temp:  [98.1  F (36.7  C)-98.6  F (37  C)] 98.1  F (36.7  C)  Pulse:  [] 99  Resp:  [12-18] 12  BP: ()/(52-89) 143/88  SpO2:  [98 %] 98 %  172 lbs 8 oz    General: Pleasant, no acute distress  Resp: Breathing comfortably on RA  Card: Irregular, tachycardic   Extremities: No significant edema   Neuro: Awake, alert, answers questions appropriately       Diagnostics:  I personally reviewed the patient's EKG, lab work, and pertinent imaging    Recent Labs   Lab Test 25  1625 25  1350 25  1138 24  0909 24  1148 07/15/24  1454   WBC  --  6.3  --   --  6.5 8.5   HGB  --  12.3  --   --  12.7 12.4   HCT  --  36.2  --   --  38.8 38.5   MCV  --  86  --   --  91 88   PLT  --  300  --   --  277 300   * 130* 129*   < > 134*  --    CO2 19* 23 23   < > 25  --    BUN 21.2 21.4 21.4   < > 23.7*  --    CR 1.23* 1.25* 1.18*   < > 1.29*  --     < > = values in this interval not displayed.       Last Comprehensive Metabolic Panel:  Lab Results   Component Value Date     (L) 2025    POTASSIUM 4.1 2025    CHLORIDE 92 (L) 2025    CO2 19 (L) 2025    ANIONGAP 20 (H) 2025     (H) 2025    BUN 21.2 2025    CR 1.23 (H) 2025    GFRESTIMATED 45 (L) 2025    NATALYA 9.8 2025         Recent Results (from the past 24 hours)   Echocardiogram Complete   Result Value Ref Range    LVEF  50%     Narrative    [id number]  [id number]  [id number]  [id number]^LJ^SHAVON^     [facility]  [facility]  Echocardiography Laboratory  [address] [place] [address]     Name: BARBARA ZAMORA  MRN: [id number]  : 1945  Study Date: 2025 09:47 AM  Age: 80 yrs  Gender: Female  Patient Location: [facility]  Reason For Study: CHF  Ordering Physician: SHAVON CALHOUN  Performed By: Jesica Lebron NITHIN     BSA: 1.9 m2  Height: 65 in  Weight: 172 lb  HR: 92  BP: 141/97 mmHg  ______________________________________________________________________________  Procedure  Echocardiogram with two-dimensional, color and spectral Doppler. Definity (NDC  #80175-179) given intravenously.  ______________________________________________________________________________  Interpretation Summary     1. The left ventricle is normal in size. The visual ejection fraction is  estimated at 50%.  2. The right ventricle is normal size. Mildly decreased right ventricular  systolic function  3. No valve disease.     Echo 2023 showed EF 55%, normal RV.  ______________________________________________________________________________  Left Ventricle  The left ventricle is normal in size. There is normal left ventricular wall  thickness. The visual ejection fraction is estimated at 50%. Diastolic  function not assessed due to atrial fibrillation. Normal left ventricular wall  motion.     Right Ventricle  The right ventricle is normal size. Mildly decreased right ventricular  systolic function.     Atria  Normal left atrial size. Right atrial size is normal. There is no atrial shunt  seen.     Mitral Valve  There is trace to mild mitral regurgitation.     Tricuspid Valve  There is mild (1+) tricuspid regurgitation.     Aortic Valve  The aortic valve is normal in structure and function.     Pulmonic Valve  The pulmonic valve is normal in structure and function.     Vessels  Normal ascending, transverse (arch), and descending aorta. The inferior vena  cava was normal in size with preserved respiratory variability.      Pericardium  There is no pericardial effusion.     Rhythm  The rhythm was atrial fibrillation.  ______________________________________________________________________________  MMode/2D Measurements & Calculations     IVSd: 0.80 cm  LVIDd: 4.5 cm  LVIDs: 3.0 cm  LVPWd: 0.80 cm  FS: 33.3 %  LV mass(C)d: 113.6 grams  LV mass(C)dI: 61.2 grams/m2  Ao root diam: 2.7 cm  asc Aorta Diam: 3.4 cm  LVOT diam: 1.8 cm  LVOT area: 2.5 cm2  Ao root diam index Ht(cm/m): 1.6  Ao root diam index BSA (cm/m2): 1.5  Asc Ao diam index BSA (cm/m2): 1.8  Asc Ao diam index Ht(cm/m): 2.1  LA Volume (BP): 60.1 ml     LA Volume Index (BP): 32.3 ml/m2  RV Base: 3.2 cm  RWT: 0.36  TAPSE: 1.5 cm     Doppler Measurements & Calculations  MV E max mukesh: 76.3 cm/sec  MV dec time: 0.16 sec  TR max mukesh: 245.2 cm/sec  TR max P.3 mmHg  E/E' av.6  Lateral E/e': 8.7  Medial E/e': 20.4  RV S Mukesh: 9.0 cm/sec     ______________________________________________________________________________  Report approved by: Adriel Wen MD on 2025 11:46 AM         Glucose by meter   Result Value Ref Range    GLUCOSE BY METER POCT 152 (H) 70 - 99 mg/dL   Glucose by meter   Result Value Ref Range    GLUCOSE BY METER POCT 211 (H) 70 - 99 mg/dL   Glucose by meter   Result Value Ref Range    GLUCOSE BY METER POCT 130 (H) 70 - 99 mg/dL   Glucose by meter   Result Value Ref Range    GLUCOSE BY METER POCT 130 (H) 70 - 99 mg/dL         Clinically Significant Risk Factors Present on Admission         # Hyponatremia: Lowest Na = 130 mmol/L in last 2 days, will monitor as appropriate  # Hypochloremia: Lowest Cl = 90 mmol/L in last 2 days, will monitor as appropriate     # Anion Gap Metabolic Acidosis: Highest Anion Gap = 20 mmol/L in last 2 days, will monitor and treat as appropriate   # Drug Induced Coagulation Defect: home medication list includes an anticoagulant medication    # Hypertension: Noted on problem list  # Heart failure, NOS: heart failure noted  "on the problem list and last echo with EF 40-50%          # Overweight: Estimated body mass index is 28.71 kg/m  as calculated from the following:    Height as of this encounter: 1.651 m (5' 5\").    Weight as of this encounter: 78.2 kg (172 lb 8 oz).             Cardiac Arrhythmia: Atrial fibrillation: Persistent  "

## 2025-07-01 NOTE — DISCHARGE SUMMARY
"[facility]  Hospitalist Discharge Summary      Date of Admission:  6/29/2025  Date of Discharge:  7/1/2025  Discharging Provider: HANY Lopez CNP  Discharge Service: Hospitalist Service    Discharge Diagnoses   Paroxysmal atrial fibrillation (H)  Dyspnea on exertion  Newly diagnosed HFpEF  Hyperlipidemia LDL goal <100   HTN   Type 2 DM   CKD stage III    Clinically Significant Risk Factors     # Overweight: Estimated body mass index is 28.71 kg/m  as calculated from the following:    Height as of this encounter: 1.651 m (5' 5\").    Weight as of this encounter: 78.2 kg (172 lb 8 oz).       Follow-ups Needed After Discharge   Follow-up Appointments       Follow Up      Follow up with electrophysiology in one month.                Discharge Disposition   Discharged to home  Condition at discharge: Stable    Hospital Course   Haritha Cat is a 80 year old female with history of paroxysmal atrial fibrillation presents with dyspnea on exertion and was noted to be in A-fib with RVR with elevated BNP.     Paroxysmal atrial fibrillation (H)  Dyspnea on exertion  Newly diagnosed HFpEF  *Diagnosed with paroxysmal A-fib in 2023, at the time her echo was within normal limit, she also had a stress test, since then she is on Eliquis, metoprolol and flecainide  *Over last 2 months patient has noticed increased exertional dyspnea, on the day of admission she could not walk more than 10 steps without resting.  *Chest x-ray does not show any pulmonary edema or pleural effusion  --Initially admitted under observation, inpatient as of 6/30   --Cardiology consulted for symptomatic recurrence of AF, appreciate recommendations.   --TTE obtained; EF around 50%, normal LA size.   --EP consulted. Flecainide increased to 100 mg BID, plan for cardioversion in 1 week. Continue PTA Eliquis 5 mg BID, discussed at length with patient and her , Blaine, the importance of not missing any doses of this " prior to cardioversion. Recommend strategies for ensuring this. Continue metoprolol 100 mg daily for now. Follow up with EP clinic in one month.   --Lasix 40 mg IV x2, started Lasix 20 mg PO daily 7/1     Hyperlipidemia LDL goal <100  --Diet controlled     HTN, benign  --Controlled  --Continue PTA metoprolol  --PTA hydrochlorothiazide held at discharge, patient has not been receiving this medication while admitted and has SBPs 120s-140. Given the initiation of lasix 20 mg daily will hold this at discharge. Follow up with PCP and/or cardiology for further medication management. Recommended blood pressure log.      Type 2 diabetes mellitus without complications (H)      Recent Labs   Lab Test 05/23/25  1138   A1C 6.0*   *PTA regimen includes: metformin   --Hold PTA metformin.  Resumed at discharge.    --Sliding scale with AccuChecks QID.    --Glucose checks QID.    --Hypoglycemic protocol in place.       CKD stage III  *Baseline Cr 1.2-1.3  --Creatinine is stable at 1.2    Consultations This Hospital Stay   OCCUPATIONAL THERAPY ADULT IP CONSULT  CARDIOLOGY IP CONSULT  ELECTROPHYSIOLOGY IP CONSULT    Code Status   Full Code    Time Spent on this Encounter   I, HANY Lopez CNP, personally saw the patient today and spent greater than 30 minutes discharging this patient.       HANY Lopez CNP  [facility] EXTENDED RECOVERY AND SHORT STAY  [address] [place] [id number]  Phone: [phone]  ______________________________________________________________________    Physical Exam   Vital Signs: Temp: 97.9  F (36.6  C) Temp src: Oral BP: (!) 126/90 Pulse: 107   Resp: 12 SpO2: 97 % O2 Device: None (Room air)    Weight: 172 lbs 8 oz  Physical Exam  Vitals and nursing note reviewed.   Constitutional:       General: She is not in acute distress.     Appearance: Normal appearance.   HENT:      Mouth/Throat:      Mouth: Mucous membranes are moist.   Eyes:      Pupils: Pupils are  equal, round, and reactive to light.   Cardiovascular:      Rate and Rhythm: Tachycardia (low 100s) present. Rhythm irregularly irregular.      Pulses: Normal pulses.      Heart sounds: Normal heart sounds.   Pulmonary:      Effort: Pulmonary effort is normal. No respiratory distress.      Breath sounds: Normal breath sounds.   Abdominal:      General: Bowel sounds are normal. There is no distension.      Palpations: Abdomen is soft.      Tenderness: There is no abdominal tenderness.   Musculoskeletal:      Right lower leg: No edema.      Left lower leg: No edema.   Skin:     General: Skin is warm and dry.      Capillary Refill: Capillary refill takes less than 2 seconds.   Neurological:      Mental Status: She is alert and oriented to person, place, and time.   Psychiatric:         Mood and Affect: Mood normal.         Behavior: Behavior normal.         Thought Content: Thought content normal.         Judgment: Judgment normal.        Primary Care Physician   Cristino Contreras MD    Discharge Orders      Follow-Up with Cardiology EP      Reason for your hospital stay    You were hospitalized for evaluation of your shortness of breath and found to be in atrial fibrillation with RVR.     Activity    Your activity upon discharge: activity as tolerated     Follow Up    Follow up with electrophysiology in one month.     Discharge Instructions    It is imperative that you take your Eliquis twice daily. We discussed setting alarms/reminders on your phone for this.     Cardioversion    Patient Instructions:   NPO for 8 hours prior to the procedure except for sips of water with medications.   Continue daily Warfarin (Coumadin) if on the medication   Hold insulin or oral diabetic medications morning of procedure. May take   dose long acting insulin. Follow further instructions of primary physician.    required     Diet    Follow this diet upon discharge: Current Diet:Orders Placed This Encounter    Regular adult  diet       Significant Results and Procedures   Results for orders placed or performed during the hospital encounter of 25   Chest XR,  PA & LAT    Narrative    EXAM: XR CHEST 2 VIEWS  LOCATION: [facility]  DATE: 2025    INDICATION: dyspnea on exertion  COMPARISON: 2023      Impression    IMPRESSION: Stable mild cardiomegaly without pulmonary vascular congestion, focal airspace opacity, pleural effusion or pneumothorax. Lung volume is within normal limits.   Echocardiogram Complete     Value    LVEF  50%    Narrative    [id number]  [facility]  [id number]  [id number]^LJ^SHAVON^     [facility]  Echocardiography Laboratory  [address] [place] [id number]     Name: BARBARA ZAMORA  MRN: [id number]  : 1945  Study Date: 2025 09:47 AM  Age: 80 yrs  Gender: Female  Patient Location: [facility]  Reason For Study: CHF  Ordering Physician: SHAVON CALHOUN  Performed By: Jesica Lebron RDCS     BSA: 1.9 m2  Height: 65 in  Weight: 172 lb  HR: 92  BP: 141/97 mmHg  ______________________________________________________________________________  Procedure  Echocardiogram with two-dimensional, color and spectral Doppler. Definity (NDC  #[id number]) given intravenously.  ______________________________________________________________________________  Interpretation Summary     1. The left ventricle is normal in size. The visual ejection fraction is  estimated at 50%.  2. The right ventricle is normal size. Mildly decreased right ventricular  systolic function  3. No valve disease.     Echo 2023 showed EF 55%, normal RV.  ______________________________________________________________________________  Left Ventricle  The left ventricle is normal in size. There is normal left ventricular wall  thickness. The visual ejection fraction is estimated at 50%. Diastolic  function not assessed due to atrial fibrillation. Normal left ventricular wall  motion.      Right Ventricle  The right ventricle is normal size. Mildly decreased right ventricular  systolic function.     Atria  Normal left atrial size. Right atrial size is normal. There is no atrial shunt  seen.     Mitral Valve  There is trace to mild mitral regurgitation.     Tricuspid Valve  There is mild (1+) tricuspid regurgitation.     Aortic Valve  The aortic valve is normal in structure and function.     Pulmonic Valve  The pulmonic valve is normal in structure and function.     Vessels  Normal ascending, transverse (arch), and descending aorta. The inferior vena  cava was normal in size with preserved respiratory variability.     Pericardium  There is no pericardial effusion.     Rhythm  The rhythm was atrial fibrillation.  ______________________________________________________________________________  MMode/2D Measurements & Calculations     IVSd: 0.80 cm  LVIDd: 4.5 cm  LVIDs: 3.0 cm  LVPWd: 0.80 cm  FS: 33.3 %  LV mass(C)d: 113.6 grams  LV mass(C)dI: 61.2 grams/m2  Ao root diam: 2.7 cm  asc Aorta Diam: 3.4 cm  LVOT diam: 1.8 cm  LVOT area: 2.5 cm2  Ao root diam index Ht(cm/m): 1.6  Ao root diam index BSA (cm/m2): 1.5  Asc Ao diam index BSA (cm/m2): 1.8  Asc Ao diam index Ht(cm/m): 2.1  LA Volume (BP): 60.1 ml     LA Volume Index (BP): 32.3 ml/m2  RV Base: 3.2 cm  RWT: 0.36  TAPSE: 1.5 cm     Doppler Measurements & Calculations  MV E max mukesh: 76.3 cm/sec  MV dec time: 0.16 sec  TR max mukesh: 245.2 cm/sec  TR max P.3 mmHg  E/E' av.6  Lateral E/e': 8.7  Medial E/e': 20.4  RV S Mukesh: 9.0 cm/sec     ______________________________________________________________________________  Report approved by: Adriel Wen MD on 2025 11:46 AM             Discharge Medications      Review of your medicines        PAUSE taking these medications        Dose / Directions   hydrochlorothiazide 25 MG tablet  Wait to take this until your doctor or other care provider tells you to start again.  Commonly known as:  HYDRODIURIL  Used for: HTN (hypertension), benign, Bilateral lower extremity edema      Dose: 25 mg  Take 1 tablet (25 mg) by mouth daily.  Quantity: 90 tablet  Refills: 3            START taking        Dose / Directions   furosemide 20 MG tablet  Commonly known as: LASIX      Dose: 20 mg  Start taking on: July 2, 2025  Take 1 tablet (20 mg) by mouth daily.  Quantity: 30 tablet  Refills: 0            CHANGE how you take these medications        Dose / Directions   flecainide 100 MG tablet  Commonly known as: TAMBOCOR  This may have changed:   medication strength  how much to take  Used for: Persistent atrial fibrillation (H)      Dose: 100 mg  Take 1 tablet (100 mg) by mouth 2 times daily.  Quantity: 30 tablet  Refills: 0            CONTINUE these medicines which have NOT CHANGED        Dose / Directions   apixaban ANTICOAGULANT 5 MG tablet  Commonly known as: ELIQUIS  Used for: Atrial fibrillation, unspecified type (H)      Dose: 5 mg  Take 1 tablet (5 mg) by mouth 2 times daily.  Quantity: 60 tablet  Refills: 11     famotidine 10 MG tablet  Commonly known as: PEPCID      Dose: 10 mg  Take 10 mg by mouth daily as needed.  Refills: 0     metFORMIN 500 MG tablet  Commonly known as: GLUCOPHAGE      Dose: 1,000 mg  Take 2 tablets (1,000 mg) by mouth 2 times daily (with meals).  Quantity: 360 tablet  Refills: 3     metoprolol succinate  MG 24 hr tablet  Commonly known as: TOPROL XL  Used for: HTN (hypertension), benign      Dose: 100 mg  Take 1 tablet (100 mg) by mouth daily.  Quantity: 90 tablet  Refills: 3     Vitamin D3 25 mcg (1000 units) tablet  Commonly known as: CHOLECALCIFEROL      Dose: 1 tablet  Take 1 tablet by mouth every evening.  Refills: 0               Where to get your medicines        These medications were sent to [facility] [place], [place] - [address] [place] [address]  [address] [place] [address] [address]      Phone: [phone]   flecainide 100 MG tablet  furosemide 20 MG  tablet       Allergies   Allergies   Allergen Reactions    Amoxicillin Hives    Irbesartan      Hyperkalemia    Lisinopril Cough

## 2025-07-01 NOTE — TELEPHONE ENCOUNTER
Spoke to pt Dr Macdonald and pt is scheduled for a Cardioversion on 7/15. Pt will have been back on the Eliquis to proceed as planned with the Cardioversion.  No changes need. Will not call pt back until the day prior  to her Cardioversion. [place]

## 2025-07-01 NOTE — TELEPHONE ENCOUNTER
Health Call Center    Phone Message    May a detailed message be left on voicemail: yes     Reason for Call: Medication Question or concern regarding medication   Prescription Clarification  Name of Medication: Eliquis  Prescribing Provider:    Pharmacy:    What on the order needs clarification? Patient states that she gave incorrect information this morning. She stopped Eliquis 6/5/25 due to back injection and re-started this 6/12/25. She wanted this to be noted for Dr. Macdonald. No need to clal back unless further questions.       Action Taken: Other: cardiology    Travel Screening: Not Applicable   Thank you!  Special[facility]      Date of Service:

## 2025-07-01 NOTE — PLAN OF CARE
Goal Outcome Evaluation:      Plan of Care Reviewed With: patient    Overall Patient Progress: improving    DATE & SHIFT: 6-30-25, 1308-2154  PRIMARY Concern: Here with SOB, tachycardia, a fib, suspected CHF  SAFETY RISK Concerns (fall risk, behaviors, etc.): green      Aggression Tool Color: green  Isolation/Type: na  Tests/Procedures for NEXT shift:labs  Consults? (Pending/following, signed-off?) cards and OT  Where is patient from? (Home, TCU, etc.): home  Other Important info for NEXT shift:none  Anticipated DC date & active delays: TBD  _____________________________________________________________________________  SUMMARY NOTE:  Orientation/Cognitive: A&O x 4  Observation Goals (Met/ Not Met): n/a  Mobility Level/Assist Equipment:Ind  Antibiotics & Plan (IV/po, length of tx left): N/A  Pain Management: Denies pain  Tele/VS/O2: VSS on RA  ABNL Lab/BG:Na+ 131  Diet: 2 gm Na+,2000 fluid restriction  Bowel/Bladder: continent   Skin Concerns: N/a  Drains/Devices: PIV SL  Patient Stated Goal for Today:Sleep

## 2025-07-03 ENCOUNTER — TRANSFERRED RECORDS (OUTPATIENT)
Dept: HEALTH INFORMATION MANAGEMENT | Facility: CLINIC | Age: 80
End: 2025-07-03
Payer: MEDICARE

## 2025-07-03 ENCOUNTER — PATIENT OUTREACH (OUTPATIENT)
Dept: CARE COORDINATION | Facility: CLINIC | Age: 80
End: 2025-07-03
Payer: MEDICARE

## 2025-07-03 NOTE — PROGRESS NOTES
"  [facility]: Transitions of Care Outreach  Chief Complaint   Patient presents with    Clinic Care Coordination - Post Hospital       Most Recent Admission Date: 6/29/2025   Most Recent Admission Diagnosis: Paroxysmal atrial fibrillation (H) - I48.0  Dyspnea on exertion - R06.09  New onset of congestive heart failure (H) - I50.9     Most Recent Discharge Date: 7/1/2025   Most Recent Discharge Diagnosis: Dyspnea on exertion - R06.09  New onset of congestive heart failure (H) - I50.9  Paroxysmal atrial fibrillation (H) - I48.0  Persistent atrial fibrillation (H) - I48.19     Transitions of Care Assessment    Discharge Assessment  How are you doing now that you are home?: \" I am doing good \"  How are your symptoms? (Red Flag symptoms escalate to triage hotline per guidelines): Improved  Do you know how to contact your clinic care team if you have future questions or changes to your health status? : Yes  Does the patient have their discharge instructions? : Yes  Does the patient have questions regarding their discharge instructions? : No  Were you started on any new medications or were there changes to any of your previous medications? : Yes  Does the patient have all of their medications?: Yes  Do you have questions regarding any of your medications? : No  Do you have all of your needed medical supplies or equipment (DME)?  (i.e. oxygen tank, CPAP, cane, etc.): Yes    Post-op ([facility] CTA Only)  If the patient had a surgery or procedure, do they have any questions for a nurse?: No         CCRC Explained and offered Care Coordination support to eligible patients: Yes    Patient accepted? No    Follow up Plan     Discharge Follow-Up  Discharge follow up appointment scheduled in alignment with recommended follow up timeframe or Transitions of Risk Category? (Low = within 30 days; Moderate= within 14 days; High= within 7 days): Yes  Discharge Follow Up Appointment Date: 07/15/25  Discharge Follow Up " Appointment Scheduled with?: Specialty Care Provider    Future Appointments   Date Time Provider Department Center   7/15/2025 10:30 AM  CARE SUITES (1ST FL) SHCARE None   8/4/2025 10:00 AM Sylvie Campuzano PA-C [facility] PSA CLIN   8/28/2025  9:00 AM CS LAB CSLABR CS   9/24/2025  9:00 AM Cristino Contreras MD CSFPIM CS   5/27/2026  9:30 AM Cristino Contreras MD CSFPIM CS       Outpatient Plan as outlined on AVS reviewed with patient.    For any urgent concerns, please contact our 24 hour nurse triage line: [phone] ([phone])       PARAMJIT Kessler

## 2025-07-07 ENCOUNTER — TELEPHONE (OUTPATIENT)
Dept: CARDIOLOGY | Facility: CLINIC | Age: 80
End: 2025-07-07
Payer: MEDICARE

## 2025-07-07 DIAGNOSIS — I10 HTN (HYPERTENSION), BENIGN: ICD-10-CM

## 2025-07-07 NOTE — TELEPHONE ENCOUNTER
Patient was admitted to [facility] on 6/29/25 with dyspnea on exertion and was noted to be in A-fib with RVR with elevated BNP.     PMH: paroxysmal atrial fibrillation.    6/30/25: Echo showed EF of 50%, normal LA size.     Plan for cardioversion in 1 week.     Pt was started on Lasix. PTA Flecainide dosage was increased. Hydrochlorothiazide was put on pause until follow up with PMD or cardiology at time of discharge.    Pt is scheduled for an OP DCCV on 7/15/25 at 0730 at [facility]. Scheduled for an OV on 8/4/25 at 0950 with REECE Sylvie Campuzano at our [place] Office.    Writer attempted to call pt for a cardiology post discharge phone call, but no answer. VM left to call back with any non emergent cardiac related or medication questions. Reminder left of appt's as scheduled above. Writer's and Dr. Macdonald's Team RN phone numbers were provided. AYAH Lemos RN.

## 2025-07-07 NOTE — TELEPHONE ENCOUNTER
07/07/25 Pt returned call with a few questions. She stated she is extremely fatigued and SOB w any activity. She stated this AM she could hardly make her bed without sitting down to rest before she was done. Hrs have been running around 100-110 at rest. She denies swelling or weight gain and has not noticed an improvement in breathing/diuresis since switching from hydrochlorothiazide to Lasix .  She verified she continues on Flecainide 100 mg BID and Toprol 100 mg daily. Bps can range from 95/75 to 140/80 using a battery powered wrist cuff. She has recently changed batteries. She is scheduled for DCCV on 7/15 but wondering if there is anything else that can be done beforehand. Routing to Dr Macdonald for review  Marlo 411 pm

## 2025-07-08 RX ORDER — METOPROLOL SUCCINATE 100 MG/1
TABLET, EXTENDED RELEASE ORAL
COMMUNITY
Start: 2025-07-08

## 2025-07-08 NOTE — TELEPHONE ENCOUNTER
07/08/25 Msg recd from Dr Macdonald    She can try adding an additional dose of metoprolol 50 mg if her heart rates remain high.  Unfortunately we can't cardiovert her any sooner because she had to hold her anticoagulation recently.      Spoke w pt who voiced understanding and agreement w gerardo Hopper  pm

## 2025-07-14 ENCOUNTER — TELEPHONE (OUTPATIENT)
Dept: CARDIOLOGY | Facility: CLINIC | Age: 80
End: 2025-07-14
Payer: MEDICARE

## 2025-07-14 DIAGNOSIS — I50.9 NEW ONSET OF CONGESTIVE HEART FAILURE (H): ICD-10-CM

## 2025-07-14 RX ORDER — FUROSEMIDE 20 MG/1
20 TABLET ORAL DAILY
Qty: 30 TABLET | Refills: 3 | Status: SHIPPED | OUTPATIENT
Start: 2025-07-14

## 2025-07-14 NOTE — TELEPHONE ENCOUNTER
DCCV/MAMI prep instructions    Patient is scheduled for a Cardioversion at [facility] - [address], [place] [address] - Main Entrance of the Hospital, on 7/15.  Check in time is at 8:30 and procedure to follow.    Patient instructed to remain NPO for solid foods 8 hours prior to arrival and may have clear liquids up to 2 hours prior to arrival for their DCCV.    Patient is taking Pradaxa/Xarelto/Eliquis and has been taking daily uninterrupted for at least 21days.     Patient is on oral diabetic medications and has been instructed to reach out to primary care provider for recommendations.     Patient is not taking Digoxin.    Patient is taking taking furosemide and has been instructed to hold it the morning of procedure. and has been advised to hold this the morning of the procedure.    Pt is not on a SGLT2 inhibitor.    Pt is not on a GLP-1 Agonist    Patient advised to take their other daily medications the morning of the procedure with small sips of water.     Verified patient has someone available to drive them home from the hospital and can stay with them for 24 hours after the procedure.     Patient advised to notify care team with any new COVID like symptoms prior to procedure.    Patient advised to notify care team with any new COVID like symptoms prior to procedure. Day of procedure phone number: Macie at [phone]    Patient is aware of visitor policy.    Patient expresses understanding of above instructions and denies further questions at this time.      Johanna Meredith RN  [facility]

## 2025-07-15 ENCOUNTER — ANESTHESIA EVENT (OUTPATIENT)
Dept: MEDSURG UNIT | Facility: CLINIC | Age: 80
End: 2025-07-15
Payer: MEDICARE

## 2025-07-15 ENCOUNTER — ANESTHESIA (OUTPATIENT)
Dept: MEDSURG UNIT | Facility: CLINIC | Age: 80
End: 2025-07-15
Payer: MEDICARE

## 2025-07-15 ENCOUNTER — TELEPHONE (OUTPATIENT)
Dept: CARDIOLOGY | Facility: CLINIC | Age: 80
End: 2025-07-15

## 2025-07-15 ENCOUNTER — HOSPITAL ENCOUNTER (OUTPATIENT)
Facility: CLINIC | Age: 80
Discharge: HOME | End: 2025-07-15
Admitting: INTERNAL MEDICINE
Payer: MEDICARE

## 2025-07-15 VITALS
TEMPERATURE: 98 F | HEART RATE: 61 BPM | DIASTOLIC BLOOD PRESSURE: 58 MMHG | SYSTOLIC BLOOD PRESSURE: 114 MMHG | OXYGEN SATURATION: 98 % | RESPIRATION RATE: 16 BRPM

## 2025-07-15 DIAGNOSIS — I48.19 PERSISTENT ATRIAL FIBRILLATION (H): ICD-10-CM

## 2025-07-15 LAB
MAGNESIUM SERPL-MCNC: 1.5 MG/DL (ref 1.7–2.3)
POTASSIUM SERPL-SCNC: 4.2 MMOL/L (ref 3.4–5.3)

## 2025-07-15 PROCEDURE — 250N000011 HC RX IP 250 OP 636: Performed by: NURSE ANESTHETIST, CERTIFIED REGISTERED

## 2025-07-15 PROCEDURE — 92960 CARDIOVERSION ELECTRIC EXT: CPT | Mod: 6MC

## 2025-07-15 PROCEDURE — 84132 ASSAY OF SERUM POTASSIUM: CPT | Performed by: INTERNAL MEDICINE

## 2025-07-15 PROCEDURE — 93005 ELECTROCARDIOGRAM TRACING: CPT

## 2025-07-15 PROCEDURE — 83735 ASSAY OF MAGNESIUM: CPT | Performed by: INTERNAL MEDICINE

## 2025-07-15 PROCEDURE — 250N000011 HC RX IP 250 OP 636: Performed by: INTERNAL MEDICINE

## 2025-07-15 PROCEDURE — 370N000003 HC ANESTHESIA WARD SERVICE: Performed by: ANESTHESIOLOGY

## 2025-07-15 PROCEDURE — 258N000003 HC RX IP 258 OP 636: Performed by: INTERNAL MEDICINE

## 2025-07-15 PROCEDURE — 96365 THER/PROPH/DIAG IV INF INIT: CPT

## 2025-07-15 PROCEDURE — 92960 CARDIOVERSION ELECTRIC EXT: CPT | Performed by: INTERNAL MEDICINE

## 2025-07-15 PROCEDURE — 36415 COLL VENOUS BLD VENIPUNCTURE: CPT | Performed by: INTERNAL MEDICINE

## 2025-07-15 RX ORDER — SODIUM CHLORIDE 9 MG/ML
INJECTION, SOLUTION INTRAVENOUS CONTINUOUS
Status: DISCONTINUED | OUTPATIENT
Start: 2025-07-15 | End: 2025-07-15 | Stop reason: HOSPADM

## 2025-07-15 RX ORDER — PROPOFOL 10 MG/ML
INJECTION, EMULSION INTRAVENOUS PRN
Status: DISCONTINUED | OUTPATIENT
Start: 2025-07-15 | End: 2025-07-15

## 2025-07-15 RX ORDER — MAGNESIUM SULFATE HEPTAHYDRATE 40 MG/ML
2 INJECTION, SOLUTION INTRAVENOUS
Status: COMPLETED | OUTPATIENT
Start: 2025-07-15 | End: 2025-07-15

## 2025-07-15 RX ORDER — LIDOCAINE 40 MG/G
CREAM TOPICAL
Status: DISCONTINUED | OUTPATIENT
Start: 2025-07-15 | End: 2025-07-15 | Stop reason: HOSPADM

## 2025-07-15 RX ORDER — FLECAINIDE ACETATE 100 MG/1
100 TABLET ORAL 2 TIMES DAILY
Qty: 60 TABLET | Refills: 3 | Status: SHIPPED | OUTPATIENT
Start: 2025-07-15

## 2025-07-15 RX ORDER — POTASSIUM CHLORIDE 1500 MG/1
20 TABLET, EXTENDED RELEASE ORAL
Status: DISCONTINUED | OUTPATIENT
Start: 2025-07-15 | End: 2025-07-15 | Stop reason: HOSPADM

## 2025-07-15 RX ADMIN — SODIUM CHLORIDE 200 ML: 0.9 INJECTION, SOLUTION INTRAVENOUS at 13:30

## 2025-07-15 RX ADMIN — MAGNESIUM SULFATE HEPTAHYDRATE 2 G: 40 INJECTION, SOLUTION INTRAVENOUS at 09:58

## 2025-07-15 RX ADMIN — PROPOFOL 40 MG: 10 INJECTION, EMULSION INTRAVENOUS at 10:30

## 2025-07-15 RX ADMIN — SODIUM CHLORIDE: 0.9 INJECTION, SOLUTION INTRAVENOUS at 08:57

## 2025-07-15 ASSESSMENT — ACTIVITIES OF DAILY LIVING (ADL)
ADLS_ACUITY_SCORE: 41

## 2025-07-15 ASSESSMENT — ENCOUNTER SYMPTOMS: DYSRHYTHMIAS: 1

## 2025-07-15 ASSESSMENT — COPD QUESTIONNAIRES: COPD: 0

## 2025-07-15 ASSESSMENT — LIFESTYLE VARIABLES: TOBACCO_USE: 0

## 2025-07-15 NOTE — DISCHARGE INSTRUCTIONS
Cardioversion Discharge Instructions    After you go home:       For 24 hours - due to the sedation you received:    Have an adult stay with you for 24 hours.   Relax and take it easy.  Do NOT make any important or legal decisions.  Do NOT drive or operate machines at home or at work.  Do NOT drink alcohol.    Diet:    Start with clear liquids and progress to your normal diet as you feel able.    Medicines:    Take your medications, including blood thinners, unless your provider tells you not to.  If you have stopped any medications, check with your provider about when to restart them.    Follow Up Appointments:    Follow up with your cardiologist at [facility] of patient preference as instructed.  Follow up with your primary care provider as needed.    Post cardioversion:    The skin on your chest or back may feel tender for 48 hours.  If your skin is tender, you may:    Use a cold pack on the site. Never use ice directly on your skin. Use the cold pack for 20 minutes. Remove it for at least 30 minutes before re-using.  Apply 1% hydrocortisone cream to the skin (sold at drug stores)  Take Advil (Ibuprofen) or Tylenol (Acetaminophen) per your provider's recommendations.      Call your provider if you have:    Weakness, dizziness, lightheadedness, or fainting.  Shortness of breath.  Irregular heartbeat, feelings of your heart fluttering or beating fast, hard or palpitations.   More than minor skin discomfort or redness where the cardioversion pads were placed.  Questions or concerns.      Call 911 if you have:    Pain in your chest, arm, shoulder, neck, or upper back.  You have problems speaking or seeing.  Weakness in your arm or leg.  You are unable to move your arm or leg.  You have uncontrolled bleeding.         [facility] [place]:    [phone] [facility] (7 days a week)

## 2025-07-15 NOTE — PROCEDURES
[facility]    Procedure: *Cardioversion    Date/Time: 7/15/2025 10:33 AM    Performed by: Adriel Wen MD  Authorized by: Adriel Wen MD      UNIVERSAL PROTOCOL   Site Marked: NA  Prior Images Obtained and Reviewed:  NA  Required items: Required blood products, implants, devices and special equipment available    Patient identity confirmed:  Verbally with patient  Patient was reevaluated immediately before administering moderate or deep sedation or anesthesia  Confirmation Checklist:  Patient's identity using two indicators  Time out: Immediately prior to the procedure a time out was called    Universal Protocol: the Joint Commission Universal Protocol was followed    Preparation: Patient was prepped and draped in usual sterile fashion       ANESTHESIA    Anesthesia was administered and monitored by anesthesiology.  See anesthesia documentation for details.    SEDATION  Patient Sedated: Yes    Sedation Type:  Deep  Vital signs: Vital signs monitored during sedation      PROCEDURE DETAILS  Cardioversion basis: elective  Pre-procedure rhythm: atrial fibrillation  Electrodes: pads  Electrodes placed: anterior-posterior  Number of attempts: 1  Post-procedure rhythm: normal sinus rhythm      PROCEDURE  Describe Procedure: Cardioversion Note    Indication: afib    Informed consent was signed by the patient.  A timeout was taken.    Anesthesia was present for cardioversion, see separate documentation for their sedation.    Baseline rhythm: afib, rate 90  Synchronized cardioversion performed at 120J, x1  Post-DCCV rhythm: sinus, rate 45    No complications.    Adriel Wen MD  Cardiology - [facility]  Pager: [phone]  Text Page  July 15, 2025        Patient Tolerance:  Patient tolerated the procedure well with no immediate complications

## 2025-07-15 NOTE — PROGRESS NOTES
Pt up for second time and became sweaty and weak.  Dr Wen notified and ordered bolus and orthostatics  post bolus.  Report given to Vanita GTZ RN

## 2025-07-15 NOTE — TELEPHONE ENCOUNTER
----- Message from Adriel Wen sent at 7/15/2025 10:24 AM CDT -----  Regarding: refill  Hi Johanna,    I am doing a cardioversion on this patient today.  She needs a refill of her flecainide which was just increased to 100mg twice daily.  Can you send this to the [facility] pharmacy?    Thanks,  Christopher Wen

## 2025-07-15 NOTE — ANESTHESIA PREPROCEDURE EVALUATION
Anesthesia Pre-Procedure Evaluation    Patient: Haritha Cat   MRN: [id number] : 1945          Procedure : * No procedures listed *         Past Medical History:   Diagnosis Date    Abscess of abdominal cavity (H) 2002    cecal perf, prob from diverticulitis    Allergic rhinitis     Arthritis this year    Right hand, numbness    Colon polyp     colonoscopy due 5 years.    Diverticulosis     DM type 2 (diabetes mellitus, type 2) (H)     Elevated LFTs     fatty liver dz    GERD (gastroesophageal reflux disease)     Hepatitis A     Herpes zoster 2011    HTN (hypertension), benign     New onset of congestive heart failure (H) 2025    Osteopenia     DXA     Osteopenia       Past Surgical History:   Procedure Laterality Date    ANESTHESIA CARDIOVERSION N/A 2023    Procedure: Anesthesia cardioversion;  Surgeon: GENERIC ANESTHESIA PROVIDER;  Location:  OR    CATARACT IOL, RT/LT      COLONOSCOPY N/A 5/15/2018    Procedure: COLONOSCOPY;  colonoscopy;  Surgeon: Lucero Acharya MD;  Location:  GI    COLONOSCOPY N/A 2024    Procedure: Colonoscopy;  Surgeon: Emelyn Campos MD;  Location:  GI    DISCECTOMY LUMBAR POSTERIOR MICROSCOPIC ONE LEVEL Right 10/1/2019    Procedure: LUMBAR MICRODISCECTOMY RIGHT L2-3;  Surgeon: Wolfgang Peng MD;  Location:  OR    varicose vein surgery      Dr. Bowen    wisdom teeth  age 27      Allergies   Allergen Reactions    Amoxicillin Hives    Irbesartan      Hyperkalemia    Lisinopril Cough      Social History     Tobacco Use    Smoking status: Never    Smokeless tobacco: Never   Substance Use Topics    Alcohol use: Yes     Comment: rare      Wt Readings from Last 1 Encounters:   25 78.2 kg (172 lb 8 oz)        Anesthesia Evaluation   Pt has had prior anesthetic.     No history of anesthetic complications       ROS/MED HX  ENT/Pulmonary:     (+)     FRANCES risk factors,  hypertension, obese,   allergic rhinitis,                           (-) tobacco use, asthma, COPD and sleep apnea   Neurologic:  - neg neurologic ROS     Cardiovascular:     (+) Dyslipidemia hypertension- -   -  - -      CHF     WILSON.  fainting (syncope).           dysrhythmias, a-fib,             METS/Exercise Tolerance:     Hematologic:       Musculoskeletal:   (+)  arthritis,             GI/Hepatic:     (+) GERD,    hepatitis resolved      hepatitis type A,     (-) liver disease   Renal/Genitourinary:       Endo:     (+)  type II DM,   Not using insulin,          Obesity,       Psychiatric/Substance Use:  - neg psychiatric ROS     Infectious Disease:       Malignancy:       Other:              Physical Exam  Airway  Mallampati: II  TM distance: >3 FB  Neck ROM: full  Mouth opening: >= 4 cm    Cardiovascular   Rhythm: irregular  Rate: normal rate     Dental   (+) Modest Abnormalities - crowns, retainers, 1 or 2 missing teeth      Pulmonary - normal exam      Neurological - normal exam  She appears awake, alert and oriented x3.    Other Findings       OUTSIDE LABS:  CBC:   Lab Results   Component Value Date    WBC 6.3 06/29/2025    WBC 6.5 11/22/2024    HGB 12.3 06/29/2025    HGB 12.7 11/22/2024    HCT 36.2 06/29/2025    HCT 38.8 11/22/2024     06/29/2025     11/22/2024     BMP:   Lab Results   Component Value Date     (L) 06/29/2025     (L) 06/29/2025    POTASSIUM 4.2 07/15/2025    POTASSIUM 4.1 06/29/2025    CHLORIDE 92 (L) 06/29/2025    CHLORIDE 90 (L) 06/29/2025    CO2 19 (L) 06/29/2025    CO2 23 06/29/2025    BUN 21.2 06/29/2025    BUN 21.4 06/29/2025    CR 1.23 (H) 06/29/2025    CR 1.25 (H) 06/29/2025     (H) 07/01/2025     (H) 07/01/2025     COAGS:   Lab Results   Component Value Date    INR 1.49 (H) 11/13/2023     POC:   Lab Results   Component Value Date     (H) 10/01/2019     HEPATIC:   Lab Results   Component Value Date    ALBUMIN 4.3 06/29/2025    PROTTOTAL 7.1 06/29/2025    ALT 44 06/29/2025    AST 33 06/29/2025  "   ALKPHOS 108 06/29/2025    BILITOTAL 1.0 06/29/2025     OTHER:   Lab Results   Component Value Date    A1C 6.0 (H) 05/23/2025    NATALYA 9.8 06/29/2025    MAG 1.5 (L) 07/15/2025    TSH 4.27 (H) 06/29/2025    T4 1.29 06/29/2025       Anesthesia Plan    ASA Status:  3      NPO Status: NPO Appropriate   Anesthesia Type: General.  Airway: natural airway.  Induction: intravenous.  Maintenance: Inhalation.   Techniques and Equipment:       - Monitoring Plan: standard ASA monitoring     Consents    Anesthesia Plan(s) and associated risks, benefits, and realistic alternatives discussed. Questions answered and patient/representative(s) expressed understanding.     - Discussed: CRNA     - Discussed with:  Patient               Postoperative Care         Comments:                   Lalo Jordan MD    I have reviewed the pertinent notes and labs in the chart from the past 30 days and (re)examined the patient.  Any updates or changes from those notes are reflected in this note.    Clinically Significant Risk Factors Present on Admission             # Hypomagnesemia: Lowest Mg = 1.5 mg/dL in last 2 days, will replace as needed       # Hypertension: Noted on problem list  # Heart failure, NOS: heart failure noted on the problem list and last echo with EF 40-50%          # Overweight: Estimated body mass index is 28.71 kg/m  as calculated from the following:    Height as of 6/29/25: 1.651 m (5' 5\").    Weight as of 6/30/25: 78.2 kg (172 lb 8 oz).                    "

## 2025-07-15 NOTE — ANESTHESIA CARE TRANSFER NOTE
Patient: Haritha Cat    Procedure: * No procedures listed *       Diagnosis: * No pre-op diagnosis entered *  Diagnosis Additional Information: No value filed.    Anesthesia Type:   No value filed.     Note:    Oropharynx: oropharynx clear of all foreign objects and spontaneously breathing  Level of Consciousness: awake  Oxygen Supplementation: room air    Independent Airway: airway patency satisfactory and stable  Dentition: dentition unchanged  Vital Signs Stable: post-procedure vital signs reviewed and stable  Report to RN Given: handoff report given  Patient transferred to: [facility]  Comments: At end of procedure, spontaneous respirations, patient alert to voice, able to follow commands. Patient breathing room air. SpO2, NiBP, and EKG monitors and alarms on and functioning, report on patient's clinical status given to Care Suites RN, RN questions answered.            Vitals:  Vitals Value Taken Time   /69 07/15/25 10:42   Temp     Pulse 51 07/15/25 10:44   Resp 16 07/15/25 10:34   SpO2 99 % 07/15/25 10:44   Vitals shown include unfiled device data.    Electronically Signed By: Melva Barrera  July 15, 2025  10:45 AM

## 2025-07-15 NOTE — PRE-PROCEDURE
GENERAL PRE-PROCEDURE:   Procedure:  Cardioversion  Date/Time:  7/15/2025 10:23 AM    Verbal consent obtained?: Yes    Written consent obtained?: Yes    Risks and benefits: Risks, benefits and alternatives were discussed    Consent given by:  Patient  Patient states understanding of procedure being performed: Yes    Patient's understanding of procedure matches consent: Yes    Procedure consent matches procedure scheduled: Yes    Expected level of sedation:  Deep  Appropriately NPO:  Yes  ASA Class:  1  Mallampati  :  Grade 1- soft palate, uvula, tonsillar pillars, and posterior pharyngeal wall visible  Lungs:  Lungs clear with good breath sounds bilaterally  Heart:  Normal heart sounds and rate and a-fib  History & Physical reviewed:  History and physical reviewed and no updates needed  Statement of review:  I have reviewed the lab findings, diagnostic data, medications, and the plan for sedation

## 2025-07-15 NOTE — PROGRESS NOTES
Care Suites Discharge Nursing Note    Patient Information  Name: Haritha Cat  Age: 80 year old    Discharge Education:  Discharge instructions reviewed: Yes  Additional education/resources provided: Per Dr Wen  Patient/patient representative verbalizes understanding: Yes  Patient discharging on new medications: No  Medication education completed: Yes    Discharge Plans:   Discharge location: home  Discharge ride contacted: Yes  Approximate discharge time: 1430    Discharge Criteria:  Discharge criteria met and vital signs stable: Yes.  Pt ambulated without dizziness and no increased SOB.  Stated she wanted to go home.  Dr Wen notified.  Stated she could be discharged    Patient Belongs:  Patient belongings returned to patient: Yes    Juan Hale RN

## 2025-07-15 NOTE — ANESTHESIA POSTPROCEDURE EVALUATION
Patient: Haritha Cat    Procedure: * No procedures listed *       Anesthesia Type:  No value filed.    Note:  Disposition: Outpatient   Postop Pain Control: Uneventful            Sign Out: Well controlled pain   PONV: No   Neuro/Psych: Uneventful            Sign Out: Acceptable/Baseline neuro status   Airway/Respiratory: Uneventful            Sign Out: Acceptable/Baseline resp. status   CV/Hemodynamics: Uneventful            Sign Out: Acceptable CV status; No obvious hypovolemia; No obvious fluid overload   Other NRE: NONE   DID A NON-ROUTINE EVENT OCCUR? No           Last vitals:  There were no vitals filed for this visit.    Electronically Signed By: Lalo Jordan MD  July 15, 2025  10:59 AM

## 2025-07-15 NOTE — PROGRESS NOTES
Pt was up for discharge.  Got up and became dizzy.  Placed back in bed and monitors replaced.   Pt felt better after 5min.  Juice and crackers given

## 2025-07-15 NOTE — PROGRESS NOTES
"Diaphoresis resolved, reports feeling \"normal self\" when lying in bed, sitting and standing for orthostatic BP.  Standing BP soft at 81/67  Saline bolus infused BP when complete is 112/69 pt is semi tirado position at approx 30 degrees.    Dr Wen here to see pt.  No new orders.  Will monitor until stable to discharge.  No complaints at this time, neuros intact.    Standing /69-Attempted to walk in hallway, ambulated a short distance and reports feeling faint, weak an dizzy, and black and white flashing lights \"started to come on\" when walking. Appears to be SOB which came on quickly.   Sitting BP on arrival to room is 99/62 sitting in WC.  Black and white flashing light feeling resolved.  SOB resolved, feels much better now approx 4 min upon returning to room.  Drinking fluids.  Dr Wen updated.    "

## 2025-07-15 NOTE — PROGRESS NOTES
Care Suites Admission Nursing Note    Patient Information  Name: Haritha Cat  Age: 80 year old  Reason for admission: Cardioversion  Care Suites arrival time: 0835    Visitor Information  Name: Blaine     Patient Admission/Assessment   Pre-procedure assessment complete: Yes  If abnormal assessment/labs, provider notified: N/A  NPO: Yes  Medications held per instructions/orders: Yes  Consent: deferred  If applicable, pregnancy test status: deferred  Patient oriented to room: Yes  Education/questions answered: Yes  Plan/other: Prep for cardioversion    Discharge Planning  Discharge name/phone number: Blaine  Discharge location: home    Juan Hale RN

## 2025-07-16 ENCOUNTER — APPOINTMENT (OUTPATIENT)
Dept: GENERAL RADIOLOGY | Facility: CLINIC | Age: 80
End: 2025-07-16
Attending: EMERGENCY MEDICINE
Payer: MEDICARE

## 2025-07-16 ENCOUNTER — TELEPHONE (OUTPATIENT)
Dept: CARDIOLOGY | Facility: CLINIC | Age: 80
End: 2025-07-16

## 2025-07-16 ENCOUNTER — HOSPITAL ENCOUNTER (OUTPATIENT)
Facility: CLINIC | Age: 80
Setting detail: OBSERVATION
Discharge: HOME | End: 2025-07-17
Attending: EMERGENCY MEDICINE | Admitting: STUDENT IN AN ORGANIZED HEALTH CARE EDUCATION/TRAINING PROGRAM
Payer: MEDICARE

## 2025-07-16 DIAGNOSIS — R79.89 ELEVATED BRAIN NATRIURETIC PEPTIDE (BNP) LEVEL: ICD-10-CM

## 2025-07-16 DIAGNOSIS — N17.9 AKI (ACUTE KIDNEY INJURY): ICD-10-CM

## 2025-07-16 DIAGNOSIS — R06.09 DOE (DYSPNEA ON EXERTION): ICD-10-CM

## 2025-07-16 DIAGNOSIS — E87.1 HYPONATREMIA: ICD-10-CM

## 2025-07-16 LAB
ANION GAP SERPL CALCULATED.3IONS-SCNC: 22 MMOL/L (ref 7–15)
ATRIAL RATE - MUSE: 53 BPM
ATRIAL RATE - MUSE: 56 BPM
ATRIAL RATE - MUSE: 82 BPM
BASOPHILS # BLD AUTO: 0.1 10E3/UL (ref 0–0.2)
BASOPHILS NFR BLD AUTO: 0 %
BUN SERPL-MCNC: 37.1 MG/DL (ref 8–23)
CALCIUM SERPL-MCNC: 9.4 MG/DL (ref 8.8–10.4)
CHLORIDE SERPL-SCNC: 89 MMOL/L (ref 98–107)
CREAT SERPL-MCNC: 1.97 MG/DL (ref 0.51–0.95)
DIASTOLIC BLOOD PRESSURE - MUSE: NORMAL MMHG
EGFRCR SERPLBLD CKD-EPI 2021: 25 ML/MIN/1.73M2
EOSINOPHIL # BLD AUTO: 0.1 10E3/UL (ref 0–0.7)
EOSINOPHIL NFR BLD AUTO: 1 %
ERYTHROCYTE [DISTWIDTH] IN BLOOD BY AUTOMATED COUNT: 12.6 % (ref 10–15)
GLUCOSE SERPL-MCNC: 191 MG/DL (ref 70–99)
HCO3 SERPL-SCNC: 17 MMOL/L (ref 22–29)
HCT VFR BLD AUTO: 39 % (ref 35–47)
HGB BLD-MCNC: 12.7 G/DL (ref 11.7–15.7)
HOLD SPECIMEN: NORMAL
IMM GRANULOCYTES # BLD: 0.1 10E3/UL
IMM GRANULOCYTES NFR BLD: 1 %
INTERPRETATION ECG - MUSE: NORMAL
LYMPHOCYTES # BLD AUTO: 2.3 10E3/UL (ref 0.8–5.3)
LYMPHOCYTES NFR BLD AUTO: 20 %
MCH RBC QN AUTO: 28.6 PG (ref 26.5–33)
MCHC RBC AUTO-ENTMCNC: 32.6 G/DL (ref 31.5–36.5)
MCV RBC AUTO: 88 FL (ref 78–100)
MONOCYTES # BLD AUTO: 1.3 10E3/UL (ref 0–1.3)
MONOCYTES NFR BLD AUTO: 12 %
NEUTROPHILS # BLD AUTO: 7.4 10E3/UL (ref 1.6–8.3)
NEUTROPHILS NFR BLD AUTO: 66 %
NRBC # BLD AUTO: 0 10E3/UL
NRBC BLD AUTO-RTO: 0 /100
NT-PROBNP SERPL-MCNC: 2614 PG/ML (ref 0–624)
P AXIS - MUSE: 39 DEGREES
P AXIS - MUSE: 40 DEGREES
P AXIS - MUSE: NORMAL DEGREES
PLATELET # BLD AUTO: 345 10E3/UL (ref 150–450)
POTASSIUM SERPL-SCNC: 4.8 MMOL/L (ref 3.4–5.3)
PR INTERVAL - MUSE: 286 MS
PR INTERVAL - MUSE: 348 MS
PR INTERVAL - MUSE: NORMAL MS
QRS DURATION - MUSE: 110 MS
QRS DURATION - MUSE: 124 MS
QRS DURATION - MUSE: 136 MS
QT - MUSE: 390 MS
QT - MUSE: 490 MS
QT - MUSE: 506 MS
QTC - MUSE: 459 MS
QTC - MUSE: 488 MS
QTC - MUSE: 505 MS
R AXIS - MUSE: -55 DEGREES
R AXIS - MUSE: -64 DEGREES
R AXIS - MUSE: 195 DEGREES
RBC # BLD AUTO: 4.44 10E6/UL (ref 3.8–5.2)
SODIUM SERPL-SCNC: 128 MMOL/L (ref 135–145)
SYSTOLIC BLOOD PRESSURE - MUSE: NORMAL MMHG
T AXIS - MUSE: -45 DEGREES
T AXIS - MUSE: 119 DEGREES
T AXIS - MUSE: 19 DEGREES
VENTRICULAR RATE- MUSE: 101 BPM
VENTRICULAR RATE- MUSE: 53 BPM
VENTRICULAR RATE- MUSE: 56 BPM
WBC # BLD AUTO: 11.2 10E3/UL (ref 4–11)

## 2025-07-16 PROCEDURE — 93005 ELECTROCARDIOGRAM TRACING: CPT

## 2025-07-16 PROCEDURE — 96361 HYDRATE IV INFUSION ADD-ON: CPT

## 2025-07-16 PROCEDURE — 71046 X-RAY EXAM CHEST 2 VIEWS: CPT

## 2025-07-16 PROCEDURE — 99285 EMERGENCY DEPT VISIT HI MDM: CPT | Mod: 25 | Performed by: EMERGENCY MEDICINE

## 2025-07-16 PROCEDURE — 85004 AUTOMATED DIFF WBC COUNT: CPT | Performed by: STUDENT IN AN ORGANIZED HEALTH CARE EDUCATION/TRAINING PROGRAM

## 2025-07-16 PROCEDURE — 258N000003 HC RX IP 258 OP 636: Performed by: STUDENT IN AN ORGANIZED HEALTH CARE EDUCATION/TRAINING PROGRAM

## 2025-07-16 PROCEDURE — 85025 COMPLETE CBC W/AUTO DIFF WBC: CPT | Performed by: EMERGENCY MEDICINE

## 2025-07-16 PROCEDURE — G0378 HOSPITAL OBSERVATION PER HR: HCPCS

## 2025-07-16 PROCEDURE — 258N000003 HC RX IP 258 OP 636: Performed by: EMERGENCY MEDICINE

## 2025-07-16 PROCEDURE — 36415 COLL VENOUS BLD VENIPUNCTURE: CPT | Performed by: STUDENT IN AN ORGANIZED HEALTH CARE EDUCATION/TRAINING PROGRAM

## 2025-07-16 PROCEDURE — 99222 1ST HOSP IP/OBS MODERATE 55: CPT | Mod: AI | Performed by: STUDENT IN AN ORGANIZED HEALTH CARE EDUCATION/TRAINING PROGRAM

## 2025-07-16 PROCEDURE — 96360 HYDRATION IV INFUSION INIT: CPT

## 2025-07-16 PROCEDURE — 250N000013 HC RX MED GY IP 250 OP 250 PS 637: Performed by: STUDENT IN AN ORGANIZED HEALTH CARE EDUCATION/TRAINING PROGRAM

## 2025-07-16 PROCEDURE — 83880 ASSAY OF NATRIURETIC PEPTIDE: CPT | Performed by: EMERGENCY MEDICINE

## 2025-07-16 PROCEDURE — 80048 BASIC METABOLIC PNL TOTAL CA: CPT | Performed by: STUDENT IN AN ORGANIZED HEALTH CARE EDUCATION/TRAINING PROGRAM

## 2025-07-16 PROCEDURE — 80048 BASIC METABOLIC PNL TOTAL CA: CPT | Performed by: EMERGENCY MEDICINE

## 2025-07-16 RX ORDER — AMOXICILLIN 250 MG
2 CAPSULE ORAL 2 TIMES DAILY PRN
Status: DISCONTINUED | OUTPATIENT
Start: 2025-07-16 | End: 2025-07-17 | Stop reason: HOSPADM

## 2025-07-16 RX ORDER — FLECAINIDE ACETATE 100 MG/1
100 TABLET ORAL 2 TIMES DAILY
Status: DISCONTINUED | OUTPATIENT
Start: 2025-07-16 | End: 2025-07-17 | Stop reason: HOSPADM

## 2025-07-16 RX ORDER — AMOXICILLIN 250 MG
1 CAPSULE ORAL 2 TIMES DAILY PRN
Status: DISCONTINUED | OUTPATIENT
Start: 2025-07-16 | End: 2025-07-17 | Stop reason: HOSPADM

## 2025-07-16 RX ORDER — ONDANSETRON 4 MG/1
4 TABLET, ORALLY DISINTEGRATING ORAL EVERY 6 HOURS PRN
Status: DISCONTINUED | OUTPATIENT
Start: 2025-07-16 | End: 2025-07-17 | Stop reason: HOSPADM

## 2025-07-16 RX ORDER — METOPROLOL SUCCINATE 50 MG/1
100 TABLET, EXTENDED RELEASE ORAL DAILY
Status: DISCONTINUED | OUTPATIENT
Start: 2025-07-17 | End: 2025-07-17 | Stop reason: HOSPADM

## 2025-07-16 RX ORDER — LIDOCAINE 40 MG/G
CREAM TOPICAL
Status: DISCONTINUED | OUTPATIENT
Start: 2025-07-16 | End: 2025-07-17 | Stop reason: HOSPADM

## 2025-07-16 RX ORDER — SODIUM CHLORIDE 9 MG/ML
INJECTION, SOLUTION INTRAVENOUS CONTINUOUS
Status: DISCONTINUED | OUTPATIENT
Start: 2025-07-16 | End: 2025-07-17 | Stop reason: HOSPADM

## 2025-07-16 RX ORDER — PROCHLORPERAZINE MALEATE 5 MG/1
5 TABLET ORAL EVERY 6 HOURS PRN
Status: DISCONTINUED | OUTPATIENT
Start: 2025-07-16 | End: 2025-07-17 | Stop reason: HOSPADM

## 2025-07-16 RX ORDER — ONDANSETRON 2 MG/ML
4 INJECTION INTRAMUSCULAR; INTRAVENOUS EVERY 6 HOURS PRN
Status: DISCONTINUED | OUTPATIENT
Start: 2025-07-16 | End: 2025-07-17 | Stop reason: HOSPADM

## 2025-07-16 RX ORDER — ACETAMINOPHEN 500 MG
1000 TABLET ORAL EVERY 6 HOURS PRN
Status: DISCONTINUED | OUTPATIENT
Start: 2025-07-16 | End: 2025-07-17 | Stop reason: HOSPADM

## 2025-07-16 RX ADMIN — APIXABAN 5 MG: 5 TABLET, FILM COATED ORAL at 21:05

## 2025-07-16 RX ADMIN — FLECAINIDE ACETATE 100 MG: 100 TABLET ORAL at 22:17

## 2025-07-16 RX ADMIN — SODIUM CHLORIDE: 0.9 INJECTION, SOLUTION INTRAVENOUS at 21:04

## 2025-07-16 RX ADMIN — SODIUM CHLORIDE 500 ML: 0.9 INJECTION, SOLUTION INTRAVENOUS at 19:16

## 2025-07-16 ASSESSMENT — COLUMBIA-SUICIDE SEVERITY RATING SCALE - C-SSRS
2. HAVE YOU ACTUALLY HAD ANY THOUGHTS OF KILLING YOURSELF IN THE PAST MONTH?: NO
1. IN THE PAST MONTH, HAVE YOU WISHED YOU WERE DEAD OR WISHED YOU COULD GO TO SLEEP AND NOT WAKE UP?: NO
6. HAVE YOU EVER DONE ANYTHING, STARTED TO DO ANYTHING, OR PREPARED TO DO ANYTHING TO END YOUR LIFE?: NO

## 2025-07-16 ASSESSMENT — ACTIVITIES OF DAILY LIVING (ADL)
ADLS_ACUITY_SCORE: 41
ADLS_ACUITY_SCORE: 44
ADLS_ACUITY_SCORE: 41
ADLS_ACUITY_SCORE: 35

## 2025-07-16 NOTE — ED PROVIDER NOTES
Emergency Department Note      History of Present Illness     Chief Complaint   Shortness of Breath      HPI   Haritha Cat is an anticoagulated 80 year old female with a history of atrial fibrillation on Eliquis and flecainide as well as a new diagnosis of heart failure with reduced EF on Lasix presenting with her  for evaluation of shortness of breath.  Yesterday she had a cardioversion.  Afterwards she had what sounds like near syncope and dyspnea when she tried to stand and walk.  It took several hours for her to recover but she was ultimately discharged home. Today the near-syncope symptoms have improved but she continues to have shortness of breath with exertion.  She has not had any chest pain, fever, cough, diarrhea, or leg swelling.  She did have her usual vomiting of nonbloody mucus yesterday.  She has had no weight change since recent discharge at 172 pounds when weighed at home today.    Independent Historian    as detailed above.    Review of External Notes   I reviewed discharge summary from (7/1/25) for PAF and new diagnoses of heart failure on Eliquis and Flecainide   I reviewed cardiology note where the patient had a cardioversion. She had shortness of breath yesterday before discharge   Today when cardiology called to check in the patient reported that the shortness of breath persisted. She also reported blood pressure of 70/45 but  improving and vomiting last night   Schcledued 9:30 am at heart clinic  Past Medical History     Medical History and Problem List   Osteopenia   Allergic rhinitis   Arthritis   Diverticulosis   Type 2 diabetes   GERD   Hepatitis A   Herpes Zoster   Hypertension   Atrial fibrillation   Spinal stenosis     Medications   Eliquis   Flecainide   Hydrochlorothiazide   Metformin   Toprol XL     Surgical History   Anaesthesia cardioversion  Colonoscopy x2  Cataract removal, bilateral   Lumbar discectomy   Varicose vein surgery   [place] teeth removal       Physical Exam     Patient Vitals for the past 24 hrs:   BP Temp Temp src Pulse Resp SpO2 Height Weight   07/16/25 1950 (!) 151/91 -- -- 55 12 99 % -- --   07/16/25 1930 -- -- -- 54 11 98 % -- --   07/16/25 1900 -- -- -- 53 20 98 % -- --   07/16/25 1818 (!) 126/91 -- -- -- -- 100 % -- --   07/16/25 1547 96/51 97.8  F (36.6  C) Temporal 53 16 98 % -- --     Lying Orthostatic  BP: 126/91 Lying Orthostatic Pulse: 56 bpm  Sitting Orthostatic  BP: 116/84 Sitting Orthostatic Pulse: 55 bpm  Standing Orthostatic  BP: 124/87 Standing Orthostatic Pulse: 56 bpm     Physical Exam  General: Well-developed and well-nourished. Well appearing elderly  woman. Cooperative.  Head:  Atraumatic.  Eyes:  Conjunctivae, lids, and sclerae are normal.  ENT:    Normal nose. Moist mucous membranes.  Neck:  Supple. Normal range of motion.  CV:  Bradycardic rate and regular rhythm. Normal heart sounds with no murmurs, rubs, or gallops detected.  Resp:  No respiratory distress. Clear to auscultation bilaterally without decreased breath sounds, wheezing, rales, or rhonchi.  GI:  Soft. Non-distended. Non-tender.    MS:  Normal ROM. No bilateral lower extremity edema.  Skin:  Warm. Non-diaphoretic. No pallor.  Neuro:  Awake. A&Ox3. Normal strength.  Psych: Normal mood and affect. Normal speech.  Vitals reviewed.     Diagnostics     Lab Results   Labs Ordered and Resulted from Time of ED Arrival to Time of ED Departure   BASIC METABOLIC PANEL - Abnormal       Result Value    Sodium 128 (*)     Potassium 4.8      Chloride 89 (*)     Carbon Dioxide (CO2) 17 (*)     Anion Gap 22 (*)     Urea Nitrogen 37.1 (*)     Creatinine 1.97 (*)     GFR Estimate 25 (*)     Calcium 9.4      Glucose 191 (*)    CBC WITH PLATELETS AND DIFFERENTIAL - Abnormal    WBC Count 11.2 (*)     RBC Count 4.44      Hemoglobin 12.7      Hematocrit 39.0      MCV 88      MCH 28.6      MCHC 32.6      RDW 12.6      Platelet Count 345      % Neutrophils 66      % Lymphocytes  20      % Monocytes 12      % Eosinophils 1      % Basophils 0      % Immature Granulocytes 1      NRBCs per 100 WBC 0      Absolute Neutrophils 7.4      Absolute Lymphocytes 2.3      Absolute Monocytes 1.3      Absolute Eosinophils 0.1      Absolute Basophils 0.1      Absolute Immature Granulocytes 0.1      Absolute NRBCs 0.0     NT-PROBNP - Abnormal    NT-proBNP 2,614 (*)    FRACTIONAL EXCRETION OF SODIUM       Imaging   XR Chest 2 Views   Final Result   IMPRESSION: Minimal bibasilar opacities may reflect atelectasis or mild edema. No focal consolidation or pleural effusion. Stable cardiomegaly. No vascular congestion.        EKG  Indication: shortness of breath   Time: 1549  Rate 56 bpm. HI interval 348. QRS duration 124. QT/QTc 506/488.   Sinus bradycardia with 1st degree AV block  Septal infarct, age undetermined   Possible lateral infract, age undetermined  Abnormal ECG   No acute ST changes.  No changes as compared to prior, dated 7/15/25.     Independent Interpretation   CXR: No pneumothorax or infiltrate.  Cardiomegaly.    ED Course      Medications Administered   Medications   sodium chloride 0.9% BOLUS 500 mL (500 mLs Intravenous $New Bag 7/16/25 1916)         Discussion of Management   Admitting Hospitalist, Dr. Capellan     ED Course   ED Course as of 07/16/25 2333   Wed Jul 16, 2025   1758 I obtained history and examined the patient as noted above    1917 I spoke with Dr. Capellan, hospitalist, who accepts the patient.      Additional Documentation  Social Determinants of Health: Healthcare Access/Compliance(established cardiologist) and Social Connections/Isolation(supportive )     Medical Decision Making / Diagnosis   MDM   Ct is a 80 year old woman with history of atrial fibrillation on Eliquis and flecainide who underwent a cardioversion yesterday.  It sounds like recovery was prolonged as she had near syncope on more than 1 occasion.  After she got home the near syncope improved but today she  continues to have some lightheadedness as well as dyspnea on exertion.  Notably, she was started on Lasix after discharge earlier this month.  Her exam is reassuring.  She does not appear volume overloaded.    EKG is reassuring without acute ST changes or arrhythmias.  Orthostatics are negative.  However, she has evidence of JAGRUTI with creatinine 1.97 from baseline of 1.23 6/29/2025.  There is associated hyponatremia.  I suspect this is prerenal and she may have been over diuresed with BNP improving from 5050 to 2614 today.  Again she does not appear volume overloaded including chest x-ray without significant edema or vascular congestion.  Her weight is unchanged since discharge.  She was given 500 mL of normal saline and I have ordered a FENa to further help differentiate the cause of symptoms.  She is compliant with her Eliquis and I doubt PE.  She does not describe infectious symptoms.  She is inappropriate for discharge given her lightheadedness, dyspnea, and JAGRUTI.  She is comfortable with plan for admission.  All questions answered.  I discussed the patient's case with Dr. Carrion, hospitalist, who accepts admission and has no further orders.    Disposition   The patient was admitted to the hospital.     Diagnosis     ICD-10-CM    1. JAGRUTI (acute kidney injury)  N17.9       2. WILSON (dyspnea on exertion)  R06.09       3. Elevated brain natriuretic peptide (BNP) level  R79.89       4. Hyponatremia  E87.1              Scribe Disclosure:  I, Emmanuel Martinez, am serving as a scribe at 5:50 PM on 7/16/2025 to document services personally performed by Makenna Aldridge MD based on my observations and the provider's statements to me.        Makenna Aldridge MD  07/16/25 6483

## 2025-07-16 NOTE — ED TRIAGE NOTES
Pt c/o sob starting today. Recently had a cardioversion yesterday. Endorses Low BP at home. Pt currently on eliquis.  at bedside.      Triage Assessment (Adult)       Row Name 07/16/25 7637          Triage Assessment    Airway WDL WDL        Respiratory WDL    Respiratory WDL WDL        Skin Circulation/Temperature WDL    Skin Circulation/Temperature WDL WDL        Cardiac WDL    Cardiac WDL WDL        Peripheral/Neurovascular WDL    Peripheral Neurovascular WDL WDL        Cognitive/Neuro/Behavioral WDL    Cognitive/Neuro/Behavioral WDL WDL

## 2025-07-17 ENCOUNTER — APPOINTMENT (OUTPATIENT)
Dept: PHYSICAL THERAPY | Facility: CLINIC | Age: 80
End: 2025-07-17
Attending: STUDENT IN AN ORGANIZED HEALTH CARE EDUCATION/TRAINING PROGRAM
Payer: MEDICARE

## 2025-07-17 VITALS
HEIGHT: 65 IN | HEART RATE: 58 BPM | DIASTOLIC BLOOD PRESSURE: 89 MMHG | SYSTOLIC BLOOD PRESSURE: 123 MMHG | OXYGEN SATURATION: 98 % | WEIGHT: 183.2 LBS | RESPIRATION RATE: 18 BRPM | TEMPERATURE: 97.9 F | BODY MASS INDEX: 30.52 KG/M2

## 2025-07-17 LAB
ANION GAP SERPL CALCULATED.3IONS-SCNC: 17 MMOL/L (ref 7–15)
BUN SERPL-MCNC: 34.9 MG/DL (ref 8–23)
CALCIUM SERPL-MCNC: 9.1 MG/DL (ref 8.8–10.4)
CHLORIDE SERPL-SCNC: 96 MMOL/L (ref 98–107)
CREAT SERPL-MCNC: 1.72 MG/DL (ref 0.51–0.95)
CREAT UR-MCNC: 113.9 MG/DL
EGFRCR SERPLBLD CKD-EPI 2021: 30 ML/MIN/1.73M2
ERYTHROCYTE [DISTWIDTH] IN BLOOD BY AUTOMATED COUNT: 12.6 % (ref 10–15)
FRACT EXCRET NA UR+SERPL-RTO: NORMAL %
GLUCOSE SERPL-MCNC: 162 MG/DL (ref 70–99)
HCO3 SERPL-SCNC: 20 MMOL/L (ref 22–29)
HCT VFR BLD AUTO: 34.1 % (ref 35–47)
HGB BLD-MCNC: 11.4 G/DL (ref 11.7–15.7)
MCH RBC QN AUTO: 28.5 PG (ref 26.5–33)
MCHC RBC AUTO-ENTMCNC: 33.4 G/DL (ref 31.5–36.5)
MCV RBC AUTO: 85 FL (ref 78–100)
PLATELET # BLD AUTO: 300 10E3/UL (ref 150–450)
POTASSIUM SERPL-SCNC: 4.8 MMOL/L (ref 3.4–5.3)
RBC # BLD AUTO: 4 10E6/UL (ref 3.8–5.2)
SODIUM SERPL-SCNC: 133 MMOL/L (ref 135–145)
SODIUM UR-SCNC: <20 MMOL/L
WBC # BLD AUTO: 7.7 10E3/UL (ref 4–11)

## 2025-07-17 PROCEDURE — 97530 THERAPEUTIC ACTIVITIES: CPT | Mod: GP

## 2025-07-17 PROCEDURE — 97116 GAIT TRAINING THERAPY: CPT | Mod: GP

## 2025-07-17 PROCEDURE — 82570 ASSAY OF URINE CREATININE: CPT | Performed by: STUDENT IN AN ORGANIZED HEALTH CARE EDUCATION/TRAINING PROGRAM

## 2025-07-17 PROCEDURE — 97161 PT EVAL LOW COMPLEX 20 MIN: CPT | Mod: GP

## 2025-07-17 PROCEDURE — 80048 BASIC METABOLIC PNL TOTAL CA: CPT | Performed by: STUDENT IN AN ORGANIZED HEALTH CARE EDUCATION/TRAINING PROGRAM

## 2025-07-17 PROCEDURE — 36415 COLL VENOUS BLD VENIPUNCTURE: CPT | Performed by: STUDENT IN AN ORGANIZED HEALTH CARE EDUCATION/TRAINING PROGRAM

## 2025-07-17 PROCEDURE — 99239 HOSP IP/OBS DSCHRG MGMT >30: CPT | Performed by: STUDENT IN AN ORGANIZED HEALTH CARE EDUCATION/TRAINING PROGRAM

## 2025-07-17 PROCEDURE — G0378 HOSPITAL OBSERVATION PER HR: HCPCS

## 2025-07-17 PROCEDURE — 258N000003 HC RX IP 258 OP 636: Performed by: STUDENT IN AN ORGANIZED HEALTH CARE EDUCATION/TRAINING PROGRAM

## 2025-07-17 PROCEDURE — 96361 HYDRATE IV INFUSION ADD-ON: CPT

## 2025-07-17 PROCEDURE — 85018 HEMOGLOBIN: CPT | Performed by: STUDENT IN AN ORGANIZED HEALTH CARE EDUCATION/TRAINING PROGRAM

## 2025-07-17 PROCEDURE — 250N000013 HC RX MED GY IP 250 OP 250 PS 637: Performed by: STUDENT IN AN ORGANIZED HEALTH CARE EDUCATION/TRAINING PROGRAM

## 2025-07-17 RX ADMIN — SODIUM CHLORIDE: 0.9 INJECTION, SOLUTION INTRAVENOUS at 08:39

## 2025-07-17 RX ADMIN — APIXABAN 5 MG: 5 TABLET, FILM COATED ORAL at 08:40

## 2025-07-17 RX ADMIN — FLECAINIDE ACETATE 100 MG: 100 TABLET ORAL at 08:39

## 2025-07-17 ASSESSMENT — ACTIVITIES OF DAILY LIVING (ADL)
ADLS_ACUITY_SCORE: 51
ADLS_ACUITY_SCORE: 51
ADLS_ACUITY_SCORE: 55
ADLS_ACUITY_SCORE: 51
ADLS_ACUITY_SCORE: 46
ADLS_ACUITY_SCORE: 51
ADLS_ACUITY_SCORE: 57
ADLS_ACUITY_SCORE: 51
ADLS_ACUITY_SCORE: 57
ADLS_ACUITY_SCORE: 51
ADLS_ACUITY_SCORE: 55
ADLS_ACUITY_SCORE: 57
ADLS_ACUITY_SCORE: 51

## 2025-07-17 NOTE — PROGRESS NOTES
Observation goals  PRIOR TO DISCHARGE        Comments: -diagnostic tests and consults completed and resulted=Not Met, PT to see and AM labs  -vital signs normal or at patient baseline=Met  -tolerating oral intake to maintain hydration=Met  -adequate pain control on oral analgesics=Met  -returns to baseline functional status=Not Met, SOB with exertion  -safe disposition plan has been identified=Not Met, PT consult  Nurse to notify provider when observation goals have been met and patient is ready for discharge.

## 2025-07-17 NOTE — ED NOTES
[facility]  ED Nurse Handoff Report    ED Chief complaint: Shortness of Breath      ED Diagnosis:   Final diagnoses:   None       Code Status: To be addressed by admitting provider    Allergies:   Allergies   Allergen Reactions    Amoxicillin Hives    Irbesartan      Hyperkalemia    Lisinopril Cough       Patient Story: Cardioversion yesterday, reports feeling weak with low BP's and SOB intermittently but especially with mild activity.   Focused Assessment:  Alert and oriented X 4. SBP 's on arrival. Orthostatics negative for change. SB on tele. SOB noted with standing, moving self up in bed. O2 remained stable on RA. Up SBA. CXR done. Receiving 500 ml bolus.  at bedside.    Treatments and/or interventions provided: Meds (see  MAR), labs, CXR, orthostatics  Patient's response to treatments and/or interventions: No change    To be done/followed up on inpatient unit:  ADmission orders    Does this patient have any cognitive concerns?: Alert and oriented X 4    Activity level - Baseline/Home:  Independent  Activity Level - Current:   Stand with Assist    Patient's Preferred language: English   Needed?: No    Isolation: None  Infection: Not Applicable  Sepsis treatment initiated: No  Patient tested for COVID 19 prior to admission: NO  Bariatric?: No    Vital Signs:   Vitals:    07/16/25 1547 07/16/25 1818   BP: 96/51 (!) 126/91   Pulse: 53    Resp: 16    Temp: 97.8  F (36.6  C)    TempSrc: Temporal    SpO2: 98% 100%       Cardiac Rhythm:     Was the PSS-3 completed:   Yes  What interventions are required if any?  na             Family Comments:  present at bedside  OBS brochure/video discussed/provided to patient/family: Yes              Name of person given brochure if not patient: na              Relationship to patient: na    For the majority of the shift this patient's behavior was Green.   Behavioral interventions performed were na.    ED NURSE PHONE NUMBER:  [phone]

## 2025-07-17 NOTE — H&P
[facility]    History and Physical - Hospitalist Service       Date of Admission:  7/16/2025    Assessment & Plan      Haritha Cat is a 80 year old female admitted on 7/16/2025. She presents with weakness with low BP's and SOB intermittently but especially with mild activity.     CKD stage III  JAGRUTI  Generalized Weakness  Hyponatremia  Assessment: *Baseline Cr 1.2-1.3, patient's creatinine on admission at 1.97, suspect secondary to prerenal etiology in the setting of poor p.o. intake and dehydration.  Sodium of 128 also likely secondary to hypovolemia. CXR shows minimal bibasilar opacities may reflect atelectasis or mild edema. No focal consolidation or pleural effusion. Stable cardiomegaly. No vascular congestion.   Plan:  - Registered observation  - IV fluids overnight  - Recheck BMP in a.m.  - Follow vitals/temp  - Fall precautions  - PT eval    Paroxysmal atrial fibrillation (H)   HFpEF  *Diagnosed with paroxysmal A-fib in 2023, at the time her echo was within normal limit, she also had a stress test, since then she is on Eliquis, metoprolol and flecainide. TTE obtained 06/2025; EF around 50%, normal LA size. S/P cardioversion 07/15/2025 -> SR  Plan:  -- Continue BB in AM if vitals stable (holding for now) / resume Flecainide   -- Telemetry overnight  -- Continue PTA DOAC     Hyperlipidemia LDL goal <100  --Diet controlled     HTN, benign  Assessment: PTA on Metoprolol / hydrochlorothiazide   Plan:   --Continue PTA metoprolol  --PTA Lasix held at this time      Type 2 diabetes mellitus without complications (H)  *PTA regimen includes: metformin   Plan:  --Hold PTA metformin.  Resume at discharge.               Diet:  Regular Diet  DVT Prophylaxis: DOAC  Rodriguez Catheter: Not present  Lines: None     Cardiac Monitoring: None  Code Status:  FULL CODE    Clinically Significant Risk Factors Present on Admission         # Hyponatremia: Lowest Na = 128 mmol/L in last 2 days, will monitor  "as appropriate  # Hypochloremia: Lowest Cl = 89 mmol/L in last 2 days, will monitor as appropriate    # Hypomagnesemia: Lowest Mg = 1.5 mg/dL in last 2 days, will replace as needed  # Anion Gap Metabolic Acidosis: Highest Anion Gap = 22 mmol/L in last 2 days, will monitor and treat as appropriate   # Drug Induced Coagulation Defect: home medication list includes an anticoagulant medication   # Acute Kidney Injury, unspecified: based on a >150% or 0.3 mg/dL increase in last creatinine compared to past 90 day average, will monitor renal function  # Hypertension: Noted on problem list  # Heart failure, NOS: heart failure noted on the problem list and last echo with EF 40-50%          # Overweight: Estimated body mass index is 28.71 kg/m  as calculated from the following:    Height as of 6/29/25: 1.651 m (5' 5\").    Weight as of 6/30/25: 78.2 kg (172 lb 8 oz).              Disposition Plan     Medically Ready for Discharge: Anticipated Tomorrow           Cirilo Carrion MD  Hospitalist Service  [facility]  Securely message with uConnect (more info)  Text page via [facility] Paging/Directory     ______________________________________________________________________    Chief Complaint     Weakness    History is obtained from the patient    History of Present Illness     Haritha Cat is a 80 year old female with history of paroxysmal atrial fibrillation presents with weakness with low BP's and SOB intermittently but especially with mild activity.    Patient had a successful cardioversion yesterday but since then has been dealing with ongoing generalized weakness in addition to low blood pressures at home.  She is anticoagulated with Eliquis.  She is without any chest pain or shortness of breath otherwise.  Has not had any syncope/loss of consciousness or falls at home.  Denies any bloody stools, weight loss or night sweats.  She has no urinary complaints of urgency or frequency, no dysuria or hematuria. "  Patient denies any nausea/vomiting or abdominal pain at this time.  Has not had any recent headaches, slurred speech, localized weakness or numbness of her extremities.  Reports that she has not had much p.o. intake over the past day secondary to her weakness.  Patient otherwise denies any other complaints at this time.      Past Medical History    Past Medical History:   Diagnosis Date    Abscess of abdominal cavity (H) 11/2002    cecal perf, prob from diverticulitis    Allergic rhinitis     Arthritis this year    Right hand, numbness    Colon polyp 2015    colonoscopy due 5 years.    Diverticulosis     DM type 2 (diabetes mellitus, type 2) (H)     Elevated LFTs     fatty liver dz    GERD (gastroesophageal reflux disease)     Hepatitis A     Herpes zoster 11/28/2011    HTN (hypertension), benign     New onset of congestive heart failure (H) 6/29/2025    Osteopenia     DXA 2008    Osteopenia 2014       Past Surgical History   Past Surgical History:   Procedure Laterality Date    ANESTHESIA CARDIOVERSION N/A 11/13/2023    Procedure: Anesthesia cardioversion;  Surgeon: GENERIC ANESTHESIA PROVIDER;  Location:  OR    CATARACT IOL, RT/LT      COLONOSCOPY N/A 5/15/2018    Procedure: COLONOSCOPY;  colonoscopy;  Surgeon: Lucero Acharya MD;  Location:  GI    COLONOSCOPY N/A 8/13/2024    Procedure: Colonoscopy;  Surgeon: Emelyn Campos MD;  Location:  GI    DISCECTOMY LUMBAR POSTERIOR MICROSCOPIC ONE LEVEL Right 10/1/2019    Procedure: LUMBAR MICRODISCECTOMY RIGHT L2-3;  Surgeon: Wolfgang Peng MD;  Location:  OR    varicose vein surgery      Dr. Bowen    wisdom teeth  age 27       Prior to Admission Medications   Prior to Admission Medications   Prescriptions Last Dose Informant Patient Reported? Taking?   Vitamin D, Cholecalciferol, 25 MCG (1000 UT) TABS  Self Yes No   Sig: Take 1 tablet by mouth every evening.   apixaban ANTICOAGULANT (ELIQUIS) 5 MG tablet  Self, Pharmacy No No   Sig: Take 1  tablet (5 mg) by mouth 2 times daily.   famotidine (PEPCID) 10 MG tablet  Self Yes No   Sig: Take 10 mg by mouth daily as needed.   flecainide (TAMBOCOR) 100 MG tablet   No No   Sig: Take 1 tablet (100 mg) by mouth 2 times daily.   furosemide (LASIX) 20 MG tablet   No No   Sig: Take 1 tablet (20 mg) by mouth daily.   hydrochlorothiazide (HYDRODIURIL) 25 MG tablet  Self, Pharmacy No No   Sig: Take 1 tablet (25 mg) by mouth daily.   metFORMIN (GLUCOPHAGE) 500 MG tablet  Self, Pharmacy No No   Sig: Take 2 tablets (1,000 mg) by mouth 2 times daily (with meals).   metoprolol succinate ER (TOPROL XL) 100 MG 24 hr tablet   Yes No   Sig: Take 1 tablet daily. May take additional 50 mg ( 1/2 tab) daily for persistent elevated HRS      Facility-Administered Medications: None        Review of Systems    The 10 point Review of Systems is negative other than noted in the HPI or here.     Social History   I have reviewed this patient's social history and updated it with pertinent information if needed.  Social History     Tobacco Use    Smoking status: Never    Smokeless tobacco: Never   Vaping Use    Vaping status: Never Used   Substance Use Topics    Alcohol use: Yes     Comment: rare    Drug use: No         Family History   I have reviewed this patient's family history and updated it with pertinent information if needed.  Family History   Problem Relation Age of Onset    Hypertension Mother             Thyroid Disease Mother             Neurologic Disorder Mother         Parkinsons and RA    Hypertension Father             Diabetes Father             Diabetes Sister             Substance Abuse Sister             Hypertension Sister             Diabetes Maternal Grandmother             Diabetes Maternal Grandfather             Alcoholism Sister 59    Diabetes Son     Hypertension Son          Allergies   Allergies   Allergen Reactions    Amoxicillin Hives     Irbesartan      Hyperkalemia    Lisinopril Cough     ------------------------------------------------------------------------     Physical Exam   Vital Signs: Temp: 97.8  F (36.6  C) Temp src: Temporal BP: (!) 151/91 Pulse: 55   Resp: 12 SpO2: 99 % O2 Device: None (Room air)    Weight: 0 lbs 0 oz    Constitutional: awake, alert, cooperative, no apparent distress.   ENT: Normocephalic, without obvious abnormality, atraumatic  Respiratory: CTABL   Cardiovascular: RRR with no m/r/g   GI: Normal bowel sounds, soft, non-distended, non-tender. Skin: normal skin color, texture, turgor   Musculoskeletal: There is no redness, warmth, or swelling of the joints. Full range of motion noted.   Neurologic: Awake, alert, oriented to name, place and time. Jed. Motor is 5 out of 5 bilaterally. Sensory is intact.   Neuropsychiatric: normal mood and affect    ----------------------------------------------------------------------------------------------------------------------------------    Medical Decision Making       60 MINUTES SPENT BY ME on the date of service doing chart review, history, exam, documentation & further activities per the note.      Data   ------------------------- PAST 24 HR DATA REVIEWED -----------------------------------------------    I have personally reviewed the following data over the past 24 hrs:    11.2 (H)  \   12.7   / 345     128 (L) 89 (L) 37.1 (H) /  191 (H)   4.8 17 (L) 1.97 (H) \     Trop: N/A BNP: 2,614 (H)       Imaging results reviewed over the past 24 hrs:   Recent Results (from the past 24 hours)   XR Chest 2 Views    Narrative    EXAM: XR CHEST 2 VIEWS  LOCATION: [facility]  DATE: 7/16/2025    INDICATION: Dyspnea on exertion  COMPARISON: X-ray 6/29/2025      Impression    IMPRESSION: Minimal bibasilar opacities may reflect atelectasis or mild edema. No focal consolidation or pleural effusion. Stable cardiomegaly. No vascular congestion.      ------------------------- ENCOUNTER LABS ----------------------------------------------------------------  Recent Labs   Lab 07/16/25  1556 07/15/25  0857   WBC 11.2*  --    HGB 12.7  --    MCV 88  --      --    *  --    POTASSIUM 4.8 4.2   CHLORIDE 89*  --    CO2 17*  --    BUN 37.1*  --    CR 1.97*  --    ANIONGAP 22*  --    NATALYA 9.4  --    *  --        Most Recent 3 CBC's:  Recent Labs   Lab Test 07/16/25  1556 06/29/25  1350 11/22/24  1148   WBC 11.2* 6.3 6.5   HGB 12.7 12.3 12.7   MCV 88 86 91    300 277     Most Recent 3 BMP's:  Recent Labs   Lab Test 07/16/25  1556 07/15/25  0857 07/01/25  0750 07/01/25  0229 06/29/25  2151 06/29/25  1625 06/29/25  1350   *  --   --   --   --  131* 130*   POTASSIUM 4.8 4.2  --   --   --  4.1 3.9   CHLORIDE 89*  --   --   --   --  92* 90*   CO2 17*  --   --   --   --  19* 23   BUN 37.1*  --   --   --   --  21.2 21.4   CR 1.97*  --   --   --   --  1.23* 1.25*   ANIONGAP 22*  --   --   --   --  20* 17*   NATALYA 9.4  --   --   --   --  9.8 9.4   *  --  147* 130*   < > 100* 139*    < > = values in this interval not displayed.     Most Recent 2 LFT's:  Recent Labs   Lab Test 06/29/25  1350 05/23/25  1138   AST 33 23   ALT 44 12   ALKPHOS 108 90   BILITOTAL 1.0 1.0     Most Recent 3 INR's:  Recent Labs   Lab Test 11/13/23  0807   INR 1.49*     Most Recent 3 Troponin's:No lab results found.  Most Recent 3 BNP's:  Recent Labs   Lab Test 07/16/25  1556 06/29/25  1350   NTBNP 2,614* 5,050*     Most Recent D-dimer:  Recent Labs   Lab Test 04/05/24  1313   DD 0.47     Most Recent Cholesterol Panel:  Recent Labs   Lab Test 05/23/25  1138   CHOL 190   LDL 96   HDL 69   TRIG 125     Most Recent 6 Bacteria Isolates From Any Culture (See EPIC Reports for Culture Details):No lab results found.  Most Recent TSH and T4:  Recent Labs   Lab Test 06/29/25  1350   TSH 4.27*   T4 1.29     Most Recent Hemoglobin A1c:  Recent Labs   Lab Test 05/23/25  1138   A1C  6.0*     Most Recent Urinalysis:  Recent Labs   Lab Test 04/21/19  1051 07/13/17  0924   COLOR Yellow Yellow   APPEARANCE Clear Clear   URINEGLC Negative Negative   URINEBILI Negative Negative   URINEKETONE Negative Negative   SG 1.018 <=1.005   UBLD Negative Negative   URINEPH 7.0 5.5   PROTEIN 10* Negative   UROBILINOGEN  --  0.2   NITRITE Negative Negative   LEUKEST Negative Negative   RBCU 1  --    WBCU 1  --      Most Recent ESR & CRP:No lab results found.

## 2025-07-17 NOTE — PHARMACY-ADMISSION MEDICATION HISTORY
Pharmacist Admission Medication History    Admission medication history is complete. The information provided in this note is only as accurate as the sources available at the time of the update.    Information Source(s): Patient and CareEverywhere/SureScripts via in-person    Pertinent Information:   - Patient reports that she took her morning meds and then called her cardiologist when she wasn't feeling well and the cardiologist told her to hold her lasix and metoprolol. Left on list but not currently marked as taking.    Changes made to PTA medication list:  Added:   Tylenol PM  Deleted:   Hydrochlorothiazide   Changed: None    Allergies reviewed with patient and updates made in EHR: yes    Medication History Completed By: Radha Ramirez [facility] 7/16/2025 8:27 PM    PTA Med List   Medication Sig Last Dose/Taking    apixaban ANTICOAGULANT (ELIQUIS) 5 MG tablet Take 1 tablet (5 mg) by mouth 2 times daily. 7/16/2025 Morning    diphenhydrAMINE-acetaminophen (TYLENOL PM)  MG tablet Take 1 tablet by mouth nightly as needed for sleep. Taking As Needed    famotidine (PEPCID) 10 MG tablet Take 10 mg by mouth daily as needed. Taking As Needed    flecainide (TAMBOCOR) 100 MG tablet Take 1 tablet (100 mg) by mouth 2 times daily. 7/16/2025 Morning    metFORMIN (GLUCOPHAGE) 500 MG tablet Take 2 tablets (1,000 mg) by mouth 2 times daily (with meals). 7/16/2025 Morning    Vitamin D, Cholecalciferol, 25 MCG (1000 UT) TABS Take 1 tablet by mouth every evening. 7/16/2025 Morning     
Statement Selected

## 2025-07-17 NOTE — PROGRESS NOTES
DATE & SHIFT: 7/16 2100-0730  PRIMARY Concern: SOB. JAGRUTI, hyponatremia.  SAFETY RISK Concerns (fall risk, behaviors, etc.): Fall risk      Aggression Tool Color: Green  Isolation/Type: N/A  Tests/Procedures for NEXT shift: AM labs  Consults? (Pending/following, signed-off?)   Where is patient from? (Home, TCU, etc.): PT  Other Important info for NEXT shift: Cardioversion completed yesterday.  Anticipated DC date & active delays: TBD  _____________________________________________________________________________  SUMMARY NOTE:   Orientation/Cognitive: AxOx4  Observation Goals (Met/ Not Met): Not Met  Mobility Level/Assist Equipment: SBA  Antibiotics & Plan (IV/po, length of tx left): N/A  Pain Management: Denies  Complete Pain Reassessment: Y/N Y   Due next: Next shift  Tele/VS/O2: VSS on RA ex htn. Tele=SB w/ 1st AV  ABNL Lab/BG: Tj=283, Creat=1.97, Urine Na=pending   Diet: Reg  Bowel/Bladder: Cont B/B, bladder qdss=415, 249.  Skin Concerns: WNL  Drains/Devices: PIV inf NS @ 100ml/hr  Patient Stated Goal for Today: To feel better

## 2025-07-17 NOTE — PROGRESS NOTES
Admission/Transfer from: ED  2 RN skin assessment completed. Yes  Name of 2nd RN: Ana Lilia Gallardo  Significant findings include: WNL  Provider Paged for WOC Nurse Consult Order? No

## 2025-07-17 NOTE — DISCHARGE SUMMARY
"[facility]  Hospitalist Discharge Summary      Date of Admission:  7/16/2025  Date of Discharge:  7/17/2025  Discharging Provider: Cirilo Carrion MD  Discharge Service: Hospitalist Service    Discharge Diagnoses     CKD stage III  JAGRUTI  Generalized Weakness  Hyponatremia    Clinically Significant Risk Factors     # Obesity: Estimated body mass index is 30.49 kg/m  as calculated from the following:    Height as of this encounter: 1.651 m (5' 5\").    Weight as of this encounter: 83.1 kg (183 lb 3.2 oz).       Follow-ups Needed After Discharge   Follow-up Appointments       Hospital Follow-up with Existing Primary Care Provider (PCP)          Schedule Primary Care visit within: 7 Days   Recommended labs and Imaging (to be ordered by Primary Care Provider): BMP               Unresulted Labs Ordered in the Past 30 Days of this Admission       No orders found for last 31 day(s).            Discharge Disposition   Discharged to home  Condition at discharge: Stable    Hospital Course      Haritha Cat is a 80 year old female admitted on 7/16/2025. She presents with weakness with low BP's and SOB intermittently but especially with mild activity.     CKD stage III  JAGRUTI  Generalized Weakness  Hyponatremia  Assessment: *Baseline Cr 1.2-1.3, patient's creatinine on admission at 1.97, suspect secondary to prerenal etiology in the setting of poor p.o. intake and dehydration.  Sodium of 128 also likely secondary to hypovolemia. CXR shows minimal bibasilar opacities may reflect atelectasis or mild edema. No focal consolidation or pleural effusion. Stable cardiomegaly. No vascular congestion.   Plan:  - Registered observation  - Recheck BMP in a.m. -> cr down to 1.72 and Na up to 133  - PT eval -> HOME    Paroxysmal atrial fibrillation (H)   HFpEF  *Diagnosed with paroxysmal A-fib in 2023, at the time her echo was within normal limit, she also had a stress test, since then she is on Eliquis, metoprolol and " flecainide. TTE obtained 06/2025; EF around 50%, normal LA size. S/P cardioversion 07/15/2025 -> SR  Plan:  -- Continue to hold BB / resume Flecainide   -- Continue PTA DOAC     Hyperlipidemia LDL goal <100  --Diet controlled     HTN, benign  Assessment: PTA on Metoprolol / hydrochlorothiazide   Plan:   --Hold PTA metoprolol and PTA Lasix held at discharge until PCP follow up     Type 2 diabetes mellitus without complications (H)  *PTA regimen includes: metformin   Plan:  --PTA metformin.  Resume at discharge.         Consultations This Hospital Stay   PHYSICAL THERAPY ADULT IP CONSULT    Code Status   Full Code    Time Spent on this Encounter   I, Cirilo Carrion MD, personally saw the patient today and spent greater than 30 minutes discharging this patient.       Cirilo Carrion MD  [facility] EXTENDED RECOVERY AND SHORT STAY  [address] [place] [id number]  Phone: [phone]  ______________________________________________________________________    Physical Exam   Vital Signs: Temp: 97.9  F (36.6  C) Temp src: Oral BP: 123/89 Pulse: 58   Resp: 18 SpO2: 98 % O2 Device: None (Room air)    Weight: 183 lbs 3.2 oz  ----------------------------------------------------------------------------------------       Primary Care Physician   Cristino Contreras MD    Discharge Orders      Reason for your hospital stay    You had low blood pressures and dehydration causing weakness.     Activity    Your activity upon discharge: activity as tolerated     Diet    Follow this diet upon discharge: Current Diet:Orders Placed This Encounter      Regular Diet Adult     Hospital Follow-up with Existing Primary Care Provider (PCP)            Significant Results and Procedures   Most Recent 3 CBC's:  Recent Labs   Lab Test 07/17/25  0612 07/16/25  1556 06/29/25  1350   WBC 7.7 11.2* 6.3   HGB 11.4* 12.7 12.3   MCV 85 88 86    345 300     Most Recent 3 BMP's:  Recent Labs   Lab Test 07/17/25  0612  07/16/25  1556 07/15/25  0857 07/01/25  0750 06/29/25  2151 06/29/25  1625   * 128*  --   --   --  131*   POTASSIUM 4.8 4.8 4.2  --   --  4.1   CHLORIDE 96* 89*  --   --   --  92*   CO2 20* 17*  --   --   --  19*   BUN 34.9* 37.1*  --   --   --  21.2   CR 1.72* 1.97*  --   --   --  1.23*   ANIONGAP 17* 22*  --   --   --  20*   NATALYA 9.1 9.4  --   --   --  9.8   * 191*  --  147*   < > 100*    < > = values in this interval not displayed.     Most Recent 2 LFT's:  Recent Labs   Lab Test 06/29/25  1350 05/23/25  1138   AST 33 23   ALT 44 12   ALKPHOS 108 90   BILITOTAL 1.0 1.0     Most Recent 3 INR's:  Recent Labs   Lab Test 11/13/23  0807   INR 1.49*     Most Recent 3 Troponin's:No lab results found.  Most Recent 3 BNP's:  Recent Labs   Lab Test 07/16/25  1556 06/29/25  1350   NTBNP 2,614* 5,050*     Most Recent D-dimer:  Recent Labs   Lab Test 04/05/24  1313   DD 0.47     Most Recent Cholesterol Panel:  Recent Labs   Lab Test 05/23/25  1138   CHOL 190   LDL 96   HDL 69   TRIG 125     7-Day Micro Results       No results found for the last 168 hours.          Most Recent TSH and T4:  Recent Labs   Lab Test 06/29/25  1350   TSH 4.27*   T4 1.29     Most Recent Hemoglobin A1c:  Recent Labs   Lab Test 05/23/25  1138   A1C 6.0*     Most Recent 6 glucoses:  Recent Labs   Lab Test 07/17/25  0612 07/16/25  1556 07/01/25  0750 07/01/25  0229 06/30/25  2203 06/30/25  1717   * 191* 147* 130* 130* 211*     Most Recent Urinalysis:  Recent Labs   Lab Test 04/21/19  1051 07/13/17  0924   COLOR Yellow Yellow   APPEARANCE Clear Clear   URINEGLC Negative Negative   URINEBILI Negative Negative   URINEKETONE Negative Negative   SG 1.018 <=1.005   UBLD Negative Negative   URINEPH 7.0 5.5   PROTEIN 10* Negative   UROBILINOGEN  --  0.2   NITRITE Negative Negative   LEUKEST Negative Negative   RBCU 1  --    WBCU 1  --      Most Recent ESR & CRP:No lab results found.,   Results for orders placed or performed during the  hospital encounter of 07/16/25   XR Chest 2 Views    Narrative    EXAM: XR CHEST 2 VIEWS  LOCATION: [facility]  DATE: 7/16/2025    INDICATION: Dyspnea on exertion  COMPARISON: X-ray 6/29/2025      Impression    IMPRESSION: Minimal bibasilar opacities may reflect atelectasis or mild edema. No focal consolidation or pleural effusion. Stable cardiomegaly. No vascular congestion.       Discharge Medications      Review of your medicines        PAUSE taking these medications        Dose / Directions   furosemide 20 MG tablet  Wait to take this until your doctor or other care provider tells you to start again.  Commonly known as: LASIX  Used for: New onset of congestive heart failure (H)      Dose: 20 mg  Take 1 tablet (20 mg) by mouth daily.  Quantity: 30 tablet  Refills: 3            CONTINUE these medicines which have NOT CHANGED        Dose / Directions   apixaban ANTICOAGULANT 5 MG tablet  Commonly known as: ELIQUIS  Used for: Atrial fibrillation, unspecified type (H)      Dose: 5 mg  Take 1 tablet (5 mg) by mouth 2 times daily.  Quantity: 60 tablet  Refills: 11     diphenhydrAMINE-acetaminophen  MG tablet  Commonly known as: TYLENOL PM      Dose: 1 tablet  Take 1 tablet by mouth nightly as needed for sleep.  Refills: 0     famotidine 10 MG tablet  Commonly known as: PEPCID      Dose: 10 mg  Take 10 mg by mouth daily as needed.  Refills: 0     flecainide 100 MG tablet  Commonly known as: TAMBOCOR  Used for: Persistent atrial fibrillation (H)      Dose: 100 mg  Take 1 tablet (100 mg) by mouth 2 times daily.  Quantity: 60 tablet  Refills: 3     metFORMIN 500 MG tablet  Commonly known as: GLUCOPHAGE      Dose: 1,000 mg  Take 2 tablets (1,000 mg) by mouth 2 times daily (with meals).  Quantity: 360 tablet  Refills: 3     Vitamin D3 25 mcg (1000 units) tablet  Commonly known as: CHOLECALCIFEROL      Dose: 1 tablet  Take 1 tablet by mouth every evening.  Refills: 0            STOP taking       metoprolol succinate  MG 24 hr tablet  Commonly known as: TOPROL XL               Allergies   Allergies   Allergen Reactions    Amoxicillin Hives    Irbesartan      Hyperkalemia    Lisinopril Cough

## 2025-07-17 NOTE — PROGRESS NOTES
07/17/25 1030   Appointment Info   Signing Clinician's Name / Credentials (PT) Shan Zaman DPT   Living Environment   People in Home spouse   Current Living Arrangements house   Home Accessibility stairs to enter home   Number of Stairs, Main Entrance 4   Stair Railings, Main Entrance none   Transportation Anticipated family or friend will provide   Living Environment Comments Reports living w/ spouse in a house. Notes 4 ESTELLA w/o rails and all needs on main floor. Reports she has an office upstairs that she does frequently go to.   Self-Care   Usual Activity Tolerance moderate   Current Activity Tolerance fair   Equipment Currently Used at Home cane, straight   Fall history within last six months no   Activity/Exercise/Self-Care Comment Reports that she has had decreasing mobility over the past ~2 months. More limited over the past week d/t breathing. Has been using a SEC most recently for mobility but prior was not using AD. Typically independent w/ ADLs.   General Information   Onset of Illness/Injury or Date of Surgery 07/16/25   Referring Physician Cirilo Carrion MD   Patient/Family Therapy Goals Statement (PT) Return to home'   Pertinent History of Current Problem (include personal factors and/or comorbidities that impact the POC) Haritha Cat is a 80 year old female admitted on 7/16/2025. She presents with weakness with low BP's and SOB intermittently but especially with mild activity.   Existing Precautions/Restrictions fall   Cognition   Affect/Mental Status (Cognition) WFL   Orientation Status (Cognition) oriented x 4   Follows Commands (Cognition) WFL   Pain Assessment   Patient Currently in Pain No   Integumentary/Edema   Integumentary/Edema no deficits were identifed   Range of Motion (ROM)   ROM Comment WFLs for mobility and transfers no formal testing completed   Strength (Manual Muscle Testing)   Strength Comments Generalized deconditioning noted w/ mobility and transfers no formal testing  completed   Bed Mobility   Comment, (Bed Mobility) Supine to sitting EOB w/ SBA   Transfers   Comment, (Transfers) Sit to stand w/ no AD and SBA   Gait/Stairs (Locomotion)   Comment, (Gait/Stairs) 5 ft w/ no AD and SBA   Balance   Balance Comments Minor unsteadiness noted, improved w/ holding onto IV pole.   Clinical Impression   Criteria for Skilled Therapeutic Intervention Yes, treatment indicated   PT Diagnosis (PT) Impaired ambulation   Influenced by the following impairments Impaired strength, balance and activity tolerance   Functional limitations due to impairments Impaired ADLs, IADLs and functional mobility   Clinical Presentation (PT Evaluation Complexity) stable   Clinical Presentation Rationale Clinical judgment   Clinical Decision Making (Complexity) low complexity   Planned Therapy Interventions (PT) balance training;bed mobility training;gait training;home exercise program;patient/family education;stair training;strengthening;transfer training;progressive activity/exercise   Risk & Benefits of therapy have been explained evaluation/treatment results reviewed;care plan/treatment goals reviewed;risks/benefits reviewed;current/potential barriers reviewed;participants voiced agreement with care plan;participants included;patient   PT Total Evaluation Time   PT Eval, Low Complexity Minutes (66447) 10   Physical Therapy Goals   PT Frequency Daily   PT Predicted Duration/Target Date for Goal Attainment 07/27/25   PT Goals Bed Mobility;Transfers;Gait;Stairs   PT: Bed Mobility Independent;Supine to/from sit   PT: Transfers Modified independent;Sit to/from stand;Assistive device   PT: Gait Modified independent;Straight cane;150 feet   PT: Stairs Supervision/stand-by assist;4 stairs;Assistive device   Interventions   Interventions Quick Adds Therapeutic Activity;Gait Training   Therapeutic Activity   Therapeutic Activities: dynamic activities to improve functional performance Minutes (10508) 8   Symptoms Noted  During/After Treatment Fatigue;Shortness of breath   Treatment Detail/Skilled Intervention Pt supine in bed at start of session. Agreeable to PT. Good sitting balance at EOB. Denied dizziness/lightheadedness. Seated BP at 138/77 w/ HR Of 57 and SPO2 of 96% on RA. Pt able to don own socks and shoes and doff shoes later in session.Sit to stand x3 w/o AD and SBA. Upon return to room after first ambulation VSS on RA, however, Pt very SOB. Cued for PLB and quickly recovering after sitting. Upon return from second ambulation BP down to 97/59, however, HR and SPO2 stable on RA. Sit to supine w/ SBA. Some difficulties scooting up in bed. All needs met at end of session w/ call light in place. Educated on walking program for home, all questions and concerns answered.   Gait Training   Gait Training Minutes (62906) 15   Symptoms Noted During/After Treatment (Gait Training) fatigue;shortness of breath   Treatment Detail/Skilled Intervention Pt ambulating ~100 ft x1 w/ no AD and SBA. Mild unsteadiness noted w/ R sided deviation in hallway. Pt reports increased fatigue and SOB w/ activity. Pt then ambulating ~200 ft w/ use of IV pole and SBA. x4 short standing rest breaks needed. Initially holding onto IV pole w/ single hand and then needing to bring BUEs on IV pole d/t fatigue.   PT Discharge Planning   PT Plan Gait w/ SEC, trial stairs, activity tolerance   PT Discharge Recommendation (DC Rec) home with assist   PT Rationale for DC Rec Pt below baseline mobility but moving well. Currently ambulating w/ IV pole vs no AD and SBA. Anticipate when medically ready Pt can DC to home w/ use of SEC for mobility and assistance from spouse as needed.   PT Brief overview of current status Goals of therapy will be to address safe mobility and make recs for d/c to next level of care. Pt and RN will continue to follow all falls risk precautions as documented by RN staff while hospitalized   PT Total Distance Amb During Session (feet) 300    Physical Therapy Time and Intention   Timed Code Treatment Minutes 23   Total Session Time (sum of timed and untimed services) 33

## 2025-07-18 NOTE — PLAN OF CARE
Physical Therapy Discharge Summary    Reason for therapy discharge:    Discharged to home.    Progress towards therapy goal(s). See goals on Care Plan in [facility] electronic health record for goal details.  Goals partially met.  Barriers to achieving goals:   discharge on same date as initial evaluation.    Therapy recommendation(s):    No further therapy is recommended.  Pt below baseline mobility but moving well. Currently ambulating w/ IV pole vs no AD and SBA. Anticipate when medically ready Pt can DC to home w/ use of SEC for mobility and assistance from spouse as needed.  PT Brief overview of current status: Goals of therapy will be to address safe mobility and make recs for d/c to next level of care. Pt and RN will continue to follow all falls risk precautions as documented by RN staff while hospitalized  PT Total Distance Amb During Session (feet): 300    Recommendation above provided by last treating therapist.

## 2025-07-23 ENCOUNTER — ANCILLARY PROCEDURE (OUTPATIENT)
Dept: GENERAL RADIOLOGY | Facility: CLINIC | Age: 80
End: 2025-07-23
Attending: INTERNAL MEDICINE
Payer: MEDICARE

## 2025-07-23 ENCOUNTER — OFFICE VISIT (OUTPATIENT)
Dept: FAMILY MEDICINE | Facility: CLINIC | Age: 80
End: 2025-07-23
Payer: MEDICARE

## 2025-07-23 VITALS
WEIGHT: 181.2 LBS | DIASTOLIC BLOOD PRESSURE: 91 MMHG | BODY MASS INDEX: 30.19 KG/M2 | SYSTOLIC BLOOD PRESSURE: 158 MMHG | HEART RATE: 85 BPM | RESPIRATION RATE: 14 BRPM | HEIGHT: 65 IN | OXYGEN SATURATION: 97 % | TEMPERATURE: 96.9 F

## 2025-07-23 DIAGNOSIS — I48.0 PAROXYSMAL ATRIAL FIBRILLATION (H): Primary | ICD-10-CM

## 2025-07-23 DIAGNOSIS — I50.9 NEW ONSET OF CONGESTIVE HEART FAILURE (H): ICD-10-CM

## 2025-07-23 DIAGNOSIS — E11.9 TYPE 2 DIABETES MELLITUS WITHOUT COMPLICATION, WITHOUT LONG-TERM CURRENT USE OF INSULIN (H): ICD-10-CM

## 2025-07-23 DIAGNOSIS — I10 HTN (HYPERTENSION), BENIGN: ICD-10-CM

## 2025-07-23 LAB
ERYTHROCYTE [DISTWIDTH] IN BLOOD BY AUTOMATED COUNT: 12.8 % (ref 10–15)
HCT VFR BLD AUTO: 36.3 % (ref 35–47)
HGB BLD-MCNC: 11.6 G/DL (ref 11.7–15.7)
MCH RBC QN AUTO: 28 PG (ref 26.5–33)
MCHC RBC AUTO-ENTMCNC: 32 G/DL (ref 31.5–36.5)
MCV RBC AUTO: 88 FL (ref 78–100)
PLATELET # BLD AUTO: 279 10E3/UL (ref 150–450)
RBC # BLD AUTO: 4.15 10E6/UL (ref 3.8–5.2)
WBC # BLD AUTO: 7.7 10E3/UL (ref 4–11)

## 2025-07-23 PROCEDURE — 80048 BASIC METABOLIC PNL TOTAL CA: CPT | Performed by: INTERNAL MEDICINE

## 2025-07-23 PROCEDURE — 83735 ASSAY OF MAGNESIUM: CPT | Performed by: INTERNAL MEDICINE

## 2025-07-23 PROCEDURE — 3080F DIAST BP >= 90 MM HG: CPT | Performed by: INTERNAL MEDICINE

## 2025-07-23 PROCEDURE — 3077F SYST BP >= 140 MM HG: CPT | Performed by: INTERNAL MEDICINE

## 2025-07-23 PROCEDURE — 71046 X-RAY EXAM CHEST 2 VIEWS: CPT | Mod: TC | Performed by: RADIOLOGY

## 2025-07-23 PROCEDURE — 36415 COLL VENOUS BLD VENIPUNCTURE: CPT | Performed by: INTERNAL MEDICINE

## 2025-07-23 PROCEDURE — 1126F AMNT PAIN NOTED NONE PRSNT: CPT | Performed by: INTERNAL MEDICINE

## 2025-07-23 PROCEDURE — 93000 ELECTROCARDIOGRAM COMPLETE: CPT | Performed by: INTERNAL MEDICINE

## 2025-07-23 PROCEDURE — 85027 COMPLETE CBC AUTOMATED: CPT | Performed by: INTERNAL MEDICINE

## 2025-07-23 PROCEDURE — 84443 ASSAY THYROID STIM HORMONE: CPT | Performed by: INTERNAL MEDICINE

## 2025-07-23 PROCEDURE — 99214 OFFICE O/P EST MOD 30 MIN: CPT | Performed by: INTERNAL MEDICINE

## 2025-07-23 RX ORDER — FUROSEMIDE 20 MG/1
20 TABLET ORAL DAILY
Qty: 30 TABLET | Refills: 3 | Status: SHIPPED | OUTPATIENT
Start: 2025-07-23

## 2025-07-23 RX ORDER — METOPROLOL SUCCINATE 100 MG/1
50 TABLET, EXTENDED RELEASE ORAL DAILY
COMMUNITY
Start: 2025-07-23 | End: 2025-07-23

## 2025-07-23 ASSESSMENT — PAIN SCALES - GENERAL: PAINLEVEL_OUTOF10: NO PAIN (0)

## 2025-07-23 NOTE — PROGRESS NOTES
Subjective   Pat is a 80 year old, presenting for the following health issues:  Hospital F/U    HPI          7/18/2025   Post Discharge Outreach   How are you doing now that you are home? Pt stated that she is doing well.   How are your symptoms? (Red Flag symptoms escalate to triage hotline per guidelines) Improved   Does the patient have their discharge instructions?  Yes   Does the patient have questions regarding their discharge instructions?  No   Were you started on any new medications or were there changes to any of your previous medications?  Yes   Does the patient have all of their medications? Yes   Do you have questions regarding any of your medications?  No   Do you have all of your needed medical supplies or equipment (DME)?  (i.e. oxygen tank, CPAP, cane, etc.) Yes   Discharge Follow Up Appointment Date 8/4/2025   Discharge Follow Up Appointment Scheduled with? Specialty Care Provider       Hospital Follow-up Visit:    Hospital/Nursing Home/IP Rehab Facility: [facility]  Most Recent Admission Date: 7/16/2025   Most Recent Admission Diagnosis:      Most Recent Discharge Date: 7/17/2025   Most Recent Discharge Diagnosis: JAGRUTI (acute kidney injury) - N17.9  WILSON (dyspnea on exertion) - R06.09  Elevated brain natriuretic peptide (BNP) level - R79.89  Hyponatremia - E87.1   Do you have any other stressors you would like to discuss with your provider? No    Problems taking medications regularly:  None  Medication changes since discharge: Stopped Lasix  Problems adhering to non-medication therapy:  None    Summary of hospitalization:  [facility] discharge summary reviewed  Diagnostic Tests/Treatments reviewed.  Follow up needed: cardiology   Other Healthcare Providers Involved in Patient s Care:         None  Update since discharge: improved.         Plan of care communicated with patient           History of Present Illness-  Ct YUSUF [place], age 80 years    Atrial  Fibrillation, Shortness of Breath, and Fluid Retention  For several months prior to June 29, 2025, experienced increasing fatigue and reduced exercise tolerance, initially attributed to chronic back pain. On June 29, 2025, developed acute shortness of breath and exhaustion while shopping, requiring assistance and prompting an emergency evaluation. On-site EKG revealed atrial fibrillation. Hospitalized for 9 hours in the emergency department before being admitted; underwent multiple tests over two days. Discharged on July 2, 2025, but continued to have significant shortness of breath at home. Cardiologist advised return to hospital. Second hospitalization for five days with continued symptoms. Medications were adjusted multiple times: metoprolol dose increased then decreased, Lasix initiated then reduced due to excessive diuresis, and both eventually stopped at discharge due to concerns of dehydration and fluid overload.    On return home after the second hospitalization (mid-July 2025), developed new onset bilateral ankle swelling and noted elevated blood pressure readings between 160-190/90 mmHg. Weight increased above baseline (normal ~175 lbs; recent weights 181-183 lbs). No chest pain reported. Shortness of breath improved but still present with exertion. No orthopnea; able to sleep lying flat in bed. Urine output was reduced during hospitalization but normalized after discharge.    Cardioversion delayed until July 8, 2025, due to interruption in Eliquis (apixaban) for a planned back injection; resumed Eliquis as directed prior to procedure. After cardioversion, reported episodes of presyncope and dizziness when standing; required extended monitoring.    History of hypertension for 25-30 years with previous use of hydrochlorothiazide prior to recent hospitalizations; switched to Lasix during recent admissions. Reports adherence to a low-sodium diet since most recent hospitalization.    Chronic kidney disease was  "noted during the most recent hospitalization, with creatinine increased to 1.9 mg/dL and fluid overload contributing to symptoms.    Dermatologic Issues  Follow-up for seborrheic dermatitis and alopecia ongoing with dermatologist; using ketoconazole shampoo and vitamin D supplementation as previously recommended.        Review of Systems  Constitutional, HEENT, cardiovascular, pulmonary, GI, , musculoskeletal, neuro, skin, endocrine and psych systems are negative, except as otherwise noted.        Objective    BP (!) 158/91 (BP Location: Right arm, Patient Position: Sitting, Cuff Size: Adult Regular)   Pulse 85   Temp 96.9  F (36.1  C) (Tympanic)   Resp 14   Ht 1.651 m (5' 5\")   Wt 82.2 kg (181 lb 3.2 oz)   SpO2 97%   BMI 30.15 kg/m    Body mass index is 30.15 kg/m .  Physical Exam   GENERAL: alert and no distress  EYES: Eyes grossly normal to inspection,  HENT: + JVD  NECK: no adenopathy, no asymmetry, masses, or scars  RESP: lungs clear to auscultation - no rales, rhonchi or wheezes  CV: regular rate and rhythm, normal S1 S2, no S3 or S4, no murmur, click or rub, trace peripheral edema  ABDOMEN: soft, nontender,    MS: no gross musculoskeletal defects noted, no edema  SKIN: no suspicious lesions or rashes  NEURO: Normal strength and tone,  PSYCH: mentation appears normal, affect normal/bright    Patient Instructions   (I48.0) Paroxysmal atrial fibrillation (H)  (primary encounter diagnosis)  Comment: Noted ongoing atrial fibrillation.  Apixaban for stroke prevention.  Continue on flecainide for rhythm control. Recommend resuming metoprolol at lower dose of 50 mg (1/2 tablet) daily and check EKG and labs today   Plan: EKG 12-lead complete w/read - Clinics, TSH with        free T4 reflex            (E11.9) Type 2 diabetes mellitus without complications (H)  Comment: hemoglobin A1c is stable   Plan:     (I10) HTN (hypertension), benign  Comment: blood pressure is a bit elevated and with will plan to resume " furosemide at 20 mg as recommenced by cardiology as well as metoprolol with plan to follow up in 1 week for recheck of labs   Plan: metoprolol succinate ER (TOPROL XL) 100 MG 24         hr tablet, Basic metabolic panel  (Ca, Cl, CO2,        Creat, Gluc, K, Na, BUN), Magnesium, CBC with         platelets            (I50.9) New onset of congestive heart failure (H)  Comment: as above - recommend resuming lasix 20 mg daily and we will try to keep a low sodium diet and check chest x-ray today and follow up in 1 week with myself  Plan: furosemide (LASIX) 20 MG tablet, XR Chest 2         Views                    Signed Electronically by: Cristino Contreras MD, MD

## 2025-07-23 NOTE — PATIENT INSTRUCTIONS
(I48.0) Paroxysmal atrial fibrillation (H)  (primary encounter diagnosis)  Comment: Noted ongoing atrial fibrillation.  Apixaban for stroke prevention.  Continue on flecainide for rhythm control.  EKG reviewed showing 1st degree AV block and will avoid restarting metoprolol today  Plan: EKG 12-lead complete w/read - Clinics, TSH with        free T4 reflex            (E11.9) Type 2 diabetes mellitus without complications (H)  Comment: hemoglobin A1c is stable   Plan:     (I10) HTN (hypertension), benign  Comment: blood pressure is a bit elevated and with will plan to resume furosemide at 20 mg as recommenced by cardiology and plan to follow up in 1 week for recheck of labs   Plan: metoprolol succinate ER (TOPROL XL) 100 MG 24         hr tablet, Basic metabolic panel  (Ca, Cl, CO2,        Creat, Gluc, K, Na, BUN), Magnesium, CBC with         platelets            (I50.9) New onset of congestive heart failure (H)  Comment: as above - recommend resuming lasix 20 mg daily and we will try to keep a low sodium diet and check chest x-ray today and follow up in 1 week with myself  Plan: furosemide (LASIX) 20 MG tablet, XR Chest 2         Views

## 2025-07-24 LAB
ANION GAP SERPL CALCULATED.3IONS-SCNC: 14 MMOL/L (ref 7–15)
BUN SERPL-MCNC: 10.8 MG/DL (ref 8–23)
CALCIUM SERPL-MCNC: 10.1 MG/DL (ref 8.8–10.4)
CHLORIDE SERPL-SCNC: 100 MMOL/L (ref 98–107)
CREAT SERPL-MCNC: 1.18 MG/DL (ref 0.51–0.95)
EGFRCR SERPLBLD CKD-EPI 2021: 47 ML/MIN/1.73M2
GLUCOSE SERPL-MCNC: 104 MG/DL (ref 70–99)
HCO3 SERPL-SCNC: 24 MMOL/L (ref 22–29)
MAGNESIUM SERPL-MCNC: 1.5 MG/DL (ref 1.7–2.3)
POTASSIUM SERPL-SCNC: 4.7 MMOL/L (ref 3.4–5.3)
SODIUM SERPL-SCNC: 138 MMOL/L (ref 135–145)
TSH SERPL DL<=0.005 MIU/L-ACNC: 3.45 UIU/ML (ref 0.3–4.2)

## 2025-07-30 ENCOUNTER — TRANSFERRED RECORDS (OUTPATIENT)
Dept: MULTI SPECIALTY CLINIC | Facility: CLINIC | Age: 80
End: 2025-07-30
Payer: MEDICARE

## 2025-07-30 LAB — RETINOPATHY: NORMAL

## 2025-08-01 ENCOUNTER — TELEPHONE (OUTPATIENT)
Dept: NURSING | Facility: CLINIC | Age: 80
End: 2025-08-01

## 2025-08-01 ENCOUNTER — OFFICE VISIT (OUTPATIENT)
Dept: FAMILY MEDICINE | Facility: CLINIC | Age: 80
End: 2025-08-01
Payer: MEDICARE

## 2025-08-01 VITALS
HEIGHT: 65 IN | TEMPERATURE: 97.1 F | OXYGEN SATURATION: 97 % | BODY MASS INDEX: 28.09 KG/M2 | HEART RATE: 82 BPM | DIASTOLIC BLOOD PRESSURE: 91 MMHG | RESPIRATION RATE: 16 BRPM | WEIGHT: 168.6 LBS | SYSTOLIC BLOOD PRESSURE: 155 MMHG

## 2025-08-01 DIAGNOSIS — I50.9 NEW ONSET OF CONGESTIVE HEART FAILURE (H): ICD-10-CM

## 2025-08-01 DIAGNOSIS — I48.19 PERSISTENT ATRIAL FIBRILLATION (H): ICD-10-CM

## 2025-08-01 DIAGNOSIS — E11.9 TYPE 2 DIABETES MELLITUS WITHOUT COMPLICATION, WITHOUT LONG-TERM CURRENT USE OF INSULIN (H): Primary | ICD-10-CM

## 2025-08-01 DIAGNOSIS — E61.1 IRON DEFICIENCY: ICD-10-CM

## 2025-08-01 LAB
ANION GAP SERPL CALCULATED.3IONS-SCNC: 12 MMOL/L (ref 7–15)
BUN SERPL-MCNC: 21.6 MG/DL (ref 8–23)
CALCIUM SERPL-MCNC: 10.7 MG/DL (ref 8.8–10.4)
CHLORIDE SERPL-SCNC: 96 MMOL/L (ref 98–107)
CREAT SERPL-MCNC: 1.18 MG/DL (ref 0.51–0.95)
EGFRCR SERPLBLD CKD-EPI 2021: 47 ML/MIN/1.73M2
ERYTHROCYTE [DISTWIDTH] IN BLOOD BY AUTOMATED COUNT: 12.5 % (ref 10–15)
FERRITIN SERPL-MCNC: NORMAL NG/ML
GLUCOSE SERPL-MCNC: 110 MG/DL (ref 70–99)
HCO3 SERPL-SCNC: 26 MMOL/L (ref 22–29)
HCT VFR BLD AUTO: 42.3 % (ref 35–47)
HGB BLD-MCNC: 13.5 G/DL (ref 11.7–15.7)
IRON BINDING CAPACITY (ROCHE): ABNORMAL
IRON SATN MFR SERPL: ABNORMAL %
IRON SERPL-MCNC: 180 UG/DL (ref 37–145)
MCH RBC QN AUTO: 27.6 PG (ref 26.5–33)
MCHC RBC AUTO-ENTMCNC: 31.9 G/DL (ref 31.5–36.5)
MCV RBC AUTO: 86 FL (ref 78–100)
PLATELET # BLD AUTO: 325 10E3/UL (ref 150–450)
POTASSIUM SERPL-SCNC: 7.1 MMOL/L (ref 3.4–5.3)
RBC # BLD AUTO: 4.9 10E6/UL (ref 3.8–5.2)
SODIUM SERPL-SCNC: 134 MMOL/L (ref 135–145)
WBC # BLD AUTO: 6.1 10E3/UL (ref 4–11)

## 2025-08-01 PROCEDURE — 80048 BASIC METABOLIC PNL TOTAL CA: CPT | Performed by: INTERNAL MEDICINE

## 2025-08-01 PROCEDURE — 83540 ASSAY OF IRON: CPT | Performed by: INTERNAL MEDICINE

## 2025-08-01 PROCEDURE — 85027 COMPLETE CBC AUTOMATED: CPT | Performed by: INTERNAL MEDICINE

## 2025-08-01 PROCEDURE — 1126F AMNT PAIN NOTED NONE PRSNT: CPT | Performed by: INTERNAL MEDICINE

## 2025-08-01 PROCEDURE — 99213 OFFICE O/P EST LOW 20 MIN: CPT | Performed by: INTERNAL MEDICINE

## 2025-08-01 PROCEDURE — G2211 COMPLEX E/M VISIT ADD ON: HCPCS | Performed by: INTERNAL MEDICINE

## 2025-08-01 PROCEDURE — 36415 COLL VENOUS BLD VENIPUNCTURE: CPT | Performed by: INTERNAL MEDICINE

## 2025-08-01 PROCEDURE — 82728 ASSAY OF FERRITIN: CPT | Performed by: INTERNAL MEDICINE

## 2025-08-01 PROCEDURE — 3080F DIAST BP >= 90 MM HG: CPT | Performed by: INTERNAL MEDICINE

## 2025-08-01 PROCEDURE — 3077F SYST BP >= 140 MM HG: CPT | Performed by: INTERNAL MEDICINE

## 2025-08-01 ASSESSMENT — PAIN SCALES - GENERAL: PAINLEVEL_OUTOF10: NO PAIN (0)

## 2025-08-03 ENCOUNTER — RESULTS FOLLOW-UP (OUTPATIENT)
Dept: FAMILY MEDICINE | Facility: CLINIC | Age: 80
End: 2025-08-03
Payer: MEDICARE

## 2025-08-03 DIAGNOSIS — I10 HTN (HYPERTENSION), BENIGN: ICD-10-CM

## 2025-08-03 DIAGNOSIS — E61.1 IRON DEFICIENCY: ICD-10-CM

## 2025-08-03 DIAGNOSIS — E11.9 TYPE 2 DIABETES MELLITUS WITHOUT COMPLICATION, WITHOUT LONG-TERM CURRENT USE OF INSULIN (H): Primary | ICD-10-CM

## 2025-08-04 ENCOUNTER — LAB (OUTPATIENT)
Dept: LAB | Facility: CLINIC | Age: 80
End: 2025-08-04
Payer: MEDICARE

## 2025-08-04 ENCOUNTER — OFFICE VISIT (OUTPATIENT)
Dept: CARDIOLOGY | Facility: CLINIC | Age: 80
End: 2025-08-04
Payer: MEDICARE

## 2025-08-04 ENCOUNTER — TELEPHONE (OUTPATIENT)
Dept: FAMILY MEDICINE | Facility: CLINIC | Age: 80
End: 2025-08-04

## 2025-08-04 VITALS
SYSTOLIC BLOOD PRESSURE: 143 MMHG | BODY MASS INDEX: 27.99 KG/M2 | DIASTOLIC BLOOD PRESSURE: 85 MMHG | HEART RATE: 88 BPM | HEIGHT: 65 IN | WEIGHT: 168 LBS

## 2025-08-04 DIAGNOSIS — I50.9 NEW ONSET OF CONGESTIVE HEART FAILURE (H): ICD-10-CM

## 2025-08-04 DIAGNOSIS — I10 HTN (HYPERTENSION), BENIGN: ICD-10-CM

## 2025-08-04 DIAGNOSIS — I48.19 PERSISTENT ATRIAL FIBRILLATION (H): Primary | ICD-10-CM

## 2025-08-04 DIAGNOSIS — E11.9 TYPE 2 DIABETES MELLITUS WITHOUT COMPLICATION, WITHOUT LONG-TERM CURRENT USE OF INSULIN (H): ICD-10-CM

## 2025-08-04 DIAGNOSIS — E61.1 IRON DEFICIENCY: ICD-10-CM

## 2025-08-04 DIAGNOSIS — I50.21 ACUTE SYSTOLIC HEART FAILURE (H): ICD-10-CM

## 2025-08-04 LAB
ERYTHROCYTE [DISTWIDTH] IN BLOOD BY AUTOMATED COUNT: 12.6 % (ref 10–15)
HCT VFR BLD AUTO: 41.2 % (ref 35–47)
HGB BLD-MCNC: 13.1 G/DL (ref 11.7–15.7)
MCH RBC QN AUTO: 27.4 PG (ref 26.5–33)
MCHC RBC AUTO-ENTMCNC: 31.8 G/DL (ref 31.5–36.5)
MCV RBC AUTO: 86 FL (ref 78–100)
PLATELET # BLD AUTO: 306 10E3/UL (ref 150–450)
RBC # BLD AUTO: 4.78 10E6/UL (ref 3.8–5.2)
WBC # BLD AUTO: 7.2 10E3/UL (ref 4–11)

## 2025-08-04 PROCEDURE — 36415 COLL VENOUS BLD VENIPUNCTURE: CPT

## 2025-08-04 PROCEDURE — 85027 COMPLETE CBC AUTOMATED: CPT

## 2025-08-04 PROCEDURE — 83540 ASSAY OF IRON: CPT

## 2025-08-04 PROCEDURE — 82728 ASSAY OF FERRITIN: CPT

## 2025-08-04 PROCEDURE — 80048 BASIC METABOLIC PNL TOTAL CA: CPT

## 2025-08-04 PROCEDURE — 83550 IRON BINDING TEST: CPT

## 2025-08-05 ENCOUNTER — RESULTS FOLLOW-UP (OUTPATIENT)
Dept: FAMILY MEDICINE | Facility: CLINIC | Age: 80
End: 2025-08-05
Payer: MEDICARE

## 2025-08-05 LAB
ANION GAP SERPL CALCULATED.3IONS-SCNC: 15 MMOL/L (ref 7–15)
BUN SERPL-MCNC: 25.1 MG/DL (ref 8–23)
CALCIUM SERPL-MCNC: 10.4 MG/DL (ref 8.8–10.4)
CHLORIDE SERPL-SCNC: 95 MMOL/L (ref 98–107)
CREAT SERPL-MCNC: 1.39 MG/DL (ref 0.51–0.95)
EGFRCR SERPLBLD CKD-EPI 2021: 39 ML/MIN/1.73M2
FERRITIN SERPL-MCNC: 39 NG/ML (ref 11–328)
GLUCOSE SERPL-MCNC: 95 MG/DL (ref 70–99)
HCO3 SERPL-SCNC: 26 MMOL/L (ref 22–29)
IRON BINDING CAPACITY (ROCHE): 402 UG/DL (ref 240–430)
IRON SATN MFR SERPL: 33 % (ref 15–46)
IRON SERPL-MCNC: 132 UG/DL (ref 37–145)
POTASSIUM SERPL-SCNC: 5 MMOL/L (ref 3.4–5.3)
SODIUM SERPL-SCNC: 136 MMOL/L (ref 135–145)

## 2025-08-28 ENCOUNTER — LAB (OUTPATIENT)
Dept: LAB | Facility: CLINIC | Age: 80
End: 2025-08-28
Payer: MEDICARE

## 2025-08-28 DIAGNOSIS — I10 ESSENTIAL HYPERTENSION: ICD-10-CM

## 2025-08-28 LAB
ANION GAP SERPL CALCULATED.3IONS-SCNC: 14 MMOL/L (ref 7–15)
BUN SERPL-MCNC: 18.2 MG/DL (ref 8–23)
CALCIUM SERPL-MCNC: 10.5 MG/DL (ref 8.8–10.4)
CHLORIDE SERPL-SCNC: 97 MMOL/L (ref 98–107)
CREAT SERPL-MCNC: 1.23 MG/DL (ref 0.51–0.95)
EGFRCR SERPLBLD CKD-EPI 2021: 45 ML/MIN/1.73M2
GLUCOSE SERPL-MCNC: 130 MG/DL (ref 70–99)
HCO3 SERPL-SCNC: 25 MMOL/L (ref 22–29)
POTASSIUM SERPL-SCNC: 4.6 MMOL/L (ref 3.4–5.3)
SODIUM SERPL-SCNC: 136 MMOL/L (ref 135–145)

## 2025-09-03 ENCOUNTER — HOSPITAL ENCOUNTER (OUTPATIENT)
Dept: CARDIOLOGY | Facility: CLINIC | Age: 80
Discharge: HOME | End: 2025-09-03
Payer: MEDICARE

## 2025-09-03 DIAGNOSIS — I50.21 ACUTE SYSTOLIC HEART FAILURE (H): ICD-10-CM

## 2025-09-03 DIAGNOSIS — I48.19 PERSISTENT ATRIAL FIBRILLATION (H): ICD-10-CM

## 2025-09-03 DIAGNOSIS — I50.9 NEW ONSET OF CONGESTIVE HEART FAILURE (H): ICD-10-CM

## 2025-09-03 LAB — LVEF ECHO: NORMAL

## 2025-09-03 PROCEDURE — 93306 TTE W/DOPPLER COMPLETE: CPT | Mod: 26 | Performed by: INTERNAL MEDICINE

## 2025-09-03 PROCEDURE — 93306 TTE W/DOPPLER COMPLETE: CPT

## (undated) DEVICE — GLOVE PROTEXIS BLUE W/NEU-THERA 7.5  2D73EB75

## (undated) DEVICE — MANIFOLD NEPTUNE 4 PORT 700-20

## (undated) DEVICE — MIDAS REX DISSECTING TOOL  14MH30

## (undated) DEVICE — PACK SPINE SM CUSTOM SNE15SSFSK

## (undated) DEVICE — BLADE CLIPPER 4412A

## (undated) DEVICE — DRAPE MICROSOPE ZEISS 52X150" 6120

## (undated) DEVICE — KIT PATIENT JACKSON 1 SPK10118

## (undated) DEVICE — PREP DURAPREP 26ML APL 8630

## (undated) DEVICE — SU STRATAFIX PDS PLUS 2-0 SPIRAL SH 30CM SXPP1B416

## (undated) DEVICE — SU VICRYL 0 CT-2 27" J334H

## (undated) DEVICE — SPONGE SURGIFOAM 12 1972

## (undated) DEVICE — SOL NACL 0.9% IRRIG 1000ML BOTTLE 2F7124

## (undated) DEVICE — GLOVE PROTEXIS MICRO 7.5  2D73PM75

## (undated) DEVICE — GLOVE PROTEXIS BLUE W/NEU-THERA 7.0  2D73EB70

## (undated) DEVICE — LINEN TOWEL PACK X5 5464

## (undated) DEVICE — ESU GROUND PAD UNIVERSAL W/O CORD

## (undated) DEVICE — SU VICRYL 2-0 CT-2 27" J333H

## (undated) DEVICE — POSITIONER PT PRONESAFE HEAD REST W/DERMAPROX INSERT 40599

## (undated) DEVICE — SOL WATER IRRIG 1000ML BOTTLE 2F7114

## (undated) DEVICE — DRAPE SHEET REV FOLD 3/4 9349

## (undated) DEVICE — GLOVE PROTEXIS W/NEU-THERA 7.0  2D73TE70

## (undated) DEVICE — SU VICRYL 3-0 PS-1 18" UND J683

## (undated) RX ORDER — POTASSIUM CHLORIDE 1500 MG/1
TABLET, EXTENDED RELEASE ORAL
Status: DISPENSED
Start: 2023-11-13

## (undated) RX ORDER — NALOXONE HYDROCHLORIDE 0.4 MG/ML
INJECTION, SOLUTION INTRAMUSCULAR; INTRAVENOUS; SUBCUTANEOUS
Status: DISPENSED
Start: 2023-11-13

## (undated) RX ORDER — FENTANYL CITRATE 50 UG/ML
INJECTION, SOLUTION INTRAMUSCULAR; INTRAVENOUS
Status: DISPENSED
Start: 2024-08-13

## (undated) RX ORDER — ONDANSETRON 2 MG/ML
INJECTION INTRAMUSCULAR; INTRAVENOUS
Status: DISPENSED
Start: 2018-05-15

## (undated) RX ORDER — FENTANYL CITRATE 50 UG/ML
INJECTION, SOLUTION INTRAMUSCULAR; INTRAVENOUS
Status: DISPENSED
Start: 2018-05-15

## (undated) RX ORDER — ACETAMINOPHEN 500 MG
TABLET ORAL
Status: DISPENSED
Start: 2019-10-01

## (undated) RX ORDER — CEFAZOLIN SODIUM 2 G/100ML
INJECTION, SOLUTION INTRAVENOUS
Status: DISPENSED
Start: 2019-10-01

## (undated) RX ORDER — FENTANYL CITRATE 50 UG/ML
INJECTION, SOLUTION INTRAMUSCULAR; INTRAVENOUS
Status: DISPENSED
Start: 2023-11-13

## (undated) RX ORDER — FENTANYL CITRATE 0.05 MG/ML
INJECTION, SOLUTION INTRAMUSCULAR; INTRAVENOUS
Status: DISPENSED
Start: 2019-10-01

## (undated) RX ORDER — FENTANYL CITRATE 50 UG/ML
INJECTION, SOLUTION INTRAMUSCULAR; INTRAVENOUS
Status: DISPENSED
Start: 2019-10-01

## (undated) RX ORDER — DEXAMETHASONE SODIUM PHOSPHATE 4 MG/ML
INJECTION, SOLUTION INTRA-ARTICULAR; INTRALESIONAL; INTRAMUSCULAR; INTRAVENOUS; SOFT TISSUE
Status: DISPENSED
Start: 2019-10-01

## (undated) RX ORDER — GLYCOPYRROLATE 0.2 MG/ML
INJECTION, SOLUTION INTRAMUSCULAR; INTRAVENOUS
Status: DISPENSED
Start: 2023-11-13

## (undated) RX ORDER — FLUMAZENIL 0.1 MG/ML
INJECTION, SOLUTION INTRAVENOUS
Status: DISPENSED
Start: 2023-11-13

## (undated) RX ORDER — REGADENOSON 0.08 MG/ML
INJECTION, SOLUTION INTRAVENOUS
Status: DISPENSED
Start: 2023-12-18

## (undated) RX ORDER — ONDANSETRON 2 MG/ML
INJECTION INTRAMUSCULAR; INTRAVENOUS
Status: DISPENSED
Start: 2019-10-01

## (undated) RX ORDER — LIDOCAINE HYDROCHLORIDE 40 MG/ML
SOLUTION TOPICAL
Status: DISPENSED
Start: 2023-11-13

## (undated) RX ORDER — HYDROCODONE BITARTRATE AND ACETAMINOPHEN 5; 325 MG/1; MG/1
TABLET ORAL
Status: DISPENSED
Start: 2019-10-01

## (undated) RX ORDER — LIDOCAINE HYDROCHLORIDE 20 MG/ML
INJECTION, SOLUTION EPIDURAL; INFILTRATION; INTRACAUDAL; PERINEURAL
Status: DISPENSED
Start: 2019-10-01

## (undated) RX ORDER — MAGNESIUM SULFATE HEPTAHYDRATE 40 MG/ML
INJECTION, SOLUTION INTRAVENOUS
Status: DISPENSED
Start: 2023-11-13

## (undated) RX ORDER — MAGNESIUM SULFATE HEPTAHYDRATE 40 MG/ML
INJECTION, SOLUTION INTRAVENOUS
Status: DISPENSED
Start: 2025-07-15